# Patient Record
Sex: FEMALE | Race: WHITE | HISPANIC OR LATINO | Employment: FULL TIME | URBAN - METROPOLITAN AREA
[De-identification: names, ages, dates, MRNs, and addresses within clinical notes are randomized per-mention and may not be internally consistent; named-entity substitution may affect disease eponyms.]

---

## 2017-07-10 DIAGNOSIS — Z12.11 COLON CANCER SCREENING: ICD-10-CM

## 2018-07-10 LAB — PAP SMEAR: NORMAL

## 2018-11-19 ENCOUNTER — TELEPHONE (OUTPATIENT)
Dept: ENDOSCOPY | Facility: HOSPITAL | Age: 63
End: 2018-11-19

## 2018-11-19 NOTE — TELEPHONE ENCOUNTER
----- Message from Kim Ross sent at 11/19/2018  4:14 PM CST -----  Contact: self [phone]  Kade - returning your call - please call patient at [phone]

## 2018-11-21 ENCOUNTER — OFFICE VISIT (OUTPATIENT)
Dept: GASTROENTEROLOGY | Facility: CLINIC | Age: 63
End: 2018-11-21
Payer: COMMERCIAL

## 2018-11-21 ENCOUNTER — TELEPHONE (OUTPATIENT)
Dept: ENDOSCOPY | Facility: HOSPITAL | Age: 63
End: 2018-11-21

## 2018-11-21 ENCOUNTER — LAB VISIT (OUTPATIENT)
Dept: LAB | Facility: HOSPITAL | Age: 63
End: 2018-11-21
Payer: COMMERCIAL

## 2018-11-21 VITALS
HEIGHT: 61 IN | WEIGHT: 141 LBS | BODY MASS INDEX: 26.62 KG/M2 | DIASTOLIC BLOOD PRESSURE: 77 MMHG | HEART RATE: 67 BPM | SYSTOLIC BLOOD PRESSURE: 138 MMHG

## 2018-11-21 DIAGNOSIS — Z12.11 COLON CANCER SCREENING: Primary | ICD-10-CM

## 2018-11-21 DIAGNOSIS — R10.13 EPIGASTRIC ABDOMINAL PAIN: ICD-10-CM

## 2018-11-21 PROCEDURE — 3008F BODY MASS INDEX DOCD: CPT | Mod: CPTII,S$GLB,, | Performed by: PHYSICIAN ASSISTANT

## 2018-11-21 PROCEDURE — 86677 HELICOBACTER PYLORI ANTIBODY: CPT

## 2018-11-21 PROCEDURE — 99213 OFFICE O/P EST LOW 20 MIN: CPT | Mod: S$GLB,,, | Performed by: PHYSICIAN ASSISTANT

## 2018-11-21 PROCEDURE — 36415 COLL VENOUS BLD VENIPUNCTURE: CPT

## 2018-11-21 PROCEDURE — 99999 PR PBB SHADOW E&M-EST. PATIENT-LVL III: CPT | Mod: PBBFAC,,, | Performed by: PHYSICIAN ASSISTANT

## 2018-11-21 NOTE — PROGRESS NOTES
[facility] Consultation Note    Reason for Consult:  The primary encounter diagnosis was Colon cancer screening. A diagnosis of Epigastric abdominal pain was also pertinent to this visit.    PCP:   Prem Denny   No address on file    Referring MD:  Aaareferral Self  No address on file    HPI:  This is a 63 y.o. female here for colon cancer screening    Last colonoscopy 20years ago for abdominal pain  No polyps    Epigastric abdominal pain  Onset-few months  Palliative/Worse - increases with eating heavy food  Quality- burning  Radiation-no   Severity-8/10  Timing- after meals  No N/V  Denies melena  S/p tyrone - had trouble with anesthesia 20yrs ago    GERD after meals- couple times a month  Denies dysphagia    ASA 81 daily twice daily- before bed    ROS:  Constitutional: No fevers, chills, No weight loss  ENT: No allergies  CV: No chest pain  Pulm: No cough, No shortness of breath  Ophtho: No vision changes  GI: see HPI  Derm: No rash  Heme: No lymphadenopathy, No bruising  MSK: No arthritis  : No dysuria, No hematuria  Endo: No hot or cold intolerance  Neuro: No syncope, No seizure  Psych: No anxiety, No depression    Medical History:  has a past medical history of Hyperlipidemia.    Surgical History:  has a past surgical history that includes Cholecystectomy.    Family History: family history is not on file..     Social History:  reports that  has never smoked. She does not have any smokeless tobacco history on file. She reports that she does not drink alcohol or use drugs.    Review of patient's allergies indicates:  No Known Allergies    Current Outpatient Medications on File Prior to Visit   Medication Sig Dispense Refill    fish oil-omega-3 fatty acids 300-1,000 mg capsule Take 2 g by mouth once daily.      aspirin (ECOTRIN) 81 MG EC tablet Take 81 mg by mouth twice a week.      benzocaine-menthol (CEPACOL SORE THROAT, BOLA-MEN,) 15-2.6 mg Lozg 1 lozenge by Mucous Membrane  "route every 4 to 6 hours as needed. 18 lozenge 0    benzonatate (TESSALON) 100 MG capsule Take 1 capsule (100 mg total) by mouth 3 (three) times daily as needed for Cough. 21 capsule 0    cetirizine (ZYRTEC) 10 MG tablet Take 10 mg by mouth once daily.      ketoconazole (NIZORAL) 2 % cream       peg-electrolyte soln (TRILYTE WITH FLAVOR PACKETS) 420 gram SolR Take 4,000 mLs by mouth as directed. 1 Bottle 0     No current facility-administered medications on file prior to visit.          Objective Findings:    Vital Signs:  /77   Pulse 67   Ht 5' 1" (1.549 m)   Wt 64 kg (141 lb)   BMI 26.64 kg/m²   Body mass index is 26.64 kg/m².    Physical Exam:  General Appearance: Well appearing in no acute distress  Head:   Normocephalic, without obvious abnormality  Eyes:    No scleral icterus  ENT: Neck supple, Lips, mucosa, and tongue normal  Lungs: CTA bilaterally in anterior and posterior fields, no wheezes, no crackles.  Heart:  Regular rate and rhythm, S1, S2 normal, no murmurs heard  Abdomen: Soft, non tender, non distended with positive bowel sounds in all four quadrants. Extremities: no edema  Skin: No rash  Neurologic: AAO x 3      Labs:  Lab Results   Component Value Date    WBC 8.70 10/16/2015    HGB 13.4 10/16/2015    HCT 39.9 10/16/2015     10/16/2015    CHOL 216 (H) 10/16/2015    TRIG 131 10/16/2015    HDL 36 (L) 10/16/2015    ALT 10 10/16/2015    AST 15 10/16/2015     10/16/2015    K 4.1 10/16/2015     10/16/2015    CREATININE 0.8 10/16/2015    BUN 13 10/16/2015    CO2 27 10/16/2015    TSH 4.132 (H) 10/16/2015    HGBA1C 5.5 10/16/2015       Imaging:    Endoscopy:      Assessment:  1. Colon cancer screening    2. Epigastric abdominal pain       62yo F here to schedule a colonoscopy    Occasional abdominal pain after meals. Will rule ou H. pylori    Recommendations:  1. Schedule colonoscopy to rule out malignancies    2. Lab to rule out H. pylori    No Follow-up on file.      Order " summary:  Orders Placed This Encounter    H. PYLORI ANTIBODY, IGG    Case request GI: COLONOSCOPY         Thank you so much for allowing me to participate in the care of Raine Gunnar Braun PA-C

## 2018-11-24 LAB — H PYLORI IGG SERPL QL IA: NEGATIVE

## 2018-11-27 DIAGNOSIS — Z12.11 SPECIAL SCREENING FOR MALIGNANT NEOPLASMS, COLON: Primary | ICD-10-CM

## 2018-11-27 RX ORDER — POLYETHYLENE GLYCOL 3350, SODIUM SULFATE ANHYDROUS, SODIUM BICARBONATE, SODIUM CHLORIDE, POTASSIUM CHLORIDE 236; 22.74; 6.74; 5.86; 2.97 G/4L; G/4L; G/4L; G/4L; G/4L
4 POWDER, FOR SOLUTION ORAL ONCE
Qty: 4000 ML | Refills: 0 | Status: SHIPPED | OUTPATIENT
Start: 2018-11-27 | End: 2018-11-27

## 2019-01-03 ENCOUNTER — ANESTHESIA (OUTPATIENT)
Dept: ENDOSCOPY | Facility: HOSPITAL | Age: 64
End: 2019-01-03
Payer: COMMERCIAL

## 2019-01-03 ENCOUNTER — ANESTHESIA EVENT (OUTPATIENT)
Dept: ENDOSCOPY | Facility: HOSPITAL | Age: 64
End: 2019-01-03
Payer: COMMERCIAL

## 2019-01-03 ENCOUNTER — HOSPITAL ENCOUNTER (OUTPATIENT)
Facility: HOSPITAL | Age: 64
Discharge: HOME | End: 2019-01-03
Attending: INTERNAL MEDICINE | Admitting: INTERNAL MEDICINE
Payer: COMMERCIAL

## 2019-01-03 VITALS
DIASTOLIC BLOOD PRESSURE: 75 MMHG | TEMPERATURE: 97 F | OXYGEN SATURATION: 100 % | WEIGHT: 142 LBS | RESPIRATION RATE: 20 BRPM | HEART RATE: 66 BPM | HEIGHT: 62 IN | SYSTOLIC BLOOD PRESSURE: 144 MMHG | BODY MASS INDEX: 26.13 KG/M2

## 2019-01-03 DIAGNOSIS — Z12.11 ENCOUNTER FOR SCREENING COLONOSCOPY: Primary | ICD-10-CM

## 2019-01-03 PROCEDURE — G0121 COLON CA SCRN NOT HI RSK IND: HCPCS | Performed by: INTERNAL MEDICINE

## 2019-01-03 PROCEDURE — 37000009 HC ANESTHESIA EA ADD 15 MINS: Performed by: INTERNAL MEDICINE

## 2019-01-03 PROCEDURE — E9220 PRA ENDO ANESTHESIA: ICD-10-PCS | Mod: ,,, | Performed by: NURSE ANESTHETIST, CERTIFIED REGISTERED

## 2019-01-03 PROCEDURE — 25000003 PHARM REV CODE 250: Performed by: INTERNAL MEDICINE

## 2019-01-03 PROCEDURE — 37000008 HC ANESTHESIA 1ST 15 MINUTES: Performed by: INTERNAL MEDICINE

## 2019-01-03 PROCEDURE — G0121 COLON CA SCRN NOT HI RSK IND: HCPCS | Mod: ,,, | Performed by: INTERNAL MEDICINE

## 2019-01-03 PROCEDURE — E9220 PRA ENDO ANESTHESIA: HCPCS | Mod: ,,, | Performed by: NURSE ANESTHETIST, CERTIFIED REGISTERED

## 2019-01-03 PROCEDURE — 63600175 PHARM REV CODE 636 W HCPCS: Performed by: NURSE ANESTHETIST, CERTIFIED REGISTERED

## 2019-01-03 PROCEDURE — G0121 COLON CA SCRN NOT HI RSK IND: ICD-10-PCS | Mod: ,,, | Performed by: INTERNAL MEDICINE

## 2019-01-03 RX ORDER — SODIUM CHLORIDE 0.9 % (FLUSH) 0.9 %
3 SYRINGE (ML) INJECTION
Status: DISCONTINUED | OUTPATIENT
Start: 2019-01-03 | End: 2019-01-03 | Stop reason: HOSPADM

## 2019-01-03 RX ORDER — SODIUM CHLORIDE 9 MG/ML
INJECTION, SOLUTION INTRAVENOUS CONTINUOUS
Status: DISCONTINUED | OUTPATIENT
Start: 2019-01-03 | End: 2019-01-03 | Stop reason: HOSPADM

## 2019-01-03 RX ORDER — LIDOCAINE HCL/PF 100 MG/5ML
SYRINGE (ML) INTRAVENOUS
Status: DISCONTINUED | OUTPATIENT
Start: 2019-01-03 | End: 2019-01-03

## 2019-01-03 RX ORDER — PROPOFOL 10 MG/ML
VIAL (ML) INTRAVENOUS
Status: DISCONTINUED | OUTPATIENT
Start: 2019-01-03 | End: 2019-01-03

## 2019-01-03 RX ORDER — PROPOFOL 10 MG/ML
VIAL (ML) INTRAVENOUS CONTINUOUS PRN
Status: DISCONTINUED | OUTPATIENT
Start: 2019-01-03 | End: 2019-01-03

## 2019-01-03 RX ADMIN — SODIUM CHLORIDE: 0.9 INJECTION, SOLUTION INTRAVENOUS at 09:01

## 2019-01-03 RX ADMIN — LIDOCAINE HYDROCHLORIDE 50 MG: 20 INJECTION, SOLUTION INTRAVENOUS at 09:01

## 2019-01-03 RX ADMIN — PROPOFOL 175 MCG/KG/MIN: 10 INJECTION, EMULSION INTRAVENOUS at 09:01

## 2019-01-03 RX ADMIN — PROPOFOL 80 MG: 10 INJECTION, EMULSION INTRAVENOUS at 09:01

## 2019-01-03 NOTE — DISCHARGE INSTRUCTIONS
Colonoscopy     A camera attached to a flexible tube with a viewing lens is used to take video pictures.     Colonoscopy is a test to view the inside of your lower digestive tract (colon and rectum). Sometimes it can show the last part of the small intestine (ileum). During the test, small pieces of tissue may be removed for testing. This is called a biopsy. Small growths, such as polyps, may also be removed.   Why is colonoscopy done?  The test is done to help look for colon cancer. And it can help find the source of abdominal pain, bleeding, and changes in bowel habits. It may be needed once a year, depending on factors such as your:  · Age  · Health history  · Family health history  · Symptoms  · Results from any prior colonoscopy  Risks and possible complications  These include:  · Bleeding               · A puncture or tear in the colon   · Risks of anesthesia  · A cancer lesion not being seen  Getting ready   To prepare for the test:  · Talk with your healthcare provider about the risks of the test (see below). Also ask your healthcare provider about alternatives to the test.  · Tell your healthcare provider about any medicines you take. Also tell him or her about any health conditions you may have.  · Make sure your rectum and colon are empty for the test. Follow the diet and bowel prep instructions exactly. If you dont, the test may need to be rescheduled.  · Plan for a friend or family member to drive you home after the test.     Colonoscopy provides an inside view of the entire colon.     You may discuss the results with your doctor right away or at a future visit.  During the test   The test is usually done in the hospital on an outpatient basis. This means you go home the same day. The procedure takes about 30 minutes. During that time:  · You are given relaxing (sedating) medicine through an IV line. You may be drowsy, or fully asleep.  · The healthcare provider will first give you a physical exam to  check for anal and rectal problems.  · Then the anus is lubricated and the scope inserted.  · If you are awake, you may have a feeling similar to needing to have a bowel movement. You may also feel pressure as air is pumped into the colon. Its OK to pass gas during the procedure.  · Biopsy, polyp removal, or other treatments may be done during the test.  After the test   You may have gas right after the test. It can help to try to pass it to help prevent later bloating. Your healthcare provider may discuss the results with you right away. Or you may need to schedule a follow-up visit to talk about the results. After the test, you can go back to your normal eating and other activities. You may be tired from the sedation and need to rest for a few hours.  Date Last Reviewed: 11/1/2016 © 2000-2017 [employer]. [address] [place] [address], [place] [address]. All rights reserved. This information is not intended as a substitute for professional medical care. Always follow your healthcare professional's instructions.

## 2019-01-03 NOTE — TRANSFER OF CARE
"Anesthesia Transfer of Care Note    Patient: Raine Medley    Procedure(s) Performed: Procedure(s) (LRB):  COLONOSCOPY (N/A)    Patient location: PACU    Anesthesia Type: general    Transport from OR: Transported from OR on 6-10 L/min O2 by face mask with adequate spontaneous ventilation    Post pain: adequate analgesia    Post assessment: no apparent anesthetic complications    Post vital signs: stable    Level of consciousness: sedated    Nausea/Vomiting: no nausea/vomiting    Complications: none    Transfer of care protocol was followed      Last vitals:   Visit Vitals  BP (!) 156/70 (BP Location: Left arm, Patient Position: Lying)   Pulse 67   Temp 36.6 °C (97.9 °F) (Temporal)   Resp 18   Ht 5' 2" (1.575 m)   Wt 64.4 kg (142 lb)   SpO2 99%   Breastfeeding? No   BMI 25.97 kg/m²     "

## 2019-01-03 NOTE — ANESTHESIA PREPROCEDURE EVALUATION
01/03/2019  Raine Medley is a 63 y.o., female.    Anesthesia Evaluation    I have reviewed the Patient Summary Reports.     I have reviewed the Medications.     Review of Systems  Hematology/Oncology:  Hematology Normal   Oncology Normal     EENT/Dental:EENT/Dental Normal   Cardiovascular:  Cardiovascular Normal  hyperlipidemia   Pulmonary:  Pulmonary Normal    Renal/:  Renal/ Normal     Hepatic/GI:  Hepatic/GI Normal    Musculoskeletal:  Musculoskeletal Normal    Neurological:  Neurology Normal    Endocrine:  Endocrine Normal    Dermatological:  Skin Normal        Physical Exam  General:  Well nourished    Airway/Jaw/Neck:  Airway Findings: Mouth Opening: Normal Mallampati: II  Jaw/Neck Findings:     Eyes/Ears/Nose:  EYES/EARS/NOSE FINDINGS: Normal   Dental:  Dental Findings: In tact   Chest/Lungs:  Chest/Lungs Clear    Heart/Vascular:  Heart Findings: Normal Heart murmur: negative Vascular Findings: Normal    Abdomen:  Abdomen Findings: Normal    Musculoskeletal:  Musculoskeletal Findings: Normal   Skin:  Skin Findings: Normal    Mental Status:  Mental Status Findings: Normal        Anesthesia Plan  Type of Anesthesia, risks & benefits discussed:  Anesthesia Type:  general  Patient's Preference: general  Intra-op Monitoring Plan: standard ASA monitors  Intra-op Monitoring Plan Comments:   Post Op Pain Control Plan:   Post Op Pain Control Plan Comments:   Induction:   IV  Beta Blocker:  Patient is not currently on a Beta-Blocker (No further documentation required).       Informed Consent: Patient understands risks and agrees with Anesthesia plan.  Questions answered. Anesthesia consent signed with patient.  ASA Score: 2     Day of Surgery Review of History & Physical:  There are no significant changes.          Ready For Surgery From Anesthesia Perspective.

## 2019-01-03 NOTE — [employer] PATIENT INSTRUCTIONS
Discharge Summary/Instructions after an Endoscopic Procedure  Patient Name: Raine Medley  Patient MRN: [id number]  Patient YOB: 1955 Thursday, January 03, 2019  Gaston Beebe MD  RESTRICTIONS:  During your procedure today, you received medications for sedation.  These   medications may affect your judgment, balance and coordination.  Therefore,   for 24 hours, you have the following restrictions:   - DO NOT drive a car, operate machinery, make legal/financial decisions,   sign important papers or drink alcohol.    ACTIVITY:  Today: no heavy lifting, straining or running due to procedural   sedation/anesthesia.  The following day: return to full activity including work.  DIET:  Eat and drink normally unless instructed otherwise.     TREATMENT FOR COMMON SIDE EFFECTS:  - Mild abdominal pain, nausea, belching, bloating or excessive gas:  rest,   eat lightly and use a heating pad.  - Sore Throat: treat with throat lozenges and/or gargle with warm salt   water.  - Because air was used during the procedure, expelling large amounts of air   from your rectum or belching is normal.  - If a bowel prep was taken, you may not have a bowel movement for 1-3 days.    This is normal.  SYMPTOMS TO WATCH FOR AND REPORT TO YOUR PHYSICIAN:  1. Abdominal pain or bloating, other than gas cramps.  2. Chest pain.  3. Back pain.  4. Signs of infection such as: chills or fever occurring within 24 hours   after the procedure.  5. Rectal bleeding, which would show as bright red, maroon, or black stools.   (A tablespoon of blood from the rectum is not serious, especially if   hemorrhoids are present.)  6. Vomiting.  7. Weakness or dizziness.  GO DIRECTLY TO THE NEAREST EMERGENCY ROOM IF YOU HAVE ANY OF THE FOLLOWING:      Difficulty breathing              Chills and/or fever over 101 F   Persistent vomiting and/or vomiting blood   Severe abdominal pain   Severe chest pain   Black, tarry stools   Bleeding- more than one  tablespoon   Any other symptom or condition that you feel may need urgent attention  Your doctor recommends these additional instructions:  If any biopsies were taken, your doctors clinic will contact you in 1 to 2   weeks with any results.  - Discharge patient to home.   - Patient has a contact number available for emergencies.  The signs and   symptoms of potential delayed complications were discussed with the   patient.  Return to normal activities tomorrow.  Written discharge   instructions were provided to the patient.   - Resume previous diet.   - Continue present medications.   - Repeat colonoscopy in 10 years for screening purposes.   - Return to referring physician.   For questions, problems or results please call your physician - Gaston Beebe MD at Work:  [phone].  [facility], EMERGENCY ROOM PHONE NUMBER: [phone]  IF A COMPLICATION OR EMERGENCY SITUATION ARISES AND YOU ARE UNABLE TO REACH   YOUR PHYSICIAN - GO DIRECTLY TO THE EMERGENCY ROOM.  Gaston Beebe MD  1/3/2019 9:56:53 AM  This report has been verified and signed electronically.  [employer]

## 2019-01-03 NOTE — H&P
Short Stay Endoscopy History and Physical    PCP - Prem Denny MD  Referring Physician - Italo Braun PA-C  [address] [place] [address]    Procedure - Colonoscopy  ASA - per anesthesia  Mallampati - per anesthesia  History of Anesthesia problems - no  Family history Anesthesia problems -  no   Plan of anesthesia - General    HPI  63 y.o. female  Reason for procedure: screening colonoscopy      ROS:  Constitutional: No fevers, chills, No weight loss  CV: No chest pain  Pulm: No cough, No shortness of breath  GI: see HPI    Medical History:  has a past medical history of Hyperlipidemia.    Surgical History:  has a past surgical history that includes Cholecystectomy.    Family History: family history is not on file.. Otherwise no colon cancer, inflammatory bowel disease, or GI malignancies.    Social History:  reports that  has never smoked. She does not have any smokeless tobacco history on file. She reports that she does not drink alcohol or use drugs.    Review of patient's allergies indicates:  No Known Allergies    Medications:   Medications Prior to Admission   Medication Sig Dispense Refill Last Dose    aspirin (ECOTRIN) 81 MG EC tablet Take 81 mg by mouth twice a week.   Taking    benzocaine-menthol (CEPACOL SORE THROAT, BOLA-MEN,) 15-2.6 mg Lozg 1 lozenge by Mucous Membrane route every 4 to 6 hours as needed. 18 lozenge 0 Not Taking    benzonatate (TESSALON) 100 MG capsule Take 1 capsule (100 mg total) by mouth 3 (three) times daily as needed for Cough. 21 capsule 0 Not Taking    cetirizine (ZYRTEC) 10 MG tablet Take 10 mg by mouth once daily.   Not Taking    fish oil-omega-3 fatty acids 300-1,000 mg capsule Take 2 g by mouth once daily.   Taking    ketoconazole (NIZORAL) 2 % cream    Not Taking    peg-electrolyte soln (TRILYTE WITH FLAVOR PACKETS) 420 gram SolR Take 4,000 mLs by mouth as directed. 1 Bottle 0 Not Taking       Physical Exam:    Vital Signs: There were no  vitals filed for this visit.    General Appearance: Well appearing in no acute distress  Abdomen: Soft, non tender, non distended with normal bowel sounds, no masses    Labs:  Lab Results   Component Value Date    WBC 8.70 10/16/2015    HGB 13.4 10/16/2015    HCT 39.9 10/16/2015     10/16/2015    CHOL 216 (H) 10/16/2015    TRIG 131 10/16/2015    HDL 36 (L) 10/16/2015    ALT 10 10/16/2015    AST 15 10/16/2015     10/16/2015    K 4.1 10/16/2015     10/16/2015    CREATININE 0.8 10/16/2015    BUN 13 10/16/2015    CO2 27 10/16/2015    TSH 4.132 (H) 10/16/2015    HGBA1C 5.5 10/16/2015       I have explained the risks and benefits of this endoscopic procedure to the patient including but not limited to bleeding, inflammation, infection, perforation, and death.      Gaston Beebe MD

## 2019-01-10 ENCOUNTER — TELEPHONE (OUTPATIENT)
Dept: ENDOSCOPY | Facility: HOSPITAL | Age: 64
End: 2019-01-10

## 2019-01-29 DIAGNOSIS — M54.2 CERVICALGIA: Primary | ICD-10-CM

## 2019-01-29 DIAGNOSIS — R10.9 STOMACH PAIN: Primary | ICD-10-CM

## 2019-01-29 LAB
CHOLEST SERPL-MSCNC: 204 MG/DL (ref 100–200)
HDLC SERPL-MCNC: 34 MG/DL (ref 35–70)
LDLC SERPL CALC-MCNC: 138 MG/DL (ref 0–99)
TRIGL SERPL-MCNC: 158 MG/DL (ref 40–160)
VLDLC SERPL-MCNC: 32 MG/DL (ref 5–40)

## 2019-02-11 ENCOUNTER — TELEPHONE (OUTPATIENT)
Dept: GASTROENTEROLOGY | Facility: CLINIC | Age: 64
End: 2019-02-11

## 2019-02-11 NOTE — TELEPHONE ENCOUNTER
Attempted to return pt call pt didn't answer left vm instructing pt to call back.         ----- Message from Rohini Joaquin sent at 2/11/2019 11:53 AM CST -----  Contact: self  Pt is returning a call she missed from the office to schedule a procedure.    Pt would like a call back at [phone]. Per pt, please leave a message if she doesn't answer.     Thank you

## 2019-02-22 ENCOUNTER — OFFICE VISIT (OUTPATIENT)
Dept: GASTROENTEROLOGY | Facility: CLINIC | Age: 64
End: 2019-02-22
Payer: COMMERCIAL

## 2019-02-22 ENCOUNTER — TELEPHONE (OUTPATIENT)
Dept: ENDOSCOPY | Facility: HOSPITAL | Age: 64
End: 2019-02-22

## 2019-02-22 VITALS
HEIGHT: 61 IN | SYSTOLIC BLOOD PRESSURE: 147 MMHG | WEIGHT: 138 LBS | HEART RATE: 60 BPM | BODY MASS INDEX: 26.06 KG/M2 | DIASTOLIC BLOOD PRESSURE: 76 MMHG

## 2019-02-22 DIAGNOSIS — R10.13 EPIGASTRIC PAIN: Primary | ICD-10-CM

## 2019-02-22 DIAGNOSIS — R14.0 ABDOMINAL BLOATING: ICD-10-CM

## 2019-02-22 PROCEDURE — 99214 PR OFFICE/OUTPT VISIT, EST, LEVL IV, 30-39 MIN: ICD-10-PCS | Mod: S$GLB,,, | Performed by: PHYSICIAN ASSISTANT

## 2019-02-22 PROCEDURE — 3008F PR BODY MASS INDEX (BMI) DOCUMENTED: ICD-10-PCS | Mod: CPTII,S$GLB,, | Performed by: PHYSICIAN ASSISTANT

## 2019-02-22 PROCEDURE — 3008F BODY MASS INDEX DOCD: CPT | Mod: CPTII,S$GLB,, | Performed by: PHYSICIAN ASSISTANT

## 2019-02-22 PROCEDURE — 99999 PR PBB SHADOW E&M-EST. PATIENT-LVL IV: CPT | Mod: PBBFAC,,, | Performed by: PHYSICIAN ASSISTANT

## 2019-02-22 PROCEDURE — 99999 PR PBB SHADOW E&M-EST. PATIENT-LVL IV: ICD-10-PCS | Mod: PBBFAC,,, | Performed by: PHYSICIAN ASSISTANT

## 2019-02-22 PROCEDURE — 99214 OFFICE O/P EST MOD 30 MIN: CPT | Mod: S$GLB,,, | Performed by: PHYSICIAN ASSISTANT

## 2019-02-22 RX ORDER — PANTOPRAZOLE SODIUM 40 MG/1
40 TABLET, DELAYED RELEASE ORAL DAILY
Qty: 30 TABLET | Refills: 3 | Status: SHIPPED | OUTPATIENT
Start: 2019-02-22 | End: 2020-11-04

## 2019-02-22 NOTE — LETTER
February 22, 2019      Prem Denny MD  [address]  [address]  [facility] [address]           [facility] Gastroenter[facility]  [address]  [place] [address]  Phone: [phone]  Fax: [phone]          Patient: Raine Medley   MR Number: [id number]   YOB: 1955   Date of Visit: 2/22/2019       Dear Dr. Prem Denny:    Thank you for referring Raine Medley to me for evaluation. Attached you will find relevant portions of my assessment and plan of care.    If you have questions, please do not hesitate to call me. I look forward to following Raine Medley along with you.    Sincerely,    COLEMAN Seguraosure  CC:  No Recipients    If you would like to receive this communication electronically, please contact [email or web address] or [phone] to request more information on [employer] access.    For providers and/or their staff who would like to refer a patient to [facility], please contact us through our one-stop-shop provider referral line, [facility], at [phone].    If you feel you have received this communication in error or would no longer like to receive these types of communications, please e-mail [email or web address]

## 2019-02-22 NOTE — PROGRESS NOTES
[facility] Consultation Note    Reason for Consult:  The primary encounter diagnosis was Epigastric pain. A diagnosis of Abdominal bloating was also pertinent to this visit.    PCP:   Prem Denny   [address] / [facility] [address]    Referring MD:  Prem Denny Md  [address]  [address]  [place] [address]    HPI:  This is a 63 y.o. female here to follow up on her abdominal pain  HP IgG neg  Epigastric pain 1-2 times a week  Feels like a rock  Trigger Fried foods and acidic foods  Frequent bloating - even with drinking water  prilosec made her stomach hurt more  S/p tyrone    11/2018 visit notes  Epigastric abdominal pain  Onset-few months  Palliative/Worse - increases with eating heavy food  Quality- burning  Radiation-no   Severity-8/10  Timing- after meals  No N/V  Denies melena  S/p tyrone - had trouble with anesthesia 20yrs ago    GERD after meals- couple times a month  Denies dysphagia    ASA 81 daily- before bed    ROS:  Constitutional: No fevers, chills, No weight loss  ENT: No allergies  CV: No chest pain  Pulm: No cough, No shortness of breath  Ophtho: No vision changes  GI: see HPI  Derm: No rash  Heme: No lymphadenopathy, No bruising  MSK: No arthritis  : No dysuria, No hematuria  Endo: No hot or cold intolerance  Neuro: No syncope, No seizure  Psych: No anxiety, No depression    Medical History:  has a past medical history of Hyperlipidemia.    Surgical History:  has a past surgical history that includes Cholecystectomy and Colonoscopy (N/A, 1/3/2019).    Family History: family history is not on file..     Social History:  reports that  has never smoked. she has never used smokeless tobacco. She reports that she does not drink alcohol or use drugs.    Review of patient's allergies indicates:  No Known Allergies    Current Outpatient Medications on File Prior to Visit   Medication Sig Dispense Refill    aspirin (ECOTRIN) 81 MG EC tablet  "Take 81 mg by mouth twice a week.      benzocaine-menthol (CEPACOL SORE THROAT, BOLA-MEN,) 15-2.6 mg Lozg 1 lozenge by Mucous Membrane route every 4 to 6 hours as needed. 18 lozenge 0    benzonatate (TESSALON) 100 MG capsule Take 1 capsule (100 mg total) by mouth 3 (three) times daily as needed for Cough. 21 capsule 0    cetirizine (ZYRTEC) 10 MG tablet Take 10 mg by mouth once daily.      fish oil-omega-3 fatty acids 300-1,000 mg capsule Take 2 g by mouth once daily.      ketoconazole (NIZORAL) 2 % cream       peg-electrolyte soln (TRILYTE WITH FLAVOR PACKETS) 420 gram SolR Take 4,000 mLs by mouth as directed. 1 Bottle 0     No current facility-administered medications on file prior to visit.          Objective Findings:    Vital Signs:  BP (!) 147/76   Pulse 60   Ht 5' 1" (1.549 m)   Wt 62.6 kg (138 lb)   BMI 26.07 kg/m²   Body mass index is 26.07 kg/m².    Physical Exam:  General Appearance: Well appearing in no acute distress  Head:   Normocephalic, without obvious abnormality  Eyes:    No scleral icterus  ENT: Neck supple, Lips, mucosa, and tongue normal  Lungs: CTA bilaterally in anterior and posterior fields, no wheezes, no crackles.  Heart:  Regular rate and rhythm, S1, S2 normal, no murmurs heard  Abdomen: Soft, epigastric tenderness, no guarding or rebound tenderness non distended with positive bowel sounds in all four quadrants. Extremities: no edema  Skin: No rash  Neurologic: AAO x 3      Labs:  Lab Results   Component Value Date    WBC 8.70 10/16/2015    HGB 13.4 10/16/2015    HCT 39.9 10/16/2015     10/16/2015    CHOL 216 (H) 10/16/2015    TRIG 131 10/16/2015    HDL 36 (L) 10/16/2015    ALT 10 10/16/2015    AST 15 10/16/2015     10/16/2015    K 4.1 10/16/2015     10/16/2015    CREATININE 0.8 10/16/2015    BUN 13 10/16/2015    CO2 27 10/16/2015    TSH 4.132 (H) 10/16/2015    HGBA1C 5.5 10/16/2015       Imaging:    Endoscopy:      Assessment:  1. Epigastric pain    2. " Abdominal bloating       64yo F with epigastric pain 1-2 times a week with meals. ASA 81 on empty stomach. >HP neg. No anemia. Need to rule out ulcers    Recommendations:  1. Schedule EGD to rule out gastric ulcers  2. Trial of protonix 40mg x 12 weeks    No Follow-up on file.      Order summary:  Orders Placed This Encounter    pantoprazole (PROTONIX) 40 MG tablet    Case request GI: EGD (ESOPHAGOGASTRODUODENOSCOPY)         Thank you so much for allowing me to participate in the care of Raine Braun PA-C

## 2019-04-04 ENCOUNTER — TELEPHONE (OUTPATIENT)
Dept: GASTROENTEROLOGY | Facility: CLINIC | Age: 64
End: 2019-04-04

## 2019-04-04 NOTE — TELEPHONE ENCOUNTER
Ma contact pt no answer left message to return call       ----- Message from Alexia Mejía sent at 4/4/2019 11:47 AM CDT -----  Contact: Self  Pt is calling to speak with Staff regarding cancelling her 4/15/19 appt.    She can be reached at [phone].    Thank you.

## 2020-07-31 ENCOUNTER — LAB VISIT (OUTPATIENT)
Dept: PRIMARY CARE CLINIC | Facility: OTHER | Age: 65
End: 2020-07-31
Attending: INTERNAL MEDICINE
Payer: COMMERCIAL

## 2020-07-31 DIAGNOSIS — Z03.818 ENCOUNTER FOR OBSERVATION FOR SUSPECTED EXPOSURE TO OTHER BIOLOGICAL AGENTS RULED OUT: ICD-10-CM

## 2020-07-31 PROCEDURE — U0003 INFECTIOUS AGENT DETECTION BY NUCLEIC ACID (DNA OR RNA); SEVERE ACUTE RESPIRATORY SYNDROME CORONAVIRUS 2 (SARS-COV-2) (CORONAVIRUS DISEASE [COVID-19]), AMPLIFIED PROBE TECHNIQUE, MAKING USE OF HIGH THROUGHPUT TECHNOLOGIES AS DESCRIBED BY CMS-2020-01-R: HCPCS

## 2020-08-03 LAB — SARS-COV-2 RNA RESP QL NAA+PROBE: NORMAL

## 2020-10-21 ENCOUNTER — PATIENT OUTREACH (OUTPATIENT)
Dept: ADMINISTRATIVE | Facility: HOSPITAL | Age: 65
End: 2020-10-21

## 2020-10-21 NOTE — PROGRESS NOTES
Chart audit performed - Health maintenance updated - My chart portal message sent. Unable to update links d/t error

## 2020-11-04 ENCOUNTER — OFFICE VISIT (OUTPATIENT)
Dept: FAMILY MEDICINE | Facility: CLINIC | Age: 65
End: 2020-11-04
Payer: COMMERCIAL

## 2020-11-04 VITALS
SYSTOLIC BLOOD PRESSURE: 132 MMHG | WEIGHT: 136.88 LBS | OXYGEN SATURATION: 100 % | HEIGHT: 61 IN | DIASTOLIC BLOOD PRESSURE: 84 MMHG | BODY MASS INDEX: 25.84 KG/M2 | TEMPERATURE: 97 F | HEART RATE: 60 BPM

## 2020-11-04 DIAGNOSIS — Z11.4 ENCOUNTER FOR SCREENING FOR HIV: ICD-10-CM

## 2020-11-04 DIAGNOSIS — Z12.31 ENCOUNTER FOR SCREENING MAMMOGRAM FOR BREAST CANCER: ICD-10-CM

## 2020-11-04 DIAGNOSIS — Z00.00 ANNUAL PHYSICAL EXAM: Primary | ICD-10-CM

## 2020-11-04 PROCEDURE — 99999 PR PBB SHADOW E&M-EST. PATIENT-LVL IV: CPT | Mod: PBBFAC,,, | Performed by: FAMILY MEDICINE

## 2020-11-04 PROCEDURE — 99386 PREV VISIT NEW AGE 40-64: CPT | Mod: S$GLB,,, | Performed by: FAMILY MEDICINE

## 2020-11-04 PROCEDURE — 3008F BODY MASS INDEX DOCD: CPT | Mod: CPTII,S$GLB,, | Performed by: FAMILY MEDICINE

## 2020-11-04 PROCEDURE — 99999 PR PBB SHADOW E&M-EST. PATIENT-LVL IV: ICD-10-PCS | Mod: PBBFAC,,, | Performed by: FAMILY MEDICINE

## 2020-11-04 PROCEDURE — 99386 PR PREVENTIVE VISIT,NEW,40-64: ICD-10-PCS | Mod: S$GLB,,, | Performed by: FAMILY MEDICINE

## 2020-11-04 PROCEDURE — 3008F PR BODY MASS INDEX (BMI) DOCUMENTED: ICD-10-PCS | Mod: CPTII,S$GLB,, | Performed by: FAMILY MEDICINE

## 2020-11-04 NOTE — PROGRESS NOTES
Subjective:       Patient ID: Raine Medley is a 65 y.o. female.    Chief Complaint: Establish Care and Annual Exam    65 years old female came to the clinic for her physical examination.  Patient due for her mammogram.  Patient due for her screening for HIV.    Review of Systems   Constitutional: Negative.    HENT: Negative.    Eyes: Negative.    Respiratory: Negative.    Cardiovascular: Negative.    Gastrointestinal: Negative.    Genitourinary: Negative.    Musculoskeletal: Negative.    Neurological: Negative.    Psychiatric/Behavioral: Negative.          Objective:      Physical Exam  Constitutional:       General: She is not in acute distress.     Appearance: She is well-developed. She is not diaphoretic.   HENT:      Head: Normocephalic and atraumatic.      Right Ear: External ear normal.      Left Ear: External ear normal.      Nose: Nose normal.      Mouth/Throat:      Pharynx: No oropharyngeal exudate.   Eyes:      General: No scleral icterus.        Right eye: No discharge.         Left eye: No discharge.      Conjunctiva/sclera: Conjunctivae normal.      Pupils: Pupils are equal, round, and reactive to light.   Neck:      Musculoskeletal: Normal range of motion and neck supple.      Thyroid: No thyromegaly.      Vascular: No JVD.      Trachea: No tracheal deviation.   Cardiovascular:      Rate and Rhythm: Normal rate and regular rhythm.      Heart sounds: Normal heart sounds. No murmur. No friction rub. No gallop.    Pulmonary:      Effort: Pulmonary effort is normal. No respiratory distress.      Breath sounds: Normal breath sounds. No stridor. No wheezing or rales.   Chest:      Chest wall: No tenderness.   Abdominal:      General: Bowel sounds are normal. There is no distension.      Palpations: Abdomen is soft. There is no mass.      Tenderness: There is no abdominal tenderness. There is no guarding or rebound.   Musculoskeletal: Normal range of motion.         General: No tenderness.    Lymphadenopathy:      Cervical: No cervical adenopathy.   Skin:     General: Skin is warm and dry.      Coloration: Skin is not pale.      Findings: No erythema or rash.   Neurological:      Mental Status: She is alert and oriented to person, place, and time.      Cranial Nerves: No cranial nerve deficit.      Motor: No abnormal muscle tone.      Coordination: Coordination normal.      Deep Tendon Reflexes: Reflexes are normal and symmetric.   Psychiatric:         Behavior: Behavior normal.         Thought Content: Thought content normal.         Judgment: Judgment normal.         Assessment:       1. Annual physical exam    2. Encounter for screening for HIV    3. Encounter for screening mammogram for breast cancer        Plan:         Raine was seen today for establish care and annual exam.    Diagnoses and all orders for this visit:    Annual physical exam  -     Lipid Panel; Future  -     Urinalysis; Future  -     TSH; Future  -     CBC Auto Differential; Future  -     Comprehensive Metabolic Panel; Future  -     HIV 1/2 Ag/Ab (4th Gen); Future    Encounter for screening for HIV  -     HIV 1/2 Ag/Ab (4th Gen); Future    Encounter for screening mammogram for breast cancer  -     Mammo Digital Screening Bilat; Future

## 2020-11-21 ENCOUNTER — HOSPITAL ENCOUNTER (OUTPATIENT)
Dept: RADIOLOGY | Facility: HOSPITAL | Age: 65
Discharge: HOME | End: 2020-11-21
Attending: FAMILY MEDICINE
Payer: COMMERCIAL

## 2020-11-21 VITALS — HEIGHT: 61 IN | BODY MASS INDEX: 25.68 KG/M2 | WEIGHT: 136 LBS

## 2020-11-21 DIAGNOSIS — Z12.31 ENCOUNTER FOR SCREENING MAMMOGRAM FOR BREAST CANCER: ICD-10-CM

## 2020-11-21 PROCEDURE — 77063 MAMMO DIGITAL SCREENING BILAT WITH TOMO: ICD-10-PCS | Mod: 26,,, | Performed by: RADIOLOGY

## 2020-11-21 PROCEDURE — 77067 SCR MAMMO BI INCL CAD: CPT | Mod: 26,,, | Performed by: RADIOLOGY

## 2020-11-21 PROCEDURE — 77067 MAMMO DIGITAL SCREENING BILAT WITH TOMO: ICD-10-PCS | Mod: 26,,, | Performed by: RADIOLOGY

## 2020-11-21 PROCEDURE — 77067 SCR MAMMO BI INCL CAD: CPT | Mod: TC

## 2020-11-21 PROCEDURE — 77063 BREAST TOMOSYNTHESIS BI: CPT | Mod: 26,,, | Performed by: RADIOLOGY

## 2021-03-30 ENCOUNTER — OFFICE VISIT (OUTPATIENT)
Dept: FAMILY MEDICINE | Facility: CLINIC | Age: 66
End: 2021-03-30
Payer: COMMERCIAL

## 2021-03-30 VITALS
DIASTOLIC BLOOD PRESSURE: 66 MMHG | HEART RATE: 66 BPM | OXYGEN SATURATION: 98 % | WEIGHT: 133.38 LBS | BODY MASS INDEX: 25.18 KG/M2 | SYSTOLIC BLOOD PRESSURE: 112 MMHG | HEIGHT: 61 IN

## 2021-03-30 DIAGNOSIS — G89.29 CHRONIC PAIN OF BOTH KNEES: ICD-10-CM

## 2021-03-30 DIAGNOSIS — M25.512 CHRONIC LEFT SHOULDER PAIN: ICD-10-CM

## 2021-03-30 DIAGNOSIS — R07.89 ATYPICAL CHEST PAIN: Primary | ICD-10-CM

## 2021-03-30 DIAGNOSIS — E78.5 DYSLIPIDEMIA: ICD-10-CM

## 2021-03-30 DIAGNOSIS — M25.561 CHRONIC PAIN OF BOTH KNEES: ICD-10-CM

## 2021-03-30 DIAGNOSIS — G89.29 CHRONIC LEFT SHOULDER PAIN: ICD-10-CM

## 2021-03-30 DIAGNOSIS — M25.562 CHRONIC PAIN OF BOTH KNEES: ICD-10-CM

## 2021-03-30 PROCEDURE — 3008F BODY MASS INDEX DOCD: CPT | Mod: CPTII,S$GLB,, | Performed by: FAMILY MEDICINE

## 2021-03-30 PROCEDURE — 93005 EKG 12-LEAD: ICD-10-PCS | Mod: S$GLB,,, | Performed by: FAMILY MEDICINE

## 2021-03-30 PROCEDURE — 99214 OFFICE O/P EST MOD 30 MIN: CPT | Mod: S$GLB,,, | Performed by: FAMILY MEDICINE

## 2021-03-30 PROCEDURE — 93010 EKG 12-LEAD: ICD-10-PCS | Mod: S$GLB,,, | Performed by: INTERNAL MEDICINE

## 2021-03-30 PROCEDURE — 99999 PR PBB SHADOW E&M-EST. PATIENT-LVL IV: CPT | Mod: PBBFAC,,, | Performed by: FAMILY MEDICINE

## 2021-03-30 PROCEDURE — 99214 PR OFFICE/OUTPT VISIT, EST, LEVL IV, 30-39 MIN: ICD-10-PCS | Mod: S$GLB,,, | Performed by: FAMILY MEDICINE

## 2021-03-30 PROCEDURE — 93005 ELECTROCARDIOGRAM TRACING: CPT | Mod: S$GLB,,, | Performed by: FAMILY MEDICINE

## 2021-03-30 PROCEDURE — 3008F PR BODY MASS INDEX (BMI) DOCUMENTED: ICD-10-PCS | Mod: CPTII,S$GLB,, | Performed by: FAMILY MEDICINE

## 2021-03-30 PROCEDURE — 93010 ELECTROCARDIOGRAM REPORT: CPT | Mod: S$GLB,,, | Performed by: INTERNAL MEDICINE

## 2021-03-30 PROCEDURE — 1125F AMNT PAIN NOTED PAIN PRSNT: CPT | Mod: S$GLB,,, | Performed by: FAMILY MEDICINE

## 2021-03-30 PROCEDURE — 99999 PR PBB SHADOW E&M-EST. PATIENT-LVL IV: ICD-10-PCS | Mod: PBBFAC,,, | Performed by: FAMILY MEDICINE

## 2021-03-30 PROCEDURE — 1125F PR PAIN SEVERITY QUANTIFIED, PAIN PRESENT: ICD-10-PCS | Mod: S$GLB,,, | Performed by: FAMILY MEDICINE

## 2021-03-30 RX ORDER — CYCLOBENZAPRINE HCL 10 MG
TABLET ORAL
COMMUNITY
End: 2022-01-06 | Stop reason: SDUPTHER

## 2021-03-30 RX ORDER — OMEPRAZOLE 20 MG/1
CAPSULE, DELAYED RELEASE ORAL
COMMUNITY
End: 2022-05-19

## 2021-03-30 RX ORDER — TRIAMCINOLONE ACETONIDE 1 MG/G
CREAM TOPICAL
COMMUNITY
End: 2022-05-19

## 2021-03-30 RX ORDER — IBUPROFEN 800 MG/1
TABLET ORAL
COMMUNITY
End: 2022-01-06 | Stop reason: SDUPTHER

## 2021-03-31 ENCOUNTER — LAB VISIT (OUTPATIENT)
Dept: LAB | Facility: HOSPITAL | Age: 66
End: 2021-03-31
Attending: FAMILY MEDICINE
Payer: COMMERCIAL

## 2021-03-31 DIAGNOSIS — E78.5 DYSLIPIDEMIA: ICD-10-CM

## 2021-03-31 LAB
ALBUMIN SERPL BCP-MCNC: 3.9 G/DL (ref 3.5–5.2)
ALP SERPL-CCNC: 82 U/L (ref 55–135)
ALT SERPL W/O P-5'-P-CCNC: 11 U/L (ref 10–44)
ANION GAP SERPL CALC-SCNC: 10 MMOL/L (ref 8–16)
AST SERPL-CCNC: 16 U/L (ref 10–40)
BASOPHILS # BLD AUTO: 0.04 K/UL (ref 0–0.2)
BASOPHILS NFR BLD: 0.7 % (ref 0–1.9)
BILIRUB SERPL-MCNC: 0.4 MG/DL (ref 0.1–1)
BUN SERPL-MCNC: 14 MG/DL (ref 8–23)
CALCIUM SERPL-MCNC: 9.1 MG/DL (ref 8.7–10.5)
CHLORIDE SERPL-SCNC: 105 MMOL/L (ref 95–110)
CHOLEST SERPL-MCNC: 185 MG/DL (ref 120–199)
CHOLEST/HDLC SERPL: 5.4 {RATIO} (ref 2–5)
CO2 SERPL-SCNC: 27 MMOL/L (ref 23–29)
CREAT SERPL-MCNC: 0.7 MG/DL (ref 0.5–1.4)
DIFFERENTIAL METHOD: NORMAL
EOSINOPHIL # BLD AUTO: 0.2 K/UL (ref 0–0.5)
EOSINOPHIL NFR BLD: 3 % (ref 0–8)
ERYTHROCYTE [DISTWIDTH] IN BLOOD BY AUTOMATED COUNT: 12.6 % (ref 11.5–14.5)
EST. GFR  (AFRICAN AMERICAN): >60 ML/MIN/1.73 M^2
EST. GFR  (NON AFRICAN AMERICAN): >60 ML/MIN/1.73 M^2
GLUCOSE SERPL-MCNC: 94 MG/DL (ref 70–110)
HCT VFR BLD AUTO: 41.3 % (ref 37–48.5)
HDLC SERPL-MCNC: 34 MG/DL (ref 40–75)
HDLC SERPL: 18.4 % (ref 20–50)
HGB BLD-MCNC: 13.2 G/DL (ref 12–16)
IMM GRANULOCYTES # BLD AUTO: 0.02 K/UL (ref 0–0.04)
IMM GRANULOCYTES NFR BLD AUTO: 0.3 % (ref 0–0.5)
LDLC SERPL CALC-MCNC: 123.8 MG/DL (ref 63–159)
LYMPHOCYTES # BLD AUTO: 1.5 K/UL (ref 1–4.8)
LYMPHOCYTES NFR BLD: 24.2 % (ref 18–48)
MCH RBC QN AUTO: 28.8 PG (ref 27–31)
MCHC RBC AUTO-ENTMCNC: 32 G/DL (ref 32–36)
MCV RBC AUTO: 90 FL (ref 82–98)
MONOCYTES # BLD AUTO: 0.4 K/UL (ref 0.3–1)
MONOCYTES NFR BLD: 6.9 % (ref 4–15)
NEUTROPHILS # BLD AUTO: 3.9 K/UL (ref 1.8–7.7)
NEUTROPHILS NFR BLD: 64.9 % (ref 38–73)
NONHDLC SERPL-MCNC: 151 MG/DL
NRBC BLD-RTO: 0 /100 WBC
PLATELET # BLD AUTO: 228 K/UL (ref 150–450)
PMV BLD AUTO: 12.8 FL (ref 9.2–12.9)
POTASSIUM SERPL-SCNC: 4.1 MMOL/L (ref 3.5–5.1)
PROT SERPL-MCNC: 7.3 G/DL (ref 6–8.4)
RBC # BLD AUTO: 4.59 M/UL (ref 4–5.4)
SODIUM SERPL-SCNC: 142 MMOL/L (ref 136–145)
TRIGL SERPL-MCNC: 136 MG/DL (ref 30–150)
TSH SERPL DL<=0.005 MIU/L-ACNC: 2.26 UIU/ML (ref 0.4–4)
WBC # BLD AUTO: 5.98 K/UL (ref 3.9–12.7)

## 2021-03-31 PROCEDURE — 36415 COLL VENOUS BLD VENIPUNCTURE: CPT | Mod: PO | Performed by: FAMILY MEDICINE

## 2021-03-31 PROCEDURE — 80061 LIPID PANEL: CPT | Performed by: FAMILY MEDICINE

## 2021-03-31 PROCEDURE — 80053 COMPREHEN METABOLIC PANEL: CPT | Performed by: FAMILY MEDICINE

## 2021-03-31 PROCEDURE — 85025 COMPLETE CBC W/AUTO DIFF WBC: CPT | Performed by: FAMILY MEDICINE

## 2021-03-31 PROCEDURE — 84443 ASSAY THYROID STIM HORMONE: CPT | Performed by: FAMILY MEDICINE

## 2021-04-01 ENCOUNTER — CLINICAL SUPPORT (OUTPATIENT)
Dept: REHABILITATION | Facility: HOSPITAL | Age: 66
End: 2021-04-01
Attending: FAMILY MEDICINE
Payer: COMMERCIAL

## 2021-04-01 DIAGNOSIS — M25.561 CHRONIC PAIN OF BOTH KNEES: ICD-10-CM

## 2021-04-01 DIAGNOSIS — M25.562 CHRONIC PAIN OF BOTH KNEES: ICD-10-CM

## 2021-04-01 DIAGNOSIS — G89.29 CHRONIC PAIN OF BOTH KNEES: ICD-10-CM

## 2021-04-01 PROCEDURE — 97161 PT EVAL LOW COMPLEX 20 MIN: CPT | Mod: PO

## 2021-04-01 PROCEDURE — 97110 THERAPEUTIC EXERCISES: CPT | Mod: PO,97

## 2021-05-04 ENCOUNTER — CLINICAL SUPPORT (OUTPATIENT)
Dept: REHABILITATION | Facility: HOSPITAL | Age: 66
End: 2021-05-04
Attending: FAMILY MEDICINE
Payer: COMMERCIAL

## 2021-05-04 DIAGNOSIS — M25.669 IMPAIRED RANGE OF MOTION OF KNEE, UNSPECIFIED LATERALITY: ICD-10-CM

## 2021-05-04 DIAGNOSIS — R29.898 DECREASED STRENGTH OF LOWER EXTREMITY: ICD-10-CM

## 2021-05-04 PROCEDURE — 97110 THERAPEUTIC EXERCISES: CPT | Mod: PO

## 2021-05-11 ENCOUNTER — CLINICAL SUPPORT (OUTPATIENT)
Dept: REHABILITATION | Facility: HOSPITAL | Age: 66
End: 2021-05-11
Attending: FAMILY MEDICINE
Payer: COMMERCIAL

## 2021-05-11 DIAGNOSIS — R29.898 DECREASED STRENGTH OF LOWER EXTREMITY: ICD-10-CM

## 2021-05-11 DIAGNOSIS — M25.669 IMPAIRED RANGE OF MOTION OF KNEE, UNSPECIFIED LATERALITY: ICD-10-CM

## 2021-05-11 PROCEDURE — 97110 THERAPEUTIC EXERCISES: CPT | Mod: PO,CQ,97

## 2021-05-18 ENCOUNTER — CLINICAL SUPPORT (OUTPATIENT)
Dept: REHABILITATION | Facility: HOSPITAL | Age: 66
End: 2021-05-18
Attending: FAMILY MEDICINE
Payer: COMMERCIAL

## 2021-05-18 DIAGNOSIS — M25.669 IMPAIRED RANGE OF MOTION OF KNEE, UNSPECIFIED LATERALITY: ICD-10-CM

## 2021-05-18 DIAGNOSIS — R29.898 DECREASED STRENGTH OF LOWER EXTREMITY: ICD-10-CM

## 2021-05-18 PROCEDURE — 97110 THERAPEUTIC EXERCISES: CPT | Mod: PO,CQ,97

## 2021-05-25 ENCOUNTER — DOCUMENTATION ONLY (OUTPATIENT)
Dept: REHABILITATION | Facility: HOSPITAL | Age: 66
End: 2021-05-25

## 2021-06-10 ENCOUNTER — CLINICAL SUPPORT (OUTPATIENT)
Dept: REHABILITATION | Facility: HOSPITAL | Age: 66
End: 2021-06-10
Attending: FAMILY MEDICINE
Payer: COMMERCIAL

## 2021-06-10 DIAGNOSIS — M25.669 IMPAIRED RANGE OF MOTION OF KNEE, UNSPECIFIED LATERALITY: ICD-10-CM

## 2021-06-10 DIAGNOSIS — R29.898 DECREASED STRENGTH OF LOWER EXTREMITY: ICD-10-CM

## 2021-06-10 PROCEDURE — 97110 THERAPEUTIC EXERCISES: CPT | Mod: PO,97

## 2021-06-15 ENCOUNTER — CLINICAL SUPPORT (OUTPATIENT)
Dept: REHABILITATION | Facility: HOSPITAL | Age: 66
End: 2021-06-15
Attending: FAMILY MEDICINE
Payer: COMMERCIAL

## 2021-06-15 DIAGNOSIS — R29.898 DECREASED STRENGTH OF LOWER EXTREMITY: Primary | ICD-10-CM

## 2021-06-15 DIAGNOSIS — M25.669 IMPAIRED RANGE OF MOTION OF KNEE, UNSPECIFIED LATERALITY: ICD-10-CM

## 2021-06-15 PROCEDURE — 97110 THERAPEUTIC EXERCISES: CPT | Mod: PO,97

## 2021-06-17 ENCOUNTER — CLINICAL SUPPORT (OUTPATIENT)
Dept: REHABILITATION | Facility: HOSPITAL | Age: 66
End: 2021-06-17
Attending: FAMILY MEDICINE
Payer: COMMERCIAL

## 2021-06-17 DIAGNOSIS — M25.669 IMPAIRED RANGE OF MOTION OF KNEE, UNSPECIFIED LATERALITY: ICD-10-CM

## 2021-06-17 DIAGNOSIS — R29.898 DECREASED STRENGTH OF LOWER EXTREMITY: ICD-10-CM

## 2021-06-17 PROCEDURE — 97110 THERAPEUTIC EXERCISES: CPT | Mod: PO,CQ,97

## 2021-06-22 ENCOUNTER — CLINICAL SUPPORT (OUTPATIENT)
Dept: REHABILITATION | Facility: HOSPITAL | Age: 66
End: 2021-06-22
Attending: FAMILY MEDICINE
Payer: COMMERCIAL

## 2021-06-22 DIAGNOSIS — R29.898 DECREASED STRENGTH OF LOWER EXTREMITY: ICD-10-CM

## 2021-06-22 DIAGNOSIS — M25.669 IMPAIRED RANGE OF MOTION OF KNEE, UNSPECIFIED LATERALITY: ICD-10-CM

## 2021-06-22 PROCEDURE — 97110 THERAPEUTIC EXERCISES: CPT | Mod: PO,CQ

## 2021-06-24 ENCOUNTER — CLINICAL SUPPORT (OUTPATIENT)
Dept: REHABILITATION | Facility: HOSPITAL | Age: 66
End: 2021-06-24
Payer: COMMERCIAL

## 2021-06-24 DIAGNOSIS — R29.898 DECREASED STRENGTH OF LOWER EXTREMITY: Primary | ICD-10-CM

## 2021-06-24 PROCEDURE — 97110 THERAPEUTIC EXERCISES: CPT | Mod: PO

## 2021-06-29 ENCOUNTER — CLINICAL SUPPORT (OUTPATIENT)
Dept: REHABILITATION | Facility: HOSPITAL | Age: 66
End: 2021-06-29
Attending: FAMILY MEDICINE
Payer: COMMERCIAL

## 2021-06-29 DIAGNOSIS — R29.898 DECREASED STRENGTH OF LOWER EXTREMITY: Primary | ICD-10-CM

## 2021-06-29 PROCEDURE — 97110 THERAPEUTIC EXERCISES: CPT | Mod: PO,97

## 2022-01-06 ENCOUNTER — OFFICE VISIT (OUTPATIENT)
Dept: FAMILY MEDICINE | Facility: CLINIC | Age: 67
End: 2022-01-06
Payer: COMMERCIAL

## 2022-01-06 VITALS
TEMPERATURE: 99 F | WEIGHT: 132.25 LBS | HEART RATE: 82 BPM | BODY MASS INDEX: 24.97 KG/M2 | SYSTOLIC BLOOD PRESSURE: 112 MMHG | HEIGHT: 61 IN | DIASTOLIC BLOOD PRESSURE: 64 MMHG | OXYGEN SATURATION: 99 %

## 2022-01-06 DIAGNOSIS — H92.09 EAR ACHE: Primary | ICD-10-CM

## 2022-01-06 DIAGNOSIS — M25.521 RIGHT ELBOW PAIN: ICD-10-CM

## 2022-01-06 DIAGNOSIS — M54.2 NECK PAIN: ICD-10-CM

## 2022-01-06 DIAGNOSIS — L91.8 SKIN TAGS, MULTIPLE ACQUIRED: ICD-10-CM

## 2022-01-06 DIAGNOSIS — R42 DIZZINESSES: ICD-10-CM

## 2022-01-06 PROCEDURE — 3074F SYST BP LT 130 MM HG: CPT | Mod: CPTII,S$GLB,, | Performed by: INTERNAL MEDICINE

## 2022-01-06 PROCEDURE — 3074F PR MOST RECENT SYSTOLIC BLOOD PRESSURE < 130 MM HG: ICD-10-PCS | Mod: CPTII,S$GLB,, | Performed by: INTERNAL MEDICINE

## 2022-01-06 PROCEDURE — 99214 PR OFFICE/OUTPT VISIT, EST, LEVL IV, 30-39 MIN: ICD-10-PCS | Mod: S$GLB,,, | Performed by: INTERNAL MEDICINE

## 2022-01-06 PROCEDURE — 3288F PR FALLS RISK ASSESSMENT DOCUMENTED: ICD-10-PCS | Mod: CPTII,S$GLB,, | Performed by: INTERNAL MEDICINE

## 2022-01-06 PROCEDURE — 1101F PR PT FALLS ASSESS DOC 0-1 FALLS W/OUT INJ PAST YR: ICD-10-PCS | Mod: CPTII,S$GLB,, | Performed by: INTERNAL MEDICINE

## 2022-01-06 PROCEDURE — 1101F PT FALLS ASSESS-DOCD LE1/YR: CPT | Mod: CPTII,S$GLB,, | Performed by: INTERNAL MEDICINE

## 2022-01-06 PROCEDURE — 1126F AMNT PAIN NOTED NONE PRSNT: CPT | Mod: CPTII,S$GLB,, | Performed by: INTERNAL MEDICINE

## 2022-01-06 PROCEDURE — 3078F DIAST BP <80 MM HG: CPT | Mod: CPTII,S$GLB,, | Performed by: INTERNAL MEDICINE

## 2022-01-06 PROCEDURE — 3008F BODY MASS INDEX DOCD: CPT | Mod: CPTII,S$GLB,, | Performed by: INTERNAL MEDICINE

## 2022-01-06 PROCEDURE — 3288F FALL RISK ASSESSMENT DOCD: CPT | Mod: CPTII,S$GLB,, | Performed by: INTERNAL MEDICINE

## 2022-01-06 PROCEDURE — 1126F PR PAIN SEVERITY QUANTIFIED, NO PAIN PRESENT: ICD-10-PCS | Mod: CPTII,S$GLB,, | Performed by: INTERNAL MEDICINE

## 2022-01-06 PROCEDURE — 99214 OFFICE O/P EST MOD 30 MIN: CPT | Mod: S$GLB,,, | Performed by: INTERNAL MEDICINE

## 2022-01-06 PROCEDURE — 1159F PR MEDICATION LIST DOCUMENTED IN MEDICAL RECORD: ICD-10-PCS | Mod: CPTII,S$GLB,, | Performed by: INTERNAL MEDICINE

## 2022-01-06 PROCEDURE — 1160F PR REVIEW ALL MEDS BY PRESCRIBER/CLIN PHARMACIST DOCUMENTED: ICD-10-PCS | Mod: CPTII,S$GLB,, | Performed by: INTERNAL MEDICINE

## 2022-01-06 PROCEDURE — 1160F RVW MEDS BY RX/DR IN RCRD: CPT | Mod: CPTII,S$GLB,, | Performed by: INTERNAL MEDICINE

## 2022-01-06 PROCEDURE — 99999 PR PBB SHADOW E&M-EST. PATIENT-LVL III: CPT | Mod: PBBFAC,,, | Performed by: INTERNAL MEDICINE

## 2022-01-06 PROCEDURE — 3008F PR BODY MASS INDEX (BMI) DOCUMENTED: ICD-10-PCS | Mod: CPTII,S$GLB,, | Performed by: INTERNAL MEDICINE

## 2022-01-06 PROCEDURE — 99999 PR PBB SHADOW E&M-EST. PATIENT-LVL III: ICD-10-PCS | Mod: PBBFAC,,, | Performed by: INTERNAL MEDICINE

## 2022-01-06 PROCEDURE — 3078F PR MOST RECENT DIASTOLIC BLOOD PRESSURE < 80 MM HG: ICD-10-PCS | Mod: CPTII,S$GLB,, | Performed by: INTERNAL MEDICINE

## 2022-01-06 PROCEDURE — 1159F MED LIST DOCD IN RCRD: CPT | Mod: CPTII,S$GLB,, | Performed by: INTERNAL MEDICINE

## 2022-01-06 RX ORDER — CYCLOBENZAPRINE HCL 10 MG
10 TABLET ORAL 2 TIMES DAILY PRN
Qty: 30 TABLET | Refills: 0 | Status: SHIPPED | OUTPATIENT
Start: 2022-01-06 | End: 2022-01-16

## 2022-01-06 RX ORDER — MECLIZINE HCL 12.5 MG 12.5 MG/1
12.5 TABLET ORAL 3 TIMES DAILY PRN
Qty: 20 TABLET | Refills: 0 | Status: SHIPPED | OUTPATIENT
Start: 2022-01-06 | End: 2022-05-19

## 2022-01-06 RX ORDER — FLUTICASONE PROPIONATE 50 MCG
1 SPRAY, SUSPENSION (ML) NASAL DAILY
Qty: 16 G | Refills: 1 | Status: SHIPPED | OUTPATIENT
Start: 2022-01-06 | End: 2023-05-12

## 2022-01-06 RX ORDER — NAPROXEN 500 MG/1
500 TABLET ORAL 2 TIMES DAILY PRN
Qty: 30 TABLET | Refills: 1 | Status: SHIPPED | OUTPATIENT
Start: 2022-01-06 | End: 2022-04-12

## 2022-01-06 NOTE — PROGRESS NOTES
Subjective:       Patient ID: Raine Medley is a 66 y.o. female.    Chief Complaint: Otalgia (Left ) and Elbow Injury (Right )      HPI  Raine Medley is a 66 y.o. female with chronic conditions of HLD, neck pain, TMJ, who presents today for evaluation of left ear pain and right elbow pain.    Reports has been having episodes of dizziness, on and off,  when turning her head to the left. Her left ear feels clogged and neck has been sore.Deneis fever, chills, or close contact with anyone sick. Had a repid test a few days ago which was negative.    Also reports a week ago started with right elbow pain with no swelling or known injury. The pain does not radiate. Denies weakness or skin discoloration.    Health Maintenance:  Health Maintenance   Topic Date Due    DEXA SCAN  Never done    Mammogram  11/21/2021    TETANUS VACCINE  10/08/2025    Lipid Panel  03/31/2026    Hepatitis C Screening  Completed       Review of Systems   Constitutional: Negative for chills and fever.   HENT: Positive for nasal congestion and ear pain.    Respiratory: Negative for cough and shortness of breath.    Cardiovascular: Negative for chest pain and palpitations.   Gastrointestinal: Negative.    Genitourinary: Negative.    Musculoskeletal: Positive for neck pain.   Neurological: Positive for dizziness.      Past Medical History:   Diagnosis Date    Hyperlipidemia        Past Surgical History:   Procedure Laterality Date    CHOLECYSTECTOMY      COLONOSCOPY N/A 1/3/2019    Procedure: COLONOSCOPY;  Surgeon: Gaston Beebe MD;  Location: [address];  Service: Endoscopy;  Laterality: N/A;       Family History   Problem Relation Age of Onset    Melanoma Neg Hx     Lupus Neg Hx     Psoriasis Neg Hx     Colon cancer Neg Hx     Esophageal cancer Neg Hx     Stomach cancer Neg Hx        Social History     Socioeconomic History    Marital status:    Occupational History     Employer: [employer]   Tobacco Use    Smoking status: Never Smoker    Smokeless tobacco: Never Used   Substance and Sexual Activity    Alcohol use: No    Drug use: No   Social History Narrative    She lives in [place] with her 15-year-old son in [place]. 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 She is from [facility].        Current Outpatient Medications   Medication Sig Dispense Refill    aspirin (ECOTRIN) 81 MG EC tablet Take 81 mg by mouth twice a week.      carbamide peroxide (DEBROX) 6.5 % otic solution Place 5 drops into both ears 2 (two) times daily. for 7 days 18 mL 0    cetirizine (ZYRTEC) 10 MG tablet Take 10 mg by mouth once daily.      cyclobenzaprine (FLEXERIL) 10 MG tablet Take 1 tablet (10 mg total) by mouth 2 (two) times daily as needed for Muscle spasms. 30 tablet 0    fish oil-omega-3 fatty acids 300-1,000 mg capsule Take 2 g by mouth once daily.      fluticasone propionate (FLONASE) 50 mcg/actuation nasal spray 1 spray (50 mcg total) by Each Nostril route once daily. 16 g 1    ketoconazole (NIZORAL) 2 % cream       meclizine (ANTIVERT) 12.5 mg tablet Take 1 tablet (12.5 mg total) by mouth 3 (three) times daily as needed for Dizziness. 20 tablet 0    naproxen (NAPROSYN) 500 MG tablet Take 1 tablet (500 mg total) by mouth 2 (two) times daily as needed (pain). 30 tablet 1    omeprazole (PRILOSEC) 20 MG capsule omeprazole 20 mg capsule,delayed release   Take 1 capsule every day by oral route.      triamcinolone acetonide 0.1% (KENALOG) 0.1 % cream triamcinolone acetonide 0.1 % topical cream       No current facility-administered medications for this visit.       Review of patient's allergies indicates:  No Known Allergies      Objective:       Last 3 sets of Vitals    Vitals - 1 value per visit 11/21/2020 3/30/2021 1/6/2022   SYSTOLIC - 112 112   DIASTOLIC - 66 64   PULSE - 66 82   TEMPERATURE - - 98.5   RESPIRATIONS - - -   SPO2 - 98 99   Weight (lb) 136  "133.38 132.28   Weight (kg) 61.689 60.5 60   HEIGHT 5' 1" 5' 1" 5' 1"   BODY MASS INDEX 25.7 25.2 24.99   VISIT REPORT - - -   Pain Score  - 7 0   Physical Exam  Constitutional:       Appearance: Normal appearance.   HENT:      Head: Normocephalic.      Right Ear: Tympanic membrane, ear canal and external ear normal.      Left Ear: Tympanic membrane, ear canal and external ear normal.      Ears:      Comments: Wax present.     Nose: Nose normal.      Mouth/Throat:      Mouth: Mucous membranes are moist.   Eyes:      General: No scleral icterus.     Extraocular Movements: Extraocular movements intact.      Conjunctiva/sclera: Conjunctivae normal.      Pupils: Pupils are equal, round, and reactive to light.   Neck:      Vascular: No carotid bruit.   Cardiovascular:      Rate and Rhythm: Normal rate and regular rhythm.      Pulses: Normal pulses.      Heart sounds: Normal heart sounds.   Pulmonary:      Effort: Pulmonary effort is normal.      Breath sounds: Normal breath sounds.   Abdominal:      General: Bowel sounds are normal. There is no distension.      Palpations: Abdomen is soft. There is no mass.      Tenderness: There is no abdominal tenderness.   Musculoskeletal:         General: Tenderness (right elbow tenderness on lateral side of olecranon fossa and epicondile.) present. No swelling. Normal range of motion.      Cervical back: Neck supple.      Comments: Neck spasms.   Lymphadenopathy:      Cervical: No cervical adenopathy.   Skin:     General: Skin is warm.   Neurological:      General: No focal deficit present.      Mental Status: She is alert and oriented to person, place, and time.      Motor: No weakness.   Psychiatric:         Mood and Affect: Mood normal.         Behavior: Behavior normal.           CBC:  Recent Labs   Lab 11/21/20  0937 11/21/20  0937 03/31/21  0840   WBC 7.73   < > 5.98   RBC 4.96   < > 4.59   Hemoglobin 14.2   < > 13.2   Hematocrit 43.5   < > 41.3   Platelets 250   < > 228   MCV " 88   < > 90   MCH 28.6   < > 28.8   MCHC 32.6  --  32.0    < > = values in this interval not displayed.     CMP:  Recent Labs   Lab 11/21/20  0937 11/21/20  0937 03/31/21  0840   Glucose 101   < > 94   Calcium 9.5   < > 9.1   Albumin 4.2   < > 3.9   Total Protein 7.6   < > 7.3   Sodium 143   < > 142   Potassium 4.6   < > 4.1   CO2 28   < > 27   Chloride 105   < > 105   BUN 10   < > 14   Creatinine 0.7   < > 0.7   Alkaline Phosphatase 89   < > 82   ALT 12   < > 11   AST 16   < > 16   Total Bilirubin 0.4  --  0.4    < > = values in this interval not displayed.     URINALYSIS:  Recent Labs   Lab 11/21/20  0933   Color, UA Yellow   Specific Gravity, UA 1.020   pH, UA >=9.0   Protein, UA Negative   Nitrite, UA Negative   Leukocytes, UA Negative   Urobilinogen, UA Negative      LIPIDS:  Recent Labs   Lab 01/29/19  0000 11/21/20  0937 03/31/21  0840   TSH  --  1.781 2.262   HDL 34 A 40 34 L   Cholesterol 204 A 204 H 185   Triglycerides 158 125 136   LDL Cholesterol 138 A 139.0 123.8   HDL/Cholesterol Ratio  --  19.6 L 18.4 L   Non-HDL Cholesterol  --  164 151   Total Cholesterol/HDL Ratio  --  5.1 H 5.4 H     TSH:  Recent Labs   Lab 11/21/20  0937 03/31/21  0840   TSH 1.781 2.262       A1C:        Imaging:  Mammo Digital Screening Bilat w/ Otilio  Narrative: Result:  Mammo Digital Screening Bilat w/ Otilio    History:  Patient is 65 y.o. and is seen for a screening mammogram.    Films Compared:  Compared to: 07/18/2017 Mammo Digital Screening Bilat, 11/25/2015 Mammo   Digital Diagnostic Right with Tomosynthesis_CAD, and 10/29/2015 Mammo   Digital Screening Bilat with CAD    Findings:  This procedure was performed using tomosynthesis. Computer-aided detection   was utilized in the interpretation of this examination.     The breasts have scattered areas of fibroglandular density. There is no   evidence of suspicious masses, microcalcifications or architectural   distortion.  Impression: No mammographic evidence of  malignancy.    BI-RADS Category 1: Negative    Recommendation:  Routine screening mammogram in 1 year is recommended.    Your estimated lifetime risk of breast cancer (to age 85) based on   Tyrer-Cuzick risk assessment model is Tyrer-Cuzick: 5 %. According to the   American Cancer Society, patients with a lifetime breast cancer risk of   20% or higher might benefit from supplemental screening tests.      Assessment:       1. Ear ache    2. Right elbow pain    3. Neck pain          Plan:       Raine was seen today for otalgia and elbow injury.    Diagnoses and all orders for this visit:    Ear ache  -    No signs of infection. Possible allergic sinusitis  Or viral infection with increased pressure on left ear and causing vertigo.    - fluticasone propionate (FLONASE) 50 mcg/actuation nasal spray; 1 spray (50 mcg total) by Each Nostril route once daily.  -     carbamide peroxide (DEBROX) 6.5 % otic solution; Place 5 drops into both ears 2 (two) times daily. for 7 days  - Consider ENT evaluation if symptoms persist.    Right elbow pain  -     Bursitis vs epicondylitis.  -  naproxen (NAPROSYN) 500 MG tablet; Take 1 tablet (500 mg total) by mouth 2 (two) times daily as needed (pain).  - Ortho evaluation if no improvement.    Neck pain  -     naproxen (NAPROSYN) 500 MG tablet; Take 1 tablet (500 mg total) by mouth 2 (two) times daily as needed (pain).  -     cyclobenzaprine (FLEXERIL) 10 MG tablet; Take 1 tablet (10 mg total) by mouth 2 (two) times daily as needed for Muscle spasms.  - If no improvement consider imaging and therapy.    Dizzinesses  -     fluticasone propionate (FLONASE) 50 mcg/actuation nasal spray; 1 spray (50 mcg total) by Each Nostril route once daily.  -     carbamide peroxide (DEBROX) 6.5 % otic solution; Place 5 drops into both ears 2 (two) times daily. for 7 days  -     meclizine (ANTIVERT) 12.5 mg tablet; Take 1 tablet (12.5 mg total) by mouth 3 (three) times daily as needed for Dizziness.    Skin  tags, multiple acquired  -     Ambulatory referral/consult to Dermatology; Future      Health Maintenance Due   Topic Date Due    Pneumococcal Vaccines (Age 65+) (1 of 2 - PPSV23) Never done    DEXA SCAN  Never done    Shingles Vaccine (2 of 3) 11/24/2017    Influenza Vaccine (1) Never done    COVID-19 Vaccine (3 - Booster for Pfizer series) 09/27/2021    Mammogram  11/21/2021        Maribell Gotti MD  [facility]  Disclaimer:  This note has been generated using voice-recognition software. There may be grammatical or spelling errors that have been missed during proof-reading

## 2022-02-16 ENCOUNTER — OFFICE VISIT (OUTPATIENT)
Dept: URGENT CARE | Facility: CLINIC | Age: 67
End: 2022-02-16
Payer: COMMERCIAL

## 2022-02-16 VITALS
RESPIRATION RATE: 18 BRPM | OXYGEN SATURATION: 99 % | TEMPERATURE: 97 F | SYSTOLIC BLOOD PRESSURE: 170 MMHG | DIASTOLIC BLOOD PRESSURE: 72 MMHG | WEIGHT: 132 LBS | HEART RATE: 66 BPM | BODY MASS INDEX: 24.92 KG/M2 | HEIGHT: 61 IN

## 2022-02-16 DIAGNOSIS — S20.222A CONTUSION OF LEFT BACK WALL OF THORAX, INITIAL ENCOUNTER: Primary | ICD-10-CM

## 2022-02-16 PROCEDURE — 3077F PR MOST RECENT SYSTOLIC BLOOD PRESSURE >= 140 MM HG: ICD-10-PCS | Mod: CPTII,S$GLB,, | Performed by: NURSE PRACTITIONER

## 2022-02-16 PROCEDURE — 96372 THER/PROPH/DIAG INJ SC/IM: CPT | Mod: S$GLB,,, | Performed by: NURSE PRACTITIONER

## 2022-02-16 PROCEDURE — 3078F DIAST BP <80 MM HG: CPT | Mod: CPTII,S$GLB,, | Performed by: NURSE PRACTITIONER

## 2022-02-16 PROCEDURE — 71046 X-RAY EXAM CHEST 2 VIEWS: CPT | Mod: S$GLB,,, | Performed by: RADIOLOGY

## 2022-02-16 PROCEDURE — 3078F PR MOST RECENT DIASTOLIC BLOOD PRESSURE < 80 MM HG: ICD-10-PCS | Mod: CPTII,S$GLB,, | Performed by: NURSE PRACTITIONER

## 2022-02-16 PROCEDURE — 99203 PR OFFICE/OUTPT VISIT, NEW, LEVL III, 30-44 MIN: ICD-10-PCS | Mod: 25,S$GLB,, | Performed by: NURSE PRACTITIONER

## 2022-02-16 PROCEDURE — 99203 OFFICE O/P NEW LOW 30 MIN: CPT | Mod: 25,S$GLB,, | Performed by: NURSE PRACTITIONER

## 2022-02-16 PROCEDURE — 1159F MED LIST DOCD IN RCRD: CPT | Mod: CPTII,S$GLB,, | Performed by: NURSE PRACTITIONER

## 2022-02-16 PROCEDURE — 1160F RVW MEDS BY RX/DR IN RCRD: CPT | Mod: CPTII,S$GLB,, | Performed by: NURSE PRACTITIONER

## 2022-02-16 PROCEDURE — 96372 PR INJECTION,THERAP/PROPH/DIAG2ST, IM OR SUBCUT: ICD-10-PCS | Mod: S$GLB,,, | Performed by: NURSE PRACTITIONER

## 2022-02-16 PROCEDURE — 3008F PR BODY MASS INDEX (BMI) DOCUMENTED: ICD-10-PCS | Mod: CPTII,S$GLB,, | Performed by: NURSE PRACTITIONER

## 2022-02-16 PROCEDURE — 1160F PR REVIEW ALL MEDS BY PRESCRIBER/CLIN PHARMACIST DOCUMENTED: ICD-10-PCS | Mod: CPTII,S$GLB,, | Performed by: NURSE PRACTITIONER

## 2022-02-16 PROCEDURE — 3008F BODY MASS INDEX DOCD: CPT | Mod: CPTII,S$GLB,, | Performed by: NURSE PRACTITIONER

## 2022-02-16 PROCEDURE — 3077F SYST BP >= 140 MM HG: CPT | Mod: CPTII,S$GLB,, | Performed by: NURSE PRACTITIONER

## 2022-02-16 PROCEDURE — 1159F PR MEDICATION LIST DOCUMENTED IN MEDICAL RECORD: ICD-10-PCS | Mod: CPTII,S$GLB,, | Performed by: NURSE PRACTITIONER

## 2022-02-16 PROCEDURE — 71046 XR CHEST PA AND LATERAL: ICD-10-PCS | Mod: S$GLB,,, | Performed by: RADIOLOGY

## 2022-02-16 RX ORDER — KETOROLAC TROMETHAMINE 30 MG/ML
30 INJECTION, SOLUTION INTRAMUSCULAR; INTRAVENOUS
Status: COMPLETED | OUTPATIENT
Start: 2022-02-16 | End: 2022-02-16

## 2022-02-16 RX ADMIN — KETOROLAC TROMETHAMINE 30 MG: 30 INJECTION, SOLUTION INTRAMUSCULAR; INTRAVENOUS at 07:02

## 2022-02-17 NOTE — PROGRESS NOTES
"Subjective:       Patient ID: Raine Medley is a 66 y.o. female.    Vitals:  height is 5' 1" (1.549 m) and weight is 59.9 kg (132 lb). Her temperature is 97.3 °F (36.3 °C). Her blood pressure is 170/72 (abnormal) and her pulse is 66. Her respiration is 18 and oxygen saturation is 99%.     Chief Complaint: Fall and Back pain radiating to left side    Patient states was walking to the bathroom in the middle of the night and feel, hitting her L posterior-lateral mid back on the tub.  C/o pain to L mid back, + bruising, pain that radiates to the LUQ.   Denies fever, chills, N/V/D, hematemesis, chest pain, SOB  Denies pelvic pain, flank pain      Fall  The accident occurred 12 to 24 hours ago (11p). The fall occurred while walking. She landed on hard floor. There was no blood loss. The pain is present in the back (left side of stomach). The pain is at a severity of 8/10. The pain is moderate. The symptoms are aggravated by standing and movement. Associated symptoms include abdominal pain. Pertinent negatives include no fever, headaches, loss of consciousness, nausea, numbness or vomiting. She has tried ice and NSAID for the symptoms. The treatment provided no relief.       Constitution: Negative for chills, sweating, fatigue and fever.   Cardiovascular: Negative for chest pain, palpitations and sob on exertion.   Respiratory: Negative for chest tightness, cough and shortness of breath.    Gastrointestinal: Positive for abdominal pain. Negative for abdominal trauma, abdominal bloating, nausea, vomiting, constipation, diarrhea, bright red blood in stool, dark colored stools and rectal bleeding.        LUQ   Genitourinary: Negative for dysuria, frequency, urgency, flank pain and pelvic pain.   Musculoskeletal: Positive for trauma and back pain. Negative for joint pain, joint swelling, muscle cramps and muscle ache.   Skin: Positive for bruising. Negative for color change, pale and rash.   Neurological: Negative for " dizziness, light-headedness, headaches, loss of consciousness, numbness and tingling.       Objective:      Physical Exam   Constitutional:  Non-toxic appearance. She does not appear ill. No distress.   HENT:   Head: Normocephalic and atraumatic.   Cardiovascular: Normal rate and regular rhythm.   Pulmonary/Chest: Effort normal. No respiratory distress.   Abdominal: Normal appearance and bowel sounds are normal. She exhibits no distension and no mass. Soft. flat abdomenThere is abdominal tenderness. There is no rebound, no guarding, no left CVA tenderness and no right CVA tenderness.      Comments: Mild tenderness with deep palpation of LUQ  typany on percussion    Neurological: She is alert.   Skin: Skin is not diaphoretic.   Nursing note and vitals reviewed.        XR CHEST PA AND LATERAL    Result Date: 2/16/2022  EXAMINATION: XR CHEST PA AND LATERAL CLINICAL HISTORY: Contusion of thorax, unspecified, initial encounter TECHNIQUE: PA and lateral views of the chest were performed. COMPARISON: 10/08/2015. FINDINGS: The trachea is unremarkable.  The cardiomediastinal silhouette is within normal limits.  The hemidiaphragms are unremarkable.  There are no pleural effusions.  There is no evidence of a pneumothorax.  There is no evidence of pneumomediastinum.  No airspace opacity is present. There are degenerative changes in the osseous structures.  There is a hiatal hernia.  There are postop changes right upper abdominal quadrant.     No acute process. Electronically signed by: Marito Brooks MD Date:    02/16/2022 Time:    19:49    Assessment:       1. Contusion of left back wall of thorax, initial encounter          Plan:         Contusion of left back wall of thorax, initial encounter  -     XR CHEST PA AND LATERAL; Future; Expected date: 02/16/2022  -     ketorolac injection 30 mg                 Patient Instructions   Patient Education       Contusion Discharge Instructions   About this topic   A contusion is also  called a bruise. A bruise happens when blood vessels under the skin break. The blood leaks into the tissues and causes pain and swelling. It also causes skin discoloration that starts as red, blue, or purple and changes to green or yellow as the bruise heals.     What care is needed at home?   · Ask your doctor what you need to do when you go home. Make sure you ask questions if you do not understand what the doctor says.  · Rest your bruised area. You may want to place the bruised area on pillows when you rest. Slowly increase your activity level as you are able to.  · Use an elastic bandage or compression pants to help limit swelling.  · Place an ice pack or a bag of frozen vegetables wrapped in a towel over the painful part. Never put ice right on the skin. Use ice every 1 to 2 hours for 10 to 15 minutes at a time. Use for the first 24 to 48 hours after your injury.  · You may want to take medicine like ibuprofen, naproxen, or acetaminophen to help with pain.  What follow-up care is needed?   Your doctor may ask you to make visits to the office to check on your progress. Be sure to keep these visits.  What drugs may be needed?   The doctor may order drugs to:  · Help with pain and swelling  Will physical activity be limited?   Physical activity may be limited based on where the contusion is found. Talk to your doctor about the right amount of activity for you. Ask your doctor when you can go back to your normal activities and when you can return to work.  What can be done to prevent this health problem?   · Avoid activities that might make you fall.  · Wear or use equipment to protect yourself from being hurt.  When do I need to call the doctor?   · Your joint swells.  · You are not able to move or walk because of the pain.  · You have bruises for no reason.  · You develop bleeding in addition to skin bruises.  Teach Back: Helping You Understand   The Teach Back Method helps you understand the information we are  giving you. After you talk with the staff, tell them in your own words what you learned. This helps to make sure the staff has described each thing clearly. It also helps to explain things that may have been confusing. Before going home, make sure you can do these:  · I can tell you about my condition.  · I can tell you what may help ease my pain.  · I can tell you what I will do if the swelling and pain does not go away.  Where can I learn more?   [facility]  https://girnarsoftshChirpVisionth.org/en/teens/bruises.html?ref=search   [facility] Choices  https://www.nhs.uk/chq/Pages/1057.aspx   Last Reviewed Date   2021-06-07  Consumer Information Use and Disclaimer   This information is not specific medical advice and does not replace information you receive from your health care provider. This is only a brief summary of general information. It does NOT include all information about conditions, illnesses, injuries, tests, procedures, treatments, therapies, discharge instructions or life-style choices that may apply to you. You must talk with your health care provider for complete information about your health and treatment options. This information should not be used to decide whether or not to accept your health care providers advice, instructions or recommendations. Only your health care provider has the knowledge and training to provide advice that is right for you.  Copyright   Copyright © 2021 UpToDate, Inc. and its affiliates and/or licensors. All rights reserved.    After taking into careful account the patient's history, physical exam findings, as well as empirical and objective data obtained throughout urgent care workup, though internal bleeding r/t to trauma cannot be completely ruled out, I feel no emergent medical condition has been identified. No further evaluation or admission was felt to be required, and the patient is stable for discharge from the . The patient and any additional family present were updated with test  results, overall clinical impression, and recommended further plan of care, including discharge instructions as provided and outpatient follow-up for continued evaluation and management as needed. All questions were answered. The patient expressed understanding and agreed with current plan for discharge and follow-up plan of care. Strict ED precautions were provided, including return/worsening of current symptoms, new symptoms, or any other concerns.    Advised on return/follow-up precautions. Advised on ER precautions. Answered all patient questions. Patient verbalized understanding and voiced agreement with current treatment plan.  Please arrange follow up with your primary medical clinic as soon as possible. You must understand that you've received an Urgent Care treatment only and that you may be released before all of your medical problems are known or treated. You, the patient, will arrange for follow up as instructed. If your symptoms worsen or fail to improve you should go to the Emergency Room.

## 2022-02-17 NOTE — PATIENT INSTRUCTIONS
Patient Education       Contusion Discharge Instructions   About this topic   A contusion is also called a bruise. A bruise happens when blood vessels under the skin break. The blood leaks into the tissues and causes pain and swelling. It also causes skin discoloration that starts as red, blue, or purple and changes to green or yellow as the bruise heals.     What care is needed at home?   · Ask your doctor what you need to do when you go home. Make sure you ask questions if you do not understand what the doctor says.  · Rest your bruised area. You may want to place the bruised area on pillows when you rest. Slowly increase your activity level as you are able to.  · Use an elastic bandage or compression pants to help limit swelling.  · Place an ice pack or a bag of frozen vegetables wrapped in a towel over the painful part. Never put ice right on the skin. Use ice every 1 to 2 hours for 10 to 15 minutes at a time. Use for the first 24 to 48 hours after your injury.  · You may want to take medicine like ibuprofen, naproxen, or acetaminophen to help with pain.  What follow-up care is needed?   Your doctor may ask you to make visits to the office to check on your progress. Be sure to keep these visits.  What drugs may be needed?   The doctor may order drugs to:  · Help with pain and swelling  Will physical activity be limited?   Physical activity may be limited based on where the contusion is found. Talk to your doctor about the right amount of activity for you. Ask your doctor when you can go back to your normal activities and when you can return to work.  What can be done to prevent this health problem?   · Avoid activities that might make you fall.  · Wear or use equipment to protect yourself from being hurt.  When do I need to call the doctor?   · Your joint swells.  · You are not able to move or walk because of the pain.  · You have bruises for no reason.  · You develop bleeding in addition to skin bruises.  Teach  Back: Helping You Understand   The Teach Back Method helps you understand the information we are giving you. After you talk with the staff, tell them in your own words what you learned. This helps to make sure the staff has described each thing clearly. It also helps to explain things that may have been confusing. Before going home, make sure you can do these:  · I can tell you about my condition.  · I can tell you what may help ease my pain.  · I can tell you what I will do if the swelling and pain does not go away.  Where can I learn more?   [employer]  https://[employer].org/en/teens/bruises.html?ref=search   [facility] Choices  https://www.nhs.uk/chq/Pages/1057.aspx   Last Reviewed Date   2021-06-07  Consumer Information Use and Disclaimer   This information is not specific medical advice and does not replace information you receive from your health care provider. This is only a brief summary of general information. It does NOT include all information about conditions, illnesses, injuries, tests, procedures, treatments, therapies, discharge instructions or life-style choices that may apply to you. You must talk with your health care provider for complete information about your health and treatment options. This information should not be used to decide whether or not to accept your health care providers advice, instructions or recommendations. Only your health care provider has the knowledge and training to provide advice that is right for you.  Copyright   Copyright © 2021 [employer], Inc. and its affiliates and/or licensors. All rights reserved.    After taking into careful account the patient's history, physical exam findings, as well as empirical and objective data obtained throughout urgent care workup, I feel no emergent medical condition has been identified. No further evaluation or admission was felt to be required, and the patient is stable for discharge from the . The patient and any additional family present  were updated with test results, overall clinical impression, and recommended further plan of care, including discharge instructions as provided and outpatient follow-up for continued evaluation and management as needed. All questions were answered. The patient expressed understanding and agreed with current plan for discharge and follow-up plan of care. Strict ED precautions were provided, including return/worsening of current symptoms, new symptoms, or any other concerns.    Advised on return/follow-up precautions. Advised on ER precautions. Answered all patient questions. Patient verbalized understanding and voiced agreement with current treatment plan.  Please arrange follow up with your primary medical clinic as soon as possible. You must understand that you've received an Urgent Care treatment only and that you may be released before all of your medical problems are known or treated. You, the patient, will arrange for follow up as instructed. If your symptoms worsen or fail to improve you should go to the Emergency Room.

## 2022-04-12 ENCOUNTER — OFFICE VISIT (OUTPATIENT)
Dept: FAMILY MEDICINE | Facility: CLINIC | Age: 67
End: 2022-04-12
Payer: COMMERCIAL

## 2022-04-12 VITALS
SYSTOLIC BLOOD PRESSURE: 130 MMHG | OXYGEN SATURATION: 98 % | BODY MASS INDEX: 24.97 KG/M2 | WEIGHT: 132.25 LBS | HEIGHT: 61 IN | HEART RATE: 62 BPM | DIASTOLIC BLOOD PRESSURE: 74 MMHG

## 2022-04-12 DIAGNOSIS — M26.622 ARTHRALGIA OF LEFT TEMPOROMANDIBULAR JOINT: Primary | ICD-10-CM

## 2022-04-12 DIAGNOSIS — H60.90 OTITIS EXTERNA, UNSPECIFIED CHRONICITY, UNSPECIFIED LATERALITY, UNSPECIFIED TYPE: ICD-10-CM

## 2022-04-12 PROCEDURE — 1126F AMNT PAIN NOTED NONE PRSNT: CPT | Mod: CPTII,S$GLB,, | Performed by: INTERNAL MEDICINE

## 2022-04-12 PROCEDURE — 3288F PR FALLS RISK ASSESSMENT DOCUMENTED: ICD-10-PCS | Mod: CPTII,S$GLB,, | Performed by: INTERNAL MEDICINE

## 2022-04-12 PROCEDURE — 1159F PR MEDICATION LIST DOCUMENTED IN MEDICAL RECORD: ICD-10-PCS | Mod: CPTII,S$GLB,, | Performed by: INTERNAL MEDICINE

## 2022-04-12 PROCEDURE — 3008F PR BODY MASS INDEX (BMI) DOCUMENTED: ICD-10-PCS | Mod: CPTII,S$GLB,, | Performed by: INTERNAL MEDICINE

## 2022-04-12 PROCEDURE — 1101F PR PT FALLS ASSESS DOC 0-1 FALLS W/OUT INJ PAST YR: ICD-10-PCS | Mod: CPTII,S$GLB,, | Performed by: INTERNAL MEDICINE

## 2022-04-12 PROCEDURE — 99214 PR OFFICE/OUTPT VISIT, EST, LEVL IV, 30-39 MIN: ICD-10-PCS | Mod: S$GLB,,, | Performed by: INTERNAL MEDICINE

## 2022-04-12 PROCEDURE — 3008F BODY MASS INDEX DOCD: CPT | Mod: CPTII,S$GLB,, | Performed by: INTERNAL MEDICINE

## 2022-04-12 PROCEDURE — 1159F MED LIST DOCD IN RCRD: CPT | Mod: CPTII,S$GLB,, | Performed by: INTERNAL MEDICINE

## 2022-04-12 PROCEDURE — 3078F PR MOST RECENT DIASTOLIC BLOOD PRESSURE < 80 MM HG: ICD-10-PCS | Mod: CPTII,S$GLB,, | Performed by: INTERNAL MEDICINE

## 2022-04-12 PROCEDURE — 1160F RVW MEDS BY RX/DR IN RCRD: CPT | Mod: CPTII,S$GLB,, | Performed by: INTERNAL MEDICINE

## 2022-04-12 PROCEDURE — 3075F SYST BP GE 130 - 139MM HG: CPT | Mod: CPTII,S$GLB,, | Performed by: INTERNAL MEDICINE

## 2022-04-12 PROCEDURE — 99214 OFFICE O/P EST MOD 30 MIN: CPT | Mod: S$GLB,,, | Performed by: INTERNAL MEDICINE

## 2022-04-12 PROCEDURE — 1101F PT FALLS ASSESS-DOCD LE1/YR: CPT | Mod: CPTII,S$GLB,, | Performed by: INTERNAL MEDICINE

## 2022-04-12 PROCEDURE — 1126F PR PAIN SEVERITY QUANTIFIED, NO PAIN PRESENT: ICD-10-PCS | Mod: CPTII,S$GLB,, | Performed by: INTERNAL MEDICINE

## 2022-04-12 PROCEDURE — 99999 PR PBB SHADOW E&M-EST. PATIENT-LVL IV: ICD-10-PCS | Mod: PBBFAC,,, | Performed by: INTERNAL MEDICINE

## 2022-04-12 PROCEDURE — 3078F DIAST BP <80 MM HG: CPT | Mod: CPTII,S$GLB,, | Performed by: INTERNAL MEDICINE

## 2022-04-12 PROCEDURE — 3075F PR MOST RECENT SYSTOLIC BLOOD PRESS GE 130-139MM HG: ICD-10-PCS | Mod: CPTII,S$GLB,, | Performed by: INTERNAL MEDICINE

## 2022-04-12 PROCEDURE — 99999 PR PBB SHADOW E&M-EST. PATIENT-LVL IV: CPT | Mod: PBBFAC,,, | Performed by: INTERNAL MEDICINE

## 2022-04-12 PROCEDURE — 3288F FALL RISK ASSESSMENT DOCD: CPT | Mod: CPTII,S$GLB,, | Performed by: INTERNAL MEDICINE

## 2022-04-12 PROCEDURE — 1160F PR REVIEW ALL MEDS BY PRESCRIBER/CLIN PHARMACIST DOCUMENTED: ICD-10-PCS | Mod: CPTII,S$GLB,, | Performed by: INTERNAL MEDICINE

## 2022-04-12 RX ORDER — ACETIC ACID 20.65 MG/ML
4 SOLUTION AURICULAR (OTIC) 3 TIMES DAILY
Qty: 6 ML | Refills: 0 | Status: SHIPPED | OUTPATIENT
Start: 2022-04-12 | End: 2022-04-22

## 2022-04-12 RX ORDER — NAPROXEN 500 MG/1
500 TABLET ORAL 2 TIMES DAILY PRN
Qty: 60 TABLET | Refills: 0 | Status: SHIPPED | OUTPATIENT
Start: 2022-04-12 | End: 2022-05-12

## 2022-04-12 NOTE — PROGRESS NOTES
Subjective:       Patient ID: Raine Medley is a 66 y.o. female.    Chief Complaint: Otalgia      HPI  Raine Medley is a 66 y.o. female with chronic conditions of chronic conditions of HLD, neck pain, TMJ, who presents today for ear discomfort.    Reports last month had a cold with sore throat, sinus congestion and postnasal drip and treated for infection.  Will have on an sore throat and pain on the left TMJ area and ear discomfort.  Denies fever, chills, or hearing loss.  Her pain is mostly TMJ area than the ear.  Some ear itching at times.  She eats an apple for lunch every day and in the last few days has been having trouble opening her mouth to eat a apple.  Is she opens her mouth feels upper pole on the muscles of her neck.  Has not used any medications to treat her symptoms.    Also reports on the left side of her nasal bridge there is a tiny area that tends to perform a scab and who is on an off.  Thinks she irritated it with her eyeglasses.    Health Maintenance:  Health Maintenance   Topic Date Due    DEXA Scan  Never done    Mammogram  11/21/2021    TETANUS VACCINE  10/08/2025    Lipid Panel  03/31/2026    Hepatitis C Screening  Completed       Review of Systems   Constitutional: Negative for fever.   HENT: Positive for nasal congestion (Earlier plasma now improved), ear pain and sore throat (Improved.).    Respiratory: Negative for shortness of breath.    Cardiovascular: Negative for chest pain and palpitations.   Gastrointestinal: Positive for abdominal pain (Occasional with bloating). Negative for change in bowel habit and change in bowel habit.   Genitourinary: Negative.    Musculoskeletal: Positive for arthralgias (TMJ) and neck pain (Describes it as a muscle pull when she opens her mouth.). Negative for neck stiffness.   Neurological: Negative for dizziness.      Past Medical History:   Diagnosis Date    Hyperlipidemia        Past Surgical History:   Procedure Laterality Date     CHOLECYSTECTOMY      COLONOSCOPY N/A 1/3/2019    Procedure: COLONOSCOPY;  Surgeon: Gaston Beebe MD;  Location: [facility] ([address] [place] [address]);  Service: Endoscopy;  Laterality: N/A;       Family History   Problem Relation Age of Onset    Melanoma Neg Hx     Lupus Neg Hx     Psoriasis Neg Hx     Colon cancer Neg Hx     Esophageal cancer Neg Hx     Stomach cancer Neg Hx        Social History     Socioeconomic History    Marital status:    Occupational History     Employer: [employer]   Tobacco Use    Smoking status: Never Smoker    Smokeless tobacco: Never Used   Substance and Sexual Activity    Alcohol use: No    Drug use: No   Social History Narrative    She lives in [place] with her 15-year-old son in [place]. She is an assistant . Her son attends [employer]. She is from [facility].        Current Outpatient Medications   Medication Sig Dispense Refill    cetirizine (ZYRTEC) 10 MG tablet Take 10 mg by mouth once daily.      fluticasone propionate (FLONASE) 50 mcg/actuation nasal spray 1 spray (50 mcg total) by Each Nostril route once daily. 16 g 1    ketoconazole (NIZORAL) 2 % cream       meclizine (ANTIVERT) 12.5 mg tablet Take 1 tablet (12.5 mg total) by mouth 3 (three) times daily as needed for Dizziness. 20 tablet 0    triamcinolone acetonide 0.1% (KENALOG) 0.1 % cream triamcinolone acetonide 0.1 % topical cream      acetic acid (VOSOL) 2 % otic solution Place 4 drops into the left ear 3 (three) times daily. for 10 days 6 mL 0    aspirin (ECOTRIN) 81 MG EC tablet Take 81 mg by mouth twice a week.      fish oil-omega-3 fatty acids 300-1,000 mg capsule Take 2 g by mouth once daily.      naproxen (NAPROSYN) 500 MG tablet Take 1 tablet (500 mg total) by mouth 2 (two) times daily as needed (Pain). 60 tablet 0    omeprazole (PRILOSEC) 20 MG capsule omeprazole 20 mg capsule,delayed release   Take 1 capsule every day by oral route.       No  current facility-administered medications for this visit.       Review of patient's allergies indicates:  No Known Allergies      Objective:       Last 3 sets of Vitals    Vitals - 1 value per visit 2/16/2022 4/12/2022 4/12/2022   SYSTOLIC 170 - 130   DIASTOLIC 72 - 74   Pulse 66 - 62   Temp 97.3 - -   Resp 18 - -   SPO2 99 - 98   Weight (lb) 132 - 132.28   Weight (kg) 59.875 - 60   Height 61 - 61   BMI (Calculated) 25 - 25   VISIT REPORT - - -   Pain Score  - 0 -   Physical Exam  Constitutional:       General: She is not in acute distress.     Appearance: Normal appearance.   HENT:      Head: Normocephalic.      Right Ear: Tympanic membrane, ear canal and external ear normal.      Left Ear: Tympanic membrane normal.      Ears:      Comments: White patch on left canal without erythema     Nose: Nose normal.      Mouth/Throat:      Mouth: Mucous membranes are moist.      Pharynx: No oropharyngeal exudate or posterior oropharyngeal erythema.      Comments: Mild click sensation and able to open mouth for exam.  Mild without any new lesions or erythema  Eyes:      General: No scleral icterus.     Extraocular Movements: Extraocular movements intact.      Conjunctiva/sclera: Conjunctivae normal.      Pupils: Pupils are equal, round, and reactive to light.   Neck:      Vascular: No carotid bruit.      Comments: No goiter.  Cardiovascular:      Rate and Rhythm: Normal rate and regular rhythm.      Pulses: Normal pulses.      Heart sounds: Normal heart sounds.   Pulmonary:      Effort: Pulmonary effort is normal.      Breath sounds: Normal breath sounds.   Abdominal:      General: Bowel sounds are normal. There is no distension.      Palpations: Abdomen is soft. There is no mass.      Tenderness: There is no abdominal tenderness.   Musculoskeletal:         General: No swelling. Normal range of motion.      Cervical back: Neck supple. No rigidity or tenderness.   Lymphadenopathy:      Cervical: No cervical adenopathy.    Skin:     General: Skin is warm and dry.   Neurological:      General: No focal deficit present.      Mental Status: She is alert and oriented to person, place, and time.   Psychiatric:         Mood and Affect: Mood normal.         Behavior: Behavior normal.           CBC:  Recent Labs   Lab 11/21/20 0937 03/31/21  0840   WBC 7.73 5.98   RBC 4.96 4.59   Hemoglobin 14.2 13.2   Hematocrit 43.5 41.3   Platelets 250 228   MCV 88 90   MCH 28.6 28.8   MCHC 32.6 32.0     CMP:  Recent Labs   Lab 11/21/20 0937 03/31/21  0840   Glucose 101 94   Calcium 9.5 9.1   Albumin 4.2 3.9   Total Protein 7.6 7.3   Sodium 143 142   Potassium 4.6 4.1   CO2 28 27   Chloride 105 105   BUN 10 14   Creatinine 0.7 0.7   Alkaline Phosphatase 89 82   ALT 12 11   AST 16 16   Total Bilirubin 0.4 0.4     URINALYSIS:  Recent Labs   Lab 11/21/20  0933   Color, UA Yellow   Specific Gravity, UA 1.020   pH, UA >=9.0   Protein, UA Negative   Nitrite, UA Negative   Leukocytes, UA Negative   Urobilinogen, UA Negative      LIPIDS:  Recent Labs   Lab 11/21/20 0937 03/31/21  0840   TSH 1.781 2.262   HDL 40 34 L   Cholesterol 204 H 185   Triglycerides 125 136   LDL Cholesterol 139.0 123.8   HDL/Cholesterol Ratio 19.6 L 18.4 L   Non-HDL Cholesterol 164 151   Total Cholesterol/HDL Ratio 5.1 H 5.4 H     TSH:  Recent Labs   Lab 11/21/20 0937 03/31/21  0840   TSH 1.781 2.262       A1C:        Imaging:  XR CHEST PA AND LATERAL  Narrative: EXAMINATION:  XR CHEST PA AND LATERAL    CLINICAL HISTORY:  Contusion of thorax, unspecified, initial encounter    TECHNIQUE:  PA and lateral views of the chest were performed.    COMPARISON:  10/08/2015.    FINDINGS:  The trachea is unremarkable.  The cardiomediastinal silhouette is within normal limits.  The hemidiaphragms are unremarkable.  There are no pleural effusions.  There is no evidence of a pneumothorax.  There is no evidence of pneumomediastinum.  No airspace opacity is present.    There are degenerative changes  in the osseous structures.  There is a hiatal hernia.  There are postop changes right upper abdominal quadrant.  Impression: No acute process.    Electronically signed by: Marito Brooks MD  Date:    02/16/2022  Time:    19:49      Assessment:       1. Arthralgia of left temporomandibular joint    2. Otitis externa, unspecified chronicity, unspecified laterality, unspecified type          Chronic conditions status updated as per HPI. Other than changes above, cont current medications and maintain follow up with specialist. Return to clinic in 2-3 months.  Plan:       Raine was seen today for otalgia.    Diagnoses and all orders for this visit:    Arthralgia of left temporomandibular joint  -    presents good range of motion in all directions of her neck and able to open mouth but appears uncomfortable.  Likely pain due to TMJ.  Doubt is related to her ear.  However due to recurrent problem moved referred to ENT.  - naproxen (NAPROSYN) 500 MG tablet; Take 1 tablet (500 mg total) by mouth 2 (two) times daily as needed (Pain).  -     Ambulatory referral/consult to ENT; Future    Otitis externa, unspecified chronicity, unspecified laterality, unspecified type  -     acetic acid (VOSOL) 2 % otic solution; Place 4 drops into the left ear 3 (three) times daily. for 10 days      Health Maintenance Due   Topic Date Due    DEXA Scan  Never done    Shingles Vaccine (2 of 3) 11/24/2017    Pneumococcal Vaccines (Age 65+) (1 - PCV) Never done    COVID-19 Vaccine (3 - Booster for Pfizer series) 08/27/2021    Influenza Vaccine (1) Never done    Mammogram  11/21/2021        Maribell Gotti MD  [facility]  Disclaimer:  This note has been generated using voice-recognition software. There may be grammatical or spelling errors that have been missed during proof-reading

## 2022-04-14 ENCOUNTER — TELEPHONE (OUTPATIENT)
Dept: FAMILY MEDICINE | Facility: CLINIC | Age: 67
End: 2022-04-14
Payer: COMMERCIAL

## 2022-04-19 ENCOUNTER — PATIENT OUTREACH (OUTPATIENT)
Dept: ADMINISTRATIVE | Facility: OTHER | Age: 67
End: 2022-04-19
Payer: COMMERCIAL

## 2022-04-19 NOTE — PROGRESS NOTES
Requested updates within Care Everywhere.  Patient's chart was reviewed for overdue ROMERO topics.  Health maintenance:updated  Immunizations:reconciled   Legacy:   Media:reviewed for outside mammogram  Orders placed:  Tasked appts:  Labs Linked:  Upcoming appt:PCP 5/19/22

## 2022-04-20 DIAGNOSIS — Z12.31 OTHER SCREENING MAMMOGRAM: ICD-10-CM

## 2022-04-21 ENCOUNTER — OFFICE VISIT (OUTPATIENT)
Dept: DERMATOLOGY | Facility: CLINIC | Age: 67
End: 2022-04-21
Payer: COMMERCIAL

## 2022-04-21 DIAGNOSIS — L82.1 SEBORRHEIC KERATOSES: ICD-10-CM

## 2022-04-21 DIAGNOSIS — B07.9 VIRAL WARTS, UNSPECIFIED TYPE: Primary | ICD-10-CM

## 2022-04-21 PROCEDURE — 17110 PR DESTRUCTION BENIGN LESIONS UP TO 14: ICD-10-PCS | Mod: S$GLB,,, | Performed by: STUDENT IN AN ORGANIZED HEALTH CARE EDUCATION/TRAINING PROGRAM

## 2022-04-21 PROCEDURE — 99999 PR PBB SHADOW E&M-EST. PATIENT-LVL III: CPT | Mod: PBBFAC,,, | Performed by: STUDENT IN AN ORGANIZED HEALTH CARE EDUCATION/TRAINING PROGRAM

## 2022-04-21 PROCEDURE — 99203 PR OFFICE/OUTPT VISIT, NEW, LEVL III, 30-44 MIN: ICD-10-PCS | Mod: 25,S$GLB,, | Performed by: STUDENT IN AN ORGANIZED HEALTH CARE EDUCATION/TRAINING PROGRAM

## 2022-04-21 PROCEDURE — 17110 DESTRUCTION B9 LES UP TO 14: CPT | Mod: S$GLB,,, | Performed by: STUDENT IN AN ORGANIZED HEALTH CARE EDUCATION/TRAINING PROGRAM

## 2022-04-21 PROCEDURE — 1101F PT FALLS ASSESS-DOCD LE1/YR: CPT | Mod: CPTII,S$GLB,, | Performed by: STUDENT IN AN ORGANIZED HEALTH CARE EDUCATION/TRAINING PROGRAM

## 2022-04-21 PROCEDURE — 1159F MED LIST DOCD IN RCRD: CPT | Mod: CPTII,S$GLB,, | Performed by: STUDENT IN AN ORGANIZED HEALTH CARE EDUCATION/TRAINING PROGRAM

## 2022-04-21 PROCEDURE — 1126F AMNT PAIN NOTED NONE PRSNT: CPT | Mod: CPTII,S$GLB,, | Performed by: STUDENT IN AN ORGANIZED HEALTH CARE EDUCATION/TRAINING PROGRAM

## 2022-04-21 PROCEDURE — 1126F PR PAIN SEVERITY QUANTIFIED, NO PAIN PRESENT: ICD-10-PCS | Mod: CPTII,S$GLB,, | Performed by: STUDENT IN AN ORGANIZED HEALTH CARE EDUCATION/TRAINING PROGRAM

## 2022-04-21 PROCEDURE — 1101F PR PT FALLS ASSESS DOC 0-1 FALLS W/OUT INJ PAST YR: ICD-10-PCS | Mod: CPTII,S$GLB,, | Performed by: STUDENT IN AN ORGANIZED HEALTH CARE EDUCATION/TRAINING PROGRAM

## 2022-04-21 PROCEDURE — 1160F RVW MEDS BY RX/DR IN RCRD: CPT | Mod: CPTII,S$GLB,, | Performed by: STUDENT IN AN ORGANIZED HEALTH CARE EDUCATION/TRAINING PROGRAM

## 2022-04-21 PROCEDURE — 1160F PR REVIEW ALL MEDS BY PRESCRIBER/CLIN PHARMACIST DOCUMENTED: ICD-10-PCS | Mod: CPTII,S$GLB,, | Performed by: STUDENT IN AN ORGANIZED HEALTH CARE EDUCATION/TRAINING PROGRAM

## 2022-04-21 PROCEDURE — 3288F PR FALLS RISK ASSESSMENT DOCUMENTED: ICD-10-PCS | Mod: CPTII,S$GLB,, | Performed by: STUDENT IN AN ORGANIZED HEALTH CARE EDUCATION/TRAINING PROGRAM

## 2022-04-21 PROCEDURE — 3288F FALL RISK ASSESSMENT DOCD: CPT | Mod: CPTII,S$GLB,, | Performed by: STUDENT IN AN ORGANIZED HEALTH CARE EDUCATION/TRAINING PROGRAM

## 2022-04-21 PROCEDURE — 99203 OFFICE O/P NEW LOW 30 MIN: CPT | Mod: 25,S$GLB,, | Performed by: STUDENT IN AN ORGANIZED HEALTH CARE EDUCATION/TRAINING PROGRAM

## 2022-04-21 PROCEDURE — 1159F PR MEDICATION LIST DOCUMENTED IN MEDICAL RECORD: ICD-10-PCS | Mod: CPTII,S$GLB,, | Performed by: STUDENT IN AN ORGANIZED HEALTH CARE EDUCATION/TRAINING PROGRAM

## 2022-04-21 PROCEDURE — 99999 PR PBB SHADOW E&M-EST. PATIENT-LVL III: ICD-10-PCS | Mod: PBBFAC,,, | Performed by: STUDENT IN AN ORGANIZED HEALTH CARE EDUCATION/TRAINING PROGRAM

## 2022-04-21 NOTE — PATIENT INSTRUCTIONS

## 2022-04-21 NOTE — PROGRESS NOTES
Subjective:       Patient ID:  Raine Medley is a 66 y.o. female who presents for   Chief Complaint   Patient presents with    Spots and/or Floaters     L side of nose , behind L ear     Spot - Initial  Affected locations: nose and face  Signs / symptoms: tender  Severity: mild to moderate  Timing: constant  Aggravated by: nothing  Relieving factors/Treatments tried: nothing  Improvement on treatment: no relief        Review of Systems   Constitutional: Negative for malaise.   Skin: Negative for daily sunscreen use, activity-related sunscreen use and recent sunburn.        Objective:    Physical Exam   Constitutional: She appears well-developed and well-nourished.   Neurological: She is alert.   Psychiatric: She has a normal mood and affect.   Skin:   Areas Examined (abnormalities noted in diagram):   Scalp / Hair Palpated and Inspected  Chest / Axilla Inspection Performed                   Diagram Legend     Erythematous scaling macule/papule c/w actinic keratosis       Vascular papule c/w angioma      Pigmented verrucoid papule/plaque c/w seborrheic keratosis      Yellow umbilicated papule c/w sebaceous hyperplasia      Irregularly shaped tan macule c/w lentigo     1-2 mm smooth white papules consistent with Milia      Movable subcutaneous cyst with punctum c/w epidermal inclusion cyst      Subcutaneous movable cyst c/w pilar cyst      Firm pink to brown papule c/w dermatofibroma      Pedunculated fleshy papule(s) c/w skin tag(s)      Evenly pigmented macule c/w junctional nevus     Mildly variegated pigmented, slightly irregular-bordered macule c/w mildly atypical nevus      Flesh colored to evenly pigmented papule c/w intradermal nevus       Pink pearly papule/plaque c/w basal cell carcinoma      Erythematous hyperkeratotic cursted plaque c/w SCC      Surgical scar with no sign of skin cancer recurrence      Open and closed comedones      Inflammatory papules and pustules      Verrucoid papule  consistent consistent with wart     Erythematous eczematous patches and plaques     Dystrophic onycholytic nail with subungual debris c/w onychomycosis     Umbilicated papule    Erythematous-base heme-crusted tan verrucoid plaque consistent with inflamed seborrheic keratosis     Erythematous Silvery Scaling Plaque c/w Psoriasis     See annotation      Assessment / Plan:        Viral warts, unspecified type  L nasal sidewall  Cryosurgery procedure note:    Verbal consent from the patient is obtained including, but not limited to, risk of hypopigmentation/hyperpigmentation, scar, recurrence of lesion. Liquid nitrogen cryosurgery is applied to 1 verruca with prior paring, as detailed in the physical exam, to produce a freeze injury. 1 consecutive freeze thaw cycles are applied to each verruca. The patient is aware that blisters (possibly blood blisters) may form.    Seborrheic keratoses  These are benign inherited growths without a malignant potential. Reassurance given to patient. No treatment is necessary.     -     Ambulatory referral/consult to Dermatology             No follow-ups on file.

## 2022-04-25 ENCOUNTER — PATIENT MESSAGE (OUTPATIENT)
Dept: ADMINISTRATIVE | Facility: HOSPITAL | Age: 67
End: 2022-04-25
Payer: COMMERCIAL

## 2022-05-19 ENCOUNTER — OFFICE VISIT (OUTPATIENT)
Dept: FAMILY MEDICINE | Facility: CLINIC | Age: 67
End: 2022-05-19
Payer: COMMERCIAL

## 2022-05-19 VITALS
DIASTOLIC BLOOD PRESSURE: 68 MMHG | SYSTOLIC BLOOD PRESSURE: 116 MMHG | HEIGHT: 61 IN | WEIGHT: 132.5 LBS | HEART RATE: 72 BPM | OXYGEN SATURATION: 98 % | BODY MASS INDEX: 25.02 KG/M2

## 2022-05-19 DIAGNOSIS — Z79.899 MEDICATION MANAGEMENT: ICD-10-CM

## 2022-05-19 DIAGNOSIS — H92.02 EARACHE ON LEFT: ICD-10-CM

## 2022-05-19 DIAGNOSIS — M26.622 ARTHRALGIA OF LEFT TEMPOROMANDIBULAR JOINT: Primary | ICD-10-CM

## 2022-05-19 PROCEDURE — 3078F DIAST BP <80 MM HG: CPT | Mod: CPTII,S$GLB,, | Performed by: INTERNAL MEDICINE

## 2022-05-19 PROCEDURE — 1159F MED LIST DOCD IN RCRD: CPT | Mod: CPTII,S$GLB,, | Performed by: INTERNAL MEDICINE

## 2022-05-19 PROCEDURE — 1160F PR REVIEW ALL MEDS BY PRESCRIBER/CLIN PHARMACIST DOCUMENTED: ICD-10-PCS | Mod: CPTII,S$GLB,, | Performed by: INTERNAL MEDICINE

## 2022-05-19 PROCEDURE — 99999 PR PBB SHADOW E&M-EST. PATIENT-LVL IV: CPT | Mod: PBBFAC,,, | Performed by: INTERNAL MEDICINE

## 2022-05-19 PROCEDURE — 3008F BODY MASS INDEX DOCD: CPT | Mod: CPTII,S$GLB,, | Performed by: INTERNAL MEDICINE

## 2022-05-19 PROCEDURE — 3074F SYST BP LT 130 MM HG: CPT | Mod: CPTII,S$GLB,, | Performed by: INTERNAL MEDICINE

## 2022-05-19 PROCEDURE — 1126F PR PAIN SEVERITY QUANTIFIED, NO PAIN PRESENT: ICD-10-PCS | Mod: CPTII,S$GLB,, | Performed by: INTERNAL MEDICINE

## 2022-05-19 PROCEDURE — 99213 PR OFFICE/OUTPT VISIT, EST, LEVL III, 20-29 MIN: ICD-10-PCS | Mod: S$GLB,,, | Performed by: INTERNAL MEDICINE

## 2022-05-19 PROCEDURE — 1159F PR MEDICATION LIST DOCUMENTED IN MEDICAL RECORD: ICD-10-PCS | Mod: CPTII,S$GLB,, | Performed by: INTERNAL MEDICINE

## 2022-05-19 PROCEDURE — 99999 PR PBB SHADOW E&M-EST. PATIENT-LVL IV: ICD-10-PCS | Mod: PBBFAC,,, | Performed by: INTERNAL MEDICINE

## 2022-05-19 PROCEDURE — 1101F PT FALLS ASSESS-DOCD LE1/YR: CPT | Mod: CPTII,S$GLB,, | Performed by: INTERNAL MEDICINE

## 2022-05-19 PROCEDURE — 99213 OFFICE O/P EST LOW 20 MIN: CPT | Mod: S$GLB,,, | Performed by: INTERNAL MEDICINE

## 2022-05-19 PROCEDURE — 1160F RVW MEDS BY RX/DR IN RCRD: CPT | Mod: CPTII,S$GLB,, | Performed by: INTERNAL MEDICINE

## 2022-05-19 PROCEDURE — 3074F PR MOST RECENT SYSTOLIC BLOOD PRESSURE < 130 MM HG: ICD-10-PCS | Mod: CPTII,S$GLB,, | Performed by: INTERNAL MEDICINE

## 2022-05-19 PROCEDURE — 3008F PR BODY MASS INDEX (BMI) DOCUMENTED: ICD-10-PCS | Mod: CPTII,S$GLB,, | Performed by: INTERNAL MEDICINE

## 2022-05-19 PROCEDURE — 1101F PR PT FALLS ASSESS DOC 0-1 FALLS W/OUT INJ PAST YR: ICD-10-PCS | Mod: CPTII,S$GLB,, | Performed by: INTERNAL MEDICINE

## 2022-05-19 PROCEDURE — 3078F PR MOST RECENT DIASTOLIC BLOOD PRESSURE < 80 MM HG: ICD-10-PCS | Mod: CPTII,S$GLB,, | Performed by: INTERNAL MEDICINE

## 2022-05-19 PROCEDURE — 3288F PR FALLS RISK ASSESSMENT DOCUMENTED: ICD-10-PCS | Mod: CPTII,S$GLB,, | Performed by: INTERNAL MEDICINE

## 2022-05-19 PROCEDURE — 3288F FALL RISK ASSESSMENT DOCD: CPT | Mod: CPTII,S$GLB,, | Performed by: INTERNAL MEDICINE

## 2022-05-19 PROCEDURE — 1126F AMNT PAIN NOTED NONE PRSNT: CPT | Mod: CPTII,S$GLB,, | Performed by: INTERNAL MEDICINE

## 2022-05-19 RX ORDER — CHOLECALCIFEROL (VITAMIN D3) 25 MCG
1000 TABLET ORAL DAILY
COMMUNITY

## 2022-05-19 NOTE — PROGRESS NOTES
Subjective:       Patient ID: Raine Medley is a 67 y.o. female.    Chief Complaint: Pain (2 month )      HPI  Raine Medley is a 67 y.o. female with chronic conditions of HLD, neck pain, TMJ who presents today for follow-up.    Reports her throat and neck pain improved.  Left ear sometimes will have a popping noise but improved.  Remains with some discomfort by the TMJ area.  Anti-inflammatories helps but discomfort is recurring.  Denies fevers or chills, cough for shortness of breast.    Has COVID vaccines x2.  Has not received the booster and uses mask at work.    Has no toxic habits.    Health Maintenance:  Health Maintenance   Topic Date Due    DEXA Scan  Never done    Mammogram  11/21/2021    TETANUS VACCINE  10/08/2025    Lipid Panel  03/31/2026    Hepatitis C Screening  Completed       Review of Systems   Constitutional: Negative for chills and fever.   HENT: Positive for ear pain.    Respiratory: Negative.    Cardiovascular: Negative.    Gastrointestinal: Negative.    Genitourinary: Negative.    Musculoskeletal: Negative.    Neurological: Negative for headaches.   Psychiatric/Behavioral: Negative for sleep disturbance.      Past Medical History:   Diagnosis Date    Hyperlipidemia        Past Surgical History:   Procedure Laterality Date    CHOLECYSTECTOMY      COLONOSCOPY N/A 1/3/2019    Procedure: COLONOSCOPY;  Surgeon: Gaston eBebe MD;  Location: [address];  Service: Endoscopy;  Laterality: N/A;       Family History   Problem Relation Age of Onset    Melanoma Neg Hx     Lupus Neg Hx     Psoriasis Neg Hx     Colon cancer Neg Hx     Esophageal cancer Neg Hx     Stomach cancer Neg Hx        Social History     Socioeconomic History    Marital status:    Occupational History     Employer: [employer]   Tobacco Use    Smoking status: Never Smoker    Smokeless tobacco: Never Used   Substance and Sexual Activity    Alcohol use: No     Drug use: No   Social History Narrative    She lives in [place] with her 15-year-old son in [place]. 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 She is from [facility].        Current Outpatient Medications   Medication Sig Dispense Refill    fluticasone propionate (FLONASE) 50 mcg/actuation nasal spray 1 spray (50 mcg total) by Each Nostril route once daily. 16 g 1    vitamin D (VITAMIN D3) 1000 units Tab Take 1,000 Units by mouth once daily.       No current facility-administered medications for this visit.       Review of patient's allergies indicates:  No Known Allergies      Objective:       Last 3 sets of Vitals    Vitals - 1 value per visit 4/21/2022 5/19/2022 5/19/2022   SYSTOLIC - - 116   DIASTOLIC - - 68   Pulse - - 72   Temp - - -   Resp - - -   SPO2 - - 98   Weight (lb) - - 132.5   Weight (kg) - - 60.1   Height - - 61   BMI (Calculated) - - 25   VISIT REPORT - - -   Pain Score  0 0 -   Physical Exam  Constitutional:       General: She is not in acute distress.     Appearance: Normal appearance.   HENT:      Head: Normocephalic.      Right Ear: External ear normal.      Left Ear: External ear normal.      Mouth/Throat:      Pharynx: Oropharynx is clear.   Eyes:      General: No scleral icterus.     Extraocular Movements: Extraocular movements intact.      Conjunctiva/sclera: Conjunctivae normal.   Neck:      Vascular: No carotid bruit.      Comments: No goiter.  Cardiovascular:      Rate and Rhythm: Normal rate and regular rhythm.      Pulses: Normal pulses.      Heart sounds: Normal heart sounds.   Pulmonary:      Effort: Pulmonary effort is normal.      Breath sounds: Normal breath sounds.   Abdominal:      General: Bowel sounds are normal. There is no distension.      Palpations: Abdomen is soft.   Musculoskeletal:         General: No swelling. Normal range of motion.      Cervical back: Neck supple.   Lymphadenopathy:      Cervical: No cervical adenopathy.   Skin:      General: Skin is warm and dry.   Neurological:      General: No focal deficit present.      Mental Status: She is alert and oriented to person, place, and time.   Psychiatric:         Mood and Affect: Mood normal.         Behavior: Behavior normal.           CBC:  Recent Labs   Lab 11/21/20 0937 03/31/21  0840   WBC 7.73 5.98   RBC 4.96 4.59   Hemoglobin 14.2 13.2   Hematocrit 43.5 41.3   Platelets 250 228   MCV 88 90   MCH 28.6 28.8   MCHC 32.6 32.0     CMP:  Recent Labs   Lab 11/21/20 0937 03/31/21  0840   Glucose 101 94   Calcium 9.5 9.1   Albumin 4.2 3.9   Total Protein 7.6 7.3   Sodium 143 142   Potassium 4.6 4.1   CO2 28 27   Chloride 105 105   BUN 10 14   Creatinine 0.7 0.7   Alkaline Phosphatase 89 82   ALT 12 11   AST 16 16   Total Bilirubin 0.4 0.4     URINALYSIS:  Recent Labs   Lab 11/21/20  0933   Color, UA Yellow   Specific Gravity, UA 1.020   pH, UA >=9.0   Protein, UA Negative   Nitrite, UA Negative   Leukocytes, UA Negative   Urobilinogen, UA Negative      LIPIDS:  Recent Labs   Lab 11/21/20  0937 03/31/21  0840   TSH 1.781 2.262   HDL 40 34 L   Cholesterol 204 H 185   Triglycerides 125 136   LDL Cholesterol 139.0 123.8   HDL/Cholesterol Ratio 19.6 L 18.4 L   Non-HDL Cholesterol 164 151   Total Cholesterol/HDL Ratio 5.1 H 5.4 H     TSH:  Recent Labs   Lab 11/21/20  0937 03/31/21  0840   TSH 1.781 2.262       A1C:        Imaging:  XR CHEST PA AND LATERAL  Narrative: EXAMINATION:  XR CHEST PA AND LATERAL    CLINICAL HISTORY:  Contusion of thorax, unspecified, initial encounter    TECHNIQUE:  PA and lateral views of the chest were performed.    COMPARISON:  10/08/2015.    FINDINGS:  The trachea is unremarkable.  The cardiomediastinal silhouette is within normal limits.  The hemidiaphragms are unremarkable.  There are no pleural effusions.  There is no evidence of a pneumothorax.  There is no evidence of pneumomediastinum.  No airspace opacity is present.    There are degenerative changes in the  osseous structures.  There is a hiatal hernia.  There are postop changes right upper abdominal quadrant.  Impression: No acute process.    Electronically signed by: Marito Brooks MD  Date:    02/16/2022  Time:    19:49      Assessment:       1. Arthralgia of left temporomandibular joint    2. Earache on left    3. Medication management          Chronic conditions status updated as per HPI. Other than changes above, cont current medications and maintain follow up with specialist. Return to clinic in 12 months.  Plan:       Raine was seen today for pain.    Diagnoses and all orders for this visit:    Arthralgia of left temporomandibular joint/ Earache on left  -     Ambulatory referral/consult to ENT; Future  - continue anti-inflammatories as needed.  - dentist evaluation recommended.    Medication management  -     CBC Auto Differential; Future  -     Comprehensive Metabolic Panel; Future  -     Hemoglobin A1C; Future  -     Lipid Panel; Future  -     TSH; Future      Health Maintenance Due   Topic Date Due    DEXA Scan  Never done    Shingles Vaccine (2 of 3) 11/24/2017    Pneumococcal Vaccines (Age 65+) (1 - PCV) Never done    COVID-19 Vaccine (3 - Booster for Pfizer series) 08/27/2021    Mammogram  11/21/2021        Maribell Gotti MD  [facility]  Disclaimer:  This note has been generated using voice-recognition software. There may be grammatical or spelling errors that have been missed during proof-reading

## 2022-05-31 ENCOUNTER — PATIENT MESSAGE (OUTPATIENT)
Dept: ADMINISTRATIVE | Facility: HOSPITAL | Age: 67
End: 2022-05-31
Payer: COMMERCIAL

## 2022-06-06 ENCOUNTER — TELEPHONE (OUTPATIENT)
Dept: FAMILY MEDICINE | Facility: CLINIC | Age: 67
End: 2022-06-06
Payer: COMMERCIAL

## 2022-06-06 DIAGNOSIS — M25.569 KNEE PAIN, UNSPECIFIED CHRONICITY, UNSPECIFIED LATERALITY: ICD-10-CM

## 2022-06-06 DIAGNOSIS — M26.622 ARTHRALGIA OF LEFT TEMPOROMANDIBULAR JOINT: Primary | ICD-10-CM

## 2022-06-06 NOTE — TELEPHONE ENCOUNTER
Patient is requesting a referral for ortho, Dr Sanford Calvert, for both her knees.  Please advise.

## 2022-06-15 ENCOUNTER — PATIENT MESSAGE (OUTPATIENT)
Dept: ORTHOPEDICS | Facility: CLINIC | Age: 67
End: 2022-06-15
Payer: COMMERCIAL

## 2022-06-15 ENCOUNTER — TELEPHONE (OUTPATIENT)
Dept: ORTHOPEDICS | Facility: CLINIC | Age: 67
End: 2022-06-15
Payer: COMMERCIAL

## 2022-06-15 DIAGNOSIS — M25.562 ACUTE PAIN OF BOTH KNEES: Primary | ICD-10-CM

## 2022-06-15 DIAGNOSIS — M25.561 ACUTE PAIN OF BOTH KNEES: Primary | ICD-10-CM

## 2022-06-15 NOTE — TELEPHONE ENCOUNTER
I called the patient regarding her appointment on 6/28 with Dr. Calvert. I left a message stating that we need to move her appointment to another day due to a scheduling conflict. The patient verbalized understanding and has no further questions.

## 2022-06-28 ENCOUNTER — HOSPITAL ENCOUNTER (OUTPATIENT)
Dept: RADIOLOGY | Facility: HOSPITAL | Age: 67
Discharge: HOME | End: 2022-06-28
Attending: ORTHOPAEDIC SURGERY
Payer: COMMERCIAL

## 2022-06-28 DIAGNOSIS — M25.562 ACUTE PAIN OF BOTH KNEES: ICD-10-CM

## 2022-06-28 DIAGNOSIS — M25.561 ACUTE PAIN OF BOTH KNEES: ICD-10-CM

## 2022-06-28 PROCEDURE — 73564 X-RAY EXAM KNEE 4 OR MORE: CPT | Mod: 26,50,, | Performed by: RADIOLOGY

## 2022-06-28 PROCEDURE — 73564 X-RAY EXAM KNEE 4 OR MORE: CPT | Mod: TC,50

## 2022-06-28 PROCEDURE — 73564 XR KNEE ORTHO BILAT WITH FLEXION: ICD-10-PCS | Mod: 26,50,, | Performed by: RADIOLOGY

## 2022-07-05 ENCOUNTER — HOSPITAL ENCOUNTER (OUTPATIENT)
Dept: RADIOLOGY | Facility: HOSPITAL | Age: 67
Discharge: HOME | End: 2022-07-05
Attending: INTERNAL MEDICINE
Payer: COMMERCIAL

## 2022-07-05 ENCOUNTER — OFFICE VISIT (OUTPATIENT)
Dept: ORTHOPEDICS | Facility: CLINIC | Age: 67
End: 2022-07-05
Payer: COMMERCIAL

## 2022-07-05 VITALS — HEIGHT: 61 IN | WEIGHT: 132 LBS | BODY MASS INDEX: 24.92 KG/M2

## 2022-07-05 DIAGNOSIS — M25.569 KNEE PAIN, UNSPECIFIED CHRONICITY, UNSPECIFIED LATERALITY: ICD-10-CM

## 2022-07-05 DIAGNOSIS — Z12.31 OTHER SCREENING MAMMOGRAM: ICD-10-CM

## 2022-07-05 DIAGNOSIS — M17.0 ARTHRITIS OF BOTH KNEES: Primary | ICD-10-CM

## 2022-07-05 PROCEDURE — 99999 PR PBB SHADOW E&M-EST. PATIENT-LVL III: CPT | Mod: PBBFAC,,, | Performed by: ORTHOPAEDIC SURGERY

## 2022-07-05 PROCEDURE — 77067 SCR MAMMO BI INCL CAD: CPT | Mod: 26,,, | Performed by: RADIOLOGY

## 2022-07-05 PROCEDURE — 99999 PR PBB SHADOW E&M-EST. PATIENT-LVL III: ICD-10-PCS | Mod: PBBFAC,,, | Performed by: ORTHOPAEDIC SURGERY

## 2022-07-05 PROCEDURE — 1125F AMNT PAIN NOTED PAIN PRSNT: CPT | Mod: CPTII,S$GLB,, | Performed by: ORTHOPAEDIC SURGERY

## 2022-07-05 PROCEDURE — 1125F PR PAIN SEVERITY QUANTIFIED, PAIN PRESENT: ICD-10-PCS | Mod: CPTII,S$GLB,, | Performed by: ORTHOPAEDIC SURGERY

## 2022-07-05 PROCEDURE — 3288F PR FALLS RISK ASSESSMENT DOCUMENTED: ICD-10-PCS | Mod: CPTII,S$GLB,, | Performed by: ORTHOPAEDIC SURGERY

## 2022-07-05 PROCEDURE — 3008F BODY MASS INDEX DOCD: CPT | Mod: CPTII,S$GLB,, | Performed by: ORTHOPAEDIC SURGERY

## 2022-07-05 PROCEDURE — 99204 OFFICE O/P NEW MOD 45 MIN: CPT | Mod: S$GLB,,, | Performed by: ORTHOPAEDIC SURGERY

## 2022-07-05 PROCEDURE — 1101F PR PT FALLS ASSESS DOC 0-1 FALLS W/OUT INJ PAST YR: ICD-10-PCS | Mod: CPTII,S$GLB,, | Performed by: ORTHOPAEDIC SURGERY

## 2022-07-05 PROCEDURE — 3008F PR BODY MASS INDEX (BMI) DOCUMENTED: ICD-10-PCS | Mod: CPTII,S$GLB,, | Performed by: ORTHOPAEDIC SURGERY

## 2022-07-05 PROCEDURE — 1101F PT FALLS ASSESS-DOCD LE1/YR: CPT | Mod: CPTII,S$GLB,, | Performed by: ORTHOPAEDIC SURGERY

## 2022-07-05 PROCEDURE — 77063 BREAST TOMOSYNTHESIS BI: CPT | Mod: 26,,, | Performed by: RADIOLOGY

## 2022-07-05 PROCEDURE — 3288F FALL RISK ASSESSMENT DOCD: CPT | Mod: CPTII,S$GLB,, | Performed by: ORTHOPAEDIC SURGERY

## 2022-07-05 PROCEDURE — 77067 SCR MAMMO BI INCL CAD: CPT | Mod: TC

## 2022-07-05 PROCEDURE — 77063 MAMMO DIGITAL SCREENING BILAT WITH TOMO: ICD-10-PCS | Mod: 26,,, | Performed by: RADIOLOGY

## 2022-07-05 PROCEDURE — 77067 MAMMO DIGITAL SCREENING BILAT WITH TOMO: ICD-10-PCS | Mod: 26,,, | Performed by: RADIOLOGY

## 2022-07-05 PROCEDURE — 99204 PR OFFICE/OUTPT VISIT, NEW, LEVL IV, 45-59 MIN: ICD-10-PCS | Mod: S$GLB,,, | Performed by: ORTHOPAEDIC SURGERY

## 2022-07-05 PROCEDURE — 77063 BREAST TOMOSYNTHESIS BI: CPT | Mod: TC

## 2022-07-05 NOTE — PROGRESS NOTES
Subjective:     HPI:   Raine Medley is a 67 y.o. female who presents for eval B knee L>R    Seen in  by Angela Alvarado knee OA, offered HA v TKA, no f/u    Son helping translate    She has had years of bilateral knee pain.  She says the pain was tolerable until a she did physical therapy last year and things got worse.  She has moderate to severe global knee pain activity related and relieved with rest.  Intermittent symptoms worse with walking and stairs.  Right more than left    Medications: Advil PRN ~3x/week, GI upset with regular NSAID use    Injections: The patient had bilateral knee iaCSI  and it only helped for 3 weeks.     Physical Therapy: Yes, completed OP-PT last year, the patient reported that it did not help the pain and the PT made her knee pain worse.     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 The patient stated that she would need to find someone to help take care of her. She has sons that live in the area may not be able to help her.       ROS:  The updated medical history is in the chart and has been reviewed. A review of systems is updated and there is no reported vision changes, ear/nose/mouth/throat complaints,  chest pain, shortness of breath, abdominal pain, urological complaints, fevers or chills, psychiatric complaints. Musculoskeletal and neurologcial symptoms are as documented. All other systems are negative.      Objective:   Exam:  There were no vitals filed for this visit.  Body mass index is 24.94 kg/m².    Physical examination included assessment of the patient's  general appearance with particular attention to development, nutrition, body habitus, attention to grooming, and any evidence of distress.  Constitutional: The patient is a well-developed, well-nourished patient in no acute distress.   Cardiovascular: Vascular examination included warmth and capillary refill as well inspection for edema and assessment of pedal pulses. Pulses are palpable and regular.  Musculoskeletal: Gait was assessed as to whether it was steady, non-antalgic, and/or required the use of an assist device. The patient was also asked to walk independently and get onto the examination table.  Skin: The skin was examined for any obvious rashes or lesions in the extremity.  Neurologic: Sensation is intact to light touch in the extremity. The patient has good coordination without hyperreflexia and is alert and oriented to person, place and time and has normal mood and affect.     All of the above were examined and found to be within normal limits except for the following pertinent clinical findings:    Antalgic gait with a limp mild.  Negative Trendelenburg.  Both knees 0-120 degree knee range of motion 3° varus alignment which does correct she is tender palpation predominantly medial but also lateral patellofemoral joint lines mild-to-moderate effusion knee stable anterior-posterior varus valgus stresses no flexion contracture or extensor lag      Imaging:    KNEE R ARTHRITIS    Indication:  Right knee pain  Exam Ordered: Radiographs of the right knee include a standing anteroposterior view, a standing posterioanterior view, a lateral view in full flexion, and a sunrise view  Details of Examination: Exam shows evidence of joint space narrowing, osteophyte formation, and subchondral sclerosis, all consistent with degenerative arthritis of the knee.  No other significant findings are noted.  Impression:  Degenerative Arthritis, Right Knee and KNEE L ARTHRITIS     Indication:  Left knee pain  Exam Ordered:  Radiographs of the left knee include a standing anteroposterior view, a standing posterioanterior view, a lateral view in full flexion, and a sunrise view  Details of Examination: Exam shows evidence of joint space narrowing, osteophyte formation, and subchondral sclerosis, all consistent with degenerative arthritis of the knee.  No other significant findings are noted.  Impression:  Degenerative Arthritis, Left Knee    B knee G4 varus arthritis, bone on bone      Assessment:       ICD-10-CM ICD-9-CM   1. Arthritis of both knees  M17.0 716.96   2. Knee pain, unspecified chronicity, unspecified laterality  M25.569 719.46      No sig PMHx     Plan:       The above findings were discussed with patient length. We discussed the risks of conservative versus surgical management knee arthritis. Conservative management consisting of anti-inflammatory medications, glucosamine/chondroitin sulfate, weight loss, physical therapy, activity modification, as well as injections (lubricant versus corticosteroid) was discussed at length. At this point considering the patient's level of activity, pain, and radiographic findings I recommend TKA    The patient was given a handout with treatment strategies for hip and knee joint care prior to surgery from [employer], the [employer] of Hip [employer] K[employer].   This included information regarding medications, injections, weight loss, exercise, braces, physical therapy, and alternative therapies.     I explained the potentially adverse gastric, cardiac, and renal effects of NSAIDs and explained that if the patient wishes to take it for longer that the patient should discuss this with their primary care physician to determine if it is safe to do so.    x Tylenol    Aleve    Voltaren Gel   [facility] info    Bursitis info   x Total Joint Info    HEP: [employer] [employer] home exercise conditioning program     PT    CSI: intra-articular steroid injection    CSI: greater trochanter  bursitis    HA: hyaluronic acid injection    Brace:     Referral:      She says school starts in 3 weeks, August 1st.    Unfortunately I do not have any operative time open between now and then and even if we did she would not have time to recover and be able to be at work full-time by the start of school.  I wish she had come in much earlier 2 chordee.      That being said she says that she will try to put off surgery until next summer.      In the meantime recommend that she see Dr. Elise for non-op interventions like US guided HA and iovera    F/u PRN to discuss TKA when she is ready    Orders Placed This Encounter   Procedures    Ambulatory referral/consult to Sports Medicine     Standing Status:   Future     Number of Occurrences:   1     Standing Expiration Date:   8/5/2023     Referral Priority:   Routine     Referral Type:   Consultation     Referral Reason:   Specialty Services Required     Requested Specialty:   Sports Medicine     Number of Visits Requested:   1             Past Medical History:   Diagnosis Date    Hyperlipidemia        Past Surgical History:   Procedure Laterality Date    CHOLECYSTECTOMY      COLONOSCOPY N/A 1/3/2019    Procedure: COLONOSCOPY;  Surgeon: Gaston Beebe MD;  Location: [address];  Service: Endoscopy;  Laterality: N/A;       Family History   Problem Relation Age of Onset    Melanoma Neg Hx     Lupus Neg Hx     Psoriasis Neg Hx     Colon cancer Neg Hx     Esophageal cancer Neg Hx     Stomach cancer Neg Hx        Social History     Socioeconomic History    Marital status:    Occupational History     Employer: [employer] [employer] [place] [employer]   Tobacco Use    Smoking status: Never Smoker    Smokeless tobacco: Never Used   Substance and Sexual Activity    Alcohol use: No    Drug use: No   Social History Narrative    She lives in [place] with her 15-year-old son in [place]. 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 She is from [facility].

## 2022-07-13 NOTE — PROGRESS NOTES
CC: bilateral knee pain    67 y.o. Female presents today for evaluation of her bilateral knee pain. Pt was referred by Dr. Calvert. Offered pt  services, pt denied as daughter is present with her day. Pt reports her knee pain started more than 10 years ago. Pt states she started going to [employer] for walks, causing her knees to hurt. Pt localizes pain to medial aspect of left knee and anterior aspect of right knee. Pt reports her pain is 8/10 in left and 10/10 in right. Pt reports clicking in her right knee with exercise. Pt denies numbness/tingling, but reports cramping in both legs at night.     SYMPTOMS:   Pain Score: 8/10 left, 10/10 right  Pain location: medial on left, anterior on right  Time of onset: >10 yrs ago  Trauma, injury: gradual onset with walking activity    Audible pop: no  Clicking: yes  Catching: no  Locking: no  Giving out, instability: no  Swelling: yes, left  Theater sign: yes  Problems with stairs: yes, descending    INTERVENTIONS:   Medications tried: advil (some relief)  Braces/devices: open patella brace (some relief)  Physical therapy: fPT last year (states pain was worse after)  Injections: CSI over 5 yrs ago, 1 month of relief    RELEVANT HISTORY:   Imaging to date: 06/28/22  Previous significant knee injuries:  none  Previous knee surgeries: none    Occupation: Teacher     REVIEW OF SYSTEMS:   Constitution: Patient denies fever or chills.  Eyes: Patient denies eye pain or vision changes.  HEENT: Patient denies ear pain, sore throat, or nasal discharge.  CVS: Patient denies chest pain.  Lungs: Patient denies shortness of breath or cough.  Abdomen: Patient denies any stomach pain, nausea, vomiting, or diarrhea  Skin: Patient denies skin rash or itching.    Musculoskeletal: Patient denies recent injuries or trauma.  Neuro: Patient denies any numbness or tingling in upper or lower extremities.  Psych: Patient denies any current anxiety or nervousness.    PAST MEDICAL HISTORY:  "  Past Medical History:   Diagnosis Date    Hyperlipidemia        PAST SURGICAL HISTORY:  Past Surgical History:   Procedure Laterality Date    CHOLECYSTECTOMY      COLONOSCOPY N/A 1/3/2019    Procedure: COLONOSCOPY;  Surgeon: Gaston Beebe MD;  Location: [address]);  Service: Endoscopy;  Laterality: N/A;       FAMILY HISTORY:  Family History   Problem Relation Age of Onset    Melanoma Neg Hx     Lupus Neg Hx     Psoriasis Neg Hx     Colon cancer Neg Hx     Esophageal cancer Neg Hx     Stomach cancer Neg Hx        SOCIAL HISTORY:  Social History     Socioeconomic History    Marital status:    Occupational History     Employer: [employer]   Tobacco Use    Smoking status: Never Smoker    Smokeless tobacco: Never Used   Substance and Sexual Activity    Alcohol use: No    Drug use: No   Social History Narrative    She lives in [place] with her 15-year-old son in [place]. 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 She is from [facility].        MEDICATIONS:     Current Outpatient Medications:     fluticasone propionate (FLONASE) 50 mcg/actuation nasal spray, 1 spray (50 mcg total) by Each Nostril route once daily., Disp: 16 g, Rfl: 1    vitamin D (VITAMIN D3) 1000 units Tab, Take 1,000 Units by mouth once daily., Disp: , Rfl:     ALLERGIES:   Review of patient's allergies indicates:  No Known Allergies     IMAGIN. Knee X-ray ordered due to bilateral knee pain. 4 views taken 22.   2. X-ray images were reviewed personally by me and then directly with patient.  3. FINDINGS: Knee joints are narrowed medially with degenerative change and loss joint cartilage.  Changes are worse on the left.  Patellofemoral degenerative changes seen bilateral no joint effusion is noted.    4. IMPRESSION:  See above    PHYSICAL EXAMINATION:  /86   Pulse 69   Ht 5' 1" (1.549 m)   Wt 60 kg (132 lb 4.8 oz)   BMI 25.00 kg/m²   Vitals " signs and nursing note have been reviewed.    General: In no acute distress, well developed, well nourished, no diaphoresis  Eyes: EOM full and smooth, no eye redness or discharge  HEENT: normocephalic and atraumatic, neck supple, trachea midline, no nasal discharge  Cardiovascular: no LE edema  Lungs: respirations non-labored, no conversational dyspnea   Neuro: AAOx3, CN2-12 grossly intact  Skin: No rashes, warm and dry  Psychiatric: cooperative, pleasant, mood and affect appropriate for age    Bilateral Knee:   Gait: slightly antalgic    Inspection/Palpation:   -Rubor   -Calor  -Effusion   -Patella ballotable   -Patellar apprehension  -Retinacular tenderness   +Patellar crepitus   Patellar tilt grossly normal     TTP at:  -Joint line   -MCL   -LCL   -Popliteal region   -Quad tendon   -Patella  -Pat tendon  -Pat border  -Med condyle   -Lat condyle   -Pes   -Prox fibula   -Tib tub  -Gerdy's tubercle  -Distal Hamstring tendons  -Proximal Hamstrings/Ischial tuberosity  -ITB    ROM:   Ext: <5°   Flex:120°   +Discomfort w/ full flex   -Bounce-home discomfort     Ligamentous:   -Ant drawer   -Post drawer   -Lachman's   Good endpoints & no pain w/ valgus & varus stress    Meniscal:  +Caleb's for pain    Other:  -Patellar apprehension  +Patellar grind  -Pritchett's   -J sign  -Vivian's  Abductors 5/5    ASSESSMENT:      ICD-10-CM ICD-9-CM   1. Bilateral primary osteoarthritis of knee  M17.0 715.16   2. Arthritis of both knees  M17.0 716.96   3. Bilateral chronic knee pain  M25.561 719.46    M25.562 338.29    G89.29          PLAN:    Patient with Kellgren-Orestes Grade 2 or higher osteoarthritic changes to the knee(s) (please see previous note with description of xray images).  Prior to hyaluronic injections, patient had functional limitations with decreased range of motion and persistent pain.  Patient has tried aerobic and resistance training and weight reduction without improvement of symptoms.  Patient failed to improve  with the use of NSAIDs and Acetaminophen over the last 12 months.  Patient did not receive more than 8 weeks of relief from corticosteroid injection.        Future planning includes - B/L HA injections    All questions were answered to the best of my ability and all concerns were addressed at this time.    Follow up in 2 week(s) for above, or sooner if needed.      This note is dictated using the M*Modal Fluency Direct word recognition program. There are word recognition mistakes that are occasionally missed on review.

## 2022-07-14 ENCOUNTER — OFFICE VISIT (OUTPATIENT)
Dept: SPORTS MEDICINE | Facility: CLINIC | Age: 67
End: 2022-07-14
Payer: COMMERCIAL

## 2022-07-14 VITALS
HEART RATE: 69 BPM | SYSTOLIC BLOOD PRESSURE: 139 MMHG | WEIGHT: 132.31 LBS | HEIGHT: 61 IN | DIASTOLIC BLOOD PRESSURE: 86 MMHG | BODY MASS INDEX: 24.98 KG/M2

## 2022-07-14 DIAGNOSIS — M17.0 BILATERAL PRIMARY OSTEOARTHRITIS OF KNEE: Primary | ICD-10-CM

## 2022-07-14 DIAGNOSIS — G89.29 BILATERAL CHRONIC KNEE PAIN: ICD-10-CM

## 2022-07-14 DIAGNOSIS — M25.561 BILATERAL CHRONIC KNEE PAIN: ICD-10-CM

## 2022-07-14 DIAGNOSIS — M17.0 ARTHRITIS OF BOTH KNEES: ICD-10-CM

## 2022-07-14 DIAGNOSIS — M25.562 BILATERAL CHRONIC KNEE PAIN: ICD-10-CM

## 2022-07-14 PROCEDURE — 1101F PR PT FALLS ASSESS DOC 0-1 FALLS W/OUT INJ PAST YR: ICD-10-PCS | Mod: CPTII,S$GLB,, | Performed by: STUDENT IN AN ORGANIZED HEALTH CARE EDUCATION/TRAINING PROGRAM

## 2022-07-14 PROCEDURE — 99204 OFFICE O/P NEW MOD 45 MIN: CPT | Mod: S$GLB,,, | Performed by: STUDENT IN AN ORGANIZED HEALTH CARE EDUCATION/TRAINING PROGRAM

## 2022-07-14 PROCEDURE — 99999 PR PBB SHADOW E&M-EST. PATIENT-LVL III: CPT | Mod: PBBFAC,,, | Performed by: STUDENT IN AN ORGANIZED HEALTH CARE EDUCATION/TRAINING PROGRAM

## 2022-07-14 PROCEDURE — 3079F DIAST BP 80-89 MM HG: CPT | Mod: CPTII,S$GLB,, | Performed by: STUDENT IN AN ORGANIZED HEALTH CARE EDUCATION/TRAINING PROGRAM

## 2022-07-14 PROCEDURE — 1159F PR MEDICATION LIST DOCUMENTED IN MEDICAL RECORD: ICD-10-PCS | Mod: CPTII,S$GLB,, | Performed by: STUDENT IN AN ORGANIZED HEALTH CARE EDUCATION/TRAINING PROGRAM

## 2022-07-14 PROCEDURE — 3288F FALL RISK ASSESSMENT DOCD: CPT | Mod: CPTII,S$GLB,, | Performed by: STUDENT IN AN ORGANIZED HEALTH CARE EDUCATION/TRAINING PROGRAM

## 2022-07-14 PROCEDURE — 1125F PR PAIN SEVERITY QUANTIFIED, PAIN PRESENT: ICD-10-PCS | Mod: CPTII,S$GLB,, | Performed by: STUDENT IN AN ORGANIZED HEALTH CARE EDUCATION/TRAINING PROGRAM

## 2022-07-14 PROCEDURE — 3075F SYST BP GE 130 - 139MM HG: CPT | Mod: CPTII,S$GLB,, | Performed by: STUDENT IN AN ORGANIZED HEALTH CARE EDUCATION/TRAINING PROGRAM

## 2022-07-14 PROCEDURE — 99204 PR OFFICE/OUTPT VISIT, NEW, LEVL IV, 45-59 MIN: ICD-10-PCS | Mod: S$GLB,,, | Performed by: STUDENT IN AN ORGANIZED HEALTH CARE EDUCATION/TRAINING PROGRAM

## 2022-07-14 PROCEDURE — 3079F PR MOST RECENT DIASTOLIC BLOOD PRESSURE 80-89 MM HG: ICD-10-PCS | Mod: CPTII,S$GLB,, | Performed by: STUDENT IN AN ORGANIZED HEALTH CARE EDUCATION/TRAINING PROGRAM

## 2022-07-14 PROCEDURE — 99999 PR PBB SHADOW E&M-EST. PATIENT-LVL III: ICD-10-PCS | Mod: PBBFAC,,, | Performed by: STUDENT IN AN ORGANIZED HEALTH CARE EDUCATION/TRAINING PROGRAM

## 2022-07-14 PROCEDURE — 3008F PR BODY MASS INDEX (BMI) DOCUMENTED: ICD-10-PCS | Mod: CPTII,S$GLB,, | Performed by: STUDENT IN AN ORGANIZED HEALTH CARE EDUCATION/TRAINING PROGRAM

## 2022-07-14 PROCEDURE — 1125F AMNT PAIN NOTED PAIN PRSNT: CPT | Mod: CPTII,S$GLB,, | Performed by: STUDENT IN AN ORGANIZED HEALTH CARE EDUCATION/TRAINING PROGRAM

## 2022-07-14 PROCEDURE — 1160F PR REVIEW ALL MEDS BY PRESCRIBER/CLIN PHARMACIST DOCUMENTED: ICD-10-PCS | Mod: CPTII,S$GLB,, | Performed by: STUDENT IN AN ORGANIZED HEALTH CARE EDUCATION/TRAINING PROGRAM

## 2022-07-14 PROCEDURE — 3288F PR FALLS RISK ASSESSMENT DOCUMENTED: ICD-10-PCS | Mod: CPTII,S$GLB,, | Performed by: STUDENT IN AN ORGANIZED HEALTH CARE EDUCATION/TRAINING PROGRAM

## 2022-07-14 PROCEDURE — 1101F PT FALLS ASSESS-DOCD LE1/YR: CPT | Mod: CPTII,S$GLB,, | Performed by: STUDENT IN AN ORGANIZED HEALTH CARE EDUCATION/TRAINING PROGRAM

## 2022-07-14 PROCEDURE — 3075F PR MOST RECENT SYSTOLIC BLOOD PRESS GE 130-139MM HG: ICD-10-PCS | Mod: CPTII,S$GLB,, | Performed by: STUDENT IN AN ORGANIZED HEALTH CARE EDUCATION/TRAINING PROGRAM

## 2022-07-14 PROCEDURE — 1159F MED LIST DOCD IN RCRD: CPT | Mod: CPTII,S$GLB,, | Performed by: STUDENT IN AN ORGANIZED HEALTH CARE EDUCATION/TRAINING PROGRAM

## 2022-07-14 PROCEDURE — 1160F RVW MEDS BY RX/DR IN RCRD: CPT | Mod: CPTII,S$GLB,, | Performed by: STUDENT IN AN ORGANIZED HEALTH CARE EDUCATION/TRAINING PROGRAM

## 2022-07-14 PROCEDURE — 3008F BODY MASS INDEX DOCD: CPT | Mod: CPTII,S$GLB,, | Performed by: STUDENT IN AN ORGANIZED HEALTH CARE EDUCATION/TRAINING PROGRAM

## 2022-07-18 ENCOUNTER — OFFICE VISIT (OUTPATIENT)
Dept: OTOLARYNGOLOGY | Facility: CLINIC | Age: 67
End: 2022-07-18
Payer: COMMERCIAL

## 2022-07-18 DIAGNOSIS — J35.1 TONSILLAR HYPERTROPHY: ICD-10-CM

## 2022-07-18 DIAGNOSIS — H92.02 EAR DISCOMFORT, LEFT: ICD-10-CM

## 2022-07-18 DIAGNOSIS — Z84.89: ICD-10-CM

## 2022-07-18 DIAGNOSIS — M54.2 NECK PAIN ON LEFT SIDE: Primary | ICD-10-CM

## 2022-07-18 DIAGNOSIS — Z87.898 HISTORY OF VERTIGO: ICD-10-CM

## 2022-07-18 DIAGNOSIS — M26.622 ARTHRALGIA OF LEFT TEMPOROMANDIBULAR JOINT: ICD-10-CM

## 2022-07-18 PROCEDURE — 99999 PR PBB SHADOW E&M-EST. PATIENT-LVL III: ICD-10-PCS | Mod: PBBFAC,,, | Performed by: OTOLARYNGOLOGY

## 2022-07-18 PROCEDURE — 3288F PR FALLS RISK ASSESSMENT DOCUMENTED: ICD-10-PCS | Mod: CPTII,S$GLB,, | Performed by: OTOLARYNGOLOGY

## 2022-07-18 PROCEDURE — 1126F AMNT PAIN NOTED NONE PRSNT: CPT | Mod: CPTII,S$GLB,, | Performed by: OTOLARYNGOLOGY

## 2022-07-18 PROCEDURE — 3288F FALL RISK ASSESSMENT DOCD: CPT | Mod: CPTII,S$GLB,, | Performed by: OTOLARYNGOLOGY

## 2022-07-18 PROCEDURE — 99203 PR OFFICE/OUTPT VISIT, NEW, LEVL III, 30-44 MIN: ICD-10-PCS | Mod: S$GLB,,, | Performed by: OTOLARYNGOLOGY

## 2022-07-18 PROCEDURE — 99203 OFFICE O/P NEW LOW 30 MIN: CPT | Mod: S$GLB,,, | Performed by: OTOLARYNGOLOGY

## 2022-07-18 PROCEDURE — 1159F PR MEDICATION LIST DOCUMENTED IN MEDICAL RECORD: ICD-10-PCS | Mod: CPTII,S$GLB,, | Performed by: OTOLARYNGOLOGY

## 2022-07-18 PROCEDURE — 1101F PT FALLS ASSESS-DOCD LE1/YR: CPT | Mod: CPTII,S$GLB,, | Performed by: OTOLARYNGOLOGY

## 2022-07-18 PROCEDURE — 1126F PR PAIN SEVERITY QUANTIFIED, NO PAIN PRESENT: ICD-10-PCS | Mod: CPTII,S$GLB,, | Performed by: OTOLARYNGOLOGY

## 2022-07-18 PROCEDURE — 99999 PR PBB SHADOW E&M-EST. PATIENT-LVL III: CPT | Mod: PBBFAC,,, | Performed by: OTOLARYNGOLOGY

## 2022-07-18 PROCEDURE — 1101F PR PT FALLS ASSESS DOC 0-1 FALLS W/OUT INJ PAST YR: ICD-10-PCS | Mod: CPTII,S$GLB,, | Performed by: OTOLARYNGOLOGY

## 2022-07-18 PROCEDURE — 1159F MED LIST DOCD IN RCRD: CPT | Mod: CPTII,S$GLB,, | Performed by: OTOLARYNGOLOGY

## 2022-07-18 NOTE — PROGRESS NOTES
"Subjective:       Patient ID: Raine Medley is a 67 y.o. female.    Chief Complaint: Otalgia (Left ear)    HPI: Ms. Medley is a 67 year old CF who has lived in this country for 35 years. She is originally from [facility]. Her mother and sons live here ( as well as 6 grandchildren).  Her chief complaint is left neck pain which can radiate from her left TMJ area down and inferiorly. She also c/o a "blowing sensation" in her left ear while lying down at times.  She experienced dizziness and possible vertigo sx several months ago. Her mother suffers with vertigo sx.   Ms. Medley was evaluated years ago by another ENT for these same /very similar sx . No specific diagnosis was provided to her.  She was evaluated by family medicine practitioner Dr. Gotti 1/6/2022 for off and on dizziness episodes when turning her head to the left and a clogged senation in her left ear and neck soreness.  A rapid COVID test is negative then  She was prescribed Debrox  and Flonase and she was advised to consider an ENT evaluation should symptoms persist.  There was no sign of ear infection per exam, although a viral infection or allergic sinusitis could be related to her left ear pressure and vertigo symptoms according to the EMR notes.  She was prescribed Naprosyn for right elbow pain and neck pain as well as Flexeril.  She was prescribed meclizine 12.5 mg for dizziness symptoms  She has recently noticed a physical difference in her left neck ( appears more prominent to her ) when looking at it in the mirror.   She specifically denies any left ear pain sx or AS hearing loss sx today.     She was prescribed Zyrtec 05/09/2022.    Past Medical History:   Diagnosis Date    Hyperlipidemia      Past Surgical History:   Procedure Laterality Date    CHOLECYSTECTOMY      COLONOSCOPY N/A 1/3/2019    Procedure: COLONOSCOPY;  Surgeon: Gaston Beebe MD;  Location: [address];  Service: Endoscopy;  Laterality: N/A;     Current " Outpatient Medications on File Prior to Visit   Medication Sig Dispense Refill    fluticasone propionate (FLONASE) 50 mcg/actuation nasal spray 1 spray (50 mcg total) by Each Nostril route once daily. 16 g 1    vitamin D (VITAMIN D3) 1000 units Tab Take 1,000 Units by mouth once daily.       No current facility-administered medications on file prior to visit.       Review of Systems        Objective:    Gen.: alert and oriented [place]- accented CF in no acute distress  Both ears are examined under the microscope.  The left neck and ear are auscultated. There is no evidence of bruit.  There is no evidence of left neck mass.    Physical Exam  Constitutional:       Appearance: She is well-developed.   HENT:      Head: Normocephalic.      Jaw: No trismus.        Right Ear: Tympanic membrane and external ear normal. No decreased hearing noted. No drainage. No foreign body. No mastoid tenderness. Tympanic membrane is not perforated.      Left Ear: Tympanic membrane and external ear normal. No decreased hearing noted. No drainage. No foreign body. No mastoid tenderness. Tympanic membrane is not perforated.      Ears:        Nose: Nose normal. No nasal deformity or septal deviation.      Right Sinus: No maxillary sinus tenderness or frontal sinus tenderness.      Left Sinus: No maxillary sinus tenderness or frontal sinus tenderness.      Mouth/Throat:      Mouth: No oral lesions.      Pharynx: Uvula midline. No oropharyngeal exudate or uvula swelling.      Tonsils: No tonsillar abscesses.     Neck:      Thyroid: No thyromegaly.      Trachea: No tracheal deviation.   Pulmonary:      Effort: Pulmonary effort is normal.      Breath sounds: No stridor.   Musculoskeletal:      Cervical back: Neck supple.   Lymphadenopathy:      Cervical: No cervical adenopathy.   Skin:     Findings: No rash.   Neurological:      Mental Status: She is alert and oriented to person, place, and time.         Assessment:       1. Neck pain on left  side    2. Arthralgia of left temporomandibular joint ; TMJ syndrome   3. History of vertigo    4. Family history of vertigo    5. Ear discomfort, left    6. Tonsillar hypertrophy        Plan:     TMJ otalgia literature provided ( pt. has dental vist scheduled for Sept. 2022)  Pt. reassured re: left neck/ear status  Consider audiometry +/- [facility] on return, the pending course  Call for any significant change in status

## 2022-07-18 NOTE — PATIENT INSTRUCTIONS
TMJ otalgia literature provided ( pt. has dental vist scheduled for Sept. 2022)  Pt. reassured re: left neck/ear status  Consider audiometry +/- [facility] on return, the pending course  Call for any significant change in status

## 2022-07-28 ENCOUNTER — OFFICE VISIT (OUTPATIENT)
Dept: SPORTS MEDICINE | Facility: CLINIC | Age: 67
End: 2022-07-28
Payer: COMMERCIAL

## 2022-07-28 VITALS
WEIGHT: 131.63 LBS | BODY MASS INDEX: 24.85 KG/M2 | DIASTOLIC BLOOD PRESSURE: 85 MMHG | SYSTOLIC BLOOD PRESSURE: 164 MMHG | HEIGHT: 61 IN | HEART RATE: 65 BPM

## 2022-07-28 DIAGNOSIS — M17.0 BILATERAL PRIMARY OSTEOARTHRITIS OF KNEE: Primary | ICD-10-CM

## 2022-07-28 DIAGNOSIS — R03.0 ELEVATED BLOOD PRESSURE READING: ICD-10-CM

## 2022-07-28 PROCEDURE — 1125F AMNT PAIN NOTED PAIN PRSNT: CPT | Mod: CPTII,S$GLB,, | Performed by: STUDENT IN AN ORGANIZED HEALTH CARE EDUCATION/TRAINING PROGRAM

## 2022-07-28 PROCEDURE — 3008F PR BODY MASS INDEX (BMI) DOCUMENTED: ICD-10-PCS | Mod: CPTII,S$GLB,, | Performed by: STUDENT IN AN ORGANIZED HEALTH CARE EDUCATION/TRAINING PROGRAM

## 2022-07-28 PROCEDURE — 99999 PR PBB SHADOW E&M-EST. PATIENT-LVL III: CPT | Mod: PBBFAC,,, | Performed by: STUDENT IN AN ORGANIZED HEALTH CARE EDUCATION/TRAINING PROGRAM

## 2022-07-28 PROCEDURE — 1101F PT FALLS ASSESS-DOCD LE1/YR: CPT | Mod: CPTII,S$GLB,, | Performed by: STUDENT IN AN ORGANIZED HEALTH CARE EDUCATION/TRAINING PROGRAM

## 2022-07-28 PROCEDURE — 99999 PR PBB SHADOW E&M-EST. PATIENT-LVL III: ICD-10-PCS | Mod: PBBFAC,,, | Performed by: STUDENT IN AN ORGANIZED HEALTH CARE EDUCATION/TRAINING PROGRAM

## 2022-07-28 PROCEDURE — 3008F BODY MASS INDEX DOCD: CPT | Mod: CPTII,S$GLB,, | Performed by: STUDENT IN AN ORGANIZED HEALTH CARE EDUCATION/TRAINING PROGRAM

## 2022-07-28 PROCEDURE — 3077F PR MOST RECENT SYSTOLIC BLOOD PRESSURE >= 140 MM HG: ICD-10-PCS | Mod: CPTII,S$GLB,, | Performed by: STUDENT IN AN ORGANIZED HEALTH CARE EDUCATION/TRAINING PROGRAM

## 2022-07-28 PROCEDURE — 3079F DIAST BP 80-89 MM HG: CPT | Mod: CPTII,S$GLB,, | Performed by: STUDENT IN AN ORGANIZED HEALTH CARE EDUCATION/TRAINING PROGRAM

## 2022-07-28 PROCEDURE — 99499 UNLISTED E&M SERVICE: CPT | Mod: S$GLB,,, | Performed by: STUDENT IN AN ORGANIZED HEALTH CARE EDUCATION/TRAINING PROGRAM

## 2022-07-28 PROCEDURE — 3288F PR FALLS RISK ASSESSMENT DOCUMENTED: ICD-10-PCS | Mod: CPTII,S$GLB,, | Performed by: STUDENT IN AN ORGANIZED HEALTH CARE EDUCATION/TRAINING PROGRAM

## 2022-07-28 PROCEDURE — 20611 DRAIN/INJ JOINT/BURSA W/US: CPT | Mod: 50,S$GLB,, | Performed by: STUDENT IN AN ORGANIZED HEALTH CARE EDUCATION/TRAINING PROGRAM

## 2022-07-28 PROCEDURE — 3288F FALL RISK ASSESSMENT DOCD: CPT | Mod: CPTII,S$GLB,, | Performed by: STUDENT IN AN ORGANIZED HEALTH CARE EDUCATION/TRAINING PROGRAM

## 2022-07-28 PROCEDURE — 1101F PR PT FALLS ASSESS DOC 0-1 FALLS W/OUT INJ PAST YR: ICD-10-PCS | Mod: CPTII,S$GLB,, | Performed by: STUDENT IN AN ORGANIZED HEALTH CARE EDUCATION/TRAINING PROGRAM

## 2022-07-28 PROCEDURE — 1125F PR PAIN SEVERITY QUANTIFIED, PAIN PRESENT: ICD-10-PCS | Mod: CPTII,S$GLB,, | Performed by: STUDENT IN AN ORGANIZED HEALTH CARE EDUCATION/TRAINING PROGRAM

## 2022-07-28 PROCEDURE — 3079F PR MOST RECENT DIASTOLIC BLOOD PRESSURE 80-89 MM HG: ICD-10-PCS | Mod: CPTII,S$GLB,, | Performed by: STUDENT IN AN ORGANIZED HEALTH CARE EDUCATION/TRAINING PROGRAM

## 2022-07-28 PROCEDURE — 1159F MED LIST DOCD IN RCRD: CPT | Mod: CPTII,S$GLB,, | Performed by: STUDENT IN AN ORGANIZED HEALTH CARE EDUCATION/TRAINING PROGRAM

## 2022-07-28 PROCEDURE — 1159F PR MEDICATION LIST DOCUMENTED IN MEDICAL RECORD: ICD-10-PCS | Mod: CPTII,S$GLB,, | Performed by: STUDENT IN AN ORGANIZED HEALTH CARE EDUCATION/TRAINING PROGRAM

## 2022-07-28 PROCEDURE — 1160F RVW MEDS BY RX/DR IN RCRD: CPT | Mod: CPTII,S$GLB,, | Performed by: STUDENT IN AN ORGANIZED HEALTH CARE EDUCATION/TRAINING PROGRAM

## 2022-07-28 PROCEDURE — 99499 NO LOS: ICD-10-PCS | Mod: S$GLB,,, | Performed by: STUDENT IN AN ORGANIZED HEALTH CARE EDUCATION/TRAINING PROGRAM

## 2022-07-28 PROCEDURE — 1160F PR REVIEW ALL MEDS BY PRESCRIBER/CLIN PHARMACIST DOCUMENTED: ICD-10-PCS | Mod: CPTII,S$GLB,, | Performed by: STUDENT IN AN ORGANIZED HEALTH CARE EDUCATION/TRAINING PROGRAM

## 2022-07-28 PROCEDURE — 20611 LARGE JOINT ASPIRATION/INJECTION: BILATERAL KNEE: ICD-10-PCS | Mod: 50,S$GLB,, | Performed by: STUDENT IN AN ORGANIZED HEALTH CARE EDUCATION/TRAINING PROGRAM

## 2022-07-28 PROCEDURE — 3077F SYST BP >= 140 MM HG: CPT | Mod: CPTII,S$GLB,, | Performed by: STUDENT IN AN ORGANIZED HEALTH CARE EDUCATION/TRAINING PROGRAM

## 2022-07-28 NOTE — PROGRESS NOTES
CC: bilateral knee pain    67 y.o. Female presents today for her Monovisc injection of her bilateral knee.     Attempted treatments: brace  Pain score: 0/10 today, 10/10 at worst  History of trauma/injury: none since last visit   Affecting ADLs: yes      REVIEW OF SYSTEMS:   Constitution: Patient denies fever or chills.  Eyes: Patient denies eye pain or vision changes.  HEENT: Patient denies ear pain, sore throat, or nasal discharge.  CVS: Patient denies chest pain.  Lungs: Patient denies shortness of breath or cough.  Skin: Patient denies skin rash or itching.    Musculoskeletal: Patient denies recent falls. See HPI.  Psych: Patient reports nervousness.    PAST MEDICAL HISTORY:   Past Medical History:   Diagnosis Date    Hyperlipidemia        MEDICATIONS:     Current Outpatient Medications:     fluticasone propionate (FLONASE) 50 mcg/actuation nasal spray, 1 spray (50 mcg total) by Each Nostril route once daily., Disp: 16 g, Rfl: 1    vitamin D (VITAMIN D3) 1000 units Tab, Take 1,000 Units by mouth once daily., Disp: , Rfl:     ALLERGIES:   Review of patient's allergies indicates:  No Known Allergies     PHYSICAL EXAMINATION:  There were no vitals taken for this visit.  There are no signs of infection at the injection site, including no rubor, calor, or skin lesions.  Gen: NAD.  Psych: Affect & judgment nl.  Neuro: Grossly CNI. CHÁVEZ.  HEENT: -Trach dev. -Eye d/c. -Rhinorrhea.  CV: Color nl. -E/C/C. WWPx4.  Pulm: -Dyspnea. -Cough.  Lymph: -Edema.  Int: -Rash/lesion noted. Skin is warm and dry    ASSESSMENT:    No diagnosis found.      PLAN:  Ultrasound-guided injection of the bilateral knee with Monovisc performed at visit today.    Future planning includes - Continue exercise program    Risks and benefits were discussed with patient prior to receiving injection.  Depending on injection type, risks include the possibility of infection, pain, disruptions in blood pressure and blood sugar, and cosmetic deformity at  site of injection.    All questions were answered to the best of my ability and all concerns were addressed at this time.    Follow up in 6 week(s) for above, or sooner if needed.      This note is dictated using the M*Modal Fluency Direct word recognition program. There are word recognition mistakes that are occasionally missed on review.         No

## 2022-07-28 NOTE — PROCEDURES
Large Joint Aspiration/Injection: bilateral knee    Date/Time: 7/28/2022 10:15 AM  Performed by: Verenice Elise MD  Authorized by: Verenice Elise MD     Consent Done?:  Yes (Verbal)  Indications:  Arthritis and pain  Site marked: the procedure site was marked    Timeout: prior to procedure the correct patient, procedure, and site was verified    Prep: patient was prepped and draped in usual sterile fashion      Local anesthesia used?: Yes    Anesthesia:  Local infiltration  Local anesthetic:  Co-phenylcaine spray    Details:  Needle Size:  22 G  Ultrasonic Guidance for needle placement?: Yes (Ultrasound guidance used to avoid neurovascular injury and/or to improve accuracy given body habitus.)    Images are saved and documented.  Approach: Superolateral.  Location:  Knee  Laterality:  Bilateral  Site:  Bilateral knee  Medications (Right):  88 mg hyaluronate sodium, stabilized 88 mg/4 mL  Medications (Right) comment:  2mL Ropivacaine 0.2%    Medications (Left):  88 mg hyaluronate sodium, stabilized 88 mg/4 mL  Medications (Left) comment:  2mL Ropivacaine 0.2%    Patient tolerance:  Patient tolerated the procedure well with no immediate complications     TECHNIQUE: Real time ultrasound examination of the bilateral knees was performed with SonPrim Laundry Edge 2, 9-L MHz linear probe(s). Ultrasound guidance was used for needle localization. Images were saved and stored for documentation. Dynamic visualization of the needle was continuous throughout the procedures and maintained in good position.

## 2022-08-17 ENCOUNTER — PATIENT MESSAGE (OUTPATIENT)
Dept: SPORTS MEDICINE | Facility: CLINIC | Age: 67
End: 2022-08-17
Payer: COMMERCIAL

## 2022-08-19 ENCOUNTER — HOSPITAL ENCOUNTER (OUTPATIENT)
Dept: RADIOLOGY | Facility: HOSPITAL | Age: 67
Discharge: HOME | End: 2022-08-19
Attending: STUDENT IN AN ORGANIZED HEALTH CARE EDUCATION/TRAINING PROGRAM
Payer: COMMERCIAL

## 2022-08-19 ENCOUNTER — OFFICE VISIT (OUTPATIENT)
Dept: SPORTS MEDICINE | Facility: CLINIC | Age: 67
End: 2022-08-19
Payer: COMMERCIAL

## 2022-08-19 VITALS
DIASTOLIC BLOOD PRESSURE: 78 MMHG | WEIGHT: 132.63 LBS | HEIGHT: 61 IN | SYSTOLIC BLOOD PRESSURE: 146 MMHG | BODY MASS INDEX: 25.04 KG/M2 | HEART RATE: 63 BPM

## 2022-08-19 DIAGNOSIS — M25.562 BILATERAL CHRONIC KNEE PAIN: ICD-10-CM

## 2022-08-19 DIAGNOSIS — M17.0 BILATERAL PRIMARY OSTEOARTHRITIS OF KNEE: ICD-10-CM

## 2022-08-19 DIAGNOSIS — M25.561 BILATERAL CHRONIC KNEE PAIN: ICD-10-CM

## 2022-08-19 DIAGNOSIS — M54.16 LUMBAR RADICULOPATHY: ICD-10-CM

## 2022-08-19 DIAGNOSIS — R20.2 PARESTHESIA OF BILATERAL LEGS: ICD-10-CM

## 2022-08-19 DIAGNOSIS — R20.2 PARESTHESIA OF BILATERAL LEGS: Primary | ICD-10-CM

## 2022-08-19 DIAGNOSIS — G89.29 BILATERAL CHRONIC KNEE PAIN: ICD-10-CM

## 2022-08-19 PROCEDURE — 1125F PR PAIN SEVERITY QUANTIFIED, PAIN PRESENT: ICD-10-PCS | Mod: CPTII,S$GLB,, | Performed by: STUDENT IN AN ORGANIZED HEALTH CARE EDUCATION/TRAINING PROGRAM

## 2022-08-19 PROCEDURE — 3288F FALL RISK ASSESSMENT DOCD: CPT | Mod: CPTII,S$GLB,, | Performed by: STUDENT IN AN ORGANIZED HEALTH CARE EDUCATION/TRAINING PROGRAM

## 2022-08-19 PROCEDURE — 72114 XR LUMBAR SPINE 5 VIEW WITH FLEX AND EXT: ICD-10-PCS | Mod: 26,,, | Performed by: RADIOLOGY

## 2022-08-19 PROCEDURE — 1125F AMNT PAIN NOTED PAIN PRSNT: CPT | Mod: CPTII,S$GLB,, | Performed by: STUDENT IN AN ORGANIZED HEALTH CARE EDUCATION/TRAINING PROGRAM

## 2022-08-19 PROCEDURE — 72114 X-RAY EXAM L-S SPINE BENDING: CPT | Mod: 26,,, | Performed by: RADIOLOGY

## 2022-08-19 PROCEDURE — 1160F RVW MEDS BY RX/DR IN RCRD: CPT | Mod: CPTII,S$GLB,, | Performed by: STUDENT IN AN ORGANIZED HEALTH CARE EDUCATION/TRAINING PROGRAM

## 2022-08-19 PROCEDURE — 99214 OFFICE O/P EST MOD 30 MIN: CPT | Mod: S$GLB,,, | Performed by: STUDENT IN AN ORGANIZED HEALTH CARE EDUCATION/TRAINING PROGRAM

## 2022-08-19 PROCEDURE — 3078F DIAST BP <80 MM HG: CPT | Mod: CPTII,S$GLB,, | Performed by: STUDENT IN AN ORGANIZED HEALTH CARE EDUCATION/TRAINING PROGRAM

## 2022-08-19 PROCEDURE — 3008F PR BODY MASS INDEX (BMI) DOCUMENTED: ICD-10-PCS | Mod: CPTII,S$GLB,, | Performed by: STUDENT IN AN ORGANIZED HEALTH CARE EDUCATION/TRAINING PROGRAM

## 2022-08-19 PROCEDURE — 1159F PR MEDICATION LIST DOCUMENTED IN MEDICAL RECORD: ICD-10-PCS | Mod: CPTII,S$GLB,, | Performed by: STUDENT IN AN ORGANIZED HEALTH CARE EDUCATION/TRAINING PROGRAM

## 2022-08-19 PROCEDURE — 3008F BODY MASS INDEX DOCD: CPT | Mod: CPTII,S$GLB,, | Performed by: STUDENT IN AN ORGANIZED HEALTH CARE EDUCATION/TRAINING PROGRAM

## 2022-08-19 PROCEDURE — 99999 PR PBB SHADOW E&M-EST. PATIENT-LVL III: CPT | Mod: PBBFAC,,, | Performed by: STUDENT IN AN ORGANIZED HEALTH CARE EDUCATION/TRAINING PROGRAM

## 2022-08-19 PROCEDURE — 3077F SYST BP >= 140 MM HG: CPT | Mod: CPTII,S$GLB,, | Performed by: STUDENT IN AN ORGANIZED HEALTH CARE EDUCATION/TRAINING PROGRAM

## 2022-08-19 PROCEDURE — 1101F PR PT FALLS ASSESS DOC 0-1 FALLS W/OUT INJ PAST YR: ICD-10-PCS | Mod: CPTII,S$GLB,, | Performed by: STUDENT IN AN ORGANIZED HEALTH CARE EDUCATION/TRAINING PROGRAM

## 2022-08-19 PROCEDURE — 3078F PR MOST RECENT DIASTOLIC BLOOD PRESSURE < 80 MM HG: ICD-10-PCS | Mod: CPTII,S$GLB,, | Performed by: STUDENT IN AN ORGANIZED HEALTH CARE EDUCATION/TRAINING PROGRAM

## 2022-08-19 PROCEDURE — 1101F PT FALLS ASSESS-DOCD LE1/YR: CPT | Mod: CPTII,S$GLB,, | Performed by: STUDENT IN AN ORGANIZED HEALTH CARE EDUCATION/TRAINING PROGRAM

## 2022-08-19 PROCEDURE — 99999 PR PBB SHADOW E&M-EST. PATIENT-LVL III: ICD-10-PCS | Mod: PBBFAC,,, | Performed by: STUDENT IN AN ORGANIZED HEALTH CARE EDUCATION/TRAINING PROGRAM

## 2022-08-19 PROCEDURE — 99214 PR OFFICE/OUTPT VISIT, EST, LEVL IV, 30-39 MIN: ICD-10-PCS | Mod: S$GLB,,, | Performed by: STUDENT IN AN ORGANIZED HEALTH CARE EDUCATION/TRAINING PROGRAM

## 2022-08-19 PROCEDURE — 3288F PR FALLS RISK ASSESSMENT DOCUMENTED: ICD-10-PCS | Mod: CPTII,S$GLB,, | Performed by: STUDENT IN AN ORGANIZED HEALTH CARE EDUCATION/TRAINING PROGRAM

## 2022-08-19 PROCEDURE — 1160F PR REVIEW ALL MEDS BY PRESCRIBER/CLIN PHARMACIST DOCUMENTED: ICD-10-PCS | Mod: CPTII,S$GLB,, | Performed by: STUDENT IN AN ORGANIZED HEALTH CARE EDUCATION/TRAINING PROGRAM

## 2022-08-19 PROCEDURE — 1159F MED LIST DOCD IN RCRD: CPT | Mod: CPTII,S$GLB,, | Performed by: STUDENT IN AN ORGANIZED HEALTH CARE EDUCATION/TRAINING PROGRAM

## 2022-08-19 PROCEDURE — 3077F PR MOST RECENT SYSTOLIC BLOOD PRESSURE >= 140 MM HG: ICD-10-PCS | Mod: CPTII,S$GLB,, | Performed by: STUDENT IN AN ORGANIZED HEALTH CARE EDUCATION/TRAINING PROGRAM

## 2022-08-19 PROCEDURE — 72114 X-RAY EXAM L-S SPINE BENDING: CPT | Mod: TC

## 2022-08-19 RX ORDER — NAPROXEN 500 MG/1
500 TABLET ORAL 2 TIMES DAILY WITH MEALS
Qty: 60 TABLET | Refills: 1 | Status: SHIPPED | OUTPATIENT
Start: 2022-08-19 | End: 2023-05-12

## 2022-08-19 NOTE — PROGRESS NOTES
"CC: bilateral knee pain    67 y.o. Female presents today for follow up evaluation of her bilateral knee pain following Monovisc injections. Pt reports no improvement since injections and is reporting pain down bilateral legs. Pt also notes swelling in both knees. Pt reports stabbing pain is 8/10 in right and 10/10 in left. Pt reports clicking and locking in her right knee. Pt reports intermittent numbness down entire leg bilaterally.     Pt is accompanied by her daughter today.     Attempted treatments: monovisc, advil   Pain score: 8/10 right, 10/10 left  History of trauma/injury: none since last visit   Affecting ADLs: yes      REVIEW OF SYSTEMS:   Constitution: Patient denies fever or chills.  Eyes: Patient denies eye pain or vision changes. Pt reports feeling like something is in her eye since last week, pt unsure if this is nerves or anxiety.   HEENT: Patient denies ear pain, sore throat, or nasal discharge.  CVS: Patient denies chest pain.  Lungs: Patient denies shortness of breath or cough.  Skin: Patient denies skin rash or itching.    Musculoskeletal: Patient denies recent falls. See HPI.  Psych: Patient denies any current anxiety or nervousness.    PAST MEDICAL HISTORY:   Past Medical History:   Diagnosis Date    Hyperlipidemia        MEDICATIONS:     Current Outpatient Medications:     fluticasone propionate (FLONASE) 50 mcg/actuation nasal spray, 1 spray (50 mcg total) by Each Nostril route once daily., Disp: 16 g, Rfl: 1    naproxen (NAPROSYN) 500 MG tablet, Take 1 tablet (500 mg total) by mouth 2 (two) times daily with meals., Disp: 60 tablet, Rfl: 1    vitamin D (VITAMIN D3) 1000 units Tab, Take 1,000 Units by mouth once daily., Disp: , Rfl:     ALLERGIES:   Review of patient's allergies indicates:  No Known Allergies     PHYSICAL EXAMINATION:  BP (!) 146/78   Pulse 63   Ht 5' 1" (1.549 m)   Wt 60.1 kg (132 lb 9.6 oz)   BMI 25.05 kg/m²   Vitals signs and nursing note have been " reviewed.    General: In no acute distress, well developed, well nourished, no diaphoresis  Eyes: EOM full and smooth, no eye redness or discharge  HENT: normocephalic and atraumatic, neck supple, trachea midline, no nasal discharge  Cardiovascular: no LE edema  Lungs: respirations non-labored, no conversational dyspnea   Neuro: AAOx3, CN2-12 grossly intact  Skin: No rashes, warm and dry  Psychiatric: cooperative, pleasant, mood and affect appropriate for age    Bilateral Knee:   Gait:  Slightly antalgic    Inspection/Palpation:   -Rubor   -Calor  -Effusion   -Patella ballotable   -Patellar apprehension  -Retinacular tenderness   +Patellar crepitus   Patellar tilt grossly normal     TTP at:  -Joint line   -MCL   -LCL   -Popliteal region   -Quad tendon   -Patella  -Pat tendon  -Pat border  -Med condyle   -Lat condyle   -Pes   -Prox fibula   -Tib tub  -Gerdy's tubercle  -Distal Hamstring tendons  -Proximal Hamstrings/Ischial tuberosity  -ITB    ROM:   Ext: 0°   Flex:120°   +Discomfort w/ full flex   -Bounce-home discomfort         ASSESSMENT:      ICD-10-CM ICD-9-CM   1. Paresthesia of bilateral legs  R20.2 782.0   2. Bilateral primary osteoarthritis of knee  M17.0 715.16   3. Bilateral chronic knee pain  M25.561 719.46    M25.562 338.29    G89.29    4. Lumbar radiculopathy  M54.16 724.4         PLAN:  Considering it is only been about 3 weeks since the Monovisc injection, we are 3 weeks early into wound we typically see results.  But given the change in patient's symptoms, I am concerned that there is more going on other than the primary osteoarthritis of her knees.  I believe patient might be having lumbar radiculopathy.  Will obtain lumbar spine x-rays as well as MRI lumbar spine.  Patient will also be prescribed naproxen 500 mg b.i.d..  Follow-up after MRI.    Future planning includes - lumbar spine x-ray, lumbar spine MRI    All questions were answered to the best of my ability and all concerns were addressed at  this time.    Follow up for above, or sooner if needed.      This note is dictated using the M*Modal Fluency Direct word recognition program. There are word recognition mistakes that are occasionally missed on review.

## 2022-08-26 ENCOUNTER — TELEPHONE (OUTPATIENT)
Dept: SPORTS MEDICINE | Facility: CLINIC | Age: 67
End: 2022-08-26
Payer: COMMERCIAL

## 2022-08-26 NOTE — TELEPHONE ENCOUNTER
----- Message from Francis Whitaker sent at 8/26/2022  1:22 PM CDT -----  Regarding: MRI AUTHORIZATION APPROVAL  Contact: Maine Eid stated the pt's MRI Authorization has been approved Authorization Approval Number: [id number]      Contact info    [phone] Phone

## 2022-08-31 DIAGNOSIS — Z78.0 MENOPAUSE: ICD-10-CM

## 2022-09-07 ENCOUNTER — PATIENT MESSAGE (OUTPATIENT)
Dept: ADMINISTRATIVE | Facility: HOSPITAL | Age: 67
End: 2022-09-07
Payer: COMMERCIAL

## 2022-10-21 ENCOUNTER — TELEPHONE (OUTPATIENT)
Dept: ORTHOPEDICS | Facility: CLINIC | Age: 67
End: 2022-10-21
Payer: COMMERCIAL

## 2022-10-21 NOTE — TELEPHONE ENCOUNTER
I called the patient to rescheduled the patient because Dr. Calvert will not be in clinic on 11/8. The patient did not answer. I left a voicemail with a call back number.

## 2022-11-27 ENCOUNTER — OFFICE VISIT (OUTPATIENT)
Dept: URGENT CARE | Facility: CLINIC | Age: 67
End: 2022-11-27
Payer: COMMERCIAL

## 2022-11-27 VITALS
OXYGEN SATURATION: 100 % | TEMPERATURE: 99 F | BODY MASS INDEX: 25.02 KG/M2 | RESPIRATION RATE: 18 BRPM | HEIGHT: 61 IN | HEART RATE: 61 BPM | DIASTOLIC BLOOD PRESSURE: 88 MMHG | SYSTOLIC BLOOD PRESSURE: 188 MMHG | WEIGHT: 132.5 LBS

## 2022-11-27 DIAGNOSIS — M25.519 SHOULDER PAIN, UNSPECIFIED CHRONICITY, UNSPECIFIED LATERALITY: ICD-10-CM

## 2022-11-27 DIAGNOSIS — M77.8 TENDINITIS OF LEFT SHOULDER: Primary | ICD-10-CM

## 2022-11-27 PROCEDURE — 1125F AMNT PAIN NOTED PAIN PRSNT: CPT | Mod: CPTII,S$GLB,, | Performed by: NURSE PRACTITIONER

## 2022-11-27 PROCEDURE — 1159F MED LIST DOCD IN RCRD: CPT | Mod: CPTII,S$GLB,, | Performed by: NURSE PRACTITIONER

## 2022-11-27 PROCEDURE — 3008F PR BODY MASS INDEX (BMI) DOCUMENTED: ICD-10-PCS | Mod: CPTII,S$GLB,, | Performed by: NURSE PRACTITIONER

## 2022-11-27 PROCEDURE — 3077F PR MOST RECENT SYSTOLIC BLOOD PRESSURE >= 140 MM HG: ICD-10-PCS | Mod: CPTII,S$GLB,, | Performed by: NURSE PRACTITIONER

## 2022-11-27 PROCEDURE — 1159F PR MEDICATION LIST DOCUMENTED IN MEDICAL RECORD: ICD-10-PCS | Mod: CPTII,S$GLB,, | Performed by: NURSE PRACTITIONER

## 2022-11-27 PROCEDURE — 99213 OFFICE O/P EST LOW 20 MIN: CPT | Mod: S$GLB,,, | Performed by: NURSE PRACTITIONER

## 2022-11-27 PROCEDURE — 99213 PR OFFICE/OUTPT VISIT, EST, LEVL III, 20-29 MIN: ICD-10-PCS | Mod: S$GLB,,, | Performed by: NURSE PRACTITIONER

## 2022-11-27 PROCEDURE — 1160F RVW MEDS BY RX/DR IN RCRD: CPT | Mod: CPTII,S$GLB,, | Performed by: NURSE PRACTITIONER

## 2022-11-27 PROCEDURE — 3077F SYST BP >= 140 MM HG: CPT | Mod: CPTII,S$GLB,, | Performed by: NURSE PRACTITIONER

## 2022-11-27 PROCEDURE — 3008F BODY MASS INDEX DOCD: CPT | Mod: CPTII,S$GLB,, | Performed by: NURSE PRACTITIONER

## 2022-11-27 PROCEDURE — 73030 XR SHOULDER TRAUMA 3 VIEW LEFT: ICD-10-PCS | Mod: LT,S$GLB,, | Performed by: RADIOLOGY

## 2022-11-27 PROCEDURE — 1125F PR PAIN SEVERITY QUANTIFIED, PAIN PRESENT: ICD-10-PCS | Mod: CPTII,S$GLB,, | Performed by: NURSE PRACTITIONER

## 2022-11-27 PROCEDURE — 73030 X-RAY EXAM OF SHOULDER: CPT | Mod: LT,S$GLB,, | Performed by: RADIOLOGY

## 2022-11-27 PROCEDURE — 1160F PR REVIEW ALL MEDS BY PRESCRIBER/CLIN PHARMACIST DOCUMENTED: ICD-10-PCS | Mod: CPTII,S$GLB,, | Performed by: NURSE PRACTITIONER

## 2022-11-27 PROCEDURE — 3079F PR MOST RECENT DIASTOLIC BLOOD PRESSURE 80-89 MM HG: ICD-10-PCS | Mod: CPTII,S$GLB,, | Performed by: NURSE PRACTITIONER

## 2022-11-27 PROCEDURE — 3079F DIAST BP 80-89 MM HG: CPT | Mod: CPTII,S$GLB,, | Performed by: NURSE PRACTITIONER

## 2022-11-27 RX ORDER — MELOXICAM 15 MG/1
15 TABLET ORAL DAILY
Qty: 14 TABLET | Refills: 0 | Status: SHIPPED | OUTPATIENT
Start: 2022-11-27 | End: 2022-12-11

## 2022-11-27 NOTE — PROGRESS NOTES
"Subjective:       Patient ID: Raine Medley is a 67 y.o. female.    Vitals:  height is 5' 1" (1.549 m) and weight is 60.1 kg (132 lb 7.9 oz). Her temperature is 98.6 °F (37 °C). Her blood pressure is 188/88 (abnormal) and her pulse is 61. Her respiration is 18 and oxygen saturation is 100%.     Chief Complaint: Shoulder Pain    Pt reports having left shoulder pain that radiates to her left hand for a week now. Pt reports that she has trouble raising her arm above her head. Pt reports that she has been taking aleve and had no relief.    Provider note begins below:    Patient has full range of motion without crepitus or bony impingement to the left shoulder.  No lesions to the external surface of the skin of the shoulder or upper arm.  States pain is worse at night and after sleep.  She is been taking Advil and Aleve with little or no relief.    No numbness or paresthesias to the affected limb.  Patient is right-handed.  Denies any previous history of left shoulder pain.    Shoulder Pain   The pain is present in the left arm. This is a new problem. The current episode started 1 to 4 weeks ago (1 week). There has been no history of extremity trauma. The problem occurs constantly. The quality of the pain is described as burning and dull. The pain is at a severity of 8/10. The pain is moderate. Associated symptoms include a limited range of motion and stiffness. The symptoms are aggravated by lying down and activity. She has tried NSAIDS for the symptoms. The treatment provided mild relief.     Musculoskeletal:  Positive for abnormal ROM of joint.     Objective:      Physical Exam   Constitutional: She is oriented to person, place, and time. She appears well-developed. She is cooperative. No distress.   HENT:   Head: Normocephalic and atraumatic.   Nose: Nose normal.   Mouth/Throat: Oropharynx is clear and moist and mucous membranes are normal.   Eyes: Conjunctivae and lids are normal.   Neck: Trachea normal and " phonation normal. Neck supple.   Cardiovascular: Normal rate, regular rhythm, normal heart sounds and normal pulses.   Pulses:       Radial pulses are 2+ on the left side.   Pulmonary/Chest: Effort normal and breath sounds normal.   Abdominal: Normal appearance and bowel sounds are normal. She exhibits no mass. Soft.   Musculoskeletal:         General: No deformity.      Left shoulder: She exhibits normal range of motion, no tenderness, no bony tenderness, no swelling, no effusion, no crepitus, no laceration, normal pulse and normal strength.        Arms:    Neurological: She is alert and oriented to person, place, and time. She has normal strength and normal reflexes. No sensory deficit.   Skin: Skin is warm, dry, intact and not diaphoretic.   Psychiatric: Her speech is normal and behavior is normal. Judgment and thought content normal.   Nursing note and vitals reviewed.      Assessment:       1. Shoulder pain, unspecified chronicity, unspecified laterality          Plan:       D/C Advil and Aleve at home and only take meloxicam for the next 3 days.  If pain is well-controlled after 3 days you may discontinue meloxicam and resume taking Advil relief.  If pain is not controlled after 3 days continue taking the meloxicam for as long as necessary.    Shoulder pain, unspecified chronicity, unspecified laterality  -     XR SHOULDER TRAUMA 3 VIEW LEFT; Future; Expected date: 11/27/2022

## 2022-12-02 ENCOUNTER — HOSPITAL ENCOUNTER (OUTPATIENT)
Dept: RADIOLOGY | Facility: CLINIC | Age: 67
Discharge: HOME | End: 2022-12-02
Attending: INTERNAL MEDICINE
Payer: COMMERCIAL

## 2022-12-02 DIAGNOSIS — Z78.0 MENOPAUSE: ICD-10-CM

## 2022-12-02 PROCEDURE — 77080 DXA BONE DENSITY AXIAL: CPT | Mod: TC

## 2022-12-02 PROCEDURE — 77080 DXA BONE DENSITY AXIAL: CPT | Mod: 26,,, | Performed by: INTERNAL MEDICINE

## 2022-12-02 PROCEDURE — 77080 DEXA BONE DENSITY SPINE HIP: ICD-10-PCS | Mod: 26,,, | Performed by: INTERNAL MEDICINE

## 2022-12-05 ENCOUNTER — PATIENT MESSAGE (OUTPATIENT)
Dept: ORTHOPEDICS | Facility: CLINIC | Age: 67
End: 2022-12-05
Payer: COMMERCIAL

## 2022-12-05 DIAGNOSIS — M25.569 KNEE PAIN, UNSPECIFIED CHRONICITY, UNSPECIFIED LATERALITY: Primary | ICD-10-CM

## 2022-12-05 DIAGNOSIS — M17.0 ARTHRITIS OF BOTH KNEES: ICD-10-CM

## 2022-12-06 ENCOUNTER — OFFICE VISIT (OUTPATIENT)
Dept: SPORTS MEDICINE | Facility: CLINIC | Age: 67
End: 2022-12-06
Payer: COMMERCIAL

## 2022-12-06 VITALS
WEIGHT: 132 LBS | HEART RATE: 72 BPM | SYSTOLIC BLOOD PRESSURE: 135 MMHG | HEIGHT: 61 IN | BODY MASS INDEX: 24.92 KG/M2 | DIASTOLIC BLOOD PRESSURE: 71 MMHG

## 2022-12-06 DIAGNOSIS — M75.32 CALCIFIC TENDINITIS OF LEFT SHOULDER: ICD-10-CM

## 2022-12-06 DIAGNOSIS — M25.512 ACUTE PAIN OF LEFT SHOULDER: Primary | ICD-10-CM

## 2022-12-06 PROCEDURE — 1159F MED LIST DOCD IN RCRD: CPT | Mod: CPTII,S$GLB,, | Performed by: PHYSICIAN ASSISTANT

## 2022-12-06 PROCEDURE — 1160F RVW MEDS BY RX/DR IN RCRD: CPT | Mod: CPTII,S$GLB,, | Performed by: PHYSICIAN ASSISTANT

## 2022-12-06 PROCEDURE — 3078F DIAST BP <80 MM HG: CPT | Mod: CPTII,S$GLB,, | Performed by: PHYSICIAN ASSISTANT

## 2022-12-06 PROCEDURE — 99999 PR PBB SHADOW E&M-EST. PATIENT-LVL III: ICD-10-PCS | Mod: PBBFAC,,, | Performed by: PHYSICIAN ASSISTANT

## 2022-12-06 PROCEDURE — 3075F PR MOST RECENT SYSTOLIC BLOOD PRESS GE 130-139MM HG: ICD-10-PCS | Mod: CPTII,S$GLB,, | Performed by: PHYSICIAN ASSISTANT

## 2022-12-06 PROCEDURE — 3008F BODY MASS INDEX DOCD: CPT | Mod: CPTII,S$GLB,, | Performed by: PHYSICIAN ASSISTANT

## 2022-12-06 PROCEDURE — 99213 PR OFFICE/OUTPT VISIT, EST, LEVL III, 20-29 MIN: ICD-10-PCS | Mod: S$GLB,,, | Performed by: PHYSICIAN ASSISTANT

## 2022-12-06 PROCEDURE — 3075F SYST BP GE 130 - 139MM HG: CPT | Mod: CPTII,S$GLB,, | Performed by: PHYSICIAN ASSISTANT

## 2022-12-06 PROCEDURE — 1160F PR REVIEW ALL MEDS BY PRESCRIBER/CLIN PHARMACIST DOCUMENTED: ICD-10-PCS | Mod: CPTII,S$GLB,, | Performed by: PHYSICIAN ASSISTANT

## 2022-12-06 PROCEDURE — 3008F PR BODY MASS INDEX (BMI) DOCUMENTED: ICD-10-PCS | Mod: CPTII,S$GLB,, | Performed by: PHYSICIAN ASSISTANT

## 2022-12-06 PROCEDURE — 1159F PR MEDICATION LIST DOCUMENTED IN MEDICAL RECORD: ICD-10-PCS | Mod: CPTII,S$GLB,, | Performed by: PHYSICIAN ASSISTANT

## 2022-12-06 PROCEDURE — 1125F AMNT PAIN NOTED PAIN PRSNT: CPT | Mod: CPTII,S$GLB,, | Performed by: PHYSICIAN ASSISTANT

## 2022-12-06 PROCEDURE — 99999 PR PBB SHADOW E&M-EST. PATIENT-LVL III: CPT | Mod: PBBFAC,,, | Performed by: PHYSICIAN ASSISTANT

## 2022-12-06 PROCEDURE — 3078F PR MOST RECENT DIASTOLIC BLOOD PRESSURE < 80 MM HG: ICD-10-PCS | Mod: CPTII,S$GLB,, | Performed by: PHYSICIAN ASSISTANT

## 2022-12-06 PROCEDURE — 3288F PR FALLS RISK ASSESSMENT DOCUMENTED: ICD-10-PCS | Mod: CPTII,S$GLB,, | Performed by: PHYSICIAN ASSISTANT

## 2022-12-06 PROCEDURE — 99213 OFFICE O/P EST LOW 20 MIN: CPT | Mod: S$GLB,,, | Performed by: PHYSICIAN ASSISTANT

## 2022-12-06 PROCEDURE — 1101F PT FALLS ASSESS-DOCD LE1/YR: CPT | Mod: CPTII,S$GLB,, | Performed by: PHYSICIAN ASSISTANT

## 2022-12-06 PROCEDURE — 1101F PR PT FALLS ASSESS DOC 0-1 FALLS W/OUT INJ PAST YR: ICD-10-PCS | Mod: CPTII,S$GLB,, | Performed by: PHYSICIAN ASSISTANT

## 2022-12-06 PROCEDURE — 3288F FALL RISK ASSESSMENT DOCD: CPT | Mod: CPTII,S$GLB,, | Performed by: PHYSICIAN ASSISTANT

## 2022-12-06 PROCEDURE — 1125F PR PAIN SEVERITY QUANTIFIED, PAIN PRESENT: ICD-10-PCS | Mod: CPTII,S$GLB,, | Performed by: PHYSICIAN ASSISTANT

## 2022-12-06 RX ORDER — TRIPROLIDINE/PSEUDOEPHEDRINE 2.5MG-60MG
TABLET ORAL EVERY 6 HOURS PRN
COMMUNITY
End: 2022-12-12 | Stop reason: ALTCHOICE

## 2022-12-06 NOTE — PROGRESS NOTES
NEW PATIENT    HISTORY OF PRESENT ILLNESS   67 y.o. Female with a history of left shoulder pain who is a  at [employer].  She complains today of having acute left shoulder pain and stiffness over the last 2 weeks.  She denies having any precipitating injury or trauma.  She states that her pain is worse with activity and range of motion.  She is also having discomfort and difficulty sleeping at night due to the pain.  She has tried over-the-counter anti-inflammatories and oral meloxicam which have given her no relief.  She has noticed some improvement in her range of motion and pain with the use of a topical diclofenac cream 2 times per day.       -  mechanical symptoms, - instability    Is affecting ADLs.  Pain is 7/10 at it's worst.        PAST MEDICAL HISTORY    Past Medical History:   Diagnosis Date    Hyperlipidemia        PAST SURGICAL HISTORY     Past Surgical History:   Procedure Laterality Date    CHOLECYSTECTOMY      COLONOSCOPY N/A 1/3/2019    Procedure: COLONOSCOPY;  Surgeon: Gaston Beebe MD;  Location: [address];  Service: Endoscopy;  Laterality: N/A;       FAMILY HISTORY    Family History   Problem Relation Age of Onset    Melanoma Neg Hx     Lupus Neg Hx     Psoriasis Neg Hx     Colon cancer Neg Hx     Esophageal cancer Neg Hx     Stomach cancer Neg Hx        SOCIAL HISTORY    Social History     Socioeconomic History    Marital status:    Occupational History     Employer: [employer]   Tobacco Use    Smoking status: Never    Smokeless tobacco: Never   Substance and Sexual Activity    Alcohol use: No    Drug use: No   Social History Narrative    She lives in [place] with her 15-year-old son in [place]. 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 She is from [facility].        MEDICATIONS      Current Outpatient Medications:     ibuprofen (ADVIL,MOTRIN) 100 mg/5 mL suspension, Take by mouth every 6  "(six) hours as needed for Temperature greater than., Disp: , Rfl:     meloxicam (MOBIC) 15 MG tablet, Take 1 tablet (15 mg total) by mouth once daily. for 14 days, Disp: 14 tablet, Rfl: 0    vitamin D (VITAMIN D3) 1000 units Tab, Take 1,000 Units by mouth once daily., Disp: , Rfl:     fluticasone propionate (FLONASE) 50 mcg/actuation nasal spray, 1 spray (50 mcg total) by Each Nostril route once daily. (Patient not taking: Reported on 11/27/2022), Disp: 16 g, Rfl: 1    naproxen (NAPROSYN) 500 MG tablet, Take 1 tablet (500 mg total) by mouth 2 (two) times daily with meals. (Patient not taking: Reported on 11/27/2022), Disp: 60 tablet, Rfl: 1    ALLERGIES     Review of patient's allergies indicates:  No Known Allergies      REVIEW OF SYSTEMS   Constitution: Negative. Negative for chills, fever and night sweats.   HENT: Negative for congestion and headaches.    Eyes: Negative for blurred vision, left vision loss and right vision loss.   Cardiovascular: Negative for chest pain and syncope.   Respiratory: Negative for cough and shortness of breath.    Endocrine: Negative for polydipsia, polyphagia and polyuria.   Hematologic/Lymphatic: Negative for bleeding problem. Does not bruise/bleed easily.   Skin: Negative for dry skin, itching and rash.   Musculoskeletal: Negative for falls. Positive for left shoulder pain and stiffness.  Gastrointestinal: Negative for abdominal pain and bowel incontinence.   Genitourinary: Negative for bladder incontinence and nocturia.   Neurological: Negative for disturbances in coordination, loss of balance and seizures.   Psychiatric/Behavioral: Negative for depression. The patient does not have insomnia.    Allergic/Immunologic: Negative for hives and persistent infections.     PHYSICAL EXAMINATION    Vitals: /71   Pulse 72   Ht 5' 1" (1.549 m)   Wt 59.9 kg (132 lb)   BMI 24.94 kg/m²     General: The patient appears active and healthy with no apparent physical problems.  No " disturbance of mood or affect is demonstrated. Alert and Oriented.    HEENT: Eyes normal, pupils equally round, nose normal.    Resp: Equal and symmetrical chest rises. No wheezing    CV: Regular rate    Neck: Supple; nonpainful range of motion.    Extremities: no cyanosis, clubbing, edema, or diffuse swelling.  Palpable pulses, good capillary refill of the digits.  No coolness, discoloration, edema or obvious varicosities.    Skin: no lesions noted.    Lymphatic: no detected adenopathy in the upper or lower extremities.    Neurologic: normal mental status, normal reflexes, normal gait and balance.  Patient is alert and oriented to person, place and time.  No flaccidity or spasticity is noted.  No motor or sensory deficits are noted.  Light touch is intact    Orthopaedic: Shoulder Musculoskeletal Exam    Inspection    Left      Left shoulder inspection is normal.      Ecchymosis: none      Peripheral edema: none      Atrophy: none      Symmetry: symmetric      Masses: none      Skin tenting: none      Prior incision: none    Palpation    Left      Crepitus: no crepitus      Increased warmth: none      Tenderness: present        Anterior shoulder: none        Posterior shoulder: none        Clavicle: none        AC joint: none        Rotator cuff: mild        Greater tuberosity: mild        Trapezius: mild        Bicipital groove: none    Range of Motion    Right      Internal rotation: T7.     Left      Active ROM: abnormal, pain and no pain.       Passive ROM: normal and pain.       Active forward elevation: 90.       Passive forward elevation: 160.       Shoulder active abduction: 70.       Passive abduction: 100.       Active external rotation at side: 20.       Passive external rotation at side: 40.       Active external rotation in abduction: 20.       Passive external rotation in abduction: 30.       Internal rotation: T10.     Strength    Left      Left shoulder strength is normal.       External rotation:  5/5. External rotation is affected by pain.       Internal rotation: 5/5. Internal rotation is not affected by pain.       Abduction: 5/5. Abduction is affected by pain.       Biceps: 5/5. Biceps are not affected by pain.       Triceps: 5/5. Triceps are not affected by pain.     Neurovascular    Left      Left shoulder nerve sensation is normal.    Scapula    Left       Position: normal      Dyskinesia: positive    Special Tests    Left     Rotator Cuff Signs      Neer's test: positive       Jackson test: positive       Supraspinatus: positive       Belly press test: negative       Painful arc test: positive     Biceps/buck Signs      Blackford's test: negative       Clicking/popping: negative     AC Joint Signs      Active horizontal adduction pain: negative     Instability Signs      Joint laxity: negative       IMAGING    XR SHOULDER TRAUMA 3 VIEW LEFT  Narrative: EXAMINATION:  XR SHOULDER TRAUMA 3 VIEW LEFT    CLINICAL HISTORY:  Pain in unspecified shoulder    TECHNIQUE:  Three views of the left shoulder were performed.    COMPARISON  Chest radiograph 02/16/2022 and 03/31/2015    FINDINGS:  Bones are well mineralized for age.  Overall alignment is within normal limits.  No displaced fracture, dislocation or destructive osseous process.  Mild degenerative change noted about the shoulder.  Multiple amorphous calcific densities noted about the humeral head suggesting sequela of remote/chronic rotator cuff calcific tendinitis.  No subcutaneous emphysema or radiodense retained foreign body.  Left lung apex is clear.  Impression: No acute displaced fracture-dislocation identified.    Suspected sequela of left shoulder rotator cuff remote/chronic calcific tendinitis.    Electronically signed by: Jorge Majano MD  Date:    11/27/2022  Time:    17:24    I have personally reviewed the previous x-ray images and report today.  There are no acute findings.  No fracture or dislocation is identified.  The glenohumeral joint  space appears to be well preserved.  There are some degenerative changes seen of the acromioclavicular joint.  There are also multiple calcific densities adjacent to the humeral head consistent with rotator cuff calcific tendinitis.    IMPRESSION       ICD-10-CM ICD-9-CM   1. Acute pain of left shoulder  M25.512 719.41   2. Calcific tendinitis of left shoulder  M75.32 726.11       MEDICATIONS PRESCRIBED      None    RECOMMENDATIONS     Physical therapy focused on left shoulder range of motion and strengthening  Topical diclofenac 3-4 times per day and icing as needed for pain  Return to clinic in 1 month for repeat evaluation; we will consider subacromial corticosteroid injection under ultrasound guidance if pain persists      All questions were answered, pt will contact us for questions or concerns in the interim.

## 2022-12-11 ENCOUNTER — PATIENT MESSAGE (OUTPATIENT)
Dept: FAMILY MEDICINE | Facility: CLINIC | Age: 67
End: 2022-12-11
Payer: COMMERCIAL

## 2022-12-12 ENCOUNTER — TELEPHONE (OUTPATIENT)
Dept: SPORTS MEDICINE | Facility: CLINIC | Age: 67
End: 2022-12-12
Payer: COMMERCIAL

## 2022-12-12 DIAGNOSIS — M75.32 CALCIFIC TENDINITIS OF LEFT SHOULDER: Primary | ICD-10-CM

## 2022-12-12 RX ORDER — DICLOFENAC SODIUM 10 MG/G
2 GEL TOPICAL 3 TIMES DAILY PRN
Qty: 100 G | Refills: 2 | Status: ON HOLD | OUTPATIENT
Start: 2022-12-12 | End: 2023-07-20 | Stop reason: HOSPADM

## 2022-12-12 NOTE — TELEPHONE ENCOUNTER
Called and spoke with patient to let her know that Riley got her message and that he apologize due to he thought that you already had the medication and and to let you know it should be at your pharmacy shortly.    ----- Message from Riley Roblero PA-C sent at 12/12/2022  2:11 PM CST -----  Regarding: RE: PT'S CALLING REGARDING CREAM FOR LEFT SHOULDER  Contact: PT  I will send a Rx now.  I must have misunderstood her because I thought she already had a prescription.  Please apologize to her for me and let her know it should be at her pharmacy shortly.  Riley    ----- Message -----  From: Angela Salamanca MA  Sent: 12/12/2022  12:31 PM CST  To: Riley Roblero PA-C  Subject: FW: PT'S CALLING REGARDING CREAM FOR LEFT SH#      ----- Message -----  From: Oanh Kurtz  Sent: 12/12/2022  11:43 AM CST  To: [facility] Staff  Subject: PT'S CALLING REGARDING CREAM FOR LEFT SHOULD#    Diclofenac cream    Confirmed contact info below:  Contact Name: Raine Medley  Phone Number: [phone]

## 2022-12-30 ENCOUNTER — HOSPITAL ENCOUNTER (OUTPATIENT)
Dept: RADIOLOGY | Facility: HOSPITAL | Age: 67
Discharge: HOME | End: 2022-12-30
Attending: ORTHOPAEDIC SURGERY
Payer: COMMERCIAL

## 2022-12-30 ENCOUNTER — TELEPHONE (OUTPATIENT)
Dept: FAMILY MEDICINE | Facility: CLINIC | Age: 67
End: 2022-12-30
Payer: COMMERCIAL

## 2022-12-30 ENCOUNTER — OFFICE VISIT (OUTPATIENT)
Dept: ORTHOPEDICS | Facility: CLINIC | Age: 67
End: 2022-12-30
Payer: COMMERCIAL

## 2022-12-30 VITALS — HEIGHT: 61 IN | BODY MASS INDEX: 24.15 KG/M2 | WEIGHT: 127.88 LBS

## 2022-12-30 DIAGNOSIS — M17.11 PRIMARY OSTEOARTHRITIS OF RIGHT KNEE: Primary | ICD-10-CM

## 2022-12-30 DIAGNOSIS — M17.0 ARTHRITIS OF BOTH KNEES: Primary | ICD-10-CM

## 2022-12-30 DIAGNOSIS — M25.569 KNEE PAIN, UNSPECIFIED CHRONICITY, UNSPECIFIED LATERALITY: ICD-10-CM

## 2022-12-30 DIAGNOSIS — M17.0 ARTHRITIS OF BOTH KNEES: ICD-10-CM

## 2022-12-30 PROCEDURE — 3288F PR FALLS RISK ASSESSMENT DOCUMENTED: ICD-10-PCS | Mod: CPTII,S$GLB,, | Performed by: ORTHOPAEDIC SURGERY

## 2022-12-30 PROCEDURE — 73564 X-RAY EXAM KNEE 4 OR MORE: CPT | Mod: 26,RT,, | Performed by: RADIOLOGY

## 2022-12-30 PROCEDURE — 99999 PR PBB SHADOW E&M-EST. PATIENT-LVL III: CPT | Mod: PBBFAC,,, | Performed by: ORTHOPAEDIC SURGERY

## 2022-12-30 PROCEDURE — 73564 XR KNEE ORTHO BILAT WITH FLEXION: ICD-10-PCS | Mod: 26,RT,, | Performed by: RADIOLOGY

## 2022-12-30 PROCEDURE — 1125F PR PAIN SEVERITY QUANTIFIED, PAIN PRESENT: ICD-10-PCS | Mod: CPTII,S$GLB,, | Performed by: ORTHOPAEDIC SURGERY

## 2022-12-30 PROCEDURE — 99215 OFFICE O/P EST HI 40 MIN: CPT | Mod: S$GLB,,, | Performed by: ORTHOPAEDIC SURGERY

## 2022-12-30 PROCEDURE — 1159F MED LIST DOCD IN RCRD: CPT | Mod: CPTII,S$GLB,, | Performed by: ORTHOPAEDIC SURGERY

## 2022-12-30 PROCEDURE — 73564 X-RAY EXAM KNEE 4 OR MORE: CPT | Mod: TC,50

## 2022-12-30 PROCEDURE — 1101F PT FALLS ASSESS-DOCD LE1/YR: CPT | Mod: CPTII,S$GLB,, | Performed by: ORTHOPAEDIC SURGERY

## 2022-12-30 PROCEDURE — 99215 PR OFFICE/OUTPT VISIT, EST, LEVL V, 40-54 MIN: ICD-10-PCS | Mod: S$GLB,,, | Performed by: ORTHOPAEDIC SURGERY

## 2022-12-30 PROCEDURE — 1125F AMNT PAIN NOTED PAIN PRSNT: CPT | Mod: CPTII,S$GLB,, | Performed by: ORTHOPAEDIC SURGERY

## 2022-12-30 PROCEDURE — 3008F PR BODY MASS INDEX (BMI) DOCUMENTED: ICD-10-PCS | Mod: CPTII,S$GLB,, | Performed by: ORTHOPAEDIC SURGERY

## 2022-12-30 PROCEDURE — 73564 X-RAY EXAM KNEE 4 OR MORE: CPT | Mod: 26,LT,, | Performed by: RADIOLOGY

## 2022-12-30 PROCEDURE — 3008F BODY MASS INDEX DOCD: CPT | Mod: CPTII,S$GLB,, | Performed by: ORTHOPAEDIC SURGERY

## 2022-12-30 PROCEDURE — 1159F PR MEDICATION LIST DOCUMENTED IN MEDICAL RECORD: ICD-10-PCS | Mod: CPTII,S$GLB,, | Performed by: ORTHOPAEDIC SURGERY

## 2022-12-30 PROCEDURE — 1101F PR PT FALLS ASSESS DOC 0-1 FALLS W/OUT INJ PAST YR: ICD-10-PCS | Mod: CPTII,S$GLB,, | Performed by: ORTHOPAEDIC SURGERY

## 2022-12-30 PROCEDURE — 99999 PR PBB SHADOW E&M-EST. PATIENT-LVL III: ICD-10-PCS | Mod: PBBFAC,,, | Performed by: ORTHOPAEDIC SURGERY

## 2022-12-30 PROCEDURE — 3288F FALL RISK ASSESSMENT DOCD: CPT | Mod: CPTII,S$GLB,, | Performed by: ORTHOPAEDIC SURGERY

## 2022-12-30 NOTE — TELEPHONE ENCOUNTER
----- Message from Emma Oconnor MA sent at 12/30/2022  3:26 PM CST -----  Spoke with patient who informed me she is being billed 134.90  from visit on April 19 . Patient does not remember any additional treatments . Patient is requesting to have charges taken off.

## 2022-12-30 NOTE — TELEPHONE ENCOUNTER
----- Message from Vicky Lino sent at 12/30/2022  1:21 PM CST -----  Regarding: call back  Contact: [phone]  Who Called: PT     Patient is calling to talk to nurse in regards to a bill she had received in regards to an office visit in April 12 it was coded wrong. Please advice

## 2022-12-30 NOTE — TELEPHONE ENCOUNTER
Spoke with patient who informed me she is being billed 134.90  from visit on April 19 . Patient does not remember any additional treatments . Patient is requesting to have charges taken off.

## 2022-12-30 NOTE — TELEPHONE ENCOUNTER
Covering for Dr. Gotti, only thing I see is a derm visit where they froze off some warts on 04/21, not sure if that is what she is talking about.  There was also a patient out reach care coordination message listed on 04/19/2022 but not sure if that would be a charge.  My thought is if it was a procedure charge it may be from the dermatology visit (saw Dr. Eamon Stearns).  Should have the service billed for and the provider associated with the charge listed on her invoice.  Note the very original message said something about April 12th.  She did see Dr. Gotti on April 12th for an office visit.  Not sure if the charges related to office visit charge or something related to her co-pay if it is related to the April 12th visit with Dr. Gotti, she may need to check with billing department.

## 2022-12-30 NOTE — PROGRESS NOTES
Subjective:     HPI:   Raine Medley is a 67 y.o. female who presents for repeat eval R>L hip arthritis    We saw her 2022 she has bilateral grade 4 varus knee arthritis.  She did not want to schedule knee replacement to interfere with the school year in the fall.  She went to [facility] and had bilateral Monovisc injections 2022 she had apparently some significant pain the 1st week or 2 after the injections but overall felt like they helped long-term for quite some time.    To review:   She has had years of bilateral knee pain.  She says the pain was tolerable until a she did physical therapy last year and things got worse.  She has moderate to severe global knee pain activity related and relieved with rest.  Intermittent symptoms worse with walking and stairs.  Right more than left     Medications: Advil PRN ~3x/week, GI upset with regular NSAID use     Injections: The patient had bilateral knee iaCSI  and it only helped for 3 weeks. B synvisc one injections  helped.      Physical Therapy: Yes, completed OP-PT last year, the patient reported that it did not help the pain and the PT made her knee pain worse.      Bracing: None      Assistive Devices: None      Walkin mile      Limitations: General walking, difficulty going up/down steps, difficulty sleeping at night , difficulty rising from sitting  and difficulty standing for long periods of time                   Occupation: The patient currently works as a  @ the [employer], teaches math and science     Social support: The patient stated that they live at home with their son. The patient stated that she would need to find someone to help take care of her. She has sons that live in the area may not be able to help her.         Objective:   Body mass index is 24.16 kg/m².  Exam:  No change:   Antalgic gait with a limp mild.  Negative Trendelenburg.  Both knees 0-120 degree knee range of motion 3° varus  alignment which does correct she is tender palpation predominantly medial but also lateral patellofemoral joint lines mild-to-moderate effusion knee stable anterior-posterior varus valgus stresses no flexion contracture or extensor lag      Imaging:  KNEE R ARTHRITIS    Indication:  Right knee pain  Exam Ordered: Radiographs of the right knee include a standing anteroposterior view, a standing posterioanterior view, a lateral view in full flexion, and a sunrise view  Details of Examination: Exam shows evidence of joint space narrowing, osteophyte formation, and subchondral sclerosis, all consistent with degenerative arthritis of the knee.  No other significant findings are noted.  Impression:  Degenerative Arthritis, Right Knee and KNEE L ARTHRITIS     Indication:  Left knee pain  Exam Ordered: Radiographs of the left knee include a standing anteroposterior view, a standing posterioanterior view, a lateral view in full flexion, and a sunrise view  Details of Examination: Exam shows evidence of joint space narrowing, osteophyte formation, and subchondral sclerosis, all consistent with degenerative arthritis of the knee.  No other significant findings are noted.  Impression:  Degenerative Arthritis, Left Knee     B knee G4 varus arthritis, severe, bone on bone      Assessment:       ICD-10-CM ICD-9-CM   1. Arthritis of both knees  M17.0 716.96      Denies sig PMHx     Plan:       The above findings were discussed with patient length. We discussed the risks of conservative versus surgical management knee arthritis. Conservative management consisting of anti-inflammatory medications, glucosamine/chondroitin sulfate, weight loss, physical therapy, activity modification, as well as injections (lubricant versus corticosteroid) was discussed at length. At this point considering the patient's level of activity, pain, and radiographic findings I recommend TKA    This patient has significant symptoms in their knee that are  affecting their quality of life and daily activities.  They have tried non-operative treatment including analgesics, an exercise program, and activity modification, but the symptoms have persisted. I believe they make a good candidate for knee arthroplasty.       Implants:   Company: [employer]  System: Attune  primary tray    DVT prophylaxis: ASA 81mg BID x1 month    Dispo: outpatient PT    Admission status: Outpatient/23hr OBS    Location: [place]    Overnight stay, language barrier    Plan for R TKA 5/31/23 at end of school year  (May change to left)  TKA info    F/u for pre-op visit with       No orders of the defined types were placed in this encounter.            Past Medical History:   Diagnosis Date    Hyperlipidemia        Past Surgical History:   Procedure Laterality Date    CHOLECYSTECTOMY      COLONOSCOPY N/A 1/3/2019    Procedure: COLONOSCOPY;  Surgeon: Gaston Beebe MD;  Location: [facility] ([address] [place] [address]);  Service: Endoscopy;  Laterality: N/A;       Family History   Problem Relation Age of Onset    Melanoma Neg Hx     Lupus Neg Hx     Psoriasis Neg Hx     Colon cancer Neg Hx     Esophageal cancer Neg Hx     Stomach cancer Neg Hx        Social History     Socioeconomic History    Marital status:    Occupational History     Employer: [employer]   Tobacco Use    Smoking status: Never    Smokeless tobacco: Never   Substance and Sexual Activity    Alcohol use: No    Drug use: No   Social History Narrative    She lives in [place] with her 15-year-old son in [place]. 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 She is from [facility].

## 2023-01-10 ENCOUNTER — OFFICE VISIT (OUTPATIENT)
Dept: SPORTS MEDICINE | Facility: CLINIC | Age: 68
End: 2023-01-10
Payer: COMMERCIAL

## 2023-01-10 VITALS
HEIGHT: 61 IN | WEIGHT: 138.75 LBS | SYSTOLIC BLOOD PRESSURE: 138 MMHG | HEART RATE: 65 BPM | DIASTOLIC BLOOD PRESSURE: 67 MMHG | BODY MASS INDEX: 26.2 KG/M2

## 2023-01-10 DIAGNOSIS — M75.32 CALCIFIC TENDINITIS OF LEFT SHOULDER: ICD-10-CM

## 2023-01-10 DIAGNOSIS — M25.512 ACUTE PAIN OF LEFT SHOULDER: Primary | ICD-10-CM

## 2023-01-10 PROCEDURE — 1126F PR PAIN SEVERITY QUANTIFIED, NO PAIN PRESENT: ICD-10-PCS | Mod: CPTII,S$GLB,, | Performed by: PHYSICIAN ASSISTANT

## 2023-01-10 PROCEDURE — 1160F RVW MEDS BY RX/DR IN RCRD: CPT | Mod: CPTII,S$GLB,, | Performed by: PHYSICIAN ASSISTANT

## 2023-01-10 PROCEDURE — 3078F PR MOST RECENT DIASTOLIC BLOOD PRESSURE < 80 MM HG: ICD-10-PCS | Mod: CPTII,S$GLB,, | Performed by: PHYSICIAN ASSISTANT

## 2023-01-10 PROCEDURE — 99213 PR OFFICE/OUTPT VISIT, EST, LEVL III, 20-29 MIN: ICD-10-PCS | Mod: S$GLB,,, | Performed by: PHYSICIAN ASSISTANT

## 2023-01-10 PROCEDURE — 1160F PR REVIEW ALL MEDS BY PRESCRIBER/CLIN PHARMACIST DOCUMENTED: ICD-10-PCS | Mod: CPTII,S$GLB,, | Performed by: PHYSICIAN ASSISTANT

## 2023-01-10 PROCEDURE — 3078F DIAST BP <80 MM HG: CPT | Mod: CPTII,S$GLB,, | Performed by: PHYSICIAN ASSISTANT

## 2023-01-10 PROCEDURE — 3288F PR FALLS RISK ASSESSMENT DOCUMENTED: ICD-10-PCS | Mod: CPTII,S$GLB,, | Performed by: PHYSICIAN ASSISTANT

## 2023-01-10 PROCEDURE — 3008F PR BODY MASS INDEX (BMI) DOCUMENTED: ICD-10-PCS | Mod: CPTII,S$GLB,, | Performed by: PHYSICIAN ASSISTANT

## 2023-01-10 PROCEDURE — 99999 PR PBB SHADOW E&M-EST. PATIENT-LVL III: ICD-10-PCS | Mod: PBBFAC,,, | Performed by: PHYSICIAN ASSISTANT

## 2023-01-10 PROCEDURE — 1126F AMNT PAIN NOTED NONE PRSNT: CPT | Mod: CPTII,S$GLB,, | Performed by: PHYSICIAN ASSISTANT

## 2023-01-10 PROCEDURE — 3008F BODY MASS INDEX DOCD: CPT | Mod: CPTII,S$GLB,, | Performed by: PHYSICIAN ASSISTANT

## 2023-01-10 PROCEDURE — 99213 OFFICE O/P EST LOW 20 MIN: CPT | Mod: S$GLB,,, | Performed by: PHYSICIAN ASSISTANT

## 2023-01-10 PROCEDURE — 1159F PR MEDICATION LIST DOCUMENTED IN MEDICAL RECORD: ICD-10-PCS | Mod: CPTII,S$GLB,, | Performed by: PHYSICIAN ASSISTANT

## 2023-01-10 PROCEDURE — 1101F PT FALLS ASSESS-DOCD LE1/YR: CPT | Mod: CPTII,S$GLB,, | Performed by: PHYSICIAN ASSISTANT

## 2023-01-10 PROCEDURE — 3288F FALL RISK ASSESSMENT DOCD: CPT | Mod: CPTII,S$GLB,, | Performed by: PHYSICIAN ASSISTANT

## 2023-01-10 PROCEDURE — 1159F MED LIST DOCD IN RCRD: CPT | Mod: CPTII,S$GLB,, | Performed by: PHYSICIAN ASSISTANT

## 2023-01-10 PROCEDURE — 99999 PR PBB SHADOW E&M-EST. PATIENT-LVL III: CPT | Mod: PBBFAC,,, | Performed by: PHYSICIAN ASSISTANT

## 2023-01-10 PROCEDURE — 1101F PR PT FALLS ASSESS DOC 0-1 FALLS W/OUT INJ PAST YR: ICD-10-PCS | Mod: CPTII,S$GLB,, | Performed by: PHYSICIAN ASSISTANT

## 2023-01-10 PROCEDURE — 3075F SYST BP GE 130 - 139MM HG: CPT | Mod: CPTII,S$GLB,, | Performed by: PHYSICIAN ASSISTANT

## 2023-01-10 PROCEDURE — 3075F PR MOST RECENT SYSTOLIC BLOOD PRESS GE 130-139MM HG: ICD-10-PCS | Mod: CPTII,S$GLB,, | Performed by: PHYSICIAN ASSISTANT

## 2023-01-10 NOTE — PROGRESS NOTES
ESTABLISHED PATIENT    History 01/10/2023:  Maryam returns here today for follow-up evaluation of her left shoulder.  She did not undergo physical therapy as previously recommended however, her symptoms have improved significantly since her last visit with conservative care.  She is still having some discomfort at night while sleeping but is overall very pleased with her progress and reports her current symptoms do not inhibit her in any way.    History 12/06/2022:  67 y.o. Female with a history of left shoulder pain who is a  at the [employer].  She complains today of having acute left shoulder pain and stiffness over the last 2 weeks.  She denies having any precipitating injury or trauma.  She states that her pain is worse with activity and range of motion.  She is also having discomfort and difficulty sleeping at night due to the pain.  She has tried over-the-counter anti-inflammatories and oral meloxicam which have given her no relief.  She has noticed some improvement in her range of motion and pain with the use of a topical diclofenac cream 2 times per day.       -  mechanical symptoms, - instability    Is NOT affecting ADLs.  Pain is 2/10 at it's worst.        PAST MEDICAL HISTORY    Past Medical History:   Diagnosis Date    Hyperlipidemia        PAST SURGICAL HISTORY     Past Surgical History:   Procedure Laterality Date    CHOLECYSTECTOMY      COLONOSCOPY N/A 1/3/2019    Procedure: COLONOSCOPY;  Surgeon: Gaston Beebe MD;  Location: [address]);  Service: Endoscopy;  Laterality: N/A;       FAMILY HISTORY    Family History   Problem Relation Age of Onset    Melanoma Neg Hx     Lupus Neg Hx     Psoriasis Neg Hx     Colon cancer Neg Hx     Esophageal cancer Neg Hx     Stomach cancer Neg Hx        SOCIAL HISTORY    Social History     Socioeconomic History    Marital status:    Occupational History     Employer: [employer]   Tobacco Use     Smoking status: Never    Smokeless tobacco: Never   Substance and Sexual Activity    Alcohol use: No    Drug use: No   Social History Narrative    She lives in [place] with her 15-year-old son in [place]. 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 She is from [facility].        MEDICATIONS      Current Outpatient Medications:     diclofenac sodium (VOLTAREN) 1 % Gel, Apply 2 g topically 3 (three) times daily as needed (pain). (Patient not taking: Reported on 1/10/2023), Disp: 100 g, Rfl: 2    fluticasone propionate (FLONASE) 50 mcg/actuation nasal spray, 1 spray (50 mcg total) by Each Nostril route once daily. (Patient not taking: Reported on 11/27/2022), Disp: 16 g, Rfl: 1    naproxen (NAPROSYN) 500 MG tablet, Take 1 tablet (500 mg total) by mouth 2 (two) times daily with meals. (Patient not taking: Reported on 11/27/2022), Disp: 60 tablet, Rfl: 1    vitamin D (VITAMIN D3) 1000 units Tab, Take 1,000 Units by mouth once daily., Disp: , Rfl:     ALLERGIES     Review of patient's allergies indicates:  No Known Allergies      REVIEW OF SYSTEMS   Constitution: Negative. Negative for chills, fever and night sweats.   HENT: Negative for congestion and headaches.    Eyes: Negative for blurred vision, left vision loss and right vision loss.   Cardiovascular: Negative for chest pain and syncope.   Respiratory: Negative for cough and shortness of breath.    Endocrine: Negative for polydipsia, polyphagia and polyuria.   Hematologic/Lymphatic: Negative for bleeding problem. Does not bruise/bleed easily.   Skin: Negative for dry skin, itching and rash.   Musculoskeletal: Negative for falls. Positive for left shoulder pain and stiffness.  Gastrointestinal: Negative for abdominal pain and bowel incontinence.   Genitourinary: Negative for bladder incontinence and nocturia.   Neurological: Negative for disturbances in coordination, loss of balance and seizures.   Psychiatric/Behavioral: Negative for  "depression. The patient does not have insomnia.    Allergic/Immunologic: Negative for hives and persistent infections.     PHYSICAL EXAMINATION    Vitals: /67   Pulse 65   Ht 5' 1" (1.549 m)   Wt 62.9 kg (138 lb 12.5 oz)   BMI 26.22 kg/m²     General: The patient appears active and healthy with no apparent physical problems.  No disturbance of mood or affect is demonstrated. Alert and Oriented.    HEENT: Eyes normal, pupils equally round, nose normal.    Resp: Equal and symmetrical chest rises. No wheezing    CV: Regular rate    Neck: Supple; nonpainful range of motion.    Extremities: no cyanosis, clubbing, edema, or diffuse swelling.  Palpable pulses, good capillary refill of the digits.  No coolness, discoloration, edema or obvious varicosities.    Skin: no lesions noted.    Lymphatic: no detected adenopathy in the upper or lower extremities.    Neurologic: normal mental status, normal reflexes, normal gait and balance.  Patient is alert and oriented to person, place and time.  No flaccidity or spasticity is noted.  No motor or sensory deficits are noted.  Light touch is intact    Orthopaedic: Shoulder Musculoskeletal Exam    Inspection    Left      Left shoulder inspection is normal.      Ecchymosis: none      Peripheral edema: none      Atrophy: none      Symmetry: symmetric      Masses: none      Skin tenting: none      Prior incision: none    Palpation    Left      Crepitus: no crepitus      Increased warmth: none      Tenderness: none    Range of Motion    Right      Internal rotation: T7.     Left      Active ROM: normal and no pain.       Passive ROM: normal and no pain.       Active forward elevation: 160.       Passive forward elevation: 170.       Shoulder active abduction: 90.       Passive abduction: 100.       Active external rotation at side: 30.       Passive external rotation at side: 40.       Active external rotation in abduction: 30.       Passive external rotation in abduction: 30. "       Internal rotation: T7.     Strength    Left      Left shoulder strength is normal.       External rotation: 5/5. External rotation is not affected by pain.       Internal rotation: 5/5. Internal rotation is not affected by pain.       Abduction: 5/5. Abduction is not affected by pain.       Biceps: 5/5. Biceps are not affected by pain.       Triceps: 5/5. Triceps are not affected by pain.     Neurovascular    Left      Left shoulder nerve sensation is normal.    Scapula    Left       Position: normal      Dyskinesia: positive    Special Tests    Left     Rotator Cuff Signs      Neer's test: negative       Jackson test: negative       Supraspinatus: negative       Belly press test: negative       Painful arc test: negative       IMAGING    XR SHOULDER TRAUMA 3 VIEW LEFT  Order: [id number]  Status: Final result     Visible to patient: Yes (seen)     Next appt: None     Dx: Shoulder pain, unspecified chronicity...     0 Result Notes  Details    Reading Physician Reading Date Result Priority   Jorge Majano MD  [phone]  [phone] 11/27/2022 STAT     Narrative & Impression  EXAMINATION:  XR SHOULDER TRAUMA 3 VIEW LEFT     CLINICAL HISTORY:  Pain in unspecified shoulder     TECHNIQUE:  Three views of the left shoulder were performed.     COMPARISON  Chest radiograph 02/16/2022 and 03/31/2015     FINDINGS:  Bones are well mineralized for age.  Overall alignment is within normal limits.  No displaced fracture, dislocation or destructive osseous process.  Mild degenerative change noted about the shoulder.  Multiple amorphous calcific densities noted about the humeral head suggesting sequela of remote/chronic rotator cuff calcific tendinitis.  No subcutaneous emphysema or radiodense retained foreign body.  Left lung apex is clear.     Impression:     No acute displaced fracture-dislocation identified.     Suspected sequela of left shoulder rotator cuff remote/chronic calcific tendinitis.        Electronically  signed by: Jorge Majano MD  Date:                                            11/27/2022  Time:                                           17:24       I have personally reviewed the previous x-ray images and report today.  There are no acute findings.  No fracture or dislocation is identified.  The glenohumeral joint space appears to be well preserved.  There are some degenerative changes seen of the acromioclavicular joint.  There are also multiple calcific densities adjacent to the humeral head consistent with rotator cuff calcific tendinitis.    IMPRESSION       ICD-10-CM ICD-9-CM   1. Acute pain of left shoulder  M25.512 719.41   2. Calcific tendinitis of left shoulder  M75.32 726.11         MEDICATIONS PRESCRIBED      None    RECOMMENDATIONS     Continue conservative treatment until symptoms completely resolve  RTC as needed      All questions were answered, pt will contact us for questions or concerns in the interim.

## 2023-01-26 ENCOUNTER — PATIENT MESSAGE (OUTPATIENT)
Dept: ORTHOPEDICS | Facility: CLINIC | Age: 68
End: 2023-01-26

## 2023-03-17 ENCOUNTER — PATIENT MESSAGE (OUTPATIENT)
Dept: RESEARCH | Facility: HOSPITAL | Age: 68
End: 2023-03-17
Payer: COMMERCIAL

## 2023-04-07 ENCOUNTER — PATIENT MESSAGE (OUTPATIENT)
Dept: SURGERY | Facility: HOSPITAL | Age: 68
End: 2023-04-07
Payer: COMMERCIAL

## 2023-05-03 ENCOUNTER — PATIENT MESSAGE (OUTPATIENT)
Dept: ADMINISTRATIVE | Facility: OTHER | Age: 68
End: 2023-05-03
Payer: COMMERCIAL

## 2023-05-09 DIAGNOSIS — M17.11 PRIMARY OSTEOARTHRITIS OF RIGHT KNEE: Primary | ICD-10-CM

## 2023-05-10 ENCOUNTER — TELEPHONE (OUTPATIENT)
Dept: ORTHOPEDICS | Facility: CLINIC | Age: 68
End: 2023-05-10
Payer: COMMERCIAL

## 2023-05-10 NOTE — TELEPHONE ENCOUNTER
I called the patient to let her know that she is scheduled at  for her xray appointment. The patient did not answer. I left a voicemail explaining with a call back number.

## 2023-05-11 DIAGNOSIS — M79.609 PAIN IN EXTREMITY, UNSPECIFIED EXTREMITY: ICD-10-CM

## 2023-05-11 DIAGNOSIS — Z01.818 PRE-OP TESTING: Primary | ICD-10-CM

## 2023-05-11 PROBLEM — M17.11 PRIMARY OSTEOARTHRITIS OF RIGHT KNEE: Status: ACTIVE | Noted: 2023-05-11

## 2023-05-12 ENCOUNTER — OFFICE VISIT (OUTPATIENT)
Dept: ORTHOPEDICS | Facility: CLINIC | Age: 68
End: 2023-05-12
Payer: COMMERCIAL

## 2023-05-12 ENCOUNTER — OFFICE VISIT (OUTPATIENT)
Dept: INTERNAL MEDICINE | Facility: CLINIC | Age: 68
End: 2023-05-12
Payer: COMMERCIAL

## 2023-05-12 ENCOUNTER — TELEPHONE (OUTPATIENT)
Dept: PREADMISSION TESTING | Facility: HOSPITAL | Age: 68
End: 2023-05-12

## 2023-05-12 ENCOUNTER — HOSPITAL ENCOUNTER (OUTPATIENT)
Dept: RADIOLOGY | Facility: HOSPITAL | Age: 68
Discharge: HOME | End: 2023-05-12
Attending: ORTHOPAEDIC SURGERY
Payer: COMMERCIAL

## 2023-05-12 VITALS
DIASTOLIC BLOOD PRESSURE: 71 MMHG | SYSTOLIC BLOOD PRESSURE: 146 MMHG | HEIGHT: 61 IN | WEIGHT: 134 LBS | HEART RATE: 68 BPM | TEMPERATURE: 98 F | BODY MASS INDEX: 25.3 KG/M2 | OXYGEN SATURATION: 100 %

## 2023-05-12 VITALS
HEIGHT: 60 IN | SYSTOLIC BLOOD PRESSURE: 146 MMHG | WEIGHT: 135.13 LBS | DIASTOLIC BLOOD PRESSURE: 71 MMHG | BODY MASS INDEX: 26.53 KG/M2 | HEIGHT: 60 IN | HEART RATE: 68 BPM | BODY MASS INDEX: 26.51 KG/M2 | WEIGHT: 135.06 LBS

## 2023-05-12 DIAGNOSIS — M17.11 PRIMARY OSTEOARTHRITIS OF RIGHT KNEE: Primary | ICD-10-CM

## 2023-05-12 DIAGNOSIS — R06.83 SNORING: ICD-10-CM

## 2023-05-12 DIAGNOSIS — M17.11 PRIMARY OSTEOARTHRITIS OF RIGHT KNEE: ICD-10-CM

## 2023-05-12 DIAGNOSIS — Z01.818 PREOPERATIVE EXAMINATION: Primary | ICD-10-CM

## 2023-05-12 DIAGNOSIS — R10.816 EPIGASTRIC ABDOMINAL TENDERNESS, REBOUND TENDERNESS PRESENCE NOT SPECIFIED: ICD-10-CM

## 2023-05-12 DIAGNOSIS — M26.629 TMJ SYNDROME: ICD-10-CM

## 2023-05-12 DIAGNOSIS — E78.1 HYPERTRIGLYCERIDEMIA: ICD-10-CM

## 2023-05-12 DIAGNOSIS — D50.9 MICROCYTIC ANEMIA: ICD-10-CM

## 2023-05-12 PROCEDURE — 1159F MED LIST DOCD IN RCRD: CPT | Mod: CPTII,S$GLB,, | Performed by: PHYSICIAN ASSISTANT

## 2023-05-12 PROCEDURE — 99999 PR PBB SHADOW E&M-EST. PATIENT-LVL III: ICD-10-PCS | Mod: PBBFAC,,, | Performed by: ORTHOPAEDIC SURGERY

## 2023-05-12 PROCEDURE — 3008F PR BODY MASS INDEX (BMI) DOCUMENTED: ICD-10-PCS | Mod: CPTII,S$GLB,, | Performed by: PHYSICIAN ASSISTANT

## 2023-05-12 PROCEDURE — 99499 UNLISTED E&M SERVICE: CPT | Mod: S$GLB,,, | Performed by: ORTHOPAEDIC SURGERY

## 2023-05-12 PROCEDURE — 3077F PR MOST RECENT SYSTOLIC BLOOD PRESSURE >= 140 MM HG: ICD-10-PCS | Mod: CPTII,S$GLB,, | Performed by: PHYSICIAN ASSISTANT

## 2023-05-12 PROCEDURE — 3008F PR BODY MASS INDEX (BMI) DOCUMENTED: ICD-10-PCS | Mod: CPTII,S$GLB,, | Performed by: NURSE PRACTITIONER

## 2023-05-12 PROCEDURE — 73560 X-RAY EXAM OF KNEE 1 OR 2: CPT | Mod: TC,RT

## 2023-05-12 PROCEDURE — 73560 XR KNEE 1 OR 2 VIEW RIGHT: ICD-10-PCS | Mod: 26,RT,, | Performed by: RADIOLOGY

## 2023-05-12 PROCEDURE — 3078F PR MOST RECENT DIASTOLIC BLOOD PRESSURE < 80 MM HG: ICD-10-PCS | Mod: CPTII,S$GLB,, | Performed by: NURSE PRACTITIONER

## 2023-05-12 PROCEDURE — 1159F PR MEDICATION LIST DOCUMENTED IN MEDICAL RECORD: ICD-10-PCS | Mod: CPTII,S$GLB,, | Performed by: PHYSICIAN ASSISTANT

## 2023-05-12 PROCEDURE — 3078F DIAST BP <80 MM HG: CPT | Mod: CPTII,S$GLB,, | Performed by: NURSE PRACTITIONER

## 2023-05-12 PROCEDURE — 99215 PR OFFICE/OUTPT VISIT, EST, LEVL V, 40-54 MIN: ICD-10-PCS | Mod: S$GLB,,, | Performed by: NURSE PRACTITIONER

## 2023-05-12 PROCEDURE — 99499 NO LOS: ICD-10-PCS | Mod: S$GLB,,, | Performed by: ORTHOPAEDIC SURGERY

## 2023-05-12 PROCEDURE — 1125F PR PAIN SEVERITY QUANTIFIED, PAIN PRESENT: ICD-10-PCS | Mod: CPTII,S$GLB,, | Performed by: PHYSICIAN ASSISTANT

## 2023-05-12 PROCEDURE — 3077F SYST BP >= 140 MM HG: CPT | Mod: CPTII,S$GLB,, | Performed by: PHYSICIAN ASSISTANT

## 2023-05-12 PROCEDURE — 1160F RVW MEDS BY RX/DR IN RCRD: CPT | Mod: CPTII,S$GLB,, | Performed by: PHYSICIAN ASSISTANT

## 2023-05-12 PROCEDURE — 99999 PR PBB SHADOW E&M-EST. PATIENT-LVL IV: ICD-10-PCS | Mod: PBBFAC,,, | Performed by: NURSE PRACTITIONER

## 2023-05-12 PROCEDURE — 99214 PR OFFICE/OUTPT VISIT, EST, LEVL IV, 30-39 MIN: ICD-10-PCS | Mod: S$GLB,,, | Performed by: PHYSICIAN ASSISTANT

## 2023-05-12 PROCEDURE — 3288F PR FALLS RISK ASSESSMENT DOCUMENTED: ICD-10-PCS | Mod: CPTII,S$GLB,, | Performed by: PHYSICIAN ASSISTANT

## 2023-05-12 PROCEDURE — 1125F AMNT PAIN NOTED PAIN PRSNT: CPT | Mod: CPTII,S$GLB,, | Performed by: PHYSICIAN ASSISTANT

## 2023-05-12 PROCEDURE — 3078F DIAST BP <80 MM HG: CPT | Mod: CPTII,S$GLB,, | Performed by: PHYSICIAN ASSISTANT

## 2023-05-12 PROCEDURE — 1160F PR REVIEW ALL MEDS BY PRESCRIBER/CLIN PHARMACIST DOCUMENTED: ICD-10-PCS | Mod: CPTII,S$GLB,, | Performed by: PHYSICIAN ASSISTANT

## 2023-05-12 PROCEDURE — 3008F BODY MASS INDEX DOCD: CPT | Mod: CPTII,S$GLB,, | Performed by: PHYSICIAN ASSISTANT

## 2023-05-12 PROCEDURE — 99999 PR PBB SHADOW E&M-EST. PATIENT-LVL IV: CPT | Mod: PBBFAC,,, | Performed by: NURSE PRACTITIONER

## 2023-05-12 PROCEDURE — 3077F PR MOST RECENT SYSTOLIC BLOOD PRESSURE >= 140 MM HG: ICD-10-PCS | Mod: CPTII,S$GLB,, | Performed by: NURSE PRACTITIONER

## 2023-05-12 PROCEDURE — 99999 PR PBB SHADOW E&M-EST. PATIENT-LVL III: CPT | Mod: PBBFAC,,, | Performed by: PHYSICIAN ASSISTANT

## 2023-05-12 PROCEDURE — 1101F PT FALLS ASSESS-DOCD LE1/YR: CPT | Mod: CPTII,S$GLB,, | Performed by: PHYSICIAN ASSISTANT

## 2023-05-12 PROCEDURE — 99999 PR PBB SHADOW E&M-EST. PATIENT-LVL III: ICD-10-PCS | Mod: PBBFAC,,, | Performed by: PHYSICIAN ASSISTANT

## 2023-05-12 PROCEDURE — 3078F PR MOST RECENT DIASTOLIC BLOOD PRESSURE < 80 MM HG: ICD-10-PCS | Mod: CPTII,S$GLB,, | Performed by: PHYSICIAN ASSISTANT

## 2023-05-12 PROCEDURE — 99215 OFFICE O/P EST HI 40 MIN: CPT | Mod: S$GLB,,, | Performed by: NURSE PRACTITIONER

## 2023-05-12 PROCEDURE — 73560 X-RAY EXAM OF KNEE 1 OR 2: CPT | Mod: 26,RT,, | Performed by: RADIOLOGY

## 2023-05-12 PROCEDURE — 3008F BODY MASS INDEX DOCD: CPT | Mod: CPTII,S$GLB,, | Performed by: NURSE PRACTITIONER

## 2023-05-12 PROCEDURE — 3077F SYST BP >= 140 MM HG: CPT | Mod: CPTII,S$GLB,, | Performed by: NURSE PRACTITIONER

## 2023-05-12 PROCEDURE — 1126F PR PAIN SEVERITY QUANTIFIED, NO PAIN PRESENT: ICD-10-PCS | Mod: CPTII,S$GLB,, | Performed by: NURSE PRACTITIONER

## 2023-05-12 PROCEDURE — 99999 PR PBB SHADOW E&M-EST. PATIENT-LVL III: CPT | Mod: PBBFAC,,, | Performed by: ORTHOPAEDIC SURGERY

## 2023-05-12 PROCEDURE — 1126F AMNT PAIN NOTED NONE PRSNT: CPT | Mod: CPTII,S$GLB,, | Performed by: NURSE PRACTITIONER

## 2023-05-12 PROCEDURE — 3288F FALL RISK ASSESSMENT DOCD: CPT | Mod: CPTII,S$GLB,, | Performed by: PHYSICIAN ASSISTANT

## 2023-05-12 PROCEDURE — 1101F PR PT FALLS ASSESS DOC 0-1 FALLS W/OUT INJ PAST YR: ICD-10-PCS | Mod: CPTII,S$GLB,, | Performed by: PHYSICIAN ASSISTANT

## 2023-05-12 PROCEDURE — 99214 OFFICE O/P EST MOD 30 MIN: CPT | Mod: S$GLB,,, | Performed by: PHYSICIAN ASSISTANT

## 2023-05-12 RX ORDER — PROCHLORPERAZINE EDISYLATE 5 MG/ML
5 INJECTION INTRAMUSCULAR; INTRAVENOUS EVERY 6 HOURS PRN
Status: CANCELLED | OUTPATIENT
Start: 2023-05-12

## 2023-05-12 RX ORDER — ASPIRIN 81 MG/1
81 TABLET ORAL 2 TIMES DAILY
Status: CANCELLED | OUTPATIENT
Start: 2023-05-12

## 2023-05-12 RX ORDER — LIDOCAINE HYDROCHLORIDE 10 MG/ML
1 INJECTION, SOLUTION EPIDURAL; INFILTRATION; INTRACAUDAL; PERINEURAL
Status: CANCELLED | OUTPATIENT
Start: 2023-05-12

## 2023-05-12 RX ORDER — ACETAMINOPHEN 500 MG
1000 TABLET ORAL
Status: CANCELLED | OUTPATIENT
Start: 2023-05-12

## 2023-05-12 RX ORDER — MUPIROCIN 20 MG/G
1 OINTMENT TOPICAL
Status: CANCELLED | OUTPATIENT
Start: 2023-05-12

## 2023-05-12 RX ORDER — NALOXONE HCL 0.4 MG/ML
0.02 VIAL (ML) INJECTION
Status: CANCELLED | OUTPATIENT
Start: 2023-05-12

## 2023-05-12 RX ORDER — PREGABALIN 75 MG/1
75 CAPSULE ORAL NIGHTLY
Status: CANCELLED | OUTPATIENT
Start: 2023-05-12

## 2023-05-12 RX ORDER — AMOXICILLIN 500 MG
CAPSULE ORAL DAILY
COMMUNITY
End: 2023-07-19

## 2023-05-12 RX ORDER — FENTANYL CITRATE 50 UG/ML
100 INJECTION, SOLUTION INTRAMUSCULAR; INTRAVENOUS
Status: CANCELLED | OUTPATIENT
Start: 2023-05-12 | End: 2023-05-13

## 2023-05-12 RX ORDER — ACETAMINOPHEN 500 MG
1000 TABLET ORAL EVERY 6 HOURS
Status: CANCELLED | OUTPATIENT
Start: 2023-05-12

## 2023-05-12 RX ORDER — METHOCARBAMOL 750 MG/1
750 TABLET, FILM COATED ORAL 3 TIMES DAILY
Status: CANCELLED | OUTPATIENT
Start: 2023-05-12

## 2023-05-12 RX ORDER — PREGABALIN 75 MG/1
75 CAPSULE ORAL
Status: CANCELLED | OUTPATIENT
Start: 2023-05-12

## 2023-05-12 RX ORDER — AMOXICILLIN 250 MG
1 CAPSULE ORAL 2 TIMES DAILY
Status: CANCELLED | OUTPATIENT
Start: 2023-05-12

## 2023-05-12 RX ORDER — SODIUM CHLORIDE 9 MG/ML
INJECTION, SOLUTION INTRAVENOUS CONTINUOUS
Status: CANCELLED | OUTPATIENT
Start: 2023-05-12 | End: 2023-05-13

## 2023-05-12 RX ORDER — OXYCODONE HYDROCHLORIDE 5 MG/1
5 TABLET ORAL
Status: CANCELLED | OUTPATIENT
Start: 2023-05-12

## 2023-05-12 RX ORDER — CELECOXIB 200 MG/1
200 CAPSULE ORAL DAILY
Status: CANCELLED | OUTPATIENT
Start: 2023-05-13

## 2023-05-12 RX ORDER — FENTANYL CITRATE 50 UG/ML
25 INJECTION, SOLUTION INTRAMUSCULAR; INTRAVENOUS EVERY 5 MIN PRN
Status: CANCELLED | OUTPATIENT
Start: 2023-05-12

## 2023-05-12 RX ORDER — FAMOTIDINE 20 MG/1
20 TABLET, FILM COATED ORAL 2 TIMES DAILY
Status: CANCELLED | OUTPATIENT
Start: 2023-05-12

## 2023-05-12 RX ORDER — BISACODYL 10 MG
10 SUPPOSITORY, RECTAL RECTAL EVERY 12 HOURS PRN
Status: CANCELLED | OUTPATIENT
Start: 2023-05-12

## 2023-05-12 RX ORDER — MORPHINE SULFATE 2 MG/ML
2 INJECTION, SOLUTION INTRAMUSCULAR; INTRAVENOUS
Status: CANCELLED | OUTPATIENT
Start: 2023-05-12

## 2023-05-12 RX ORDER — CELECOXIB 200 MG/1
400 CAPSULE ORAL ONCE
Status: CANCELLED | OUTPATIENT
Start: 2023-05-12 | End: 2023-05-12

## 2023-05-12 RX ORDER — TALC
6 POWDER (GRAM) TOPICAL NIGHTLY PRN
Status: CANCELLED | OUTPATIENT
Start: 2023-05-12

## 2023-05-12 RX ORDER — SODIUM CHLORIDE 9 MG/ML
INJECTION, SOLUTION INTRAVENOUS
Status: CANCELLED | OUTPATIENT
Start: 2023-05-12

## 2023-05-12 RX ORDER — CEFAZOLIN SODIUM 2 G/50ML
2 SOLUTION INTRAVENOUS
Status: CANCELLED | OUTPATIENT
Start: 2023-05-12

## 2023-05-12 RX ORDER — MIDAZOLAM HYDROCHLORIDE 1 MG/ML
4 INJECTION INTRAMUSCULAR; INTRAVENOUS
Status: CANCELLED | OUTPATIENT
Start: 2023-05-12 | End: 2023-05-13

## 2023-05-12 RX ORDER — ONDANSETRON 2 MG/ML
4 INJECTION INTRAMUSCULAR; INTRAVENOUS EVERY 8 HOURS PRN
Status: CANCELLED | OUTPATIENT
Start: 2023-05-12

## 2023-05-12 RX ORDER — POLYETHYLENE GLYCOL 3350 17 G/17G
17 POWDER, FOR SOLUTION ORAL DAILY
Status: CANCELLED | OUTPATIENT
Start: 2023-05-13

## 2023-05-12 RX ORDER — SODIUM CHLORIDE 0.9 % (FLUSH) 0.9 %
10 SYRINGE (ML) INJECTION
Status: CANCELLED | OUTPATIENT
Start: 2023-05-12

## 2023-05-12 RX ORDER — OXYCODONE HYDROCHLORIDE 5 MG/1
10 TABLET ORAL
Status: CANCELLED | OUTPATIENT
Start: 2023-05-12

## 2023-05-12 RX ORDER — MUPIROCIN 20 MG/G
1 OINTMENT TOPICAL 2 TIMES DAILY
Status: CANCELLED | OUTPATIENT
Start: 2023-05-12 | End: 2023-05-17

## 2023-05-12 RX ORDER — CEFAZOLIN SODIUM 2 G/50ML
2 SOLUTION INTRAVENOUS
Status: CANCELLED | OUTPATIENT
Start: 2023-05-12 | End: 2023-05-13

## 2023-05-12 NOTE — OUTPATIENT SUBJECTIVE & OBJECTIVE
Outpatient Subjective & Objective      Chief Complaint: Preoperative evaulation, perioperative medical management, and complication reduction plan.     Functional Capacity: no regular exercise regimen; can walk up one flight of stairs without CP/SOB.       Anesthesia issues: slow to awake with gallbladder surgery. Many people around bed and concerned.     Difficulty mouth opening: No    Steroid use in the last 12 months: No     Dental Issues: No    Family anesthesia difficulty: None       Family Hx of Thrombosis: None    Past Medical History:   Diagnosis Date    Asthma     as a child    Hyperlipidemia          Past Medical History Pertinent Negatives:   Diagnosis Date Noted    Anemia 05/12/2023    Anxiety 05/12/2023    CHF (congestive heart failure) 05/12/2023    Coronary artery disease 05/12/2023    Deep vein thrombosis 05/12/2023    Depression 05/12/2023    Diabetes mellitus 08/23/2015    Diabetes mellitus, type 2 05/12/2023    Disorder of kidney and ureter 05/12/2023    GERD (gastroesophageal reflux disease) 05/12/2023    Hypertension 08/23/2015    Myocardial infarction 05/12/2023    Pulmonary embolism 05/12/2023    Seizures 05/12/2023    Stroke 05/12/2023    Thyroid disease 05/12/2023         Past Surgical History:   Procedure Laterality Date    CHOLECYSTECTOMY      COLONOSCOPY N/A 1/3/2019    Procedure: COLONOSCOPY;  Surgeon: Gaston Beebe MD;  Location: [address]);  Service: Endoscopy;  Laterality: N/A;       Review of Systems   Constitutional:  Negative for chills, fatigue, fever and unexpected weight change.   HENT:  Negative for dental problem, hearing loss, postnasal drip, rhinorrhea, sore throat, tinnitus and trouble swallowing.    Eyes:  Negative for photophobia, pain, discharge and visual disturbance.   Respiratory:  Negative for apnea, cough, chest tightness, shortness of breath and wheezing.         STOP BANG risk factors:  Snoring   Cardiovascular:  Negative for chest pain, palpitations  "and leg swelling.   Gastrointestinal:  Negative for abdominal pain, blood in stool, constipation, nausea and vomiting.        Denies Fatty liver, Hepatitis   Endocrine: Negative for cold intolerance and heat intolerance.   Genitourinary:  Negative for decreased urine volume, difficulty urinating, dysuria, frequency, hematuria and urgency.        Nocturia 2-3x   Musculoskeletal:  Positive for arthralgias (bilateral knee). Negative for back pain, neck pain and neck stiffness.   Skin:  Negative for rash and wound.   Neurological:  Positive for numbness (bilateral foot). Negative for dizziness, tremors, seizures, syncope, weakness and headaches.   Hematological:  Negative for adenopathy. Does not bruise/bleed easily.   Psychiatric/Behavioral:  Negative for confusion, sleep disturbance and suicidal ideas.             VITALS  Visit Vitals  BP (!) 146/71 (BP Location: Right arm, Patient Position: Sitting)   Pulse 68   Temp 97.7 °F (36.5 °C) (Oral)   Ht 5' 1" (1.549 m)   Wt 60.8 kg (134 lb)   SpO2 100%   BMI 25.32 kg/m²          Physical Exam  Vitals reviewed.   Constitutional:       General: She is not in acute distress.     Appearance: She is well-developed.   HENT:      Head: Normocephalic.      Nose: Nose normal.      Mouth/Throat:      Pharynx: No oropharyngeal exudate.      Comments: small torus present  Eyes:      General:         Right eye: No discharge.         Left eye: No discharge.      Conjunctiva/sclera: Conjunctivae normal.      Pupils: Pupils are equal, round, and reactive to light.   Neck:      Thyroid: No thyromegaly.      Vascular: No carotid bruit or JVD.      Trachea: No tracheal deviation.   Cardiovascular:      Rate and Rhythm: Normal rate and regular rhythm.      Pulses:           Carotid pulses are 2+ on the right side and 2+ on the left side.       Dorsalis pedis pulses are 2+ on the right side and 2+ on the left side.        Posterior tibial pulses are 2+ on the right side and 2+ on the left side. "      Heart sounds: Normal heart sounds. No murmur heard.  Pulmonary:      Effort: Pulmonary effort is normal. No respiratory distress.      Breath sounds: Normal breath sounds. No stridor. No wheezing, rhonchi or rales.   Abdominal:      General: Bowel sounds are normal. There is no distension.      Palpations: Abdomen is soft.      Tenderness: There is abdominal tenderness in the epigastric area. There is no guarding.   Musculoskeletal:      Cervical back: Decreased range of motion (mild with extension).      Right lower leg: No edema.      Left lower leg: No edema.   Lymphadenopathy:      Cervical: No cervical adenopathy.   Skin:     General: Skin is warm and dry.      Capillary Refill: Capillary refill takes less than 2 seconds.      Findings: No erythema or rash.   Neurological:      Mental Status: She is alert and oriented to person, place, and time.      Coordination: Coordination normal.        Significant Labs:  Lab Results   Component Value Date    WBC 8.44 05/12/2023    HGB 11.6 (L) 05/12/2023    HCT 38.8 05/12/2023     05/12/2023    CHOL 185 03/31/2021    TRIG 136 03/31/2021    HDL 34 (L) 03/31/2021    ALT 20 05/12/2023    AST 23 05/12/2023     05/12/2023    K 4.2 05/12/2023     05/12/2023    CREATININE 0.7 05/12/2023    BUN 10 05/12/2023    CO2 30 (H) 05/12/2023    TSH 2.262 03/31/2021    INR 0.9 05/12/2023    HGBA1C 5.5 10/16/2015       Diagnostic Studies: No relevant studies.    EKG:   Results for orders placed or performed in visit on 03/30/21   IN OFFICE EKG 12-LEAD (to [facility])    Collection Time: 03/30/21  8:53 AM    Narrative    Test Reason : R07.89,M25.512,G89.29,    Vent. Rate : 055 BPM     Atrial Rate : 055 BPM     P-R Int : 148 ms          QRS Dur : 080 ms      QT Int : 440 ms       P-R-T Axes : 053 050 061 degrees     QTc Int : 420 ms    Sinus bradycardia  Otherwise normal ECG  When compared with ECG of 24-NOV-2014 16:49,  No significant change was found  Confirmed by Ryan  Abraham ELLISON MD ([id number]) on 3/31/2021 10:03:08 AM    Referred By: SONIA RIVERA           Confirmed By:Abraham Davis III, MD       2D ECHO: old 2013  TTE:  CONCLUSIONS     1 - Concentric remodeling.     2 - Normal left ventricular systolic function (EF 60-65%).     3 - Normal right ventricular systolic function .     4 - Normal left ventricular diastolic function.     5 - Mild tricuspid regurgitation.         This document has been electronically    SIGNED BY: Charlie Flores MD On: 10/17/2013 16:45         Imaging   Xray L-Spine 8/19/22  FINDINGS:  Bones appear mildly demineralized.  Alignment appears satisfactory in the frontal projection.  Neutral lateral view demonstrates grade 1 anterolisthesis of L4 with respect L5.  This does not appear to change significantly with flexion and extension.  Vertebral body heights are satisfactorily maintained.  Mild hypertrophic spurring at the lower thoracic and lower lumbar spine.  Mild disc space narrowing at the L4-5 level.  No definite evidence of spondylolysis.  Degenerative changes at some of the lower facet joints bilaterally.  No fracture or osseous destruction.  Pedicles appear intact.  Surgical clips at the right upper quadrant of the abdomen.  Aortoiliac atherosclerosis without definite evidence of aneurysm.     Impression:     No acute abnormality.  DJD and alignment abnormalities as detailed above.        Electronically signed by: Martin Holt MD  Date:                                            08/19/2022  Time:                                           16:56        Active Cardiac Conditions: None      Revised Cardiac Risk Index   High -Risk Surgery  Intraperitoneal; Intrathoracic; suprainguinal vascular Yes- + 1 No- 0   History of Ischemic Heart Disease   (Hx of MI/positive exercise test/current chest pain due to ischemia/use of nitrate therapy/EKG with pathological Q waves) Yes- + 1 No- 0   History of CHF  (Pulmonary edema/bilateral rales or S3 gallop/PND/CXR  showing pulmonary vascular redistribution) Yes- + 1 No- 0   History of CVA   (Prior stroke or TIA) Yes- + 1 No- 0   Pre-operative treatment with insulin Yes- + 1 No- 0   Pre-operative creatinine > 2mg/dl Yes- + 1 No- 0   Total: 0      Risk Status:  Estimated risk of cardiac complications after non-cardiac surgery using the Revised Cardiac Risk Index for Preoperative risk is 3.9 %      ARISCAT (Canet) risk index: Intermediate: 13.3% risk of post-op pulmonary complications.    American Society of Anesthesiologists Physical Status classification (ASA): 2           No further cardiac workup needed prior to surgery.    Outpatient Subjective & Objective

## 2023-05-12 NOTE — ASSESSMENT & PLAN NOTE
With exam today, states intermittent discomfort and not new.  Symptoms more prevalent with certain foods; increased bloating.  Seen by [facility] Clinic in 2019 for this and EGD was to be done to rule out ulcers. Trial of Protonix initiated- no longer taking medication.  Reports history of hiatal hernia  Suggest continued follow-up with PCP

## 2023-05-12 NOTE — ANESTHESIA PAT ROS NOTE
05/12/2023  Raine Medley is a 67 y.o., female.      Pre-op Assessment          Review of Systems         Anesthesia Assessment: Preoperative EQUATION    Planned Procedure: Procedure(s) (LRB):  ARTHROPLASTY, KNEE, TOTAL: RIGHT: DEPUY - ATTUNE (Right)  Requested Anesthesia Type:Regional  Surgeon: Sanford Calvert III, MD  Service: Orthopedics  Known or anticipated Date of Surgery:6/5/2023    Surgeon notes: reviewed    Electronic QUestionnaire Assessment completed via nurse interview with patient.        Triage considerations:     The patient has no apparent active cardiac condition (No unstable coronary Syndrome such as severe unstable angina or recent [<1 month] myocardial infarction, decompensated CHF, severe valvular   disease or significant arrhythmia)    Previous anesthesia records:MAC    1/3/19  COLONOSCOPY     Airway/Jaw/Neck:  Airway Findings: Mouth Opening: Normal Mallampati: II  Jaw/Neck Findings:       Last PCP note: 6-12 months ago     Dr. Gotti  Subspecialty notes: Ortho  ENT     (Dr. Mc)  Gastro (CIRILO AYALA)    Other important co-morbidities: HLD , Microcytic anemia (mild)     Tests already available:  Available tests,  within [facility] .   3/31/21     CBC, CMP, TSH, Lipid  2/16/22     XR Chest  3/30/21     EKG  11/26/14   24 hr Holter  10/17/13   2D Echo  2/14/13     X Ray Cervical Spine                Instructions given. (See in Nurse's note)    Optimization:  Anesthesia Preop Clinic Assessment  Indicated Will schedule POC    Medical Opinion Indicated       Sub-specialist consult indicated:   [facility] NP      Plan:    Testing:  CBC, CMP, and PT/INR   Pre-anesthesia  visit       Visit focus: possible regional anesthesia and/or nerve block      Consultation: [facility] NP for medical and anesthesia optimization     Patient  has previously scheduled Medical Appointment:   5/12    Navigation: Tests Scheduled.              Consults scheduled.             Results will be tracked by Preop Clinic.      5/15/23    Patient is optimized for surgery.     Elevated bicarbonate likely due to snoring.         Constantin Flores NP  Perioperative Medicine  [facility]

## 2023-05-12 NOTE — PROGRESS NOTES
Raine Medley is a 67 y.o. year old here today for pre surgery optimization visit  in preparation for a Right total knee arthroplasty to be performed by Dr. Calvert  on 06/05/2023.  she was last seen and treated in the clinic on 5/12/2023. she will be medically optimized by the pre op center. There has been no significant change in medical status since last visit. No fever, chills, malaise, or unexplained weight change.      Allergies, Medications, past medical and surgical history reviewed.    Focused examination performed.    Patient saw surgeon in clinic today. All questions answered. Patient encouraged to call with questions. Contact information given.     Pre, connor, and post operative procedures and expectations discussed. Goals of successful surgery reviewed and include manageable pain levels, surgical site free of infection, medication management, and ambulation with PT/OT assistance. Healthy weight management discussed with patient and caregiver who were receptive to eduction of healthy diet and activity. No other necessary lifestyle changes identified. Educated patient about signs and symptoms of infection, medication management, anticoagulation therapy, risk of tobacco and alcohol use, and self-care to promote healing. Surgical guide given for future reference. Hibiclens given to patient with instructions. All questions were answered.     Raine Medley verbalized an understanding to the education and goals. Patient has displayed readiness to engage in care and is ready to proceed with surgery.  Patient reports daughter is able and ready to provide assistance at home after discharge.    Surgical and blood consents signed.    Raine Medley will contact us if there are any questions, concerns, or changes in medical status prior to surgery.       Joint class: 05/24/2023    Patient has discussed discharge planning with surgeon. Patient will be discharged to home following surgery.    patient will be scheduled with [facility].     30 minutes of time was spent on patient education, review of records, templating, H&P, , appointment scheduling and optimizing patient for surgery.

## 2023-05-12 NOTE — PROGRESS NOTES
[facility]  Progress Note    Patient Name: Raine Medley  MRN: [id number]  Date of Evaluation- 05/15/2023  PCP- Maribell Gotti MD    Future cases for Raine Medley [[id number]]       Case ID Status Date Time Rod Procedure Provider Location    [id number] [facility] 6/5/2023  1:18  ARTHROPLASTY, KNEE, TOTAL: RIGHT: DEPUY - ATTUNE Sanford Calvert III, MD [[id number]] [facility]            HPI:  This is a 67 y.o. female  who presents today with daughter for a preoperative evaluation in preparation for an Orthopedic  procedure.  Scheduled for  right knee arthroplasty.  Surgery is indicated for osteoarthritis of right knee.   Patient is new to me.  Details of current problem: The duration of problem is 7 years. Denies trauma.  Pain is becoming progressively worse. She has tried gel injections with initial relief.  Reports symptoms of  intermittent dull aching pain to right knee.  Aggravating factors include: walking, going up/down stairs and decreased ADLs.    Relieving factors are   Advil/Voltaren gel prn.  Denies pain today; no use of  assistive devices.    The history has been obtained by speaking with the patient and daughter as well as by  reviewing the electronic medical record and/or outside health information. Significant health conditions for the perioperative period are discussed below in assessment and plan.     Patient reports current health status to be Good.  Denies any new symptoms before surgery.        Subjective/ Objective:     Chief Complaint: Preoperative evaulation, perioperative medical management, and complication reduction plan.     Functional Capacity: no regular exercise regimen; can walk up one flight of stairs without CP/SOB.       Anesthesia issues: slow to awake with gallbladder surgery. Many people around bed and concerned.     Difficulty mouth opening: No    Steroid use in the last 12 months: No     Dental Issues: No    Family anesthesia difficulty: None       Family Hx of  Thrombosis: None    Past Medical History:   Diagnosis Date    Asthma     as a child    Hyperlipidemia          Past Medical History Pertinent Negatives:   Diagnosis Date Noted    Anemia 05/12/2023    Anxiety 05/12/2023    CHF (congestive heart failure) 05/12/2023    Coronary artery disease 05/12/2023    Deep vein thrombosis 05/12/2023    Depression 05/12/2023    Diabetes mellitus 08/23/2015    Diabetes mellitus, type 2 05/12/2023    Disorder of kidney and ureter 05/12/2023    GERD (gastroesophageal reflux disease) 05/12/2023    Hypertension 08/23/2015    Myocardial infarction 05/12/2023    Pulmonary embolism 05/12/2023    Seizures 05/12/2023    Stroke 05/12/2023    Thyroid disease 05/12/2023         Past Surgical History:   Procedure Laterality Date    CHOLECYSTECTOMY      COLONOSCOPY N/A 1/3/2019    Procedure: COLONOSCOPY;  Surgeon: Gaston Beebe MD;  Location: [address];  Service: Endoscopy;  Laterality: N/A;       Review of Systems   Constitutional:  Negative for chills, fatigue, fever and unexpected weight change.   HENT:  Negative for dental problem, hearing loss, postnasal drip, rhinorrhea, sore throat, tinnitus and trouble swallowing.    Eyes:  Negative for photophobia, pain, discharge and visual disturbance.   Respiratory:  Negative for apnea, cough, chest tightness, shortness of breath and wheezing.         STOP BANG risk factors:  Snoring   Cardiovascular:  Negative for chest pain, palpitations and leg swelling.   Gastrointestinal:  Negative for abdominal pain, blood in stool, constipation, nausea and vomiting.        Denies Fatty liver, Hepatitis   Endocrine: Negative for cold intolerance and heat intolerance.   Genitourinary:  Negative for decreased urine volume, difficulty urinating, dysuria, frequency, hematuria and urgency.        Nocturia 2-3x   Musculoskeletal:  Positive for arthralgias (bilateral knee). Negative for back pain, neck pain and neck stiffness.   Skin:  Negative for rash  "and wound.   Neurological:  Positive for numbness (bilateral foot). Negative for dizziness, tremors, seizures, syncope, weakness and headaches.   Hematological:  Negative for adenopathy. Does not bruise/bleed easily.   Psychiatric/Behavioral:  Negative for confusion, sleep disturbance and suicidal ideas.             VITALS  Visit Vitals  BP (!) 146/71 (BP Location: Right arm, Patient Position: Sitting)   Pulse 68   Temp 97.7 °F (36.5 °C) (Oral)   Ht 5' 1" (1.549 m)   Wt 60.8 kg (134 lb)   SpO2 100%   BMI 25.32 kg/m²          Physical Exam  Vitals reviewed.   Constitutional:       General: She is not in acute distress.     Appearance: She is well-developed.   HENT:      Head: Normocephalic.      Nose: Nose normal.      Mouth/Throat:      Pharynx: No oropharyngeal exudate.      Comments: small torus present  Eyes:      General:         Right eye: No discharge.         Left eye: No discharge.      Conjunctiva/sclera: Conjunctivae normal.      Pupils: Pupils are equal, round, and reactive to light.   Neck:      Thyroid: No thyromegaly.      Vascular: No carotid bruit or JVD.      Trachea: No tracheal deviation.   Cardiovascular:      Rate and Rhythm: Normal rate and regular rhythm.      Pulses:           Carotid pulses are 2+ on the right side and 2+ on the left side.       Dorsalis pedis pulses are 2+ on the right side and 2+ on the left side.        Posterior tibial pulses are 2+ on the right side and 2+ on the left side.      Heart sounds: Normal heart sounds. No murmur heard.  Pulmonary:      Effort: Pulmonary effort is normal. No respiratory distress.      Breath sounds: Normal breath sounds. No stridor. No wheezing, rhonchi or rales.   Abdominal:      General: Bowel sounds are normal. There is no distension.      Palpations: Abdomen is soft.      Tenderness: There is abdominal tenderness in the epigastric area. There is no guarding.   Musculoskeletal:      Cervical back: Decreased range of motion (mild with " extension).      Right lower leg: No edema.      Left lower leg: No edema.   Lymphadenopathy:      Cervical: No cervical adenopathy.   Skin:     General: Skin is warm and dry.      Capillary Refill: Capillary refill takes less than 2 seconds.      Findings: No erythema or rash.   Neurological:      Mental Status: She is alert and oriented to person, place, and time.      Coordination: Coordination normal.        Significant Labs:  Lab Results   Component Value Date    WBC 8.44 05/12/2023    HGB 11.6 (L) 05/12/2023    HCT 38.8 05/12/2023     05/12/2023    CHOL 185 03/31/2021    TRIG 136 03/31/2021    HDL 34 (L) 03/31/2021    ALT 20 05/12/2023    AST 23 05/12/2023     05/12/2023    K 4.2 05/12/2023     05/12/2023    CREATININE 0.7 05/12/2023    BUN 10 05/12/2023    CO2 30 (H) 05/12/2023    TSH 2.262 03/31/2021    INR 0.9 05/12/2023    HGBA1C 5.5 10/16/2015       Diagnostic Studies: No relevant studies.    EKG:   Results for orders placed or performed in visit on 03/30/21   IN OFFICE EKG 12-LEAD (to [place])    Collection Time: 03/30/21  8:53 AM    Narrative    Test Reason : R07.89,M25.512,G89.29,    Vent. Rate : 055 BPM     Atrial Rate : 055 BPM     P-R Int : 148 ms          QRS Dur : 080 ms      QT Int : 440 ms       P-R-T Axes : 053 050 061 degrees     QTc Int : 420 ms    Sinus bradycardia  Otherwise normal ECG  When compared with ECG of 24-NOV-2014 16:49,  No significant change was found  Confirmed by Abraham Davis III, MD ([id number]) on 3/31/2021 10:03:08 AM    Referred By: SONIA RIVERA           Confirmed By:Abraham Davis III, MD       2D ECHO: old 2013  TTE:  CONCLUSIONS     1 - Concentric remodeling.     2 - Normal left ventricular systolic function (EF 60-65%).     3 - Normal right ventricular systolic function .     4 - Normal left ventricular diastolic function.     5 - Mild tricuspid regurgitation.         This document has been electronically    SIGNED BY: Charlie Flores MD On: 10/17/2013  16:45         Imaging   Xray L-Spine 8/19/22  FINDINGS:  Bones appear mildly demineralized.  Alignment appears satisfactory in the frontal projection.  Neutral lateral view demonstrates grade 1 anterolisthesis of L4 with respect L5.  This does not appear to change significantly with flexion and extension.  Vertebral body heights are satisfactorily maintained.  Mild hypertrophic spurring at the lower thoracic and lower lumbar spine.  Mild disc space narrowing at the L4-5 level.  No definite evidence of spondylolysis.  Degenerative changes at some of the lower facet joints bilaterally.  No fracture or osseous destruction.  Pedicles appear intact.  Surgical clips at the right upper quadrant of the abdomen.  Aortoiliac atherosclerosis without definite evidence of aneurysm.     Impression:     No acute abnormality.  DJD and alignment abnormalities as detailed above.        Electronically signed by: Martin Holt MD  Date:                                            08/19/2022  Time:                                           16:56        Active Cardiac Conditions: None      Revised Cardiac Risk Index   High -Risk Surgery  Intraperitoneal; Intrathoracic; suprainguinal vascular Yes- + 1 No- 0   History of Ischemic Heart Disease   (Hx of MI/positive exercise test/current chest pain due to ischemia/use of nitrate therapy/EKG with pathological Q waves) Yes- + 1 No- 0   History of CHF  (Pulmonary edema/bilateral rales or S3 gallop/PND/CXR showing pulmonary vascular redistribution) Yes- + 1 No- 0   History of CVA   (Prior stroke or TIA) Yes- + 1 No- 0   Pre-operative treatment with insulin Yes- + 1 No- 0   Pre-operative creatinine > 2mg/dl Yes- + 1 No- 0   Total: 0      Risk Status:  Estimated risk of cardiac complications after non-cardiac surgery using the Revised Cardiac Risk Index for Preoperative risk is 3.9 %      ARISCAT (Canet) risk index: Intermediate: 13.3% risk of post-op pulmonary complications.    American Society of  Anesthesiologists Physical Status classification (ASA): 2           No further cardiac workup needed prior to surgery.                   Assessment/Plan:     TMJ syndrome  Denies history; reported left ear pain years ago.  Resolved      Hypertriglyceridemia  Patient denies and not treated with medication      Primary osteoarthritis of right knee  Scheduled with Dr. Calvert on 6/5/23 for right knee arthroplasty.     Snoring  Denies ERMELINDA. Possible sleep apnea: recommend caution with sedating medication in the perioperative period.   Sleep Study referral declined     Epigastric abdominal tenderness  With exam today, states intermittent discomfort and not new.  Symptoms more prevalent with certain foods; increased bloating.  Seen by [facility] in 2019 for this and EGD was to be done to rule out ulcers. Trial of Protonix initiated- no longer taking medication.  Reports history of hiatal hernia  Suggest continued follow-up with PCP    Microcytic anemia  Mild  Current labs:  Hgb- 11.6   Hct-   38.8; possibly related to naproxen use.  Recommend monitoring during perioperative period.       Discussion/Management of Perioperative Care    Thromboembolic prophylaxis (VTE) Care: Risk factors for thrombosis include: age and surgical procedure.  I recommend prophylaxis of thromboembolism with the use of compression stockings/pneumatic devices, and/or pharmacologic agents. The benefits should outweigh the risks for pharmacologic prophylaxis in the perioperative period. I also encourage early ambulation if not contraindicated during the post-operative period.    Risk factors for post-operative pulmonary complications include:age > 65 years and surgery > 3 hours. To reduce the risk of pulmonary complications, prophylactic recommendations include: incentive spirometry use/deep breathing, early ambulation, and pain control.    Risk factors for renal complications include: age. To reduce the risk of postoperative renal complications, I  recommend the patient maintain adequate fluid volume status by drinking 2 liters of water daily.  Avoid/reduce NSAIDS and RASMUSSEN-2 inhibitors use as well as IV contrast for renal protection.    I recommend the use of appropriate prophylactic antibiotics to reduce the risk of surgical site infections.    Delirium risk factors include advanced age. I recommend to avoid/reduce use of benzodiazepine use (not for patients who take on a regular basis), anticholinergics, Benadryl,  and agents that may cause postoperative serotonin syndrome.  Controlled pain can decrease the risk for postop delirium and since opioids are used for postoperative pain control, I suggest using the lowest dose for the shortest amount of time necessary for pain management.     The patient is at an increased risk for urinary retention due to : possible regional anesthesia and advanced age. I recommend to avoid/decrease the use of benzodiazepines, anticholinergics, and Benadryl in the perioperative period. I also recommend using opioids for the shortest period of time if possible.          This visit was focused on Preoperative evaluation, Perioperative Medical management, complication reduction plans. I suggest that the patient follows up with primary care or relevant sub specialists for ongoing health care.    I appreciate the opportunity to be involved in this patients care. Please feel free to contact me if there were any questions about this consultation.        I spent a total of 61 minutes on the day of the visit.This includes face to face time and non-face to face time preparing to see the patient (e.g., review of tests), obtaining and/or reviewing separately obtained history, documenting clinical information in the electronic or other health record, independently interpreting results and communicating results to the patient/family/caregiver, or care coordinator.       Patient is optimized for surgery.    Elevated bicarbonate likely due to  snoring.       Constantin Flores NP  Perioperative Medicine  [facility]

## 2023-05-12 NOTE — HPI
This is a 67 y.o. female  who presents today with daughter for a preoperative evaluation in preparation for an Orthopedic  procedure.  Scheduled for  right knee arthroplasty.  Surgery is indicated for osteoarthritis of right knee.   Patient is new to me.  Details of current problem: The duration of problem is 7 years. Denies trauma.  Pain is becoming progressively worse. She has tried gel injections with initial relief.  Reports symptoms of  intermittent dull aching pain to right knee.  Aggravating factors include: walking, going up/down stairs and decreased ADLs.    Relieving factors are   Advil/Voltaren gel prn.  Denies pain today; no use of  assistive devices.    The history has been obtained by speaking with the patient and daughter as well as by  reviewing the electronic medical record and/or outside health information. Significant health conditions for the perioperative period are discussed below in assessment and plan.     Patient reports current health status to be Good.  Denies any new symptoms before surgery.

## 2023-05-12 NOTE — DISCHARGE INSTRUCTIONS
Your surgery has been scheduled for:_______6/5/23___________________________________    You should report to:  ____[facility], located on the [place] side of the first floor of the           [facility] ([phone])  ____[facility], located on the [facility] side of the            Second floor of the [facility] ([phone])  ____3rd Floor [facility] located on the [facility] side of the [facility] [phone]  __X__[facility]: located at [address] [place] [address]. [address]     Please Note   Tell your doctor if you take Aspirin, products containing Aspirin, herbal medications  or blood thinners, such as Coumadin, Ticlid, or Plavix.  (Consult your provider regarding holding or stopping before surgery).  Arrange for someone to drive you home following surgery.  You will not be allowed to leave the surgical facility alone or drive yourself home following sedation and anesthesia.    Before Surgery  Stop taking all herbal medications, vitamins, and supplements 7 days prior to surgery  No Motrin/Advil (Ibuprofen) 7 days before surgery  No Aleve (Naproxen) 7 days before surgery  Stop Taking Asprin, products containing Aspirin __7___days before surgery   No Goody's/BC Powder 7 days before surgery  Refrain from drinking alcoholic beverages for 24 hours before and after surgery  Stop or limit smoking at least 24 hours prior to surgery  You may take Tylenol for pain    Night before Surgery  Do not eat or drink after midnight  Take a shower or bath (shower is recommended).  Bathe with Hibiclens soap or an antibacterial soap from the neck down.  If not supplied by your surgeon, hibiclens soap will need to be purchased over the counter in pharmacy.  Rinse soap off thoroughly.  Shampoo your hair with your regular shampoo    The Day of Surgery  Take another bath or shower with hibiclens or  any antibacterial soap, to reduce the chance of infection.  Take heart and blood pressure medications with a small sip of water, as advised by the perioperative team.  Do not take fluid pills  You may brush your teeth and rinse your mouth, but do not swallow any additional water.   Do not apply perfumes, powder, body lotions or deodorant on the day of surgery.  Nail polish should be removed.  Do not wear makeup or moisturizer  Wear comfortable clothes, such as a button front shirt and loose fitting pants.  Leave all jewelry, including body piercings, and valuables at home.    Bring any devices you will neeed after surgery such as crutches or canes.  If you have sleep apnea, please bring your CPAP machine  In the event that your physical condition changes including the onset of a cold or respiratory illness, or if you have to delay or cancel your surgery, please notify your surgeon.

## 2023-05-12 NOTE — PROGRESS NOTES
Pre-op for R TKA 6/5/23  Says she does not need , one was offered  No interval change, pomc today, labs pending  Exam unchanged    This patient has significant symptoms in their knee that are affecting their quality of life and daily activities.  They have tried non-operative treatment including analgesics, an exercise program, and activity modification, but the symptoms have persisted. I believe they make a good candidate for knee arthroplasty.     The risks and benefits of knee arthroplasty have been discussed with the patient which include, but are not limited to infections (including severe sequelae), potential component failure, fracture, DVT, pulmonary embolus, nerve palsy, poor range of motion, the possibility of bone grafting, persistent pain, wound healing complications, transfusions, medical complications and death.     We discussed surgical options including implant type and why I believe the implant selected is a good match for the patient's needs. The patient expressed understanding and agrees to proceed with the planned surgery.     Pre-operative planning will include the following:  A pre-surgical evaluation by will be arranged.  Pre-op orders will be placed.  We will make arrangements with the operating room for proper time and staffing.  We will make Social Service arrangements for the patient.    Implants:   Company: [employer]  System: Attune  primary tray     DVT prophylaxis: ASA 81mg BID x1 month    Dispo: outpatient PT     Admission status: Outpatient/23hr OBS    Location: [place]     Overnight stay, language barrier     Plan for R TKA 5/31/23 at end of school year

## 2023-05-12 NOTE — H&P (VIEW-ONLY)
CC:  Right knee pain    Raine Medley is a 67 y.o. female with history of Right knee pain. Pain is worse with activity and weight bearing.  Patient has experienced interference of activities of daily living due to decreased range of motion and an increase in joint pain and swelling.  Patient has failed non-operative treatment including NSAIDs, corticosteroid injections, viscosupplement injections, and activity modification.  Raine Medley currently ambulates independently.        Past Medical History:   Diagnosis Date    Asthma     as a child    Hyperlipidemia        Past Surgical History:   Procedure Laterality Date    CHOLECYSTECTOMY      COLONOSCOPY N/A 1/3/2019    Procedure: COLONOSCOPY;  Surgeon: Gaston Beebe MD;  Location: [address]);  Service: Endoscopy;  Laterality: N/A;       Family History   Problem Relation Age of Onset    No Known Problems Mother     No Known Problems Father     No Known Problems Sister     No Known Problems Daughter     No Known Problems Son     No Known Problems Son     No Known Problems Son     Melanoma Neg Hx     Lupus Neg Hx     Psoriasis Neg Hx     Colon cancer Neg Hx     Esophageal cancer Neg Hx     Stomach cancer Neg Hx        Review of patient's allergies indicates:  No Known Allergies      Current Outpatient Medications:     diclofenac sodium (VOLTAREN) 1 % Gel, Apply 2 g topically 3 (three) times daily as needed (pain)., Disp: 100 g, Rfl: 2    omega-3 fatty acids/fish oil (FISH OIL-OMEGA-3 FATTY ACIDS) 300-1,000 mg capsule, Take by mouth once daily., Disp: , Rfl:     vitamin D (VITAMIN D3) 1000 units Tab, Take 1,000 Units by mouth once daily., Disp: , Rfl:     Review of Systems:  Constitutional: no fever or chills  Eyes: no visual changes  ENT: no nasal congestion or sore throat  Respiratory: no cough or shortness of breath  Cardiovascular: no chest pain or palpitations  Gastrointestinal: no nausea or vomiting, tolerating diet  Genitourinary: no  hematuria or dysuria  Integument/Breast: no rash or pruritis  Hematologic/Lymphatic: no easy bruising or lymphadenopathy  Musculoskeletal: positive for knee pain  Neurological: no seizures or tremors  Behavioral/Psych: no auditory or visual hallucinations  Endocrine: no heat or cold intolerance    PE:  BP (!) 146/71 (BP Location: Left arm, Patient Position: Sitting, BP Method: Medium (Automatic))   Pulse 68   Ht 5' (1.524 m)   Wt 61.3 kg (135 lb 2.3 oz)   BMI 26.39 kg/m²   General: Pleasant, cooperative, NAD   Gait: antalgic  HEENT: NCAT, sclera nonicteric   Lungs: Respirations clear bilaterally; equal and unlabored.   CV: S1S2; 2+ bilateral upper and lower extremity pulses.   Skin: Intact throughout with no rashes, erythema, or lesions  Extremities: No LE edema,  no erythema or warmth of the skin in either lower extremity.    Right knee exam:  Knee Range of Motion:0-125 degrees flexion, pain with passive range of motion  Effusion:none  Condition of skin:intact  Location of tenderness:Medial joint line and Lateral joint line   Strength:limited by pain and 5 of 5  Stability: stable to testing, Lachman: stable, LCL: stable, MCL: stable, PCL: stable, and posteromedial (dial): stable    Hip Examination: painless PROM of hip     Radiographs: Radiographs reveal advanced degenerative changes including subchondral cyst formation, subchondral sclerosis, osteophyte formation, joint space narrowing.     Knee Alignment:  Moderate varus    Diagnosis: Primary osteoarthritis Right knee    Plan: Right total knee arthroplasty    Due to the serious nature of total joint infection and the high prevalence of community acquired MRSA, vancomycin will be used perioperatively.

## 2023-05-12 NOTE — H&P
CC:  Right knee pain    Raine Medley is a 67 y.o. female with history of Right knee pain. Pain is worse with activity and weight bearing.  Patient has experienced interference of activities of daily living due to decreased range of motion and an increase in joint pain and swelling.  Patient has failed non-operative treatment including NSAIDs, corticosteroid injections, viscosupplement injections, and activity modification.  Raine Medley currently ambulates independently.        Past Medical History:   Diagnosis Date    Asthma     as a child    Hyperlipidemia        Past Surgical History:   Procedure Laterality Date    CHOLECYSTECTOMY      COLONOSCOPY N/A 1/3/2019    Procedure: COLONOSCOPY;  Surgeon: Gaston Beebe MD;  Location: [address]);  Service: Endoscopy;  Laterality: N/A;       Family History   Problem Relation Age of Onset    No Known Problems Mother     No Known Problems Father     No Known Problems Sister     No Known Problems Daughter     No Known Problems Son     No Known Problems Son     No Known Problems Son     Melanoma Neg Hx     Lupus Neg Hx     Psoriasis Neg Hx     Colon cancer Neg Hx     Esophageal cancer Neg Hx     Stomach cancer Neg Hx        Review of patient's allergies indicates:  No Known Allergies      Current Outpatient Medications:     diclofenac sodium (VOLTAREN) 1 % Gel, Apply 2 g topically 3 (three) times daily as needed (pain)., Disp: 100 g, Rfl: 2    omega-3 fatty acids/fish oil (FISH OIL-OMEGA-3 FATTY ACIDS) 300-1,000 mg capsule, Take by mouth once daily., Disp: , Rfl:     vitamin D (VITAMIN D3) 1000 units Tab, Take 1,000 Units by mouth once daily., Disp: , Rfl:     Review of Systems:  Constitutional: no fever or chills  Eyes: no visual changes  ENT: no nasal congestion or sore throat  Respiratory: no cough or shortness of breath  Cardiovascular: no chest pain or palpitations  Gastrointestinal: no nausea or vomiting, tolerating diet  Genitourinary: no  hematuria or dysuria  Integument/Breast: no rash or pruritis  Hematologic/Lymphatic: no easy bruising or lymphadenopathy  Musculoskeletal: positive for knee pain  Neurological: no seizures or tremors  Behavioral/Psych: no auditory or visual hallucinations  Endocrine: no heat or cold intolerance    PE:  BP (!) 146/71 (BP Location: Left arm, Patient Position: Sitting, BP Method: Medium (Automatic))   Pulse 68   Ht 5' (1.524 m)   Wt 61.3 kg (135 lb 2.3 oz)   BMI 26.39 kg/m²   General: Pleasant, cooperative, NAD   Gait: antalgic  HEENT: NCAT, sclera nonicteric   Lungs: Respirations clear bilaterally; equal and unlabored.   CV: S1S2; 2+ bilateral upper and lower extremity pulses.   Skin: Intact throughout with no rashes, erythema, or lesions  Extremities: No LE edema,  no erythema or warmth of the skin in either lower extremity.    Right knee exam:  Knee Range of Motion:0-125 degrees flexion, pain with passive range of motion  Effusion:none  Condition of skin:intact  Location of tenderness:Medial joint line and Lateral joint line   Strength:limited by pain and 5 of 5  Stability: stable to testing, Lachman: stable, LCL: stable, MCL: stable, PCL: stable, and posteromedial (dial): stable    Hip Examination: painless PROM of hip     Radiographs: Radiographs reveal advanced degenerative changes including subchondral cyst formation, subchondral sclerosis, osteophyte formation, joint space narrowing.     Knee Alignment:  Moderate varus    Diagnosis: Primary osteoarthritis Right knee    Plan: Right total knee arthroplasty    Due to the serious nature of total joint infection and the high prevalence of community acquired MRSA, vancomycin will be used perioperatively.

## 2023-05-12 NOTE — ASSESSMENT & PLAN NOTE
Denies ERMELINDA. Possible sleep apnea: recommend caution with sedating medication in the perioperative period.   Sleep Study referral declined

## 2023-05-15 PROBLEM — D50.9 MICROCYTIC ANEMIA: Status: ACTIVE | Noted: 2023-05-15

## 2023-05-15 NOTE — ASSESSMENT & PLAN NOTE
Mild  Current labs:  Hgb- 11.6   Hct-   38.8; possibly related to naproxen use.  Recommend monitoring during perioperative period.

## 2023-05-19 DIAGNOSIS — M17.11 PRIMARY OSTEOARTHRITIS OF RIGHT KNEE: Primary | ICD-10-CM

## 2023-05-22 ENCOUNTER — PATIENT MESSAGE (OUTPATIENT)
Dept: ADMINISTRATIVE | Facility: OTHER | Age: 68
End: 2023-05-22
Payer: COMMERCIAL

## 2023-05-23 ENCOUNTER — PATIENT MESSAGE (OUTPATIENT)
Dept: RESEARCH | Facility: HOSPITAL | Age: 68
End: 2023-05-23
Payer: COMMERCIAL

## 2023-05-31 ENCOUNTER — CLINICAL SUPPORT (OUTPATIENT)
Dept: REHABILITATION | Facility: HOSPITAL | Age: 68
End: 2023-05-31
Attending: ORTHOPAEDIC SURGERY
Payer: COMMERCIAL

## 2023-05-31 DIAGNOSIS — M17.11 PRIMARY OSTEOARTHRITIS OF RIGHT KNEE: ICD-10-CM

## 2023-05-31 PROCEDURE — 97112 NEUROMUSCULAR REEDUCATION: CPT

## 2023-05-31 PROCEDURE — 97161 PT EVAL LOW COMPLEX 20 MIN: CPT

## 2023-05-31 NOTE — PLAN OF CARE
[facility]   Physical Therapy Initial Evaluation      Name: Raine Medley  Clinic Number: [id number]    Therapy Diagnosis:   Encounter Diagnosis   Name Primary?    Primary osteoarthritis of right knee         Physician: Sanford Calvert III, *    Physician Orders: PT Eval and Treat   Medical Diagnosis from Referral: M17.11 (ICD-10-CM) - Primary osteoarthritis of right knee  Evaluation Date: 5/31/2023  Authorization Period Expiration: 6/30/23  Plan of Care Expiration: 6/1/23  Progress Note Due: n/a  Visit # / Visits authorized: 1/ 1   FOTO: 1/3    Precautions: Standard     Time In: 6:00 pm  Time Out: 6:30 pm  Total Appointment Time (timed & untimed codes): 30 minutes    Subjective     Date of onset: 1 year    History of current condition - Raine reports: she is here for pre op R knee for TKA.     Falls: none    Imaging: see chart    Prior Therapy: yes  Social History:  lives with their daughter  Occupation: teacher  Prior Level of Function: limited by knee pain  Current Level of Function: same    Pain:  Current 8/10, worst 10/10, best 8/10   Location: right knee   Description: Aching, Dull, and Sharp  Aggravating Factors: anything weight bearing  Easing Factors: rest    Patients goals: learn prehab exercises      Medical History:   Past Medical History:   Diagnosis Date    Asthma     as a child    Hyperlipidemia     Microcytic anemia 5/15/2023       Surgical History:   Raine Medley  has a past surgical history that includes Cholecystectomy and Colonoscopy (N/A, 1/3/2019).    Medications:   Raine has a current medication list which includes the following prescription(s): diclofenac sodium, fish oil-omega-3 fatty acids, and vitamin d.    Allergies:   Review of patient's allergies indicates:  No Known Allergies     Objective      Range of Motion (Active):   Knee Right  Left    Flexion 115 WFL   Extension -3 WFL       Lower Extremity Strength  Right LE  Left LE    Quadriceps:  4+/5 Quadriceps: 4+/5   Hamstrings: 4+/5 Hamstrings: 4+/5       Joint Mobility: hypomobile patella all directions     Palpation: TTP joint line      Treatment     Total Treatment time (time-based codes) separate from Evaluation: 15 minutes     Raine received the treatments listed below:      neuromuscular re-education activities to improve: Kinesthetic and Proprioception for 10 minutes. The following activities were included:  Quad sets x20  SLR x20   Heel slides with strap x20   Patient education on what to expect after sx, HEP, important exercises      Patient Education and Home Exercises     Education provided:   - HEP, POC, answered patient questions      Written Home Exercises Provided: yes. Exercises were reviewed and Raine was able to demonstrate them prior to the end of the session.  Raine demonstrated good  understanding of the education provided. See EMR under Patient Instructions for exercises provided during therapy sessions.    Assessment     Raine is a 68 y.o. female referred to outpatient Physical Therapy with a medical diagnosis of M17.11 (ICD-10-CM) - Primary osteoarthritis of right knee. Patient presents with reduced knee ROM and reduced quad strength as expected for pre-op TKA. Education given and HEP issued for before and after exercises.     Patient prognosis is Excellent.   Patient will benefit from skilled outpatient Physical Therapy to address the deficits stated above and in the chart below, provide patient /family education, and to maximize patientt's level of independence.     Plan of care discussed with patient: Yes  Patient's spiritual, cultural and educational needs considered and patient is agreeable to the plan of care and goals as stated below:     Anticipated Barriers for therapy: none    Medical Necessity is demonstrated by the following  History  Co-morbidities and personal factors that may impact the plan of care [x] LOW: no personal factors / co-morbidities  [] MODERATE: 1-2  personal factors / co-morbidities  [] HIGH: 3+ personal factors / co-morbidities       Examination  Body Structures and Functions, activity limitations and participation restrictions that may impact the plan of care [x] LOW: addressing 1-2 elements  [] MODERATE: 3+ elements  [] HIGH: 4+ elements (please support below)       Clinical Presentation [x] LOW: stable  [] MODERATE: Evolving  [] HIGH: Unstable     Decision Making/ Complexity Score: low         Plan     Plan of care Certification: 5/31/2023 to 6/1/23.    Outpatient Physical Therapy 1 times weekly for 1 weeks to include the following interventions: Neuromuscular Re-ed.     Miles Singh, PT

## 2023-06-02 ENCOUNTER — TELEPHONE (OUTPATIENT)
Dept: ORTHOPEDICS | Facility: CLINIC | Age: 68
End: 2023-06-02
Payer: COMMERCIAL

## 2023-06-02 RX ORDER — PANTOPRAZOLE SODIUM 40 MG/1
40 TABLET, DELAYED RELEASE ORAL DAILY
Qty: 30 TABLET | Refills: 0 | Status: ON HOLD | OUTPATIENT
Start: 2023-06-02 | End: 2023-07-20 | Stop reason: SDUPTHER

## 2023-06-02 RX ORDER — ASPIRIN 81 MG/1
81 TABLET ORAL 2 TIMES DAILY
Qty: 60 TABLET | Refills: 0 | Status: ON HOLD | OUTPATIENT
Start: 2023-06-02 | End: 2023-07-20 | Stop reason: HOSPADM

## 2023-06-02 RX ORDER — DEXTROMETHORPHAN HYDROBROMIDE, GUAIFENESIN 5; 100 MG/5ML; MG/5ML
650 LIQUID ORAL EVERY 8 HOURS
Qty: 120 TABLET | Refills: 0 | Status: SHIPPED | OUTPATIENT
Start: 2023-06-02 | End: 2023-08-20 | Stop reason: SDUPTHER

## 2023-06-02 RX ORDER — METHOCARBAMOL 750 MG/1
750 TABLET, FILM COATED ORAL 3 TIMES DAILY PRN
Qty: 21 TABLET | Refills: 0 | Status: SHIPPED | OUTPATIENT
Start: 2023-06-02 | End: 2023-06-20 | Stop reason: SDUPTHER

## 2023-06-02 RX ORDER — CELECOXIB 200 MG/1
200 CAPSULE ORAL DAILY
Qty: 30 CAPSULE | Refills: 0 | Status: SHIPPED | OUTPATIENT
Start: 2023-06-02 | End: 2023-07-19

## 2023-06-02 RX ORDER — OXYCODONE HYDROCHLORIDE 10 MG/1
5 TABLET ORAL EVERY 6 HOURS PRN
Qty: 25 TABLET | Refills: 0 | Status: SHIPPED | OUTPATIENT
Start: 2023-06-02 | End: 2023-07-19

## 2023-06-02 RX ORDER — DOCUSATE SODIUM 100 MG/1
100 CAPSULE, LIQUID FILLED ORAL 2 TIMES DAILY
Qty: 60 CAPSULE | Refills: 0 | Status: SHIPPED | OUTPATIENT
Start: 2023-06-02 | End: 2023-07-06

## 2023-06-02 NOTE — TELEPHONE ENCOUNTER
I called the patient today regarding surgery on 6/5/2023 with Dr. Sanford Calvert. I informed the patient that her surgery will be at  [facility] ([facility] [place] [address]). I informed the patient they must arrive at 9:00am and their surgery will start at 11:00am.     Per the [facility] COVID-19 Pre-Procedural Testing Policy (administered 10/26/2022), patients do not need tested for COVID-19 regardless of vaccination status.    I reminded the patient that they cannot eat or drink after midnight, the night before surgery.     I reminded the patient to be careful of their skin over the next few days to make sure they do not get any cuts, scratches or scrapes.    The patient has not yet received their walker, bedside commode or shower chair from [facility]. I told the patient that she needs to call [facility] today at [phone].    The patient is also going to call [facility] to get scheduled for physical therapy.     The patient verbalized understanding and has no further questions.

## 2023-06-05 ENCOUNTER — ANESTHESIA (OUTPATIENT)
Dept: SURGERY | Facility: HOSPITAL | Age: 68
End: 2023-06-05
Payer: COMMERCIAL

## 2023-06-05 ENCOUNTER — TELEPHONE (OUTPATIENT)
Dept: PHARMACY | Facility: CLINIC | Age: 68
End: 2023-06-05
Payer: COMMERCIAL

## 2023-06-05 ENCOUNTER — ANESTHESIA EVENT (OUTPATIENT)
Dept: SURGERY | Facility: HOSPITAL | Age: 68
End: 2023-06-05
Payer: COMMERCIAL

## 2023-06-05 ENCOUNTER — HOSPITAL ENCOUNTER (OUTPATIENT)
Facility: HOSPITAL | Age: 68
Discharge: HOME | End: 2023-06-06
Attending: ORTHOPAEDIC SURGERY | Admitting: ORTHOPAEDIC SURGERY
Payer: COMMERCIAL

## 2023-06-05 DIAGNOSIS — M17.11 PRIMARY OSTEOARTHRITIS OF RIGHT KNEE: ICD-10-CM

## 2023-06-05 PROCEDURE — 63600175 PHARM REV CODE 636 W HCPCS: Performed by: PHYSICIAN ASSISTANT

## 2023-06-05 PROCEDURE — 25000003 PHARM REV CODE 250: Performed by: PHYSICIAN ASSISTANT

## 2023-06-05 PROCEDURE — 63600175 PHARM REV CODE 636 W HCPCS: Performed by: ANESTHESIOLOGY

## 2023-06-05 PROCEDURE — 36000711: Performed by: ORTHOPAEDIC SURGERY

## 2023-06-05 PROCEDURE — 25000003 PHARM REV CODE 250: Performed by: NURSE ANESTHETIST, CERTIFIED REGISTERED

## 2023-06-05 PROCEDURE — D9220A PRA ANESTHESIA: Mod: CRNA,,, | Performed by: NURSE ANESTHETIST, CERTIFIED REGISTERED

## 2023-06-05 PROCEDURE — D9220A PRA ANESTHESIA: Mod: ANES,,, | Performed by: ANESTHESIOLOGY

## 2023-06-05 PROCEDURE — 37000008 HC ANESTHESIA 1ST 15 MINUTES: Performed by: ORTHOPAEDIC SURGERY

## 2023-06-05 PROCEDURE — C1776 JOINT DEVICE (IMPLANTABLE): HCPCS | Performed by: ORTHOPAEDIC SURGERY

## 2023-06-05 PROCEDURE — 99900035 HC TECH TIME PER 15 MIN (STAT)

## 2023-06-05 PROCEDURE — 25000003 PHARM REV CODE 250: Performed by: ORTHOPAEDIC SURGERY

## 2023-06-05 PROCEDURE — 27447 TOTAL KNEE ARTHROPLASTY: CPT | Mod: RT,,, | Performed by: ORTHOPAEDIC SURGERY

## 2023-06-05 PROCEDURE — 71000039 HC RECOVERY, EACH ADD'L HOUR: Performed by: ORTHOPAEDIC SURGERY

## 2023-06-05 PROCEDURE — 27200688 HC TRAY, SPINAL-HYPER/ ISOBARIC: Performed by: ANESTHESIOLOGY

## 2023-06-05 PROCEDURE — 97165 OT EVAL LOW COMPLEX 30 MIN: CPT

## 2023-06-05 PROCEDURE — D9220A PRA ANESTHESIA: ICD-10-PCS | Mod: ANES,,, | Performed by: ANESTHESIOLOGY

## 2023-06-05 PROCEDURE — 97530 THERAPEUTIC ACTIVITIES: CPT

## 2023-06-05 PROCEDURE — 63600175 PHARM REV CODE 636 W HCPCS: Performed by: NURSE ANESTHETIST, CERTIFIED REGISTERED

## 2023-06-05 PROCEDURE — 71000033 HC RECOVERY, INTIAL HOUR: Performed by: ORTHOPAEDIC SURGERY

## 2023-06-05 PROCEDURE — 27447 PR TOTAL KNEE ARTHROPLASTY: ICD-10-PCS | Mod: RT,,, | Performed by: ORTHOPAEDIC SURGERY

## 2023-06-05 PROCEDURE — 63600175 PHARM REV CODE 636 W HCPCS: Performed by: ORTHOPAEDIC SURGERY

## 2023-06-05 PROCEDURE — 94761 N-INVAS EAR/PLS OXIMETRY MLT: CPT

## 2023-06-05 PROCEDURE — 37000009 HC ANESTHESIA EA ADD 15 MINS: Performed by: ORTHOPAEDIC SURGERY

## 2023-06-05 PROCEDURE — 27201423 OPTIME MED/SURG SUP & DEVICES STERILE SUPPLY: Performed by: ORTHOPAEDIC SURGERY

## 2023-06-05 PROCEDURE — D9220A PRA ANESTHESIA: ICD-10-PCS | Mod: CRNA,,, | Performed by: NURSE ANESTHETIST, CERTIFIED REGISTERED

## 2023-06-05 PROCEDURE — C1713 ANCHOR/SCREW BN/BN,TIS/BN: HCPCS | Performed by: ORTHOPAEDIC SURGERY

## 2023-06-05 PROCEDURE — 25000003 PHARM REV CODE 250: Performed by: ANESTHESIOLOGY

## 2023-06-05 PROCEDURE — 97535 SELF CARE MNGMENT TRAINING: CPT | Mod: 97

## 2023-06-05 PROCEDURE — 97161 PT EVAL LOW COMPLEX 20 MIN: CPT

## 2023-06-05 PROCEDURE — 25000003 PHARM REV CODE 250

## 2023-06-05 PROCEDURE — 36000710: Performed by: ORTHOPAEDIC SURGERY

## 2023-06-05 DEVICE — CEMENT BONE SURG SMPLX P RADPQ: Type: IMPLANTABLE DEVICE | Site: KNEE | Status: FUNCTIONAL

## 2023-06-05 DEVICE — IMPLANTABLE DEVICE: Type: IMPLANTABLE DEVICE | Site: KNEE | Status: FUNCTIONAL

## 2023-06-05 DEVICE — PATELLA ATTUNE MED 32MM: Type: IMPLANTABLE DEVICE | Site: KNEE | Status: FUNCTIONAL

## 2023-06-05 RX ORDER — POLYETHYLENE GLYCOL 3350 17 G/17G
17 POWDER, FOR SOLUTION ORAL DAILY
Status: DISCONTINUED | OUTPATIENT
Start: 2023-06-06 | End: 2023-06-06 | Stop reason: HOSPADM

## 2023-06-05 RX ORDER — PROCHLORPERAZINE EDISYLATE 5 MG/ML
5 INJECTION INTRAMUSCULAR; INTRAVENOUS EVERY 6 HOURS PRN
Status: DISCONTINUED | OUTPATIENT
Start: 2023-06-05 | End: 2023-06-06 | Stop reason: HOSPADM

## 2023-06-05 RX ORDER — ROPIVACAINE/EPI/CLONIDINE/KET 2.46-0.005
SYRINGE (ML) INJECTION
Status: DISCONTINUED | OUTPATIENT
Start: 2023-06-05 | End: 2023-06-05 | Stop reason: HOSPADM

## 2023-06-05 RX ORDER — ACETAMINOPHEN 500 MG
1000 TABLET ORAL EVERY 6 HOURS
Status: DISCONTINUED | OUTPATIENT
Start: 2023-06-05 | End: 2023-06-06 | Stop reason: HOSPADM

## 2023-06-05 RX ORDER — MORPHINE SULFATE 2 MG/ML
2 INJECTION, SOLUTION INTRAMUSCULAR; INTRAVENOUS
Status: DISCONTINUED | OUTPATIENT
Start: 2023-06-05 | End: 2023-06-06 | Stop reason: HOSPADM

## 2023-06-05 RX ORDER — OXYCODONE HYDROCHLORIDE 5 MG/1
5 TABLET ORAL
Status: DISCONTINUED | OUTPATIENT
Start: 2023-06-05 | End: 2023-06-06 | Stop reason: HOSPADM

## 2023-06-05 RX ORDER — METHOCARBAMOL 500 MG/1
1000 TABLET, FILM COATED ORAL ONCE AS NEEDED
Status: COMPLETED | OUTPATIENT
Start: 2023-06-05 | End: 2023-06-05

## 2023-06-05 RX ORDER — VANCOMYCIN HYDROCHLORIDE 1 G/20ML
INJECTION, POWDER, LYOPHILIZED, FOR SOLUTION INTRAVENOUS
Status: DISCONTINUED | OUTPATIENT
Start: 2023-06-05 | End: 2023-06-05 | Stop reason: HOSPADM

## 2023-06-05 RX ORDER — FAMOTIDINE 20 MG/1
20 TABLET, FILM COATED ORAL 2 TIMES DAILY
Status: DISCONTINUED | OUTPATIENT
Start: 2023-06-05 | End: 2023-06-06 | Stop reason: HOSPADM

## 2023-06-05 RX ORDER — SODIUM CHLORIDE 9 MG/ML
INJECTION, SOLUTION INTRAVENOUS
Status: COMPLETED | OUTPATIENT
Start: 2023-06-05 | End: 2023-06-05

## 2023-06-05 RX ORDER — MUPIROCIN 20 MG/G
1 OINTMENT TOPICAL
Status: COMPLETED | OUTPATIENT
Start: 2023-06-05 | End: 2023-06-05

## 2023-06-05 RX ORDER — DEXAMETHASONE SODIUM PHOSPHATE 4 MG/ML
INJECTION, SOLUTION INTRA-ARTICULAR; INTRALESIONAL; INTRAMUSCULAR; INTRAVENOUS; SOFT TISSUE
Status: DISCONTINUED | OUTPATIENT
Start: 2023-06-05 | End: 2023-06-05

## 2023-06-05 RX ORDER — AMOXICILLIN 250 MG
1 CAPSULE ORAL 2 TIMES DAILY
Status: DISCONTINUED | OUTPATIENT
Start: 2023-06-05 | End: 2023-06-06 | Stop reason: HOSPADM

## 2023-06-05 RX ORDER — SODIUM CHLORIDE 9 MG/ML
INJECTION, SOLUTION INTRAVENOUS CONTINUOUS
Status: DISCONTINUED | OUTPATIENT
Start: 2023-06-05 | End: 2023-06-06 | Stop reason: HOSPADM

## 2023-06-05 RX ORDER — MIDAZOLAM HYDROCHLORIDE 1 MG/ML
4 INJECTION INTRAMUSCULAR; INTRAVENOUS
Status: DISCONTINUED | OUTPATIENT
Start: 2023-06-05 | End: 2023-06-05 | Stop reason: HOSPADM

## 2023-06-05 RX ORDER — SODIUM CHLORIDE 0.9 % (FLUSH) 0.9 %
10 SYRINGE (ML) INJECTION
Status: DISCONTINUED | OUTPATIENT
Start: 2023-06-05 | End: 2023-06-05 | Stop reason: HOSPADM

## 2023-06-05 RX ORDER — NALOXONE HCL 0.4 MG/ML
0.02 VIAL (ML) INJECTION
Status: DISCONTINUED | OUTPATIENT
Start: 2023-06-05 | End: 2023-06-06 | Stop reason: HOSPADM

## 2023-06-05 RX ORDER — OXYCODONE HYDROCHLORIDE 10 MG/1
10 TABLET ORAL
Status: DISCONTINUED | OUTPATIENT
Start: 2023-06-05 | End: 2023-06-06 | Stop reason: HOSPADM

## 2023-06-05 RX ORDER — TALC
6 POWDER (GRAM) TOPICAL NIGHTLY PRN
Status: DISCONTINUED | OUTPATIENT
Start: 2023-06-05 | End: 2023-06-05 | Stop reason: HOSPADM

## 2023-06-05 RX ORDER — FENTANYL CITRATE 50 UG/ML
25 INJECTION, SOLUTION INTRAMUSCULAR; INTRAVENOUS EVERY 5 MIN PRN
Status: DISCONTINUED | OUTPATIENT
Start: 2023-06-05 | End: 2023-06-05 | Stop reason: HOSPADM

## 2023-06-05 RX ORDER — PHENYLEPHRINE HYDROCHLORIDE 10 MG/ML
INJECTION INTRAVENOUS
Status: DISCONTINUED | OUTPATIENT
Start: 2023-06-05 | End: 2023-06-05

## 2023-06-05 RX ORDER — MUPIROCIN 20 MG/G
1 OINTMENT TOPICAL 2 TIMES DAILY
Status: DISCONTINUED | OUTPATIENT
Start: 2023-06-05 | End: 2023-06-06 | Stop reason: HOSPADM

## 2023-06-05 RX ORDER — KETAMINE HCL IN 0.9 % NACL 50 MG/5 ML
SYRINGE (ML) INTRAVENOUS
Status: DISCONTINUED | OUTPATIENT
Start: 2023-06-05 | End: 2023-06-05

## 2023-06-05 RX ORDER — NICARDIPINE HYDROCHLORIDE 2.5 MG/ML
INJECTION INTRAVENOUS
Status: DISCONTINUED | OUTPATIENT
Start: 2023-06-05 | End: 2023-06-05

## 2023-06-05 RX ORDER — ACETAMINOPHEN 500 MG
1000 TABLET ORAL
Status: COMPLETED | OUTPATIENT
Start: 2023-06-05 | End: 2023-06-05

## 2023-06-05 RX ORDER — FENTANYL CITRATE 50 UG/ML
100 INJECTION, SOLUTION INTRAMUSCULAR; INTRAVENOUS
Status: DISCONTINUED | OUTPATIENT
Start: 2023-06-05 | End: 2023-06-05 | Stop reason: HOSPADM

## 2023-06-05 RX ORDER — LIDOCAINE HYDROCHLORIDE 20 MG/ML
INJECTION INTRAVENOUS
Status: DISCONTINUED | OUTPATIENT
Start: 2023-06-05 | End: 2023-06-05

## 2023-06-05 RX ORDER — PREGABALIN 75 MG/1
75 CAPSULE ORAL NIGHTLY
Status: DISCONTINUED | OUTPATIENT
Start: 2023-06-05 | End: 2023-06-06 | Stop reason: HOSPADM

## 2023-06-05 RX ORDER — FAMOTIDINE 10 MG/ML
INJECTION INTRAVENOUS
Status: DISCONTINUED | OUTPATIENT
Start: 2023-06-05 | End: 2023-06-05

## 2023-06-05 RX ORDER — PREGABALIN 75 MG/1
75 CAPSULE ORAL
Status: COMPLETED | OUTPATIENT
Start: 2023-06-05 | End: 2023-06-05

## 2023-06-05 RX ORDER — ONDANSETRON 2 MG/ML
4 INJECTION INTRAMUSCULAR; INTRAVENOUS EVERY 8 HOURS PRN
Status: DISCONTINUED | OUTPATIENT
Start: 2023-06-05 | End: 2023-06-06 | Stop reason: HOSPADM

## 2023-06-05 RX ORDER — ONDANSETRON 2 MG/ML
INJECTION INTRAMUSCULAR; INTRAVENOUS
Status: DISCONTINUED | OUTPATIENT
Start: 2023-06-05 | End: 2023-06-05

## 2023-06-05 RX ORDER — PROPOFOL 10 MG/ML
VIAL (ML) INTRAVENOUS CONTINUOUS PRN
Status: DISCONTINUED | OUTPATIENT
Start: 2023-06-05 | End: 2023-06-05

## 2023-06-05 RX ORDER — ASPIRIN 81 MG/1
81 TABLET ORAL 2 TIMES DAILY
Status: DISCONTINUED | OUTPATIENT
Start: 2023-06-05 | End: 2023-06-06 | Stop reason: HOSPADM

## 2023-06-05 RX ORDER — CELECOXIB 200 MG/1
400 CAPSULE ORAL ONCE
Status: COMPLETED | OUTPATIENT
Start: 2023-06-05 | End: 2023-06-05

## 2023-06-05 RX ORDER — METHOCARBAMOL 750 MG/1
750 TABLET, FILM COATED ORAL 3 TIMES DAILY
Status: DISCONTINUED | OUTPATIENT
Start: 2023-06-05 | End: 2023-06-06 | Stop reason: HOSPADM

## 2023-06-05 RX ORDER — TRANEXAMIC ACID 100 MG/ML
INJECTION, SOLUTION INTRAVENOUS
Status: DISCONTINUED | OUTPATIENT
Start: 2023-06-05 | End: 2023-06-05 | Stop reason: HOSPADM

## 2023-06-05 RX ORDER — FENTANYL CITRATE 50 UG/ML
INJECTION, SOLUTION INTRAMUSCULAR; INTRAVENOUS
Status: DISCONTINUED | OUTPATIENT
Start: 2023-06-05 | End: 2023-06-05

## 2023-06-05 RX ORDER — BISACODYL 10 MG
10 SUPPOSITORY, RECTAL RECTAL EVERY 12 HOURS PRN
Status: DISCONTINUED | OUTPATIENT
Start: 2023-06-05 | End: 2023-06-06 | Stop reason: HOSPADM

## 2023-06-05 RX ORDER — MIDAZOLAM HYDROCHLORIDE 1 MG/ML
INJECTION, SOLUTION INTRAMUSCULAR; INTRAVENOUS
Status: DISCONTINUED | OUTPATIENT
Start: 2023-06-05 | End: 2023-06-05

## 2023-06-05 RX ORDER — LIDOCAINE HYDROCHLORIDE 10 MG/ML
1 INJECTION, SOLUTION EPIDURAL; INFILTRATION; INTRACAUDAL; PERINEURAL
Status: DISCONTINUED | OUTPATIENT
Start: 2023-06-05 | End: 2023-06-05 | Stop reason: HOSPADM

## 2023-06-05 RX ORDER — PROPOFOL 10 MG/ML
VIAL (ML) INTRAVENOUS
Status: DISCONTINUED | OUTPATIENT
Start: 2023-06-05 | End: 2023-06-05

## 2023-06-05 RX ORDER — ASPIRIN 81 MG/1
81 TABLET ORAL 2 TIMES DAILY
Status: DISCONTINUED | OUTPATIENT
Start: 2023-06-05 | End: 2023-06-05

## 2023-06-05 RX ADMIN — SODIUM CHLORIDE: 9 INJECTION, SOLUTION INTRAVENOUS at 09:06

## 2023-06-05 RX ADMIN — FAMOTIDINE 20 MG: 20 TABLET ORAL at 09:06

## 2023-06-05 RX ADMIN — SODIUM CHLORIDE, SODIUM GLUCONATE, SODIUM ACETATE, POTASSIUM CHLORIDE, MAGNESIUM CHLORIDE, SODIUM PHOSPHATE, DIBASIC, AND POTASSIUM PHOSPHATE: .53; .5; .37; .037; .03; .012; .00082 INJECTION, SOLUTION INTRAVENOUS at 12:06

## 2023-06-05 RX ADMIN — OXYCODONE HYDROCHLORIDE 5 MG: 5 TABLET ORAL at 09:06

## 2023-06-05 RX ADMIN — PROPOFOL 20 MG: 10 INJECTION, EMULSION INTRAVENOUS at 01:06

## 2023-06-05 RX ADMIN — FAMOTIDINE 20 MG: 10 INJECTION, SOLUTION INTRAVENOUS at 12:06

## 2023-06-05 RX ADMIN — MIDAZOLAM HYDROCHLORIDE 1 MG: 1 INJECTION, SOLUTION INTRAMUSCULAR; INTRAVENOUS at 11:06

## 2023-06-05 RX ADMIN — FENTANYL CITRATE 50 MCG: 50 INJECTION, SOLUTION INTRAMUSCULAR; INTRAVENOUS at 12:06

## 2023-06-05 RX ADMIN — SODIUM CHLORIDE: 9 INJECTION, SOLUTION INTRAVENOUS at 02:06

## 2023-06-05 RX ADMIN — CELECOXIB 400 MG: 200 CAPSULE ORAL at 09:06

## 2023-06-05 RX ADMIN — METHOCARBAMOL 1000 MG: 500 TABLET ORAL at 02:06

## 2023-06-05 RX ADMIN — CEFAZOLIN 2 G: 2 INJECTION, POWDER, FOR SOLUTION INTRAMUSCULAR; INTRAVENOUS at 12:06

## 2023-06-05 RX ADMIN — SODIUM CHLORIDE: 0.9 INJECTION, SOLUTION INTRAVENOUS at 09:06

## 2023-06-05 RX ADMIN — Medication 10 MG: at 01:06

## 2023-06-05 RX ADMIN — TRANEXAMIC ACID 1000 MG: 100 INJECTION, SOLUTION INTRAVENOUS at 12:06

## 2023-06-05 RX ADMIN — MUPIROCIN 1 G: 20 OINTMENT TOPICAL at 09:06

## 2023-06-05 RX ADMIN — LIDOCAINE HYDROCHLORIDE 75 MG: 20 INJECTION INTRAVENOUS at 12:06

## 2023-06-05 RX ADMIN — ONDANSETRON 4 MG: 2 INJECTION, SOLUTION INTRAMUSCULAR; INTRAVENOUS at 01:06

## 2023-06-05 RX ADMIN — NICARDIPINE HYDROCHLORIDE 0.2 MG: 25 INJECTION INTRAVENOUS at 12:06

## 2023-06-05 RX ADMIN — MIDAZOLAM HYDROCHLORIDE 1 MG: 1 INJECTION, SOLUTION INTRAMUSCULAR; INTRAVENOUS at 12:06

## 2023-06-05 RX ADMIN — DEXAMETHASONE SODIUM PHOSPHATE 8 MG: 4 INJECTION, SOLUTION INTRAMUSCULAR; INTRAVENOUS at 12:06

## 2023-06-05 RX ADMIN — METHOCARBAMOL 750 MG: 750 TABLET ORAL at 09:06

## 2023-06-05 RX ADMIN — SODIUM CHLORIDE: 0.9 INJECTION, SOLUTION INTRAVENOUS at 10:06

## 2023-06-05 RX ADMIN — OXYCODONE HYDROCHLORIDE 5 MG: 5 TABLET ORAL at 02:06

## 2023-06-05 RX ADMIN — ACETAMINOPHEN 1000 MG: 500 TABLET ORAL at 09:06

## 2023-06-05 RX ADMIN — SODIUM CHLORIDE, SODIUM GLUCONATE, SODIUM ACETATE, POTASSIUM CHLORIDE, MAGNESIUM CHLORIDE, SODIUM PHOSPHATE, DIBASIC, AND POTASSIUM PHOSPHATE: .53; .5; .37; .037; .03; .012; .00082 INJECTION, SOLUTION INTRAVENOUS at 01:06

## 2023-06-05 RX ADMIN — SENNOSIDES AND DOCUSATE SODIUM 1 TABLET: 50; 8.6 TABLET ORAL at 09:06

## 2023-06-05 RX ADMIN — PROPOFOL 50 MCG/KG/MIN: 10 INJECTION, EMULSION INTRAVENOUS at 12:06

## 2023-06-05 RX ADMIN — ACETAMINOPHEN 1000 MG: 500 TABLET ORAL at 05:06

## 2023-06-05 RX ADMIN — TRANEXAMIC ACID 1000 MG: 100 INJECTION, SOLUTION INTRAVENOUS at 01:06

## 2023-06-05 RX ADMIN — VANCOMYCIN HYDROCHLORIDE 1000 MG: 1 INJECTION, POWDER, LYOPHILIZED, FOR SOLUTION INTRAVENOUS at 10:06

## 2023-06-05 RX ADMIN — OXYCODONE HYDROCHLORIDE 10 MG: 10 TABLET ORAL at 05:06

## 2023-06-05 RX ADMIN — PREGABALIN 75 MG: 75 CAPSULE ORAL at 09:06

## 2023-06-05 RX ADMIN — Medication 10 MG: at 12:06

## 2023-06-05 RX ADMIN — MEPIVACAINE HYDROCHLORIDE 2.8 ML: 20 INJECTION, SOLUTION EPIDURAL; INFILTRATION at 12:06

## 2023-06-05 RX ADMIN — PHENYLEPHRINE HYDROCHLORIDE 50 MCG: 10 INJECTION INTRAVENOUS at 01:06

## 2023-06-05 RX ADMIN — ASPIRIN 81 MG: 81 TABLET, COATED ORAL at 09:06

## 2023-06-05 RX ADMIN — PROPOFOL 20 MG: 10 INJECTION, EMULSION INTRAVENOUS at 02:06

## 2023-06-05 RX ADMIN — CEFAZOLIN 2 G: 2 INJECTION, POWDER, FOR SOLUTION INTRAMUSCULAR; INTRAVENOUS at 09:06

## 2023-06-05 NOTE — TRANSFER OF CARE
"Anesthesia Transfer of Care Note    Patient: Raien Medley    Procedure(s) Performed: Procedure(s) (LRB):  ARTHROPLASTY, KNEE, TOTAL: RIGHT: DEPUY - ATTUNE (Right)    Patient location: PACU    Anesthesia Type: general and spinal    Transport from OR: Transported from OR on room air with adequate spontaneous ventilation    Post pain: adequate analgesia    Post assessment: no apparent anesthetic complications and tolerated procedure well    Post vital signs: stable    Level of consciousness: awake and sedated    Nausea/Vomiting: no nausea/vomiting    Complications: none    Transfer of care protocol was followed      Last vitals:   Visit Vitals  BP (!) 113/54   Pulse 71   Temp 36.7 °C (98 °F) (Oral)   Resp 16   Ht 5' 1" (1.549 m)   Wt 61.2 kg (135 lb)   SpO2 97%   Breastfeeding No   BMI 25.51 kg/m²     "

## 2023-06-05 NOTE — TELEPHONE ENCOUNTER
DOCUMENTATION ONLY  oxyCODONE HCl 10mg tablets  Approval date: 6/2/2023 to 12/31/2023   Case ID# [id number]

## 2023-06-05 NOTE — NURSING TRANSFER
Nursing Transfer Note      6/5/2023     Reason patient is being transferred: pt neurovascularly intact    Transfer To: room 301    Transfer via bed    Transfer with IVF    Transported by RN x 2    Telemetry:    Medicines sent: mupirocin ointment    Any special needs or follow-up needed: post op pain control    Chart send with patient: Yes    Notified: daughter at bedside    Patient reassessed at:  6/5/23 at 1530    Upon arrival to floor: patient oriented to room, call bell in reach, and bed in lowest position

## 2023-06-05 NOTE — NURSING
Pt arrived to unit via hospital bed from PACU.  Pt aaox4, vss, no s/s of distress noted.  Dressing to right knee remains cdi.  Accutherm in place.  IVF infusing.  Pt instructed to call for assistance when getting up and she verbalized understanding.  Son and daughter at bedside.  Call light within reach.

## 2023-06-05 NOTE — PLAN OF CARE
Pre op complete with no distress noted.  Daughter at bedside.  Daughter will take belongings.  Patient stated she paid for walker.  Daughter will visit pharmacy to  medications in [facility].

## 2023-06-05 NOTE — ANESTHESIA PREPROCEDURE EVALUATION
06/05/2023  Pre-operative evaluation for Procedure(s) (LRB):  ARTHROPLASTY, KNEE, TOTAL: RIGHT: DEPUY - ATTUNE (Right)    Raine Medley is a 68 y.o. female     Patient Active Problem List   Diagnosis    UTI (lower urinary tract infection)    Flank pain    Cervical pain    Palpitations    Atypical pneumonia    Encounter for screening colonoscopy    Decreased strength of lower extremity    Impaired range of motion of knee    Primary osteoarthritis of right knee    Snoring    Epigastric abdominal tenderness    Microcytic anemia       Review of patient's allergies indicates:  No Known Allergies    No current facility-administered medications on file prior to encounter.     Current Outpatient Medications on File Prior to Encounter   Medication Sig Dispense Refill    diclofenac sodium (VOLTAREN) 1 % Gel Apply 2 g topically 3 (three) times daily as needed (pain). 100 g 2    vitamin D (VITAMIN D3) 1000 units Tab Take 1,000 Units by mouth once daily.         Past Surgical History:   Procedure Laterality Date    CHOLECYSTECTOMY      COLONOSCOPY N/A 1/3/2019    Procedure: COLONOSCOPY;  Surgeon: Gaston Beebe MD;  Location: [address];  Service: Endoscopy;  Laterality: N/A;       Social History     Socioeconomic History    Marital status:    Occupational History     Employer: [employer]   Tobacco Use    Smoking status: Never    Smokeless tobacco: Never   Substance and Sexual Activity    Alcohol use: No    Drug use: No   Social History Narrative    She lives in [place] with her 15-year-old son in [place]. 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 She is from [facility].        EKG:  None on file    2D Echo:  Results for orders placed or performed during the hospital encounter of 10/17/13   2D Echo w/ Color Flow  Doppler   Result Value Ref Range    EF + QEF 65     Diastolic Dysfunction No            Pre-op Assessment    I have reviewed the Patient Summary Reports.     I have reviewed the Nursing Notes. I have reviewed the NPO Status.   I have reviewed the Medications.     Review of Systems  Anesthesia Hx:  Denies Family Hx of Anesthesia complications.   Denies Personal Hx of Anesthesia complications.   Cardiovascular:   Exercise tolerance: good  Denies Angina.  Denies CHF.  Denies GONZALEZ.    Pulmonary:   Asthma    Renal/:   Denies Chronic Renal Disease.     Hepatic/GI:   Denies GERD.    Musculoskeletal:   Arthritis     Neurological:   Denies CVA. Denies Seizures.    Endocrine:   Denies Diabetes.        Physical Exam  General: Well nourished, Cooperative, Alert and Oriented    Airway:  Mallampati: II / I  Mouth Opening: Normal  TM Distance: Normal  Tongue: Normal  Neck ROM: Normal ROM    Dental:  Intact    Chest/Lungs:  Clear to auscultation, Normal Respiratory Rate    Heart:  Rate: Normal  Rhythm: Regular Rhythm        Anesthesia Plan  Type of Anesthesia, risks & benefits discussed:    Anesthesia Type: Gen Natural Airway, Spinal, CSE  Intra-op Monitoring Plan: Standard ASA Monitors  Post Op Pain Control Plan: multimodal analgesia  Induction:  IV  Informed Consent: Informed consent signed with the Patient and all parties understand the risks and agree with anesthesia plan.  All questions answered. Patient consented to blood products? Yes  ASA Score: 2  Day of Surgery Review of History & Physical: H&P Update referred to the surgeon/provider.    Ready For Surgery From Anesthesia Perspective.     .

## 2023-06-05 NOTE — PT/OT/SLP EVAL
Physical Therapy Evaluation and Treatment    Patient Name: Raine Medley   MRN: [id number]  Recent Surgery: Procedure(s) (LRB):  ARTHROPLASTY, KNEE, TOTAL: RIGHT: DEPUY - ATTUNE (Right) Day of Surgery    Recommendations:     Discharge Recommendations: outpatient PT   Discharge Equipment Recommendations: walker, rolling   Barriers to discharge: None    Assessment:     Raine Medley is a 68 y.o. female admitted with a medical diagnosis of Primary osteoarthritis of right knee. She presents with the following impairments/functional limitations: weakness, impaired functional mobility, gait instability, impaired balance, decreased lower extremity function, pain, decreased ROM, orthopedic precautions. Patient tolerated PT session fairly well, but was limited due to dizziness with /57. Patient ambulated 6 steps x3 trials with RW and contact guard assistance. No LOB or SOB noted. Patient has an OPPT appointment on 6/7/2023.      Rehab Prognosis: Good; patient would benefit from acute PT services to address these deficits and reach maximum level of function.    Plan:     During this hospitalization, patient to be seen daily to address the above listed problems via gait training, therapeutic activities, therapeutic exercises    Plan of Care Expires: 06/09/23    Subjective     Chief Complaint: Right knee posterior pain. Dizziness.   Patient Comments/Goals: To walk without pain.   Pain/Comfort:  Pain Rating 1: 7/10  Location - Side 1: Right  Location - Orientation 1: posterior  Location 1: knee  Pain Addressed 1: Pre-medicate for activity, Reposition, Distraction    Social History:  Living Environment: At discharge, patient is going to her daughter's house which is a [facility] with no steps to enter.   Prior Level of Function: Prior to admission, patient was independent  Equipment at Home: grab bar by toilet, bath bench  Assistance Upon Discharge:  daughter    Objective:     Communicated with RN prior to session.  Patient found supine with cryotherapy, FCD, peripheral IV upon PT entry to room. Son and daughter present in room.     General Precautions: Standard, fall   Orthopedic Precautions: RLE weight bearing as tolerated   Braces: N/A    Respiratory Status: Room air    Exams:  RLE ROM: WFL except limited at knee due to pain  RLE Strength:  appears WFL but did not formally assess due to pain and recent surgery  LLE ROM: WFL  LLE Strength: WFL  Cognitive: Patient is oriented to Person, Place, Time, Situation      Functional Mobility:  Gait belt applied - Yes  Bed Mobility  Supine to Sit: contact guard assistance x2 trials  Sit to Supine: contact guard assistance   Patient dizzy once seated back on edge of bed after bedside commode transfer. Patient assisted back into supine position and /57.  Transfers  Sit to Stand: contact guard assistance with rolling walker x2 from bed and x1 from bedside commode   Toilet Transfer (beside commode next to bed due to urgency): contact guard assistance with rolling walker.   Gait  Patient ambulated 6 steps x3 trials with rolling walker and contact guard assistance. Patient required cues for sequencing and weight bearing status to increase independence and safety.     Therapeutic Activities and Exercises:  Patient educated in:  -PT role and POC  -safety with transfers including hand placement  -gait sequencing and RW management  -OOB activity to maximize recovery including ambulating with nursing staff assistance and RW while in the hospital       AM-PAC 6 CLICK MOBILITY  Total Score:17    Patient left up in chair with all lines intact, call button in reach, RN notified, and son and daughter present.    GOALS:   Multidisciplinary Problems       Physical Therapy Goals          Problem: Physical Therapy    Goal Priority Disciplines Outcome Goal Variances Interventions   Physical Therapy Goal     PT, PT/OT Ongoing, Progressing     Description: Goals to be met by: 6/9/2023    Patient will  increase functional independence with mobility by performin. Supine to sit with supervision  2. Sit to stand transfer with Supervision  3. Gait x200 feet with Supervision using Rolling Walker  4. Ascend/Descend 1 curb step with Stand by assistance using Rolling Walker  5. Lower extremity exercise program x30 reps per handout, with supervision                            History:     Past Medical History:   Diagnosis Date    Asthma     as a child    Hyperlipidemia     Microcytic anemia 5/15/2023       Past Surgical History:   Procedure Laterality Date    CHOLECYSTECTOMY      COLONOSCOPY N/A 1/3/2019    Procedure: COLONOSCOPY;  Surgeon: Gaston Beebe MD;  Location: [address]);  Service: Endoscopy;  Laterality: N/A;       Time Tracking:     PT Received On: 23  PT Start Time: 1601  PT Stop Time: 1630  PT Total Time (min): 29 min     Billable Minutes: Evaluation 10 and Therapeutic Activity 19    2023

## 2023-06-05 NOTE — ANESTHESIA PROCEDURE NOTES
Spinal    Diagnosis: osteoarthritis  Patient location during procedure: OR  Start time: 6/5/2023 12:10 PM  Timeout: 6/5/2023 12:10 PM  End time: 6/5/2023 12:15 PM    Staffing  Authorizing Provider: Nabil Fernández MD  Performing Provider: Nabil Fernández MD    Preanesthetic Checklist  Completed: patient identified, IV checked, site marked, risks and benefits discussed, surgical consent, monitors and equipment checked, pre-op evaluation and timeout performed  Spinal Block  Patient position: sitting  Prep: ChloraPrep  Patient monitoring: heart rate, continuous pulse ox and frequent blood pressure checks  Approach: midline  Location: L4-5  Injection technique: single shot  CSF Fluid: clear free-flowing CSF  Needle  Needle type: pencil-tip   Needle gauge: 25 G  Needle length: 3.5 in  Additional Documentation: incremental injection, negative aspiration for heme and no paresthesia on injection  Needle localization: anatomical landmarks  Assessment  Ease of block: easy  Patient's tolerance of the procedure: comfortable throughout block and no complaints  Medications:    Medications: mepivacaine (CARBOCAINE) injection 20 mg/mL (2%) - Intrathecal   2.8 mL - 6/5/2023 12:15:00 PM

## 2023-06-05 NOTE — PLAN OF CARE
Problem: Pain Acute  Goal: Acceptable Pain Control and Functional Ability  Intervention: Develop Pain Management Plan  Flowsheets (Taken 6/5/2023 1848)  Pain Management Interventions:   care clustered   cold applied   pain management plan reviewed with patient/caregiver     Problem: Pain Acute  Goal: Acceptable Pain Control and Functional Ability  Intervention: Prevent or Manage Pain  Flowsheets (Taken 6/5/2023 1848)  Sensory Stimulation Regulation:   care clustered   lighting decreased   quiet environment promoted  Medication Review/Management: medications reviewed     Problem: Pain Acute  Goal: Acceptable Pain Control and Functional Ability  Intervention: Optimize Psychosocial Wellbeing  Flowsheets (Taken 6/5/2023 1848)  Supportive Measures:   active listening utilized   positive reinforcement provided     Problem: Adjustment to Surgery (Knee Arthroplasty)  Goal: Optimal Coping  Intervention: Support Psychosocial Response to Surgery and Mobility Changes  Flowsheets (Taken 6/5/2023 1848)  Supportive Measures:   active listening utilized   positive reinforcement provided     Problem: Bleeding (Knee Arthroplasty)  Goal: Absence of Bleeding  Intervention: Monitor and Manage Bleeding  Flowsheets (Taken 6/5/2023 1848)  Bleeding Management: dressing monitored     Problem: Infection (Knee Arthroplasty)  Goal: Absence of Infection Signs and Symptoms  Intervention: Prevent or Manage Infection  Flowsheets (Taken 6/5/2023 1848)  Infection Management: aseptic technique maintained     Problem: Pain (Knee Arthroplasty)  Goal: Acceptable Pain Control  Intervention: Prevent or Manage Pain  Flowsheets (Taken 6/5/2023 1848)  Pain Management Interventions:   care clustered   cold applied   pain management plan reviewed with patient/caregiver     Problem: Fall Injury Risk  Goal: Absence of Fall and Fall-Related Injury  Intervention: Identify and Manage Contributors  Flowsheets (Taken 6/5/2023 1848)  Medication Review/Management:  medications reviewed     Problem: Fall Injury Risk  Goal: Absence of Fall and Fall-Related Injury  Intervention: Promote Injury-Free Environment  Flowsheets (Taken 6/5/2023 0815)  Safety Promotion/Fall Prevention:   assistive device/personal item within reach   Fall Risk reviewed with patient/family   nonskid shoes/socks when out of bed   in recliner, wheels locked   instructed to call staff for mobility

## 2023-06-05 NOTE — PLAN OF CARE
Patient tolerated PT session fairly well, but was limited due to dizziness with /57. Patient ambulated 6 steps x3 trials with RW and contact guard assistance. No LOB or SOB noted. Patient has an OPPT appointment on 2023.        Problem: Physical Therapy  Goal: Physical Therapy Goal  Description: Goals to be met by: 2023    Patient will increase functional independence with mobility by performin. Supine to sit with supervision  2. Sit to stand transfer with Supervision  3. Gait x200 feet with Supervision using Rolling Walker  4. Ascend/Descend 1 curb step with Stand by assistance using Rolling Walker  5. Lower extremity exercise program x30 reps per handout, with supervision       Outcome: Ongoing, Progressing

## 2023-06-05 NOTE — ASSESSMENT & PLAN NOTE
Raine Medley is a 68 y.o. female s/p R TKA 6/5 by Dr. Calvert. Doing well this morning.    Plan:  Pain control: multimodal  PT/OT: WBAT RLE  DVT PPx: ASA 81 mg BID, FCDs at all times when not ambulating  Abx: postop Ancef    Dispo: home today with outpatient PT

## 2023-06-05 NOTE — OP NOTE
Enrike Right TKA  OPERATIVE NOTE    Date of Operation:   6/5/2023    Providers Performing:   Surgeon(s):  Sanford Calvert III, MD  Assistant: Pedrito Yoo MD    Operative Procedure:   Right Total Knee Arthroplasty, CPT 23274    Preoperative Diagnosis:   Right Knee Osteoarthritis, ICD-10 M17.11    Postoperative Diagnosis:   SAME    Components Used:   Depuy  Femur: Attune PS Size 3N  Tibia: Attune fixed bearing Size 2  Patella: Attune Medialized Dome 32 mm  Tibial Insert: Attune fixed bearing PS inset size 5 mm  2 packs cement: Simplex P    Implant Name Type Inv. Item Serial No.  Lot No. LRB No. Used Action   CEMENT BONE SURG SMPLX P RADPQ - [id number]  CEMENT BONE SURG SMPLX P RADPQ  [employer]. [id number] Right 2 Implanted   PATELLA ATTUNE MED 32MM - [id number]  PATELLA ATTUNE MED 32MM  [employer] [id number] Right 1 Implanted   BASE ATTUNE TIB FIX BEAR RHODA 2 - [id number]  BASE ATTUNE TIB FIX BEAR RHODA 2  SYN[employer]ES [id number] Right 1 Implanted   ATTUNE FEMORAL POSTERIOR STABILIZED NARROW SIZE 3N RIGHT CEMENTED     [employer] [id number] Right 1 Implanted   ATTUNE TIBIAL INSERT FIXED BEARING POSTERIOR STABILIZED SIZE 3  5MM  AOX    [employer] [id number] Right 1 Implanted       Anesthesia:   Spinal    MALLIKA cocktail: ANDREW    Estimated Blood Loss:   350 cc    Specimens:   None    Complications:   None    Indications:   The patient has failed non-operative measures including medications, injections and activity modifications for their knee.  After an explanation of risks and benefits of knee arthroplasty surgery, the patient wishes to proceed with surgery.    Operative Notes:   The patient was greeted in the pre-op holding area.  The patients knee was marked with a surgical marker by the surgeon.  Spinal-Epidural anesthesia was administered by the anesthesia team.  The patient was placed in the supine position on the OR table with all bony prominences padded.  A tourniquet was not used.  IV antibiotics were  administered.  The leg was prepped and draped in the usual sterile fashion.  A timeout was performed and the correct patient, site and procedure were identified.    A midline incision was made with a standard medical parapatellar arthrotomy.  The standard medial capsular release was performed along with the lateral gutter.  The patella was mobilized from the lateral parapatellar ligament and bony osteophytes were removed.  The intercondylar notch was cleared of the ACL/PCL.    Femur cut first, then tibia:   The extramedullary tibial alignment guide was placed in the appropriate slope and varus/valgus orientation and the proximal cutting block secured by pins.  Measured resection with a 6mm cut was accounted for from the high side of the plateau, perpendicular to the ankle.  The cut was made with an oscillating saw.  We then obtained access to the femoral canal with the stepped drill.  The intramedullary braulio was placed in 4 degrees of valgus with a 10mm planned resection.  Our distal femoral cuts were made.  The knee was placed in extension and the medical and lateral meniscal remnants removed.  A spacer block was placed with the knee in extension checking for alignment and balance which we were happy with.    The knee was then brought into flexion and the distal femoral gap assessed for appropriate rotation.  Our distal femoral sizing guide was placed and sized appropriately.  Guide pins were placed in the appropriate external rotation.  This was assessed with the 4 in 1 cutting block and the flexion gap sizing block which was appropriate.  The distal femoral preparation was completed after the anterior, posterior and chamfer cuts were made.  The box cutting guide was placed and assessed.  The box cut was made.    At this time the trial implants were placed and knee assessed for stability, and flexion arc. The patella was prepared using free-hand technique and the three-holed lug drill guide was sized and  appropriately assessed. Patella tracking was found to be acceptable. The tibial component rotation was marked. The trials were removed. The tibial component was positioned and the keel was drilled and punched.    The wound was copiously irrigated with pulsitile lavage and the bony surfaces dried.  Cement was mixed on the back table and applied to all components.  Cementation of the femoral, tibial and patellar components was performed sequentially with removal of all excess cement. A trial insert was placed to provide compression while the cement dried and a 0.35% betadine solution was left to soak in the surgical field.     After the cement was dry, the trial poly insert was removed. Hemostasis was obtained with bovie electrocautery.  The knee was again copiously irrigated with pulsatile lavage. The final polyethylene insert was placed and the knee reduced.      1 gram of vancomycin powder was placed in the knee. The arthrotomy was closed with interrupted #1 vicryl suture and #2 quill, the subcuticular layer was closed with 2-0 vicryl suture and a running 4-0 monocryl was used to closed the skin surface.  Surgicel sealant was placed over the top of the incision and sterile dressing placed over the wound. Appropriately sized TEDs hose were placed for edema control.     Sponge and needle counts were correct.    The first-assistant was critical to all steps of the operation, including retraction and leg stabilization during exposure and bone preparation, as well as the deep and superficial wound closure.  Dr. Calvert performed and/or supervised the key portions of this surgical procedure, including evaluation of the bone cuts, reshaping of the bony elements, and insertion of the provisional and final components.    Postoperative Plan:  The patient will be anticoagulated with 81mg aspirin twice daily for one month.   They will receive 24 hours of post-operative antibiotics.    Activity will be weight bearing as  tolerated.    Signed by: Sanford Calvert III, MD

## 2023-06-06 ENCOUNTER — PATIENT MESSAGE (OUTPATIENT)
Dept: ADMINISTRATIVE | Facility: OTHER | Age: 68
End: 2023-06-06
Payer: COMMERCIAL

## 2023-06-06 VITALS
BODY MASS INDEX: 25.49 KG/M2 | OXYGEN SATURATION: 96 % | TEMPERATURE: 98 F | DIASTOLIC BLOOD PRESSURE: 61 MMHG | RESPIRATION RATE: 18 BRPM | HEART RATE: 69 BPM | SYSTOLIC BLOOD PRESSURE: 140 MMHG | WEIGHT: 135 LBS | HEIGHT: 61 IN

## 2023-06-06 PROCEDURE — 94761 N-INVAS EAR/PLS OXIMETRY MLT: CPT

## 2023-06-06 PROCEDURE — 25000003 PHARM REV CODE 250: Performed by: PHYSICIAN ASSISTANT

## 2023-06-06 PROCEDURE — 97530 THERAPEUTIC ACTIVITIES: CPT | Mod: CQ,97

## 2023-06-06 PROCEDURE — 51798 US URINE CAPACITY MEASURE: CPT

## 2023-06-06 PROCEDURE — 99900035 HC TECH TIME PER 15 MIN (STAT)

## 2023-06-06 PROCEDURE — 97535 SELF CARE MNGMENT TRAINING: CPT | Mod: 97

## 2023-06-06 PROCEDURE — 97116 GAIT TRAINING THERAPY: CPT | Mod: CQ,97

## 2023-06-06 PROCEDURE — 25000003 PHARM REV CODE 250

## 2023-06-06 PROCEDURE — 97530 THERAPEUTIC ACTIVITIES: CPT

## 2023-06-06 PROCEDURE — 63600175 PHARM REV CODE 636 W HCPCS: Performed by: PHYSICIAN ASSISTANT

## 2023-06-06 PROCEDURE — 97110 THERAPEUTIC EXERCISES: CPT | Mod: CQ,97

## 2023-06-06 RX ORDER — CELECOXIB 200 MG/1
200 CAPSULE ORAL DAILY
Status: DISCONTINUED | OUTPATIENT
Start: 2023-06-06 | End: 2023-06-06 | Stop reason: HOSPADM

## 2023-06-06 RX ADMIN — ACETAMINOPHEN 1000 MG: 500 TABLET ORAL at 12:06

## 2023-06-06 RX ADMIN — POLYETHYLENE GLYCOL 3350 17 G: 17 POWDER, FOR SOLUTION ORAL at 08:06

## 2023-06-06 RX ADMIN — FAMOTIDINE 20 MG: 20 TABLET ORAL at 09:06

## 2023-06-06 RX ADMIN — SENNOSIDES AND DOCUSATE SODIUM 1 TABLET: 50; 8.6 TABLET ORAL at 08:06

## 2023-06-06 RX ADMIN — METHOCARBAMOL 750 MG: 750 TABLET ORAL at 08:06

## 2023-06-06 RX ADMIN — ASPIRIN 81 MG: 81 TABLET, COATED ORAL at 08:06

## 2023-06-06 RX ADMIN — ACETAMINOPHEN 1000 MG: 500 TABLET ORAL at 06:06

## 2023-06-06 RX ADMIN — CEFAZOLIN 2 G: 2 INJECTION, POWDER, FOR SOLUTION INTRAMUSCULAR; INTRAVENOUS at 04:06

## 2023-06-06 RX ADMIN — CELECOXIB 200 MG: 200 CAPSULE ORAL at 08:06

## 2023-06-06 RX ADMIN — MUPIROCIN 1 G: 20 OINTMENT TOPICAL at 08:06

## 2023-06-06 NOTE — DISCHARGE SUMMARY
[facility])  Orthopedics  Discharge Summary      Patient Name: Raine Medley  MRN: [id number]  Admission Date: 6/5/2023  Hospital Length of Stay: 0 days  Discharge Date and Time:  06/06/2023 7:54 AM  Attending Physician: Sanford Calvert III, MD   Discharging Provider: Charlie Hewitt MD  Primary Care Provider: Maribell Gotti MD    HPI: Raine Medley is a 67 y.o. female with history of Right knee pain. Pain is worse with activity and weight bearing.  Patient has experienced interference of activities of daily living due to decreased range of motion and an increase in joint pain and swelling.  Patient has failed non-operative treatment including NSAIDs, corticosteroid injections, viscosupplement injections, and activity modification.  Raine Medley currently ambulates independently    Procedure(s) (LRB):  ARTHROPLAST[facility]: [facility]      H[facility] Course: Patient presented for above procedure.  Tolerated it well and was discharged home POD 1 after voiding, tolerating diet, ambulating, pain controlled.  Discharge instructions, follow-up appointment, and med rec are below.     Consults (From admission, onward)          Status Ordering Provider     Inpatient consult to Social Work  Once        Provider:  (Not yet assigned)    Acknowledged MARI MORTON     Inpatient consult to Pain Management  Once        Provider:  (Not yet assigned)    Acknowledged MRAI MORTON     Inpatient consult to Respiratory Care  Once        Provider:  (Not yet assigned)    Acknowledged MARI MORTON            Significant Diagnostic Studies: No pertinent studies.    Pending Diagnostic Studies:       None          Final Active Diagnoses:    Diagnosis Date Noted POA    PRINCIPAL PROBLEM:  Primary osteoarthritis of right knee [M17.11] 05/11/2023 Yes      Problems Resolved During this Admission:      Discharged Condition: stable    Disposition: Home or Self  "Care    Follow Up:    Patient Instructions:      WALKER FOR HOME USE     Order Specific Question Answer Comments   Type of Walker: Adult (5'4"-6'6")    With wheels? Yes    Height: 5' 1" (1.549 m)    Weight: 61.2 kg (135 lb)    Length of need (1-99 months): 99    Does patient have medical equipment at home? grab bar    Does patient have medical equipment at home? bath bench    Please check all that apply: Patient's condition impairs ambulation.      COMMODE FOR HOME USE     Order Specific Question Answer Comments   Type: Standard    Height: 5' 1" (1.549 m)    Weight: 61.2 kg (135 lb)    Does patient have medical equipment at home? grab bar    Does patient have medical equipment at home? bath bench    Length of need (1-99 months): 99      Activity as tolerated     Sponge bath only until clinic visit     Keep surgical extremity elevated     Lifting restrictions   Order Comments: No strenuous exercise or lifting of > 10 lbs     Weight bearing as tolerated     No driving, operating heavy equipment or signing legal documents while taking pain medication     Leave dressing on - Keep it clean, dry, and intact until clinic visit   Order Comments: Do not remove surgical dressing for 2 weeks post-op. This will be done only by MD at initial post-op visit. If dressing is completely saturated, replace with identical dressing - silver-impregnated hydrocolloid dressing.     Do not get dressings wet. Do not shower.     If dressing continues to be saturated or there are signs of infection, please call [facility] [phone] for further instructions and to make appt to be seen.     Call MD for:  temperature >100.4     Call MD for:  persistent nausea and vomiting     Call MD for:  severe uncontrolled pain     Call MD for:  difficulty breathing, headache or visual disturbances     Call MD for:  redness, tenderness, or signs of infection (pain, swelling, redness, odor or green/yellow discharge around incision site)     Call MD for:  " hives     Call MD for:  persistent dizziness or light-headedness     Call MD for:  extreme fatigue     Medications:  Reconciled Home Medications:      Medication List        CONTINUE taking these medications      acetaminophen 650 MG Tbsr  Commonly known as: TYLENOL  Take 1 tablet (650 mg total) by mouth every 8 (eight) hours.     aspirin 81 MG EC tablet  Commonly known as: ECOTRIN  Pollard 1 tableta (81 mg en total) por vía oral 2 (dos) veces al día.  (Take 1 tablet (81 mg total) by mouth 2 (two) times daily.)     celecoxib 200 MG capsule  Commonly known as: CeleBREX  Pollard chuy cápsula (200 mg en total) por vía oral diariamente.  (Take 1 capsule (200 mg total) by mouth once daily.)     diclofenac sodium 1 % Gel  Commonly known as: VOLTAREN  Apply 2 g topically 3 (three) times daily as needed (pain).     docusate sodium 100 MG capsule  Commonly known as: COLACE  Pollard chuy cápsula (100 mg en total) por vía oral 2 veces al día.  (Take 1 capsule (100 mg total) by mouth 2 (two) times daily.)     fish oil-omega-3 fatty acids 300-1,000 mg capsule  Take by mouth once daily.     methocarbamoL 750 MG Tab  Commonly known as: ROBAXIN  Take 1 tablet (750 mg total) by mouth 3 (three) times daily as needed.     oxyCODONE 10 mg Tab immediate release tablet  Commonly known as: ROXICODONE  Take 0.5 - 1 tablet by mouth every 4-6 hours as needed for pain     pantoprazole 40 MG tablet  Commonly known as: PROTONIX  Pollard chuy tableta (40 mg en total) por vía oral diariamente.  (Take 1 tablet (40 mg total) by mouth once daily.)     vitamin D 1000 units Tab  Commonly known as: VITAMIN D3  Take 1,000 Units by mouth once daily.              Charlie Hewitt MD  Orthopedics  [facility]

## 2023-06-06 NOTE — PROGRESS NOTES
"[facility])  Orthopedics  Progress Note    Patient Name: Raine Medley  MRN: [id number]  Admission Date: 6/5/2023  Hospital Length of Stay: 0 days  Attending Provider: Sanford Calvert III, MD  Primary Care Provider: Maribell Gotti MD  Follow-up For: Procedure(s) (LRB):  ARTHROPLASTY, KNEE, TOTAL: RIGHT: DEPUY - ATTUNE (Right)    Post-Operative Day: 1 Day Post-Op  Subjective:     Principal Problem:Primary osteoarthritis of right knee    Principal Orthopedic Problem: Same    Interval History: Pt seen and examined at bedside. NAEON. Pain controlled. Ambulated 20ft with RW, per PT.  VSS, AF. No other concerns stated. Urinating.     Review of patient's allergies indicates:  No Known Allergies    Current Facility-Administered Medications   Medication    0.9%  NaCl infusion    acetaminophen tablet 1,000 mg    aspirin EC tablet 81 mg    bisacodyL suppository 10 mg    celecoxib capsule 200 mg    famotidine tablet 20 mg    methocarbamoL tablet 750 mg    morphine injection 2 mg    mupirocin 2 % ointment 1 g    naloxone 0.4 mg/mL injection 0.02 mg    ondansetron injection 4 mg    oxyCODONE immediate release tablet 5 mg    oxyCODONE immediate release tablet Tab 10 mg    polyethylene glycol packet 17 g    pregabalin capsule 75 mg    prochlorperazine injection Soln 5 mg    senna-docusate 8.6-50 mg per tablet 1 tablet     Objective:     Vital Signs (Most Recent):  Temp: 97.7 °F (36.5 °C) (06/06/23 0359)  Pulse: 61 (06/06/23 0359)  Resp: 20 (06/06/23 0359)  BP: 134/62 (06/06/23 0359)  SpO2: 98 % (06/06/23 0359) Vital Signs (24h Range):  Temp:  [97.1 °F (36.2 °C)-98 °F (36.7 °C)] 97.7 °F (36.5 °C)  Pulse:  [59-87] 61  Resp:  [15-24] 20  SpO2:  [95 %-100 %] 98 %  BP: (101-153)/(54-73) 134/62     Weight: 61.2 kg (135 lb)  Height: 5' 1" (154.9 cm)  Body mass index is 25.51 kg/m².      Intake/Output Summary (Last 24 hours) at 6/6/2023 0650  Last data filed at 6/6/2023 0354  Gross per 24 hour   Intake " 3497.5 ml   Output 1650 ml   Net 1847.5 ml        Ortho/SPM Exam    Vitals: Afebrile.  Vital signs stable.  General: No acute distress.  Cardio: Regular rate.  Chest: No increased work of breathing.    Right Lower Extremity Exam    - Dressing c/d/i  - Quad and hip flexor intact  - Compartments soft and compressible  - ROM full (eversion,inversion,plantar/dorsiflexion)  - TA/EHL/Gastroc/FHL assessed in isolation without deficit  - SILT throughout  - DP and PT palpated  2+  - Capillary Refill <3s      Significant Labs: All pertinent labs within the past 24 hours have been reviewed.    Significant Imaging: I have reviewed all pertinent imaging results/findings.    Assessment/Plan:     * Primary osteoarthritis of right knee  Raine Medley is a 68 y.o. female s/p R TKA 6/5 by Dr. Calvert. Doing well this morning.    Plan:  Pain control: multimodal  PT/OT: WBAT RLE  DVT PPx: ASA 81 mg BID, FCDs at all times when not ambulating  Abx: postop Ancef    Dispo: home today with outpatient PT            Charlie Hewitt MD  Orthopedics  [place] [facility] ([facility])

## 2023-06-06 NOTE — PLAN OF CARE
[facility])  Discharge Final Note    Primary Care Provider: Maribell Gotti MD    Expected Discharge Date: 6/6/2023    Final Discharge Note (most recent)       Final Note - 06/06/23 1415          Final Note    Assessment Type Final Discharge Note     Anticipated Discharge Disposition Home or Self Care     What phone number can be called within the next 1-3 days to see how you are doing after discharge? --   [phone]    Hospital Resources/Appts/Education Provided Appointments scheduled and added to AVS                     Important Message from Medicare           Future Appointments   Date Time Provider Department Center   6/7/2023  9:00 AM Verenice Oates, PT [facility] OP [facility] [place] 2 fl   6/7/2023  1:30 PM TELEMED NURSE, [facility] [address]   6/20/2023  1:40 PM Verenice [place]ill, NP [facility] [address]   7/18/2023  3:40 PM MD DEANDRA Alvarez III   8/29/2023  3:40 PM MD LOREN Alvarez III [facility] [place]     Patient discharged home to care of family on 6/6/23. Outpatient PT to begin on 7/7/23 @ 9AM.    Brenda Coronel RNCM  Case Management  [facility]  [phone]

## 2023-06-06 NOTE — PLAN OF CARE
Pt discharged from unit.  Pt aaox4, vss, no s/s of distress noted.  Dressing to right knee remains cdi.  IV removed from left hand w/ catheter intact.  Discharge instructions given to pt and she verbalized understanding.  Home meds and f/u appts reviewed as well.  [place] given to pt.  Meds and eqmt delivered to bedside prior to discharge.  Pt left unit via w/c and was accompanied to front of hospital to meet ride.  All personal belongings sent with pt.

## 2023-06-06 NOTE — PLAN OF CARE
Problem: Occupational Therapy  Goal: Occupational Therapy Goal  Description: Goals to be met by: 6/6/23     Patient will increase functional independence with ADLs by performing:    UE Dressing with Modified [place].  LE Dressing with Modified [place] and Assistive Devices as needed.  Grooming while standing at sink with Modified [place].  Toileting from bedside commode with Modified [place] for hygiene and clothing management.   Bathing from  shower chair/bench with Modified [place].  Toilet transfer to bedside commode with Modified [place].    Outcome: Met

## 2023-06-06 NOTE — PT/OT/SLP PROGRESS
Physical Therapy Treatment    Patient Name:  Raine Medley   MRN:  [id number]    Recommendations:     Discharge Recommendations: outpatient PT  Discharge Equipment Recommendations: bedside commode, walker, rolling  Barriers to discharge: None    Assessment:     Raine Medley is a 68 y.o. female admitted with a medical diagnosis of Primary osteoarthritis of right knee.  She presents with the following impairments/functional limitations: weakness, impaired functional mobility, gait instability, impaired balance, decreased lower extremity function, pain, decreased ROM, orthopedic precautions .pt  presents with  improved overall functional mobility requiring decreased assistance and increased activity tolerance to perform functional mobility.Pt would continue to benefit from skilled PT to address overall functional mobility, to return to functional baseline and goals. Goals remain appropriate.      Rehab Prognosis: Good; patient would benefit from acute skilled PT services to address these deficits and reach maximum level of function.    Recent Surgery: Procedure(s) (LRB):  ARTHROPLASTY, KNEE, TOTAL: RIGHT: DEPUY - ATTUNE (Right) 1 Day Post-Op    Plan:     During this hospitalization, patient to be seen daily to address the identified rehab impairments via gait training, therapeutic activities, therapeutic exercises and progress toward the following goals:    Plan of Care Expires:  06/09/23    Subjective     Chief Complaint: It hurts behind the knee    Pain/Comfort:  Pain Rating 1: 8/10  Location - Side 1: Right  Location - Orientation 1: posterior  Location 1: knee      Objective:     Communicated with RN prior to session.  Patient found HOB elevated with cryotherapy upon PT entry to room.     General Precautions: Standard, fall  Orthopedic Precautions: RLE weight bearing as tolerated  Braces: N/A  Respiratory Status: Room air     Functional Mobility:  Bed Mobility:     Scooting: supervision  Supine to  "Sit: stand by assistance  Sit to Supine: stand by assistance  Transfers:   vcs for hand placement  Sit to Stand:  stand by assistance with rolling walker  Gait: Pt ambulated with RW x 150 ft x 2 with SBA for safety with chair follow Patient required cues for sequencing and weight bearing status   Stairs:  Pt ascended/descended 6" curb step with Rolling Walker with no handrails with Contact Guard Assistance. Vcs for sequencing      AM-PAC 6 CLICK MOBILITY  Turning over in bed (including adjusting bedclothes, sheets and blankets)?: 3  Sitting down on and standing up from a chair with arms (e.g., wheelchair, bedside commode, etc.): 3  Moving from lying on back to sitting on the side of the bed?: 3  Moving to and from a bed to a chair (including a wheelchair)?: 3  Need to walk in hospital room?: 3  Climbing 3-5 steps with a railing?: 2  Basic Mobility Total Score: 17       Treatment & Education:  Therapist provided instruction and educated of  patient on progress, safety,d/c,PT POC,   proper body mechanics, energy conservation, and fall prevention strategies during tasks listed above, on the effects of prolonged immobility and the importance of performing OOB activity and exercises to promote healing and reduce recovery time        Patient  facilitated therex per TKR protocol X 10 reps . Patient required skilled PTA for instruction of exercises and appropriate cues to perform exercises safely, sequencing and appropriately.   Exercises performed to develop and maintain pt's strength, endurance and flexibility.  Updated white board with appropriate PT mobility information for medical team notification      Donned an extra gown      Pt encouraged to ambulate in hallways 3x/day with nursing or family assistance to improve endurance.      Pt safe to ambulate in hallway with RN or PCT assistance     Call nursing/pct to transfer to chair/use bathroom. Pt stated understanding     Bedside table in front of patient and area set " up for function, convenience, and safety. RN aware of patient's mobility needs and status. Questions/concerns addressed within PTA scope of practice; patient  with no further questions. Time was provided for active listening, discussion of health disposition, and discussion of safe discharge. Pt?verbalized?agreement .     Patient left up in chair with all lines intact, call button in reach, nsg notified, and family present..  GOALS:   Multidisciplinary Problems       Physical Therapy Goals          Problem: Physical Therapy    Goal Priority Disciplines Outcome Goal Variances Interventions   Physical Therapy Goal     PT, PT/OT Ongoing, Progressing     Description: Goals to be met by: 2023    Patient will increase functional independence with mobility by performin. Supine to sit with supervision  2. Sit to stand transfer with Supervision  3. Gait x200 feet with Supervision using Rolling Walker  4. Ascend/Descend 1 curb step with Stand by assistance using Rolling Walker  5. Lower extremity exercise program x30 reps per handout, with supervision                            Time Tracking:     PT Received On: 23  PT Start Time: 730     PT Stop Time: 823  PT Total Time (min): 53 min     Billable Minutes: Gait Training 30, Therapeutic Activity 8, and Therapeutic Exercise 15    Treatment Type: Treatment  PT/PTA: PTA     Number of PTA visits since last PT visit: 2023

## 2023-06-06 NOTE — PLAN OF CARE
Problem: Adult Inpatient Plan of Care  Goal: Absence of Hospital-Acquired Illness or Injury  Intervention: Identify and Manage Fall Risk  Flowsheets (Taken 6/6/2023 0305)  Safety Promotion/Fall Prevention:   assistive device/personal item within reach   Fall Risk reviewed with patient/family   side rails raised x 2   instructed to call staff for mobility   medications reviewed   nonskid shoes/socks when out of bed     Problem: Adult Inpatient Plan of Care  Goal: Absence of Hospital-Acquired Illness or Injury  Intervention: Prevent and Manage VTE (Venous Thromboembolism) Risk  Flowsheets (Taken 6/6/2023 0305)  Activity Management: Ambulated to bathroom - L4  VTE Prevention/Management:   intravenous hydration   dorsiflexion/plantar flexion performed   remove, assess skin, and reapply foot pump  Range of Motion: active ROM (range of motion) encouraged     Problem: Adult Inpatient Plan of Care  Goal: Absence of Hospital-Acquired Illness or Injury  Intervention: Prevent Infection  Flowsheets (Taken 6/6/2023 0305)  Infection Prevention:   hand hygiene promoted   single patient room provided   rest/sleep promoted

## 2023-06-06 NOTE — PLAN OF CARE
Problem: Occupational Therapy  Goal: Occupational Therapy Goal  Description: Goals to be met by: 6/6/23     Patient will increase functional independence with ADLs by performing:    UE Dressing with Modified [place].  LE Dressing with Modified [place] and Assistive Devices as needed.  Grooming while standing at sink with Modified [place].  Toileting from bedside commode with Modified [place] for hygiene and clothing management.   Bathing from  shower chair/bench with Modified [place].  Toilet transfer to bedside commode with Modified [place].    Outcome: Ongoing, Progressing

## 2023-06-06 NOTE — SUBJECTIVE & OBJECTIVE
"Principal Problem:Primary osteoarthritis of right knee    Principal Orthopedic Problem: Same    Interval History: Pt seen and examined at bedside. NAEON. Pain controlled. Ambulated 20ft with RW, per PT.  VSS, AF. No other concerns stated. Urinating.     Review of patient's allergies indicates:  No Known Allergies    Current Facility-Administered Medications   Medication    0.9%  NaCl infusion    acetaminophen tablet 1,000 mg    aspirin EC tablet 81 mg    bisacodyL suppository 10 mg    celecoxib capsule 200 mg    famotidine tablet 20 mg    methocarbamoL tablet 750 mg    morphine injection 2 mg    mupirocin 2 % ointment 1 g    naloxone 0.4 mg/mL injection 0.02 mg    ondansetron injection 4 mg    oxyCODONE immediate release tablet 5 mg    oxyCODONE immediate release tablet Tab 10 mg    polyethylene glycol packet 17 g    pregabalin capsule 75 mg    prochlorperazine injection Soln 5 mg    senna-docusate 8.6-50 mg per tablet 1 tablet     Objective:     Vital Signs (Most Recent):  Temp: 97.7 °F (36.5 °C) (06/06/23 0359)  Pulse: 61 (06/06/23 0359)  Resp: 20 (06/06/23 0359)  BP: 134/62 (06/06/23 0359)  SpO2: 98 % (06/06/23 0359) Vital Signs (24h Range):  Temp:  [97.1 °F (36.2 °C)-98 °F (36.7 °C)] 97.7 °F (36.5 °C)  Pulse:  [59-87] 61  Resp:  [15-24] 20  SpO2:  [95 %-100 %] 98 %  BP: (101-153)/(54-73) 134/62     Weight: 61.2 kg (135 lb)  Height: 5' 1" (154.9 cm)  Body mass index is 25.51 kg/m².      Intake/Output Summary (Last 24 hours) at 6/6/2023 0626  Last data filed at 6/6/2023 0354  Gross per 24 hour   Intake 3497.5 ml   Output 1650 ml   Net 1847.5 ml        Ortho/SPM Exam    Vitals: Afebrile.  Vital signs stable.  General: No acute distress.  Cardio: Regular rate.  Chest: No increased work of breathing.    Right Lower Extremity Exam    - Dressing c/d/i  - Quad and hip flexor intact  - Compartments soft and compressible  - ROM full (eversion,inversion,plantar/dorsiflexion)  - TA/EHL/Gastroc/FHL assessed in isolation " without deficit  - SILT throughout  - DP and PT palpated  2+  - Capillary Refill <3s      Significant Labs: All pertinent labs within the past 24 hours have been reviewed.    Significant Imaging: I have reviewed all pertinent imaging results/findings.

## 2023-06-06 NOTE — PROGRESS NOTES
Acute Pain Service and Perioperative Surgical Home Progress Note    Interval history  Raine Medley is a 68 y.o., female, status post right knee arthroplasty with no acute events overnight.  Pain is well controlled.  Tolerating p.o.     Surgery:  Procedure(s) (LRB):  ARTHROPLASTY, KNEE, TOTAL: RIGHT: DEPUY - ATTUNE (Right)    Post Op Day #: 1    Problem List:    Active Hospital Problems    Diagnosis  POA    *Primary osteoarthritis of right knee [M17.11]  Yes      Resolved Hospital Problems   No resolved problems to display.       Subjective:       General appearance of alert, oriented, no complaints   Pain with rest: 2    Numbers   Pain with movement: 3    Numbers   Side Effects    1. Pruritis No    2. Nausea No    3. Motor Blockade No, 0=Ability to raise lower extremities off bed    4. Sedation No, 1=awake and alert    Schedule Medications:    acetaminophen  1,000 mg Oral Q6H    aspirin  81 mg Oral BID    celecoxib  200 mg Oral Daily    famotidine  20 mg Oral BID    methocarbamoL  750 mg Oral TID    mupirocin  1 g Nasal BID    polyethylene glycol  17 g Oral Daily    pregabalin  75 mg Oral QHS    senna-docusate 8.6-50 mg  1 tablet Oral BID        Continuous Infusions:   sodium chloride 0.9% 150 mL/hr at 06/05/23 2113        PRN Medications:  bisacodyL, morphine, naloxone, ondansetron, oxyCODONE, oxyCODONE, prochlorperazine       Antibiotics:  Antibiotics (From admission, onward)      Start     Stop Route Frequency Ordered    06/05/23 2100  mupirocin 2 % ointment 1 g         06/10 2059 Nasl 2 times daily 06/05/23 1606               Objective:         Vital Signs (Most Recent):  Temp: 97.7 °F (36.5 °C) (06/06/23 0359)  Pulse: 61 (06/06/23 0359)  Resp: 20 (06/06/23 0359)  BP: 134/62 (06/06/23 0359)  SpO2: 98 % (06/06/23 0359) Vital Signs Range (Last 24H):  Temp:  [97.1 °F (36.2 °C)-98 °F (36.7 °C)]   Pulse:  [59-87]   Resp:  [15-24]   BP: (101-153)/(54-73)   SpO2:  [95 %-100 %]          I & O (Last  24H):  Intake/Output Summary (Last 24 hours) at 6/6/2023 0529  Last data filed at 6/6/2023 0354  Gross per 24 hour   Intake 3497.5 ml   Output 1650 ml   Net 1847.5 ml       Physical Exam:    GA: Alert, comfortable, no acute distress.   Pulmonary: Clear to auscultation . Normal respiratory ratei.  Cardiac: regular rate and rhythm.          Laboratory: reviewed in chart  CBC: No results for input(s): WBC, RBC, HGB, HCT, PLT, MCV, MCH, MCHC in the last 72 hours.    BMP: No results for input(s): NA, K, CO2, CL, BUN, CREATININE, GLU, MG, PHOS, CALCIUM in the last 72 hours.    No results for input(s): PT, INR, PROTIME, APTT in the last 72 hours.          Assessment:         Pain control adequate    Plan:  1) Pain: Multimodal pain regimen ordered which includes acetaminophen, celecoxib, pregabalin, and prn oxycodone.  Well controlled on current regimen.  Will continue to monitor.    2) bilateral foot numbness, chronic   3) anemia, chronic    4) FEN/GI: Tolerating regular diet.     5) Dispo: Pt working well with PT/OT. Case management and SW following along for setting up home health and physical therapy. Plan to discharge home this am.              Evaluator Riley Eugene PA-C

## 2023-06-06 NOTE — PT/OT/SLP EVAL
Occupational Therapy Evaluation and Treatment    Name: Raine Medley  MRN: [id number]  Admitting Diagnosis: Primary osteoarthritis of right knee Day of Surgery  Recent Surgery: Procedure(s) (LRB):  ARTHROPLASTY, KNEE, TOTAL: RIGHT: DEPUY - ATTUNE (Right) Day of Surgery    Recommendations:     Discharge Recommendations: home  Level of Assistance Recommended: 24 hours supervision  Discharge Equipment Recommendations: walker, rolling  Barriers to discharge: None    Assessment:     Raine Medley is a 68 y.o. female with a medical diagnosis of Primary osteoarthritis of right knee. She presents with performance deficits affecting function including impaired self care skills, impaired functional mobility, orthopedic precautions. Pt was able to perform sit/stand T/F c S and RW.  Able to walk to bathroom c S and RW.  Pt was able to perform grooming task and toilet hygiene c set-up.  Pt is progressing well.    Rehab Prognosis: Good; patient would benefit from acute OT services to address these deficits and reach maximum level of function.    Plan:     Patient to be seen daily to address the above listed problems via self-care/home management, therapeutic activities, therapeutic exercises  Plan of Care Expires: 06/06/23  Plan of Care Reviewed with: patient, daughter    Subjective     Chief Complaint:  R TKA  Patient Comments/Goals: I need to go to the bathroom  Pain/Comfort:  Pain Rating 1: 8/10    Patients cultural, spiritual, Yarsanism conflicts given the current situation: no    Social History:  Living Environment: Patient lives with their brother in a single story home with number of outside stair(s): 8 and walk-in shower  Prior Level of Function: Prior to admission, patient was independent.  Roles and Routines: Patient was driving and retired prior to admission.  Equipment Used at Home: grab bar, bath bench  DME owned (not currently used): shower chair  Assistance Upon Discharge:  Pt will be D/C home to her  daughter who lives in a one story house c 1 [address] and has a walk-in shower.    Objective:     Communicated with RN prior to session. Patient found up in chair with cryotherapy, FCD, peripheral IV upon OT entry to room.    General Precautions: Standard, fall   Orthopedic Precautions: RLE weight bearing as tolerated   Braces: N/A    Respiratory Status: Room air    Occupational Performance    Gait belt applied - Yes    Functional Mobility/Transfers:  Sit <> Stand Transfer with supervision with rolling walker  Toilet Transfer Stand Pivot technique with supervision with rolling walker  Functional Mobility: Pt was able to walk to bathroom c CGA and RW.    Activities of Daily Living:  Grooming: contact guard assistance to wash hands while standing at sink c RW.  Toileting: supervision for toilet hygiene.      Physical Exam:  Upper Extremity Range of Motion:     -       Right Upper Extremity: WFL  -       Left Upper Extremity: WFL  Upper Extremity Strength:    -       Right Upper Extremity: WFL  -       Left Upper Extremity: WFL    AMPAC 6 Click ADL:  AMPAC Total Score: 16    Treatment & Education:  Educated on the importance of mobility to maximize recovery  Educated on the importance of OOB mobility within safe range in order to decrease adverse effects of prolonged bedrest  Educated on safety with functional mobility; hand placement to ensure safe transfers to various surfaces in prep for ADLs  Educated on performing functional mobility and ADLs in adherence to orthopedic precautions  Educated on weight bearing status  Will continue to educate as needed      Patient clear to ambulate to/from bathroom with RN/PCT, assist x1 .    Patient left up in chair with all lines intact, call button in reach, and RN notified.    GOALS:   Multidisciplinary Problems       Occupational Therapy Goals          Problem: Occupational Therapy    Goal Priority Disciplines Outcome Interventions   Occupational Therapy Goal     OT, PT/OT Ongoing,  Progressing    Description: Goals to be met by: 6/6/23     Patient will increase functional independence with ADLs by performing:    UE Dressing with Modified [place].  LE Dressing with Modified [place] and Assistive Devices as needed.  Grooming while standing at sink with Modified [place].  Toileting from bedside commode with Modified [place] for hygiene and clothing management.   Bathing from  shower chair/bench with Modified [place].  Toilet transfer to bedside commode with Modified [place].                         History:     Past Medical History:   Diagnosis Date    Asthma     as a child    Hyperlipidemia     Microcytic anemia 5/15/2023         Past Surgical History:   Procedure Laterality Date    CHOLECYSTECTOMY      COLONOSCOPY N/A 1/3/2019    Procedure: COLONOSCOPY;  Surgeon: Gaston Beebe MD;  Location: [facility] ([address] [place] [address]);  Service: Endoscopy;  Laterality: N/A;       Time Tracking:     OT Date of Treatment: 06/05/23  OT Start Time: 1700  OT Stop Time: 1728  OT Total Time (min): 28 min    Billable Minutes: Evaluation 14 and Self Care/Home Management 14    6/5/2023

## 2023-06-06 NOTE — PLAN OF CARE
Problem: Infection (Pneumonia)  Goal: Resolution of Infection Signs and Symptoms  Intervention: Prevent Infection Progression  Flowsheets (Taken 6/6/2023 0715)  Infection Management: aseptic technique maintained     Problem: Pain Acute  Goal: Acceptable Pain Control and Functional Ability  Intervention: Develop Pain Management Plan  6/6/2023 0715 by STEPHANIE Day (Taken 6/6/2023 0715)  Pain Management Interventions:   care clustered   cold applied   pain management plan reviewed with patient/caregiver   quiet environment facilitated  6/5/2023 1848 by STEPHANIE Day (Taken 6/5/2023 1848)  Pain Management Interventions:   care clustered   cold applied   pain management plan reviewed with patient/caregiver     Problem: Pain Acute  Goal: Acceptable Pain Control and Functional Ability  Intervention: Prevent or Manage Pain  6/6/2023 0715 by STEPHANIE Day (Taken 6/6/2023 0715)  Sensory Stimulation Regulation:   care clustered   lighting decreased   quiet environment promoted  Medication Review/Management: medications reviewed  6/5/2023 1848 by STEPHANIE Day (Taken 6/5/2023 1848)  Sensory Stimulation Regulation:   care clustered   lighting decreased   quiet environment promoted  Medication Review/Management: medications reviewed     Problem: Pain Acute  Goal: Acceptable Pain Control and Functional Ability  Intervention: Optimize Psychosocial Wellbeing  6/6/2023 0715 by STEPHANIE Day (Taken 6/6/2023 0715)  Supportive Measures:   active listening utilized   positive reinforcement provided  6/5/2023 1848 by STEPHANIE Day (Taken 6/5/2023 1848)  Supportive Measures:   active listening utilized   positive reinforcement provided     Problem: Adjustment to Surgery (Knee Arthroplasty)  Goal: Optimal Coping  Intervention: Support Psychosocial Response to Surgery and Mobility Changes  6/6/2023 0715 by Nicolasa  Faucheux, RN  Flowsheets (Taken 6/6/2023 0715)  Supportive Measures:   active listening utilized   positive reinforcement provided  6/5/2023 1848 by Nicolasa Pompa RN  Flowsheets (Taken 6/5/2023 1848)  Supportive Measures:   active listening utilized   positive reinforcement provided     Problem: Bleeding (Knee Arthroplasty)  Goal: Absence of Bleeding  Intervention: Monitor and Manage Bleeding  6/6/2023 0715 by Nicolasa Pompa RN  Flowsheets (Taken 6/6/2023 0715)  Bleeding Management: dressing monitored  6/5/2023 1848 by Nicolasa Pompa RN  Flowsheets (Taken 6/5/2023 1848)  Bleeding Management: dressing monitored     Problem: Infection (Knee Arthroplasty)  Goal: Absence of Infection Signs and Symptoms  Intervention: Prevent or Manage Infection  6/6/2023 0715 by Nicolasa Pompa RN  Flowsheets (Taken 6/6/2023 0715)  Infection Management: aseptic technique maintained  6/5/2023 1848 by Nicolasa Pompa RN  Flowsheets (Taken 6/5/2023 1848)  Infection Management: aseptic technique maintained     Problem: Fall Injury Risk  Goal: Absence of Fall and Fall-Related Injury  Intervention: Identify and Manage Contributors  Flowsheets (Taken 6/5/2023 1848)  Medication Review/Management: medications reviewed     Problem: Fall Injury Risk  Goal: Absence of Fall and Fall-Related Injury  Intervention: Promote Injury-Free Environment  Flowsheets (Taken 6/5/2023 1848)  Safety Promotion/Fall Prevention:   assistive device/personal item within reach   Fall Risk reviewed with patient/family   nonskid shoes/socks when out of bed   in recliner, wheels locked   instructed to call staff for mobility

## 2023-06-06 NOTE — PT/OT/SLP PROGRESS
Occupational Therapy   Treatment and Discharge Note    Name: Raine Medley  MRN: [id number]  Admitting Diagnosis:  Primary osteoarthritis of right knee  1 Day Post-Op    Recommendations:     Discharge Recommendations: home  Discharge Equipment Recommendations:  bedside commode, walker, rolling  Barriers to discharge:  None    Assessment:     Raine Medley is a 68 y.o. female with a medical diagnosis of Primary osteoarthritis of right knee.  She presents with Performance deficits affecting function are impaired self care skills, impaired functional mobility, orthopedic precautions. She transferred from sitting to standing on chair and BSC over toilet using a RW with CGA. She transferred from standing to sitting on chair and BSC over toilet using a RW with CGA. She urinated with BSC over toilet with CGA. She washed hands and brushed teeth while standing at sink using a RW with CGA. She was able to don and doff socks with supervision. She donned underwear, shoes, bra, and dress with supervision. Pt. Received education on bathing and car transfers.     Rehab Prognosis:  Good; patient would benefit from acute skilled OT services to address these deficits and reach maximum level of function.       Plan:     Patient to be seen daily to address the above listed problems via self-care/home management, therapeutic activities, therapeutic exercises  Plan of Care Expires: 06/06/23  Plan of Care Reviewed with: patient, daughter    Subjective     Chief Complaint: RTKA  Patient/Family Comments/goals: I feel better today    Objective:     Communicated with: Nurse prior to session.  Patient found up in chair with cryotherapy upon OT entry to room.    General Precautions: Standard, fall    Orthopedic Precautions:RLE weight bearing as tolerated  Braces: N/A  Respiratory Status: Room air     Occupational Performance: Pt. Walked to bathroom and back using RW. Pt. Completing dressing while sitting in the chair.      Functional Mobility/Transfers:  Patient completed Sit <> Stand Transfer with contact guard assistance  with  rolling walker   Patient completed Toilet Transfer Stand Pivot technique with contact guard assistance with  rolling walker  Functional Mobility: Pt. Walked to bathroom and back using RW.     Activities of Daily Living:  Grooming: contact guard assistance - Pt. Washed hands and brushed teeth while standing at the sink using a RW.  Upper Body Dressing: supervision - Pt. Donned bra and dress while sitting in the chair.  Lower Body Dressing: supervision - Pt. Donned and doffed socks and donned underwear and shoes while sitting in the chair.  Toileting: contact guard assistance Pt. Urinated with BSC over toilet using a RW.       [facility] 6 Click ADL: 22      Patient left up in chair with call button in reach    GOALS:   Multidisciplinary Problems       Occupational Therapy Goals       Not on file              Multidisciplinary Problems (Resolved)          Problem: Occupational Therapy    Goal Priority Disciplines Outcome Interventions   Occupational Therapy Goal   (Resolved)     OT, PT/OT Met    Description: Goals to be met by: 6/6/23     Patient will increase functional independence with ADLs by performing:    UE Dressing with Modified [place].  LE Dressing with Modified [place] and Assistive Devices as needed.  Grooming while standing at sink with Modified [place].  Toileting from bedside commode with Modified [place] for hygiene and clothing management.   Bathing from  shower chair/bench with Modified [place].  Toilet transfer to bedside commode with Modified [place].                         Time Tracking:     OT Date of Treatment: 09/06/23  OT Start Time: 0904  OT Stop Time: 0930  OT Total Time (min): 26 min    Billable Minutes:Self Care/Home Management 13  Therapeutic Activity 13               6/6/2023

## 2023-06-07 ENCOUNTER — CLINICAL SUPPORT (OUTPATIENT)
Dept: ORTHOPEDICS | Facility: CLINIC | Age: 68
End: 2023-06-07
Payer: COMMERCIAL

## 2023-06-07 ENCOUNTER — CLINICAL SUPPORT (OUTPATIENT)
Dept: REHABILITATION | Facility: HOSPITAL | Age: 68
End: 2023-06-07
Attending: ORTHOPAEDIC SURGERY
Payer: COMMERCIAL

## 2023-06-07 DIAGNOSIS — M25.669 POSTOPERATIVE STIFFNESS OF TOTAL KNEE REPLACEMENT, INITIAL ENCOUNTER: ICD-10-CM

## 2023-06-07 DIAGNOSIS — Z96.659 POSTOPERATIVE STIFFNESS OF TOTAL KNEE REPLACEMENT, INITIAL ENCOUNTER: ICD-10-CM

## 2023-06-07 DIAGNOSIS — M17.11 PRIMARY OSTEOARTHRITIS OF RIGHT KNEE: ICD-10-CM

## 2023-06-07 DIAGNOSIS — Z96.659 STATUS POST KNEE REPLACEMENT, UNSPECIFIED LATERALITY: Primary | ICD-10-CM

## 2023-06-07 DIAGNOSIS — M25.561 ACUTE PAIN OF RIGHT KNEE: ICD-10-CM

## 2023-06-07 DIAGNOSIS — Z74.09 DECREASED FUNCTIONAL MOBILITY AND ENDURANCE: ICD-10-CM

## 2023-06-07 DIAGNOSIS — T84.89XA POSTOPERATIVE STIFFNESS OF TOTAL KNEE REPLACEMENT, INITIAL ENCOUNTER: ICD-10-CM

## 2023-06-07 PROCEDURE — 99499 NO LOS: ICD-10-PCS | Mod: 95,,, | Performed by: ORTHOPAEDIC SURGERY

## 2023-06-07 PROCEDURE — 97161 PT EVAL LOW COMPLEX 20 MIN: CPT

## 2023-06-07 PROCEDURE — 99499 UNLISTED E&M SERVICE: CPT | Mod: 95,,, | Performed by: ORTHOPAEDIC SURGERY

## 2023-06-07 PROCEDURE — 97110 THERAPEUTIC EXERCISES: CPT

## 2023-06-07 NOTE — PROGRESS NOTES
I called the patient today regarding her surgery with Dr. Sanford Calvert III. The patient had a right TKA on 6/5.     Pain Scale: 6 / 10    Any issues with Fever: No.    Any issues with medications (specifically DVT prophylaxis): No. ASA 81 BID    Any issues with bowel movements:  Passing shirlene: No.                                                                 Urination: No.                                                                 Constipation: No.OTC laxatives discussed    Completing at home exercises: Yes:     Any concerns regarding their dressing/bandage:  No.    Patient confirmed first OP-PT appointment:  [facility] on  6/7 at 0900 am.     Any other concerns: No.        The patient was informed that if they have any urgent issues with their bandage, medications or any other health concerns regarding their surgery to call the 24/7 Orthopedic Post-op Hot Line at [phone]. The patient was reminded that if they have any chest pain or shortness of breath to call 911 or go to the ER.    The patient verbalized understanding and does not have any other questions

## 2023-06-07 NOTE — PROGRESS NOTES
See evaluation in POC for goals and assessment     Eval Date: 06/08/2023    Verenice Oates, PT, DPT, CLT

## 2023-06-07 NOTE — ANESTHESIA POSTPROCEDURE EVALUATION
Anesthesia Post Evaluation    Patient: Raine Medley    Procedure(s) Performed: Procedure(s) (LRB):  ARTHROPLASTY, KNEE, TOTAL: RIGHT: DEPUY - ATTUNE (Right)    Final Anesthesia Type: spinal      Patient location during evaluation: PACU  Patient participation: Yes- Able to Participate  Level of consciousness: awake and alert and oriented  Post-procedure vital signs: reviewed and stable  Pain management: adequate  Airway patency: patent    PONV status at discharge: No PONV  Anesthetic complications: no      Cardiovascular status: blood pressure returned to baseline and hemodynamically stable  Respiratory status: unassisted, room air and spontaneous ventilation  Hydration status: euvolemic  Follow-up not needed.          Vitals Value Taken Time   /61 06/06/23 0953   Temp 36.8 °C (98.3 °F) 06/06/23 0953   Pulse 69 06/06/23 0953   Resp 18 06/06/23 0953   SpO2 96 % 06/06/23 0953         Event Time   Out of Recovery 06/05/2023 15:35:00         Pain/Araseli Score: Pain Rating Prior to Med Admin: 3 (6/6/2023  8:59 AM)  Pain Rating Post Med Admin: 3 (6/6/2023  7:33 AM)

## 2023-06-08 ENCOUNTER — CLINICAL SUPPORT (OUTPATIENT)
Dept: REHABILITATION | Facility: HOSPITAL | Age: 68
End: 2023-06-08
Payer: COMMERCIAL

## 2023-06-08 DIAGNOSIS — M25.669 POSTOPERATIVE STIFFNESS OF TOTAL KNEE REPLACEMENT, INITIAL ENCOUNTER: ICD-10-CM

## 2023-06-08 DIAGNOSIS — Z74.09 DECREASED FUNCTIONAL MOBILITY AND ENDURANCE: Primary | ICD-10-CM

## 2023-06-08 DIAGNOSIS — M25.561 ACUTE PAIN OF RIGHT KNEE: ICD-10-CM

## 2023-06-08 DIAGNOSIS — T84.89XA POSTOPERATIVE STIFFNESS OF TOTAL KNEE REPLACEMENT, INITIAL ENCOUNTER: ICD-10-CM

## 2023-06-08 DIAGNOSIS — Z96.659 POSTOPERATIVE STIFFNESS OF TOTAL KNEE REPLACEMENT, INITIAL ENCOUNTER: ICD-10-CM

## 2023-06-08 PROCEDURE — 97014 ELECTRIC STIMULATION THERAPY: CPT

## 2023-06-08 PROCEDURE — 97112 NEUROMUSCULAR REEDUCATION: CPT | Mod: 97

## 2023-06-08 NOTE — PROGRESS NOTES
[facility]   Physical Therapy Treatment Note      Name: Raine Medley  Clinic Number: [id number]    Therapy Diagnosis:   Encounter Diagnoses   Name Primary?    Decreased functional mobility and endurance Yes    Acute pain of right knee     Postoperative stiffness of total knee replacement, initial encounter      Physician: Sanford Calvert III, *    Visit Date: 6/8/2023    Physician Orders: PT Eval and Treat   Medical Diagnosis from Referral: M17.11 (ICD-10-CM) - Primary osteoarthritis of right knee   Evaluation Date: 6/7/2023  Authorization Period Expiration: 12/31/2023  Plan of Care Expiration: 7/20/2023`  Progress Note Due: 6/29/2023  Visit # / Visits authorized: 1/ 1   FOTO: 1/3     Precautions: Standard   PTA Visit #: 0/5     Time In: 4:00 pm  Time Out: 4:59 pm  Total Billable Time: 51 minutes    Subjective     Pt reports: compliance with HEP; doing a little better overall.  She was compliant with home exercise program.  Response to previous treatment: no adverse effects  Functional change: ambulates with RW with reciprocal gait pattern upon cue    Pain: NT/10  Location: right knee      Objective      Objective Measures updated at progress report unless specified.     6/8/20203: 1-0- 65 degrees; 80 degrees at end of session      Treatment     Raine received the treatments listed below:      therapeutic exercises to develop ROM and flexibility for 5 minutes including:  Heel slides 10 seconds, 5x  Heel prop: 5 minutes  Hamstring stretch with heel prop 30 seconds, 3x    manual therapy techniques: Joint mobilizations, Myofacial release, and Soft tissue Mobilization were applied to the: right knee for 3 minutes, including:  Grade II-III patella mobilizations    neuromuscular re-education activities to improve: Proprioception, Posture, and motor control for 51 minutes. The following activities were included:  After being cleared for contradictions: symmetrical biphasic Electrical  Stimulation: Raine received symmetrical biphasic Electrical Stimulation supervised to elicit muscle contraction of the quadriceps for  41 minutes. Pt received stimulation: Freuqeuncy: 35 Hz, Phase duration: 300 msec, amplitude 29 Meet with 10 second on time and 10 second off time while performing quad set. Patient tolerated treatment well without any adverse effects.  Quad sets with towel under knee   Short arc quads with medium bolster + green strap  Short arc qauds with small bolster - held  Long arc quads + green strap  Ambulated 328 feet with rolling walker 2x emphasis on quad motor control during stance phase      Patient Education and Home Exercises       Education provided:   - HEP  -importance of heel prop    Written Home Exercises Provided: Patient instructed to cont prior HEP. Exercises were reviewed and Raine was able to demonstrate them prior to the end of the session.  Raine demonstrated good  understanding of the education provided. See EMR under Patient Instructions for exercises provided during therapy sessions    Assessment     Patient presents to first follow up visit since initial evaluation. Patient is 3 days s/p right total knee arthroplasty. Patient demonstrates 1-0-65 AROM at beginning of session; 80 degrees AAROM at end of session.    Raine Is progressing well towards her goals.   Pt prognosis is Good.     Pt will continue to benefit from skilled outpatient physical therapy to address the deficits listed in the problem list box on initial evaluation, provide pt/family education and to maximize pt's level of independence in the home and community environment.     Pt's spiritual, cultural and educational needs considered and pt agreeable to plan of care and goals.     Anticipated barriers to physical therapy: none    Goals:   Short Term Goals (3 Weeks):   1. Patient will be independent with home exercise program to supplement physical therapy treatment in improving functional status.  2. Patient  will improve Rlower extremity strength to at least 75% of L lower extremity strength as measured via MicroFet handheld dynamometer to improve strength for closed chain tasks.   3. Patient will improve R knee active range of motion to 0-90 degrees to promote increased ease of sit<>stand transfers.  4. Patient will perform timed up and go with less restrictive assistive device in < 20 seconds to improve gait speed and safety with community ambulation.     Long Term Goals (6 Weeks):   1. Patient will improve R lower extremity strength to at least 90% of L lower extremity strength as measured via MicroFet handheld dynamometer to improve strength for closed chain tasks.   2. Patient will improve the total FOTO Knee Survey Score to </= 20% limited to demonstrate increased perceived functional mobility.  3. Patient will perform timed up and go with least restrictive assistive device in < 13.5  seconds to improve gait speed and safety with community ambulation.  4. Patient will perform at least 15 sit to stands in 30 seconds without UE support to demonstrate increased functional strength.   5. Patient will improve R knee active range of motion to 0-120 degrees to promote higher level transfers and transitions without limitation.     Plan     Short Term Goals (3 Weeks):   1. Patient will be independent with home exercise program to supplement physical therapy treatment in improving functional status.  2. Patient will improve Rlower extremity strength to at least 75% of L lower extremity strength as measured via MicroFet handheld dynamometer to improve strength for closed chain tasks.   3. Patient will improve R knee active range of motion to 0-90 degrees to promote increased ease of sit<>stand transfers.  4. Patient will perform timed up and go with less restrictive assistive device in < 20 seconds to improve gait speed and safety with community ambulation.     Long Term Goals (6 Weeks):   1. Patient will improve R lower  extremity strength to at least 90% of L lower extremity strength as measured via MicroFet handheld dynamometer to improve strength for closed chain tasks.   2. Patient will improve the total FOTO Knee Survey Score to </= 20% limited to demonstrate increased perceived functional mobility.  3. Patient will perform timed up and go with least restrictive assistive device in < 13.5  seconds to improve gait speed and safety with community ambulation.  4. Patient will perform at least 15 sit to stands in 30 seconds without UE support to demonstrate increased functional strength.   5. Patient will improve R knee active range of motion to 0-120 degrees to promote higher level transfers and transitions without limitation.     Jacque Pride, PT

## 2023-06-08 NOTE — PLAN OF CARE
[facility]   Physical Therapy Initial Evaluation      Name: Ranie Medley  Clinic Number: [id number]    Therapy Diagnosis:   Encounter Diagnoses   Name Primary?    Primary osteoarthritis of right knee     Decreased functional mobility and endurance     Acute pain of right knee     Postoperative stiffness of total knee replacement, initial encounter         Physician: Sanford Calvert III, *    Physician Orders: PT Eval and Treat   Medical Diagnosis from Referral: M17.11 (ICD-10-CM) - Primary osteoarthritis of right knee   Evaluation Date: 6/7/2023  Authorization Period Expiration: 12/31/2023  Plan of Care Expiration: 7/20/2023`  Progress Note Due: 6/29/2023  Visit # / Visits authorized: 1/ 1   FOTO: 1/3    Precautions: Standard     Time In: 9:07am  Time Out: 10:00am  Total Appointment Time (timed & untimed codes): 53 minutes    Subjective     Date of onset: 6/5/2023    History of current condition - Raine reports: This is her first knee replacement. She is currently living at her daughters house. She has been taking her medication. She started having swelling last night, she had to remove her SWETHA hose     Falls: None     Imaging:     Impression:     Status post interval recent right TKA, as above.    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   Prior Level of Function: Independent, no AD   Current Level of Function: driving, needs assistance getting into bed, getting in the car     Pain:  Current 10/10, worst 10/10, best 2/10   Location: quad area, hurting in the back of the knee when she bends it   Description: burns   Aggravating Factors: bending the knee   Easing Factors: pain medication    Patients goals: decrease pain and inflammation      Medical History:   Past Medical History:   Diagnosis Date    Asthma     as a child    Hyperlipidemia     Microcytic anemia 5/15/2023       Surgical  "History:   Raine Medley  has a past surgical history that includes Cholecystectomy; Colonoscopy (N/A, 1/3/2019); and Total knee arthroplasty (Right, 6/5/2023).    Medications:   Raine has a current medication list which includes the following prescription(s): acetaminophen, aspirin, celecoxib, diclofenac sodium, docusate sodium, methocarbamol, fish oil-omega-3 fatty acids, oxycodone, pantoprazole, and vitamin d.    Allergies:   Review of patient's allergies indicates:  No Known Allergies     Objective      Posture: FHP  Palpation: tender around bandage, not tender in calf. Calf not hot or red. Slight redness around medial knee     Active range of motion:  Right knee: -6-0-60  Left knee: 0-120    Lower extremity srength with MicroFET handheld dynamometer Right Left Pain/dysfunction with movement   (approx 4 sec hold w/ max contraction)   Hip flexion 3.5 kg  17.0 kg     Hip abduction 8.5 kg  28,.6 kg     Quadriceps 4.3 kg  18.2 kg     Hamstrings 8.1 kg  6.2 kg       Timed Up and Go:  55 seconds (with assistive device)    30" Chair Stand: 4 with upper extremity support;     Gait Analysis: Modified independent with rolling walker: lack of TKE with gait, antalgic on R     Limitation/Restriction for FOTO Knee Survey    Therapist reviewed FOTO scores for Raine Medley on 6/7/2023.   FOTO documents entered into EPIC - see Media section.    Limitation Score: 46%         Treatment     Total Treatment time (time-based codes) separate from Evaluation: 30 minutes     Raine received the treatments listed below:      therapeutic exercises to develop strength, endurance, ROM, flexibility, and posture for 30 minutes including:    Quad sets: 5"x10  Short arc quads: x10   Long arc quads: x10   Seated marching: x10   Seated heel prop: 3'   Seated hamstring stretch in heel prop: 3x30"     cold pack for 10  minutes to R knee .    Patient Education and Home Exercises     Education provided:   - Findings; prognosis and plan "  care  - Home exercise program  - Modality options  - Therapist contact information    Written Home Exercises Provided: yes. Exercises were reviewed and Raine was able to demonstrate them prior to the end of the session.  Raine demonstrated good  understanding of the education provided. See EMR under Patient Instructions for exercises provided during therapy sessions.    Assessment     Raine is a 68 y.o. female referred to outpatient Physical Therapy with a medical diagnosis of M17.11 (ICD-10-CM) - Primary osteoarthritis of right knee . Patient presents with R knee pain, decreased ROM of R knee, and decreased mobility and endurance. Pt presents to PT with her daughter with walker. Pt's calf is non tender and not red or hot. Mild redness around lateral knee. Pt and family educated on warning signs and confirmed they have TKA hotline. Due to pt's knee flexion stiffness (max of 60 degrees) Pt educated to perform knee flexion with strap. Pt was able to perform HEP in clinic and was educated on importance of daily compliance. Pt would benefit from therapy to decrease pain, improve ROM, and prepare pt for HEP.     Patient prognosis is Good.   Patient will benefit from skilled outpatient Physical Therapy to address the deficits stated above and in the chart below, provide patient /family education, and to maximize patientt's level of independence.     Plan of care discussed with patient: Yes  Patient's spiritual, cultural and educational needs considered and patient is agreeable to the plan of care and goals as stated below:     Anticipated Barriers for therapy: none     Medical Necessity is demonstrated by the following  History  Co-morbidities and personal factors that may impact the plan of care [x] LOW: no personal factors / co-morbidities  [] MODERATE: 1-2 personal factors / co-morbidities  [] HIGH: 3+ personal factors / co-morbidities    Moderate / High Support Documentation:   Co-morbidities affecting plan of care:  "    Personal Factors:   no deficits     Examination  Body Structures and Functions, activity limitations and participation restrictions that may impact the plan of care [x] LOW: addressing 1-2 elements  [] MODERATE: 3+ elements  [] HIGH: 4+ elements (please support below)    Moderate / High Support Documentation:      Clinical Presentation [x] LOW: stable  [] MODERATE: Evolving  [] HIGH: Unstable     Decision Making/ Complexity Score: low       Short Term Goals (3 Weeks):   1. Patient will be independent with home exercise program to supplement physical therapy treatment in improving functional status.  2. Patient will improve Rlower extremity strength to at least 75% of L lower extremity strength as measured via MicroFet handheld dynamometer to improve strength for closed chain tasks.   3. Patient will improve R knee active range of motion to 0-90 degrees to promote increased ease of sit<>stand transfers.  4. Patient will perform timed up and go with less restrictive assistive device in < 20 seconds to improve gait speed and safety with community ambulation.     Long Term Goals (6 Weeks):   1. Patient will improve R lower extremity strength to at least 90% of L lower extremity strength as measured via MicroFet handheld dynamometer to improve strength for closed chain tasks.   2. Patient will improve the total FOTO Knee Survey Score to </= 20% limited to demonstrate increased perceived functional mobility.  3. Patient will perform timed up and go with least restrictive assistive device in < 13.5  seconds to improve gait speed and safety with community ambulation.  4. Patient will perform at least 15 sit to stands in 30 seconds without UE support to demonstrate increased functional strength.   5. Patient will improve R knee active range of motion to 0-120 degrees to promote higher level transfers and transitions without limitation.     Timed Up and Go Cutoff Scores:        30" Chair Stand Cutoff " Scores:            Plan     Plan of care Certification: 6/7/2023 to 7/20/2023    Outpatient Physical Therapy 3 times weekly for 6 weeks to include the following interventions: Electrical Stimulation of LE, Gait Training, Manual Therapy, Neuromuscular Re-ed, Patient Education, Self Care, Therapeutic Activities, and Therapeutic Exercise.     Verenice Oates, PT

## 2023-06-09 ENCOUNTER — TELEPHONE (OUTPATIENT)
Dept: ORTHOPEDICS | Facility: CLINIC | Age: 68
End: 2023-06-09
Payer: COMMERCIAL

## 2023-06-09 ENCOUNTER — OFFICE VISIT (OUTPATIENT)
Dept: ORTHOPEDICS | Facility: CLINIC | Age: 68
End: 2023-06-09
Payer: COMMERCIAL

## 2023-06-09 VITALS — HEIGHT: 61 IN | BODY MASS INDEX: 25.48 KG/M2 | WEIGHT: 134.94 LBS

## 2023-06-09 DIAGNOSIS — Z96.651 STATUS POST RIGHT KNEE REPLACEMENT: Primary | ICD-10-CM

## 2023-06-09 PROCEDURE — 1160F PR REVIEW ALL MEDS BY PRESCRIBER/CLIN PHARMACIST DOCUMENTED: ICD-10-PCS | Mod: CPTII,S$GLB,, | Performed by: NURSE PRACTITIONER

## 2023-06-09 PROCEDURE — 99024 POSTOP FOLLOW-UP VISIT: CPT | Mod: S$GLB,,, | Performed by: NURSE PRACTITIONER

## 2023-06-09 PROCEDURE — 99024 PR POST-OP FOLLOW-UP VISIT: ICD-10-PCS | Mod: S$GLB,,, | Performed by: NURSE PRACTITIONER

## 2023-06-09 PROCEDURE — 99999 PR PBB SHADOW E&M-EST. PATIENT-LVL III: CPT | Mod: PBBFAC,,, | Performed by: NURSE PRACTITIONER

## 2023-06-09 PROCEDURE — 3008F PR BODY MASS INDEX (BMI) DOCUMENTED: ICD-10-PCS | Mod: CPTII,S$GLB,, | Performed by: NURSE PRACTITIONER

## 2023-06-09 PROCEDURE — 1159F MED LIST DOCD IN RCRD: CPT | Mod: CPTII,S$GLB,, | Performed by: NURSE PRACTITIONER

## 2023-06-09 PROCEDURE — 1125F PR PAIN SEVERITY QUANTIFIED, PAIN PRESENT: ICD-10-PCS | Mod: CPTII,S$GLB,, | Performed by: NURSE PRACTITIONER

## 2023-06-09 PROCEDURE — 3288F FALL RISK ASSESSMENT DOCD: CPT | Mod: CPTII,S$GLB,, | Performed by: NURSE PRACTITIONER

## 2023-06-09 PROCEDURE — 99999 PR PBB SHADOW E&M-EST. PATIENT-LVL III: ICD-10-PCS | Mod: PBBFAC,,, | Performed by: NURSE PRACTITIONER

## 2023-06-09 PROCEDURE — 3008F BODY MASS INDEX DOCD: CPT | Mod: CPTII,S$GLB,, | Performed by: NURSE PRACTITIONER

## 2023-06-09 PROCEDURE — 1125F AMNT PAIN NOTED PAIN PRSNT: CPT | Mod: CPTII,S$GLB,, | Performed by: NURSE PRACTITIONER

## 2023-06-09 PROCEDURE — 3288F PR FALLS RISK ASSESSMENT DOCUMENTED: ICD-10-PCS | Mod: CPTII,S$GLB,, | Performed by: NURSE PRACTITIONER

## 2023-06-09 PROCEDURE — 1160F RVW MEDS BY RX/DR IN RCRD: CPT | Mod: CPTII,S$GLB,, | Performed by: NURSE PRACTITIONER

## 2023-06-09 PROCEDURE — 1101F PR PT FALLS ASSESS DOC 0-1 FALLS W/OUT INJ PAST YR: ICD-10-PCS | Mod: CPTII,S$GLB,, | Performed by: NURSE PRACTITIONER

## 2023-06-09 PROCEDURE — 1101F PT FALLS ASSESS-DOCD LE1/YR: CPT | Mod: CPTII,S$GLB,, | Performed by: NURSE PRACTITIONER

## 2023-06-09 PROCEDURE — 1159F PR MEDICATION LIST DOCUMENTED IN MEDICAL RECORD: ICD-10-PCS | Mod: CPTII,S$GLB,, | Performed by: NURSE PRACTITIONER

## 2023-06-09 NOTE — PROGRESS NOTES
Pt returns today with c/o blister to the medial dressing below the joint line. She sent a picture to the joint hotline earlier today. The tape was cut away and the blister spontaneously drained. Xeroform placed over the area and a mepilex dressing was placed. She will maintain the dressing until she returns for her 2 week visit.

## 2023-06-09 NOTE — TELEPHONE ENCOUNTER
Daughter called hotline concerned with a blister, asked to send a picture. Picture uploaded in media. Pt scheduled for same day appt with Verenice VIDES. Understanding verbalized.

## 2023-06-12 ENCOUNTER — CLINICAL SUPPORT (OUTPATIENT)
Dept: REHABILITATION | Facility: HOSPITAL | Age: 68
End: 2023-06-12
Payer: COMMERCIAL

## 2023-06-12 DIAGNOSIS — Z96.659 POSTOPERATIVE STIFFNESS OF TOTAL KNEE REPLACEMENT, INITIAL ENCOUNTER: ICD-10-CM

## 2023-06-12 DIAGNOSIS — T84.89XA POSTOPERATIVE STIFFNESS OF TOTAL KNEE REPLACEMENT, INITIAL ENCOUNTER: ICD-10-CM

## 2023-06-12 DIAGNOSIS — M25.561 ACUTE PAIN OF RIGHT KNEE: ICD-10-CM

## 2023-06-12 DIAGNOSIS — Z74.09 DECREASED FUNCTIONAL MOBILITY AND ENDURANCE: Primary | ICD-10-CM

## 2023-06-12 DIAGNOSIS — M25.669 POSTOPERATIVE STIFFNESS OF TOTAL KNEE REPLACEMENT, INITIAL ENCOUNTER: ICD-10-CM

## 2023-06-12 PROCEDURE — 97110 THERAPEUTIC EXERCISES: CPT | Mod: 97

## 2023-06-12 PROCEDURE — 97014 ELECTRIC STIMULATION THERAPY: CPT | Mod: 97

## 2023-06-12 PROCEDURE — 97112 NEUROMUSCULAR REEDUCATION: CPT | Mod: 97

## 2023-06-12 NOTE — PROGRESS NOTES
[facility]   Physical Therapy Treatment Note      Name: Raine Medley  Clinic Number: [id number]    Therapy Diagnosis:   Encounter Diagnoses   Name Primary?    Decreased functional mobility and endurance Yes    Acute pain of right knee     Postoperative stiffness of total knee replacement, initial encounter        Physician: Sanford Calvert III, *    Visit Date: 6/12/2023    Physician Orders: PT Eval and Treat   Medical Diagnosis from Referral: M17.11 (ICD-10-CM) - Primary osteoarthritis of right knee   Evaluation Date: 6/7/2023  Authorization Period Expiration: 12/31/2023  Plan of Care Expiration: 7/20/2023`  Progress Note Due: 6/29/2023  Visit # / Visits authorized: 1/ 1   FOTO: 1/3     Precautions: Standard   PTA Visit #: 0/5     Time In: 9:00 pm  Time Out: 10:10 pm  Total Billable Time: 59 minutes    Subjective     Pt reports: having a rough day; been having difficulty sleeping   She was compliant with home exercise program.  Response to previous treatment: no adverse effects  Functional change: ambulates with RW with reciprocal gait pattern upon cue    Pain: NT/10  Location: right knee      Objective      Objective Measures updated at progress report unless specified.     6/8/20203: 1-0- 65 degrees; 80 degrees at end of session    6/12/2023: 0 -62 AROM      Treatment     Raine received the treatments listed below:      therapeutic exercises to develop ROM and flexibility for 10 minutes including:  Heel slides 10 seconds, 10  Hamstring stretch with heel prop 30 seconds, 3x    manual therapy techniques: Joint mobilizations, Myofacial release, and Soft tissue Mobilization were applied to the: right knee for 00 minutes, including:  Grade II-III patella mobilizations    neuromuscular re-education activities to improve: Proprioception, Posture, and motor control for 49 minutes. The following activities were included:  After being cleared for contradictions: symmetrical biphasic  Electrical Stimulation: Raine received symmetrical biphasic Electrical Stimulation supervised to elicit muscle contraction of the quadriceps for  30 minutes. Pt received stimulation: Freuqeuncy: 35 Hz, Phase duration: 300 msec, amplitude 29 Meet with 10 second on time and 10 second off time while performing quad set. Patient tolerated treatment well without any adverse effects.  Quad sets with towel under knee   Short arc quads with medium bolster + green strap  Long arc quads + green strap      Patient Education and Home Exercises       Education provided:   - HEP  -importance of heel prop    Written Home Exercises Provided: Patient instructed to cont prior HEP. Exercises were reviewed and Raine was able to demonstrate them prior to the end of the session.  Raine demonstrated good  understanding of the education provided. See EMR under Patient Instructions for exercises provided during therapy sessions    Assessment     Patient demonstrated poor tolerance for NMR activities and knee flexion range of motion. Patient demonstrated 0-62 degrees of AROM which is significantly less range compared to previous visit. Patient required several rest breaks throughout today's session.     Raine Is progressing well towards her goals.   Pt prognosis is Good.     Pt will continue to benefit from skilled outpatient physical therapy to address the deficits listed in the problem list box on initial evaluation, provide pt/family education and to maximize pt's level of independence in the home and community environment.     Pt's spiritual, cultural and educational needs considered and pt agreeable to plan of care and goals.     Anticipated barriers to physical therapy: none    Goals:   Short Term Goals (3 Weeks):   1. Patient will be independent with home exercise program to supplement physical therapy treatment in improving functional status.  2. Patient will improve Rlower extremity strength to at least 75% of L lower extremity strength  as measured via MicroFet handheld dynamometer to improve strength for closed chain tasks.   3. Patient will improve R knee active range of motion to 0-90 degrees to promote increased ease of sit<>stand transfers.  4. Patient will perform timed up and go with less restrictive assistive device in < 20 seconds to improve gait speed and safety with community ambulation.     Long Term Goals (6 Weeks):   1. Patient will improve R lower extremity strength to at least 90% of L lower extremity strength as measured via MicroFet handheld dynamometer to improve strength for closed chain tasks.   2. Patient will improve the total FOTO Knee Survey Score to </= 20% limited to demonstrate increased perceived functional mobility.  3. Patient will perform timed up and go with least restrictive assistive device in < 13.5  seconds to improve gait speed and safety with community ambulation.  4. Patient will perform at least 15 sit to stands in 30 seconds without UE support to demonstrate increased functional strength.   5. Patient will improve R knee active range of motion to 0-120 degrees to promote higher level transfers and transitions without limitation.     Plan     Plan of care Certification: 6/7/2023 to 7/20/2023     Outpatient Physical Therapy 3 times weekly for 6 weeks to include the following interventions: Electrical Stimulation of LE, Gait Training, Manual Therapy, Neuromuscular Re-ed, Patient Education, Self Care, Therapeutic Activities, and Therapeutic Exercise.     Jacque Pride, PT

## 2023-06-14 ENCOUNTER — CLINICAL SUPPORT (OUTPATIENT)
Dept: REHABILITATION | Facility: HOSPITAL | Age: 68
End: 2023-06-14
Payer: COMMERCIAL

## 2023-06-14 DIAGNOSIS — M25.669 POSTOPERATIVE STIFFNESS OF TOTAL KNEE REPLACEMENT, INITIAL ENCOUNTER: ICD-10-CM

## 2023-06-14 DIAGNOSIS — M25.561 ACUTE PAIN OF RIGHT KNEE: ICD-10-CM

## 2023-06-14 DIAGNOSIS — T84.89XA POSTOPERATIVE STIFFNESS OF TOTAL KNEE REPLACEMENT, INITIAL ENCOUNTER: ICD-10-CM

## 2023-06-14 DIAGNOSIS — Z96.659 POSTOPERATIVE STIFFNESS OF TOTAL KNEE REPLACEMENT, INITIAL ENCOUNTER: ICD-10-CM

## 2023-06-14 DIAGNOSIS — Z74.09 DECREASED FUNCTIONAL MOBILITY AND ENDURANCE: Primary | ICD-10-CM

## 2023-06-14 PROCEDURE — 97014 ELECTRIC STIMULATION THERAPY: CPT | Mod: CQ

## 2023-06-14 PROCEDURE — 97112 NEUROMUSCULAR REEDUCATION: CPT | Mod: CQ

## 2023-06-14 PROCEDURE — 97110 THERAPEUTIC EXERCISES: CPT | Mod: CQ

## 2023-06-14 PROCEDURE — 97140 MANUAL THERAPY 1/> REGIONS: CPT | Mod: CQ,97

## 2023-06-14 NOTE — PROGRESS NOTES
[facility]   Physical Therapy Treatment Note      Name: Raine Medley  Clinic Number: [id number]    Therapy Diagnosis:   Encounter Diagnoses   Name Primary?    Decreased functional mobility and endurance Yes    Acute pain of right knee     Postoperative stiffness of total knee replacement, initial encounter      Physician: Sanford Calvert III, *    Visit Date: 6/14/2023    Physician Orders: PT Eval and Treat   Medical Diagnosis from Referral: M17.11 (ICD-10-CM) - Primary osteoarthritis of right knee   Evaluation Date: 6/7/2023  Authorization Period Expiration: 12/31/2023  Plan of Care Expiration: 7/20/2023  Progress Note Due: 6/29/2023  Visit # / Visits authorized: 3 / 20 + eval   FOTO: 1/3     Precautions: Standard   PTA Visit #: 1 / 5     Time In: 1000  Time Out: 1115  Total Billable Time: 65 minutes (3 NMR, 1 TE, 1 MT, 1 Estim)    Subjective     Patient reports: that her knee is tight today. She states that she is heel propping 2 times a day however is unable to recall for how long at a time.   She was compliant with home exercise program.  Response to previous treatment: appropriate muscle response  Functional change: ongoing, ambulates with RW with reciprocal gait pattern upon cue    Pain: NT/10, currently  Location: Right knee      Objective      Objective Measures updated at progress report unless specified.   Date of Surgery: 6/5/2023  *Patient is 1 week, 2 days s/p R TKA as of 6/14/2023.*    6/8/20203: 1-0- 65 degrees; 80 degrees at end of session  6/12/2023: 0 -62 AROM  6/14/2023; 90 degrees Flexion in SEATED at EOM; 87 degrees AAROM with heel slides    Treatment     Raine received the treatments listed below:      therapeutic exercises to develop ROM and flexibility for 15 minutes including:  Aerobic Activity to improve knee flexion ROM, tissue extensibility, and mobility; Nustep for 10 minutes  Heel slides; 10 second hold, 10 reps  Gait training with RW; emphasis on  heel strike and knee flexion  Hamstring stretch with heel prop; 30 seconds, 3 reps - NP    manual therapy techniques: Joint mobilizations, Myofacial release, and Soft tissue Mobilization were applied to the: Right knee for 10 minutes, including:  Patient education: ROM expectations with TKA  Grade II-III patella mobilizations  Tibfib distraction at EOM for pain management  Gentle knee flexion stretch at EOM to improve tissue extensibility and ROM (poor tolerance to this with visible hip hike on Right side)    neuromuscular re-education activities to improve: Proprioception, Posture, and motor control for 40 minutes.  The following activities were included after being cleared for contradictions: [place] Electrical Stimulation supervised to elicit muscle contraction of the quadriceps for 28 minutes. Pt received stimulation: Burst Frequency: 50 bps, Ramp: 2 sec, 10% duty cycle, amplitude 30 Meet with 10 second on time and 10 second off time while performing the following exercises. Patient tolerated treatment well without any adverse effects.    - Quad sets with towel under knee; 10 minutes (heavy verbal and tactile cueing for quadriceps isolation)   - Quad sets with heel prop; unable to tolerate, unable to isolate quadriceps   - Short arc quads with medium bolster; 10 minutes with use of green strap for TKE (heavy education of active participation with lift prior to use of strap)   - Long arc quads at EOM; 8 minutes with use of green strap for TKE (heavy education of active participation with lift prior to use of strap)    *Encouraged active knee flexion stretch at rest*    cold pack for 10 minutes to Right knee post session.     Patient Education and Home Exercises       Education provided:   - Compliance with HEP  - ROM expectations post TKA  - use of ice and elevation for swelling management  - importance of heel prop and heel slides to improve tissue extensibility and ROM    Written Home Exercises Provided:  Patient instructed to cont prior HEP. Exercises were reviewed and Raine was able to demonstrate them prior to the end of the session.  Raine demonstrated good  understanding of the education provided. See EMR under Patient Instructions for exercises provided during therapy sessions    Assessment     Ms. Ni is 1 week, 2 days s/p R TKA. She presents ambulating with RW with no heel strike and minimal knee flexion during swing phase; however, she responds well to cueing. She demonstrates increased difficulty with quadriceps isolation during NMES requiring heavy verbal and tactile cueing. Poor tolerance to knee flexion. Achieved 87 degrees AAROM today. Continue per POC towards treatment goals.   Raine Is progressing well towards her goals.   Pt prognosis is Good.     Pt will continue to benefit from skilled outpatient physical therapy to address the deficits listed in the problem list box on initial evaluation, provide pt/family education and to maximize pt's level of independence in the home and community environment.     Pt's spiritual, cultural and educational needs considered and pt agreeable to plan of care and goals.     Anticipated barriers to physical therapy: none    Goals:   Short Term Goals (3 Weeks):   1. Patient will be independent with home exercise program to supplement physical therapy treatment in improving functional status.  2. Patient will improve Rlower extremity strength to at least 75% of L lower extremity strength as measured via MicroFet handheld dynamometer to improve strength for closed chain tasks.   3. Patient will improve R knee active range of motion to 0-90 degrees to promote increased ease of sit<>stand transfers.  4. Patient will perform timed up and go with less restrictive assistive device in < 20 seconds to improve gait speed and safety with community ambulation.     Long Term Goals (6 Weeks):   1. Patient will improve R lower extremity strength to at least 90% of L lower extremity  strength as measured via MicroFet handheld dynamometer to improve strength for closed chain tasks.   2. Patient will improve the total FOTO Knee Survey Score to </= 20% limited to demonstrate increased perceived functional mobility.  3. Patient will perform timed up and go with least restrictive assistive device in < 13.5  seconds to improve gait speed and safety with community ambulation.  4. Patient will perform at least 15 sit to stands in 30 seconds without UE support to demonstrate increased functional strength.   5. Patient will improve R knee active range of motion to 0-120 degrees to promote higher level transfers and transitions without limitation.     Plan     Plan of care Certification: 6/7/2023 to 7/20/2023     Outpatient Physical Therapy 3 times weekly for 6 weeks to include the following interventions: Electrical Stimulation of LE, Gait Training, Manual Therapy, Neuromuscular Re-ed, Patient Education, Self Care, Therapeutic Activities, and Therapeutic Exercise.     Karis Clemens, PTA

## 2023-06-16 ENCOUNTER — CLINICAL SUPPORT (OUTPATIENT)
Dept: REHABILITATION | Facility: HOSPITAL | Age: 68
End: 2023-06-16
Payer: COMMERCIAL

## 2023-06-16 DIAGNOSIS — T84.89XA POSTOPERATIVE STIFFNESS OF TOTAL KNEE REPLACEMENT, INITIAL ENCOUNTER: ICD-10-CM

## 2023-06-16 DIAGNOSIS — M25.669 POSTOPERATIVE STIFFNESS OF TOTAL KNEE REPLACEMENT, INITIAL ENCOUNTER: ICD-10-CM

## 2023-06-16 DIAGNOSIS — Z96.659 POSTOPERATIVE STIFFNESS OF TOTAL KNEE REPLACEMENT, INITIAL ENCOUNTER: ICD-10-CM

## 2023-06-16 DIAGNOSIS — M25.561 ACUTE PAIN OF RIGHT KNEE: ICD-10-CM

## 2023-06-16 DIAGNOSIS — Z74.09 DECREASED FUNCTIONAL MOBILITY AND ENDURANCE: Primary | ICD-10-CM

## 2023-06-16 PROCEDURE — 97112 NEUROMUSCULAR REEDUCATION: CPT | Mod: CQ

## 2023-06-16 PROCEDURE — 97014 ELECTRIC STIMULATION THERAPY: CPT | Mod: CQ,97

## 2023-06-16 PROCEDURE — 97110 THERAPEUTIC EXERCISES: CPT | Mod: CQ,97

## 2023-06-16 NOTE — PROGRESS NOTES
[facility]   Physical Therapy Treatment Note      Name: Raine Medley  Clinic Number: [id number]    Therapy Diagnosis:   Encounter Diagnoses   Name Primary?    Decreased functional mobility and endurance Yes    Acute pain of right knee     Postoperative stiffness of total knee replacement, initial encounter      Physician: Sanford Calvert III, *    Visit Date: 6/16/2023    Physician Orders: PT Eval and Treat   Medical Diagnosis from Referral: M17.11 (ICD-10-CM) - Primary osteoarthritis of right knee   Evaluation Date: 6/7/2023  Authorization Period Expiration: 12/31/2023  Plan of Care Expiration: 7/20/2023  Progress Note Due: 6/29/2023  Visit # / Visits authorized: 4 / 20 + eval   FOTO: 1/3     Precautions: Standard   PTA Visit #: 2 / 5     Time In: 0807  Time Out: 0920  Total Treatment Time: 73 minutes  Total Billable Time: 63 minutes (3 NMR, 1 TE, 1 Estim)    Subjective     Patient reports: that she is having a lot of pain today. She felt good after last visit.  She was compliant with home exercise program.  Response to previous treatment: appropriate muscle response  Functional change: ongoing, ambulates with RW with reciprocal gait pattern upon cue    Pain: 9/10, currently  Location: Right knee      Objective      Objective Measures updated at progress report unless specified.   Date of Surgery: 6/5/2023  *Patient is 1 week, 4 days s/p R TKA as of 6/16/2023.*    6/8/20203: 1-0- 65 degrees; 80 degrees at end of session  6/12/2023: 0 -62 AROM  6/14/2023; 90 degrees Flexion in SEATED at EOM; 87 degrees AAROM with heel slides  6/16/2023; 87 degrees AAROM     Treatment     Raine received the treatments listed below:      therapeutic exercises to develop ROM and flexibility for 20 minutes including:  Aerobic Activity to improve knee flexion ROM, tissue extensibility, and mobility; Nustep for 10 minutes  Heel slides; 10 second hold, 10 reps  Gait training with RW; emphasis on heel  strike and knee flexion  Hamstring stretch with heel prop; 30 seconds, 3 reps - NP    manual therapy techniques: Joint mobilizations, Myofacial release, and Soft tissue Mobilization were applied to the: Right knee for 00 minutes, including:  Patient education: ROM expectations with TKA  Grade II-III patella mobilizations  Tibfib distraction at EOM for pain management  Gentle knee flexion stretch at EOM to improve tissue extensibility and ROM (poor tolerance to this with visible hip hike on Right side)    neuromuscular re-education activities to improve: Proprioception, Posture, and motor control for 43 minutes.  The following activities were included after being cleared for contradictions: [place] Electrical Stimulation supervised to elicit muscle contraction of the quadriceps for 30 minutes. Pt received stimulation: Burst Frequency: 50 bps, Ramp: 2 sec, 10% duty cycle, amplitude 30 Meet with 10 second on time and 10 second off time while performing the following exercises. Patient tolerated treatment well without any adverse effects.    - Quad sets with towel under knee; 10 minutes (heavy verbal and tactile cueing for quadriceps isolation)   - Quad sets with heel prop; unable to tolerate, unable to isolate quadriceps   - Short arc quads with medium bolster; 10 minutes with use of green strap for TKE (heavy education of active participation with lift prior to use of strap)   - Long arc quads at EOM; 10 minutes with use of green strap for TKE (heavy education of active participation with lift prior to use of strap)    *Encouraged active knee flexion stretch at rest*    cold pack for 10 minutes to Right knee post session.     Patient Education and Home Exercises       Education provided:   - Compliance with HEP  - ROM expectations post TKA  - use of ice and elevation for swelling management  - importance of heel prop and heel slides to improve tissue extensibility and ROM    Written Home Exercises Provided: Patient  instructed to cont prior HEP. Exercises were reviewed and Raine was able to demonstrate them prior to the end of the session.  Raine demonstrated good  understanding of the education provided. See EMR under Patient Instructions for exercises provided during therapy sessions    Assessment     Ms. Ni is 1 week, 4 days s/p R TKA. She presents today ambulating with RW; there is no visible knee flexion during swing phase requiring heavy cueing throughout. She demonstrates fair tolerance to NMES with quad based exercises requiring heavy education on quadriceps isolation. Requires use of strap for terminal knee extension. She is challenged with knee flexion stretching requiring extra encouragement to achieve full potential. Measured 87 degrees AAROM today. Continue per POC towards treatment goals.   Raine Is progressing well towards her goals.   Pt prognosis is Good.     Pt will continue to benefit from skilled outpatient physical therapy to address the deficits listed in the problem list box on initial evaluation, provide pt/family education and to maximize pt's level of independence in the home and community environment.     Pt's spiritual, cultural and educational needs considered and pt agreeable to plan of care and goals.     Anticipated barriers to physical therapy: none    Goals:   Short Term Goals (3 Weeks):   1. Patient will be independent with home exercise program to supplement physical therapy treatment in improving functional status.  2. Patient will improve Rlower extremity strength to at least 75% of L lower extremity strength as measured via MicroFet handheld dynamometer to improve strength for closed chain tasks.   3. Patient will improve R knee active range of motion to 0-90 degrees to promote increased ease of sit<>stand transfers.  4. Patient will perform timed up and go with less restrictive assistive device in < 20 seconds to improve gait speed and safety with community ambulation.     Long Term Goals  (6 Weeks):   1. Patient will improve R lower extremity strength to at least 90% of L lower extremity strength as measured via MicroFet handheld dynamometer to improve strength for closed chain tasks.   2. Patient will improve the total FOTO Knee Survey Score to </= 20% limited to demonstrate increased perceived functional mobility.  3. Patient will perform timed up and go with least restrictive assistive device in < 13.5  seconds to improve gait speed and safety with community ambulation.  4. Patient will perform at least 15 sit to stands in 30 seconds without UE support to demonstrate increased functional strength.   5. Patient will improve R knee active range of motion to 0-120 degrees to promote higher level transfers and transitions without limitation.     Plan     Plan of care Certification: 6/7/2023 to 7/20/2023     Outpatient Physical Therapy 3 times weekly for 6 weeks to include the following interventions: Electrical Stimulation of LE, Gait Training, Manual Therapy, Neuromuscular Re-ed, Patient Education, Self Care, Therapeutic Activities, and Therapeutic Exercise.     Karis Clemens, PTA

## 2023-06-19 ENCOUNTER — CLINICAL SUPPORT (OUTPATIENT)
Dept: REHABILITATION | Facility: HOSPITAL | Age: 68
End: 2023-06-19
Payer: COMMERCIAL

## 2023-06-19 DIAGNOSIS — M25.561 ACUTE PAIN OF RIGHT KNEE: ICD-10-CM

## 2023-06-19 DIAGNOSIS — M25.669 POSTOPERATIVE STIFFNESS OF TOTAL KNEE REPLACEMENT, INITIAL ENCOUNTER: ICD-10-CM

## 2023-06-19 DIAGNOSIS — Z96.659 POSTOPERATIVE STIFFNESS OF TOTAL KNEE REPLACEMENT, INITIAL ENCOUNTER: ICD-10-CM

## 2023-06-19 DIAGNOSIS — T84.89XA POSTOPERATIVE STIFFNESS OF TOTAL KNEE REPLACEMENT, INITIAL ENCOUNTER: ICD-10-CM

## 2023-06-19 DIAGNOSIS — Z74.09 DECREASED FUNCTIONAL MOBILITY AND ENDURANCE: Primary | ICD-10-CM

## 2023-06-19 PROCEDURE — 97112 NEUROMUSCULAR REEDUCATION: CPT | Mod: CQ

## 2023-06-19 PROCEDURE — 97110 THERAPEUTIC EXERCISES: CPT | Mod: CQ

## 2023-06-19 NOTE — PROGRESS NOTES
"[facility] OUTPAT[facility] THERAPY AND WELL[facility]   Physical Therapy Treatment Note      Name: Raine Medley  Clinic Number: [id number]    Therapy Diagnosis:   Encounter Diagnoses   Name Primary?    Decreased functional mobility and endurance Yes    Acute pain of right knee     Postoperative stiffness of total knee replacement, initial encounter      Physician: Sanford Calvert III, *    Visit Date: 6/19/2023    Physician Orders: PT Eval and Treat   Medical Diagnosis from Referral: M17.11 (ICD-10-CM) - Primary osteoarthritis of right knee   Evaluation Date: 6/7/2023  Authorization Period Expiration: 12/31/2023  Plan of Care Expiration: 7/20/2023  Progress Note Due: 6/29/2023  Visit # / Visits authorized: 5 / 20 + eval   FOTO: 1/3     Precautions: Standard   PTA Visit #: 3 / 5     Time In: 1500  Time Out: 1610  Total Treatment Time: 70 minutes  Total Billable Time: 60 minutes (3 NMR, 1 TE)    Subjective     Patient reports: her pain is a "little" today. She follows up with the MD tomorrow.   She was compliant with home exercise program.  Response to previous treatment: appropriate muscle response  Functional change: ongoing, ambulates with RW with reciprocal gait pattern upon cue    Pain: NT/10, currently  Location: Right knee      Objective      Objective Measures updated at progress report unless specified.   Date of Surgery: 6/5/2023  *Patient is 2 weeks s/p R TKA as of 6/19/2023.*    6/8/20203: 1-0- 65 degrees; 80 degrees at end of session  6/12/2023: 0 -62 AROM  6/14/2023; 90 degrees Flexion in SEATED at EOM; 87 degrees AAROM with heel slides  6/16/2023; 87 degrees AAROM   6/19/2023; 80 degrees ACTIVE knee flexion ROM    Treatment     Raine received the treatments listed below:      therapeutic exercises to develop ROM and flexibility for 20 minutes including:  Aerobic Activity to improve knee flexion ROM, tissue extensibility, and mobility; Nustep for 10 minutes  Heel slides; 10 second hold, 10 reps  Gait training " with RW; emphasis on heel strike and knee flexion  Hamstring stretch with heel prop; 30 seconds, 3 reps - NP    manual therapy techniques: Joint mobilizations, Myofacial release, and Soft tissue Mobilization were applied to the: Right knee for 8 minutes, including:  Patient education: ROM expectations with TKA  Grade II-III patella mobilizations    *Not today;  Tibfib distraction at EOM for pain management  Gentle knee flexion stretch at EOM to improve tissue extensibility and ROM (poor tolerance to this with visible hip hike on Right side)    neuromuscular re-education activities to improve: Proprioception, Posture, and motor control for 40 minutes.  The following activities were included after being cleared for contradictions: [place] Electrical Stimulation supervised to elicit muscle contraction of the quadriceps for 30 minutes. Pt received stimulation: Burst Frequency: 50 bps, Ramp: 2 sec, 10% duty cycle, amplitude 30 Meet with 10 second on time and 10 second off time while performing the following exercises. Patient tolerated treatment well without any adverse effects.    - Quad sets with towel under knee; 10 minutes (heavy verbal and tactile cueing for quadriceps isolation)   - Quad sets with heel prop; unable to tolerate, unable to isolate quadriceps   - Short arc quads with medium bolster; 10 minutes with use of green strap for TKE (heavy education of active participation with lift prior to use of strap)   - Long arc quads at EOM; 10 minutes with use of green strap for TKE (heavy education of active participation with lift prior to use of strap)    *Encouraged active knee flexion stretch at rest*    cold pack for 10 minutes to Right knee post session.     Patient Education and Home Exercises       Education provided:   - Compliance with HEP  - ROM expectations post TKA  - use of ice and elevation for swelling management  - importance of heel prop and heel slides to improve tissue extensibility and  ROM    Written Home Exercises Provided: Patient instructed to cont prior HEP. Exercises were reviewed and Raine was able to demonstrate them prior to the end of the session.  Raine demonstrated good  understanding of the education provided. See EMR under Patient Instructions for exercises provided during therapy sessions    Assessment     Ms. Ni is 2 weeks s/p R TKA. She presents today ambulating with RW. There is minimal knee flexion through swing phase which she is able to correct with cueing. Improvement in quad isolation with quad sets today. Achieved 80 degrees of active knee flexion today. There is hesitancy to actively bend her knee that is displayed throughout session with manual care and exercise. Continue per TKA protocol towards treatment goals.  Raine Is progressing well towards her goals.   Pt prognosis is Good.     Pt will continue to benefit from skilled outpatient physical therapy to address the deficits listed in the problem list box on initial evaluation, provide pt/family education and to maximize pt's level of independence in the home and community environment.     Pt's spiritual, cultural and educational needs considered and pt agreeable to plan of care and goals.     Anticipated barriers to physical therapy: none    Goals:   Short Term Goals (3 Weeks):   1. Patient will be independent with home exercise program to supplement physical therapy treatment in improving functional status.  2. Patient will improve Rlower extremity strength to at least 75% of L lower extremity strength as measured via MicroFet handheld dynamometer to improve strength for closed chain tasks.   3. Patient will improve R knee active range of motion to 0-90 degrees to promote increased ease of sit<>stand transfers.  4. Patient will perform timed up and go with less restrictive assistive device in < 20 seconds to improve gait speed and safety with community ambulation.     Long Term Goals (6 Weeks):   1. Patient will improve  R lower extremity strength to at least 90% of L lower extremity strength as measured via MicroFet handheld dynamometer to improve strength for closed chain tasks.   2. Patient will improve the total FOTO Knee Survey Score to </= 20% limited to demonstrate increased perceived functional mobility.  3. Patient will perform timed up and go with least restrictive assistive device in < 13.5  seconds to improve gait speed and safety with community ambulation.  4. Patient will perform at least 15 sit to stands in 30 seconds without UE support to demonstrate increased functional strength.   5. Patient will improve R knee active range of motion to 0-120 degrees to promote higher level transfers and transitions without limitation.     Plan     Plan of care Certification: 6/7/2023 to 7/20/2023     Outpatient Physical Therapy 3 times weekly for 6 weeks to include the following interventions: Electrical Stimulation of LE, Gait Training, Manual Therapy, Neuromuscular Re-ed, Patient Education, Self Care, Therapeutic Activities, and Therapeutic Exercise.     Karis Clemens, PTA

## 2023-06-20 ENCOUNTER — OFFICE VISIT (OUTPATIENT)
Dept: ORTHOPEDICS | Facility: CLINIC | Age: 68
End: 2023-06-20
Payer: COMMERCIAL

## 2023-06-20 VITALS — HEIGHT: 61 IN | WEIGHT: 134.94 LBS | BODY MASS INDEX: 25.48 KG/M2

## 2023-06-20 DIAGNOSIS — Z96.651 STATUS POST RIGHT KNEE REPLACEMENT: Primary | ICD-10-CM

## 2023-06-20 PROCEDURE — 99999 PR PBB SHADOW E&M-EST. PATIENT-LVL III: CPT | Mod: PBBFAC,,, | Performed by: NURSE PRACTITIONER

## 2023-06-20 PROCEDURE — 1101F PT FALLS ASSESS-DOCD LE1/YR: CPT | Mod: CPTII,S$GLB,, | Performed by: NURSE PRACTITIONER

## 2023-06-20 PROCEDURE — 99024 PR POST-OP FOLLOW-UP VISIT: ICD-10-PCS | Mod: S$GLB,,, | Performed by: NURSE PRACTITIONER

## 2023-06-20 PROCEDURE — 3288F PR FALLS RISK ASSESSMENT DOCUMENTED: ICD-10-PCS | Mod: CPTII,S$GLB,, | Performed by: NURSE PRACTITIONER

## 2023-06-20 PROCEDURE — 1101F PR PT FALLS ASSESS DOC 0-1 FALLS W/OUT INJ PAST YR: ICD-10-PCS | Mod: CPTII,S$GLB,, | Performed by: NURSE PRACTITIONER

## 2023-06-20 PROCEDURE — 99024 POSTOP FOLLOW-UP VISIT: CPT | Mod: S$GLB,,, | Performed by: NURSE PRACTITIONER

## 2023-06-20 PROCEDURE — 3008F BODY MASS INDEX DOCD: CPT | Mod: CPTII,S$GLB,, | Performed by: NURSE PRACTITIONER

## 2023-06-20 PROCEDURE — 3288F FALL RISK ASSESSMENT DOCD: CPT | Mod: CPTII,S$GLB,, | Performed by: NURSE PRACTITIONER

## 2023-06-20 PROCEDURE — 1159F MED LIST DOCD IN RCRD: CPT | Mod: CPTII,S$GLB,, | Performed by: NURSE PRACTITIONER

## 2023-06-20 PROCEDURE — 3008F PR BODY MASS INDEX (BMI) DOCUMENTED: ICD-10-PCS | Mod: CPTII,S$GLB,, | Performed by: NURSE PRACTITIONER

## 2023-06-20 PROCEDURE — 1160F RVW MEDS BY RX/DR IN RCRD: CPT | Mod: CPTII,S$GLB,, | Performed by: NURSE PRACTITIONER

## 2023-06-20 PROCEDURE — 1126F PR PAIN SEVERITY QUANTIFIED, NO PAIN PRESENT: ICD-10-PCS | Mod: CPTII,S$GLB,, | Performed by: NURSE PRACTITIONER

## 2023-06-20 PROCEDURE — 1159F PR MEDICATION LIST DOCUMENTED IN MEDICAL RECORD: ICD-10-PCS | Mod: CPTII,S$GLB,, | Performed by: NURSE PRACTITIONER

## 2023-06-20 PROCEDURE — 1160F PR REVIEW ALL MEDS BY PRESCRIBER/CLIN PHARMACIST DOCUMENTED: ICD-10-PCS | Mod: CPTII,S$GLB,, | Performed by: NURSE PRACTITIONER

## 2023-06-20 PROCEDURE — 99999 PR PBB SHADOW E&M-EST. PATIENT-LVL III: ICD-10-PCS | Mod: PBBFAC,,, | Performed by: NURSE PRACTITIONER

## 2023-06-20 PROCEDURE — 1126F AMNT PAIN NOTED NONE PRSNT: CPT | Mod: CPTII,S$GLB,, | Performed by: NURSE PRACTITIONER

## 2023-06-20 RX ORDER — METHOCARBAMOL 750 MG/1
750 TABLET, FILM COATED ORAL 3 TIMES DAILY PRN
Qty: 40 TABLET | Refills: 0 | Status: ON HOLD | OUTPATIENT
Start: 2023-06-20 | End: 2023-07-20 | Stop reason: HOSPADM

## 2023-06-20 NOTE — PROGRESS NOTES
"Raine Medley presents for initial post-operative visit following a right total knee arthroplasty performed by Dr. Calvert on 6/5/2023     Exam:   Height 5' 1" (1.549 m), weight 61.2 kg (134 lb 14.7 oz).   Ambulating well with assistive device.  Incision is clean and dry without drainage or erythema.   ROM:0-90    Initial post-operative radiographs reviewed today revealing a well fixed and aligned prosthesis.    A/P:  2 weeks s/p right total knee replacement  - The patient was advised to keep the incision clean and dry for the next 24 hours after which she may wash the area with antibacterial soap in the shower. Will not submerge incision until one month post op.  - Outpatient PT: ongoing at the [place] location  - Continue aspirin for 1 month post op.  - Pain medication refill not needed  - Follow up in 4 weeks with surgeon. Pt will call clinic with problems/concerns.      "

## 2023-06-21 ENCOUNTER — CLINICAL SUPPORT (OUTPATIENT)
Dept: REHABILITATION | Facility: HOSPITAL | Age: 68
End: 2023-06-21
Payer: COMMERCIAL

## 2023-06-21 DIAGNOSIS — M25.561 ACUTE PAIN OF RIGHT KNEE: ICD-10-CM

## 2023-06-21 DIAGNOSIS — Z74.09 DECREASED FUNCTIONAL MOBILITY AND ENDURANCE: Primary | ICD-10-CM

## 2023-06-21 DIAGNOSIS — Z96.659 POSTOPERATIVE STIFFNESS OF TOTAL KNEE REPLACEMENT, INITIAL ENCOUNTER: ICD-10-CM

## 2023-06-21 DIAGNOSIS — M25.669 POSTOPERATIVE STIFFNESS OF TOTAL KNEE REPLACEMENT, INITIAL ENCOUNTER: ICD-10-CM

## 2023-06-21 DIAGNOSIS — T84.89XA POSTOPERATIVE STIFFNESS OF TOTAL KNEE REPLACEMENT, INITIAL ENCOUNTER: ICD-10-CM

## 2023-06-21 PROCEDURE — 97014 ELECTRIC STIMULATION THERAPY: CPT | Mod: CQ

## 2023-06-21 PROCEDURE — 97110 THERAPEUTIC EXERCISES: CPT | Mod: CQ

## 2023-06-21 PROCEDURE — 97140 MANUAL THERAPY 1/> REGIONS: CPT | Mod: CQ

## 2023-06-21 PROCEDURE — 97112 NEUROMUSCULAR REEDUCATION: CPT | Mod: CQ

## 2023-06-21 NOTE — PROGRESS NOTES
"[facility]   Physical Therapy Treatment Note      Name: Raine Medley  Clinic Number: [id number]    Therapy Diagnosis:   Encounter Diagnoses   Name Primary?    Decreased functional mobility and endurance Yes    Acute pain of right knee     Postoperative stiffness of total knee replacement, initial encounter      Physician: Sanford Calvert III, *    Visit Date: 6/21/2023    Physician Orders: PT Eval and Treat   Medical Diagnosis from Referral: M17.11 (ICD-10-CM) - Primary osteoarthritis of right knee   Evaluation Date: 6/7/2023  Authorization Period Expiration: 12/31/2023  Plan of Care Expiration: 7/20/2023  Progress Note Due: 6/29/2023  Visit # / Visits authorized: 6 / 20 + eval   FOTO: 1/3     Precautions: Standard   PTA Visit #: 4 / 5     Time In: 1105  Time Out: 1210  Total Treatment Time: 65 minutes  Total Billable Time: 55 minutes (2 NMR, 1 TE, 1 MT, 1 Estim)    Subjective     Patient reports: having her bandage removed yesterday. States that they gave her a cane. States she is wearing the Tubigrip which seems to be helping but she still has ankle pain.  She was compliant with home exercise program.  Response to previous treatment: appropriate muscle response  Functional change: ongoing, ambulates with RW with reciprocal gait pattern upon cue    Pain: "low"/10, currently  Location: Right knee      Objective      Objective Measures updated at progress report unless specified.   Date of Surgery: 6/5/2023  *Patient is 2 weeks, 2 days s/p R TKA as of 6/19/2023.*    6/8/20203: 1-0- 65 degrees; 80 degrees at end of session  6/12/2023: 0 -62 AROM  6/14/2023; 90 degrees Flexion in SEATED at EOM; 87 degrees AAROM with heel slides  6/16/2023; 87 degrees AAROM   6/19/2023; 80 degrees ACTIVE knee flexion ROM  6/21/2023; 86 degrees AROM Flexion; 89 degrees AAROM Flexion    Treatment     Raine received the treatments listed below:      therapeutic exercises to develop ROM and flexibility " for 15 minutes including:  Aerobic Activity to improve knee flexion ROM, tissue extensibility, and mobility; Nustep for 10 minutes  Heel slides; 10 second hold, 10 reps  Gait training with SPC; emphasis on reciprocal gait pattern, heel strike, and knee flexion during swing phase    *Not today;  Gait training with RW; emphasis on heel strike and knee flexion  Hamstring stretch with heel prop; 30 seconds, 3 reps - NP    manual therapy techniques: Joint mobilizations, Myofacial release, and Soft tissue Mobilization were applied to the: Right knee for 8 minutes, including:  Patient education: ROM expectations with TKA  Grade II-III patella mobilizations    *Not today;  Tibfib distraction at EOM for pain management  Gentle knee flexion stretch at EOM to improve tissue extensibility and ROM (poor tolerance to this with visible hip hike on Right side)    neuromuscular re-education activities to improve: Proprioception, Posture, and motor control for 32 minutes.  The following activities were included after being cleared for contradictions: [place] Electrical Stimulation supervised to elicit muscle contraction of the quadriceps for 27 minutes. Pt received stimulation: Burst Frequency: 50 bps, Ramp: 2 sec, 10% duty cycle, amplitude 30 Meet with 10 second on time and 10 second off time while performing the following exercises. Patient tolerated treatment well without any adverse effects.    - Quad sets with towel under knee; 10 minutes (heavy verbal and tactile cueing for quadriceps isolation)   - Quad sets with heel prop; not performed, unable to isolate quadriceps   - Short arc quads with medium bolster; 7 minutes able to complete without use of strap   - Short arc quads with 1/2 foam; 5 minutes   - Long arc quads at EOM; 5 minutes with use of green strap for TKE (heavy education of active participation with lift prior to use of strap)    *Encouraged active knee flexion stretch at rest*    cold pack for 10 minutes to Right  knee post session.     Patient Education and Home Exercises       Education provided:   - Compliance with HEP  - ROM expectations post TKA  - use of ice and elevation for swelling management  - importance of heel prop and heel slides to improve tissue extensibility and ROM    Written Home Exercises Provided: Patient instructed to cont prior HEP. Exercises were reviewed and Raine was able to demonstrate them prior to the end of the session.  Raine demonstrated good  understanding of the education provided. See EMR under Patient Instructions for exercises provided during therapy sessions    Assessment     Ms. Ni is 2 weeks, 2 days s/p R TKA. She presents today ambulating with RW with a SPC from the MD office. She continues to ambulate with minimal knee flexion through swing phase with use of RW requiring constant verbal cueing which she responds well to. Gait training performed with SPC today. Emphasized reciprocal gait pattern, heel strike and knee flexion during swing phase. She demonstrates hesitancy with ambulation and decreased stance time on Right LE; encouraged use of walker for long distances until there is improvement in quad strength and overall stability. There is improvement in quad isolation however she demonstrates difficulty maintaining quad contraction on her own for longer than 3 seconds. Continued use of NMES and encouraged compliance with HEP as there appears to be poor carryover with exercise and strength. She demonstrates 86 degrees of AROM knee flexion and demonstrates self-limiting behavior with knee flexion based exercises. Continue per POC towards treatment goals.   Raine Is progressing well towards her goals.   Pt prognosis is Good.     Pt will continue to benefit from skilled outpatient physical therapy to address the deficits listed in the problem list box on initial evaluation, provide pt/family education and to maximize pt's level of independence in the home and community environment.      Pt's spiritual, cultural and educational needs considered and pt agreeable to plan of care and goals.     Anticipated barriers to physical therapy: none    Goals:   Short Term Goals (3 Weeks):   1. Patient will be independent with home exercise program to supplement physical therapy treatment in improving functional status.  2. Patient will improve Rlower extremity strength to at least 75% of L lower extremity strength as measured via MicroFet handheld dynamometer to improve strength for closed chain tasks.   3. Patient will improve R knee active range of motion to 0-90 degrees to promote increased ease of sit<>stand transfers.  4. Patient will perform timed up and go with less restrictive assistive device in < 20 seconds to improve gait speed and safety with community ambulation.     Long Term Goals (6 Weeks):   1. Patient will improve R lower extremity strength to at least 90% of L lower extremity strength as measured via MicroFet handheld dynamometer to improve strength for closed chain tasks.   2. Patient will improve the total FOTO Knee Survey Score to </= 20% limited to demonstrate increased perceived functional mobility.  3. Patient will perform timed up and go with least restrictive assistive device in < 13.5  seconds to improve gait speed and safety with community ambulation.  4. Patient will perform at least 15 sit to stands in 30 seconds without UE support to demonstrate increased functional strength.   5. Patient will improve R knee active range of motion to 0-120 degrees to promote higher level transfers and transitions without limitation.     Plan     Plan of care Certification: 6/7/2023 to 7/20/2023     Outpatient Physical Therapy 3 times weekly for 6 weeks to include the following interventions: Electrical Stimulation of LE, Gait Training, Manual Therapy, Neuromuscular Re-ed, Patient Education, Self Care, Therapeutic Activities, and Therapeutic Exercise.     Karis Clemens, PTA

## 2023-06-23 ENCOUNTER — CLINICAL SUPPORT (OUTPATIENT)
Dept: REHABILITATION | Facility: HOSPITAL | Age: 68
End: 2023-06-23
Payer: COMMERCIAL

## 2023-06-23 DIAGNOSIS — T84.89XA POSTOPERATIVE STIFFNESS OF TOTAL KNEE REPLACEMENT, INITIAL ENCOUNTER: ICD-10-CM

## 2023-06-23 DIAGNOSIS — Z96.659 POSTOPERATIVE STIFFNESS OF TOTAL KNEE REPLACEMENT, INITIAL ENCOUNTER: ICD-10-CM

## 2023-06-23 DIAGNOSIS — M25.669 POSTOPERATIVE STIFFNESS OF TOTAL KNEE REPLACEMENT, INITIAL ENCOUNTER: ICD-10-CM

## 2023-06-23 DIAGNOSIS — Z74.09 DECREASED FUNCTIONAL MOBILITY AND ENDURANCE: Primary | ICD-10-CM

## 2023-06-23 DIAGNOSIS — M25.561 ACUTE PAIN OF RIGHT KNEE: ICD-10-CM

## 2023-06-23 PROCEDURE — 97112 NEUROMUSCULAR REEDUCATION: CPT | Mod: CQ,97

## 2023-06-23 PROCEDURE — 97140 MANUAL THERAPY 1/> REGIONS: CPT | Mod: CQ,97

## 2023-06-23 PROCEDURE — 97110 THERAPEUTIC EXERCISES: CPT | Mod: CQ

## 2023-06-23 NOTE — PROGRESS NOTES
"[facility]   Physical Therapy Treatment Note      Name: Raine Medley  Clinic Number: [id number]    Therapy Diagnosis:   Encounter Diagnoses   Name Primary?    Decreased functional mobility and endurance Yes    Acute pain of right knee     Postoperative stiffness of total knee replacement, initial encounter      Physician: Sanford Calvert III, *    Visit Date: 6/23/2023    Physician Orders: PT Eval and Treat   Medical Diagnosis from Referral: M17.11 (ICD-10-CM) - Primary osteoarthritis of right knee   Evaluation Date: 6/7/2023  Authorization Period Expiration: 12/31/2023  Plan of Care Expiration: 7/20/2023  Progress Note Due: 6/29/2023  Visit # / Visits authorized: 7 / 20 + eval   FOTO: 1/3     Precautions: Standard   PTA Visit #: 5 / 5     Time In: 0800  Time Out: 0900  Total Treatment Time: 60 minutes  Total Billable Time: 60 minutes (2 NMR, 1 MT, 1 TE)    Subjective     Patient reports: a lot of pain around her knee joint that she describes as a "pulling stretch." States that she is still having ankle pain, but the stocking is helping. She reports trying to do her home exercises but having a lot of pain with heel slides. She reports calf pain yesterday with some swelling, however states it has gone away.  She was compliant with home exercise program.  Response to previous treatment: appropriate muscle response  Functional change: ongoing, able to get in and out of the car without assistance; ambulating long distances with RW; use of SPC at home     Pain: "high"/10, currently  Location: Right knee      Objective      Objective Measures updated at progress report unless specified.   Date of Surgery: 6/5/2023  *Patient is 2 weeks, 4 days s/p R TKA as of 6/23/2023.*    6/8/20203: 1-0- 65 degrees; 80 degrees at end of session  6/12/2023: 0 -62 AROM  6/14/2023; 90 degrees Flexion in SEATED at EOM; 87 degrees AAROM with heel slides  6/16/2023; 87 degrees AAROM   6/19/2023; 80 degrees " ACTIVE knee flexion ROM  6/21/2023; 86 degrees AROM Flexion; 89 degrees AAROM Flexion  6/23/2023; 0 - 100 degrees AAROM Flexion post bike and manual   Observation: Patient ambulates with RW. There is no heel strike at initial contact and minimal knee flexion during swing phase. Decreased stance time on RLE observed. There is no visible redness along the Right LE. There is normal post op swelling along knee joint. Denies tenderness or pain at posterior calf. She has a Negative Chris's sign.    Treatment     Raine received the treatments listed below:      therapeutic exercises to develop ROM and flexibility for 20 minutes including:  Aerobic Activity to improve knee flexion ROM, tissue extensibility, and mobility; Recumbent Bike for 5 minutes, half revolutions seat 5  Heel slides; 10 second hold, 10 reps with gastroc/HS stretch between each rep  Hamstring stretch with heel prop; 30 seconds, 3 reps  Gait training with RW; emphasis on heel strike at initial contact and knee flexion during swing phase of gait cycle.    manual therapy techniques: Joint mobilizations, Myofacial release, and Soft tissue Mobilization were applied to the: Right knee for 15 minutes, including:  Patient education:   - ROM expectations with TKA  - compliance with HEP; emphasized importance of heel slides and long sitting hamstring/gastroc stretch  - swelling management with use of Tubigrip, ice and elevation    Grade II-III patella mobilizations  Tibiofemoral joint distraction at EOM for pain management  Gentle knee flexion stretch at EOM to improve tissue extensibility and ROM (poor tolerance to this with visible hip hike on Right side)  Provided Tubigrip size E for swelling management. Educated on removal if pain increases and at night.     neuromuscular re-education activities to improve: Proprioception, Posture, and motor control for 25 minutes.  Quad sets with towel under knee; 5 second hold, 20 reps (verbal cueing for quad isolation;  responds well)  Quad sets with heel prop; 5 second hold, 20 reps (verbal cueing for quad isolation; responds well)  Short arc quads with medium bolster; 5 second hold, 20 reps  Short arc quads with 1/2 foam; 5 second hold, 20 reps  Long arc quads at EOM; 5 second hold, 20 reps    cold pack for 00 minutes to Right knee post session. (Not today.)    Patient Education and Home Exercises       Education provided:   - Compliance with HEP  - ROM expectations post TKA  - use of ice and elevation for swelling management  - importance of heel prop and heel slides to improve tissue extensibility and ROM    Written Home Exercises Provided: Patient instructed to cont prior HEP. Exercises were reviewed and Raine was able to demonstrate them prior to the end of the session.  Raine demonstrated good  understanding of the education provided. See EMR under Patient Instructions for exercises provided during therapy sessions    Assessment     Ms. Ni is 2 weeks, 4 days s/p R TKA. She presents ambulating with RW; there is no heel strike at initial contact and minimal knee flexion during swing phase. Observed decreased stance time on Right LE during ambulation. She demonstrates good tolerance to exercises performed. Able to complete quad based exercises without use of NMES and improved isolation of quadriceps contraction. Achieved 100 degrees of AAROM knee flexion today. Provided Tubigrip size E for Right LE swelling management. Educated on removal if pain increases or if paresthesia occurs; discussed removal at night.  Raine Is progressing well towards her goals.   Pt prognosis is Good.     Pt will continue to benefit from skilled outpatient physical therapy to address the deficits listed in the problem list box on initial evaluation, provide pt/family education and to maximize pt's level of independence in the home and community environment.     Pt's spiritual, cultural and educational needs considered and pt agreeable to plan of care  and goals.     Anticipated barriers to physical therapy: none    Goals:   Short Term Goals (3 Weeks):   1. Patient will be independent with home exercise program to supplement physical therapy treatment in improving functional status.  2. Patient will improve Rlower extremity strength to at least 75% of L lower extremity strength as measured via MicroFet handheld dynamometer to improve strength for closed chain tasks.   3. Patient will improve R knee active range of motion to 0-90 degrees to promote increased ease of sit<>stand transfers.  4. Patient will perform timed up and go with less restrictive assistive device in < 20 seconds to improve gait speed and safety with community ambulation.     Long Term Goals (6 Weeks):   1. Patient will improve R lower extremity strength to at least 90% of L lower extremity strength as measured via MicroFet handheld dynamometer to improve strength for closed chain tasks.   2. Patient will improve the total FOTO Knee Survey Score to </= 20% limited to demonstrate increased perceived functional mobility.  3. Patient will perform timed up and go with least restrictive assistive device in < 13.5  seconds to improve gait speed and safety with community ambulation.  4. Patient will perform at least 15 sit to stands in 30 seconds without UE support to demonstrate increased functional strength.   5. Patient will improve R knee active range of motion to 0-120 degrees to promote higher level transfers and transitions without limitation.     Plan     Plan of care Certification: 6/7/2023 to 7/20/2023     Outpatient Physical Therapy 3 times weekly for 6 weeks to include the following interventions: Electrical Stimulation of LE, Gait Training, Manual Therapy, Neuromuscular Re-ed, Patient Education, Self Care, Therapeutic Activities, and Therapeutic Exercise.     Karis Clemens, PTA

## 2023-06-26 ENCOUNTER — PATIENT MESSAGE (OUTPATIENT)
Dept: ORTHOPEDICS | Facility: CLINIC | Age: 68
End: 2023-06-26
Payer: COMMERCIAL

## 2023-06-26 DIAGNOSIS — Z96.651 STATUS POST RIGHT KNEE REPLACEMENT: Primary | ICD-10-CM

## 2023-06-27 ENCOUNTER — CLINICAL SUPPORT (OUTPATIENT)
Dept: REHABILITATION | Facility: HOSPITAL | Age: 68
End: 2023-06-27
Payer: COMMERCIAL

## 2023-06-27 DIAGNOSIS — Z96.659 POSTOPERATIVE STIFFNESS OF TOTAL KNEE REPLACEMENT, INITIAL ENCOUNTER: ICD-10-CM

## 2023-06-27 DIAGNOSIS — M25.669 POSTOPERATIVE STIFFNESS OF TOTAL KNEE REPLACEMENT, INITIAL ENCOUNTER: ICD-10-CM

## 2023-06-27 DIAGNOSIS — Z74.09 DECREASED FUNCTIONAL MOBILITY AND ENDURANCE: Primary | ICD-10-CM

## 2023-06-27 DIAGNOSIS — M25.561 ACUTE PAIN OF RIGHT KNEE: ICD-10-CM

## 2023-06-27 DIAGNOSIS — T84.89XA POSTOPERATIVE STIFFNESS OF TOTAL KNEE REPLACEMENT, INITIAL ENCOUNTER: ICD-10-CM

## 2023-06-27 PROCEDURE — 97140 MANUAL THERAPY 1/> REGIONS: CPT | Mod: 97

## 2023-06-27 PROCEDURE — 97110 THERAPEUTIC EXERCISES: CPT

## 2023-06-27 PROCEDURE — 97530 THERAPEUTIC ACTIVITIES: CPT | Mod: 97

## 2023-06-27 NOTE — PROGRESS NOTES
"[facility]   Physical Therapy Treatment Note      Name: Raine Medley  Clinic Number: [id number]    Therapy Diagnosis:   Encounter Diagnoses   Name Primary?    Decreased functional mobility and endurance Yes    Acute pain of right knee     Postoperative stiffness of total knee replacement, initial encounter      Physician: Sanford Calvert III, *    Visit Date: 6/27/2023    Physician Orders: PT Eval and Treat   Medical Diagnosis from Referral: M17.11 (ICD-10-CM) - Primary osteoarthritis of right knee   Evaluation Date: 6/7/2023  Authorization Period Expiration: 12/31/2023  Plan of Care Expiration: 7/20/2023  Progress Note Due: 6/29/2023  Visit # / Visits authorized: 7 / 20 + eval   FOTO: 1/3     Precautions: Standard   PTA Visit #: 0 / 5     Time In:2:00 pm  Time Out: 2:59 pm  Total Treatment Time: 59 minutes  Total Billable Time: 59 minutes (2 NMR, 1 MT, 2 TE)    Subjective     Patient reports: doing well today; reports she gets stomach pains when taking her pain medication.   She was compliant with home exercise program.  Response to previous treatment: appropriate muscle response  Functional change: ongoing, able to get in and out of the car without assistance; ambulating long distances with RW; use of SPC at home     Pain: "high"/10, currently  Location: Right knee      Objective      Objective Measures updated at progress report unless specified.   Date of Surgery: 6/5/2023  *Patient is 2 weeks, 4 days s/p R TKA as of 6/23/2023.*    6/8/20203: 1-0- 65 degrees; 80 degrees at end of session  6/12/2023: 0 -62 AROM  6/14/2023; 90 degrees Flexion in SEATED at EOM; 87 degrees AAROM with heel slides  6/16/2023; 87 degrees AAROM   6/19/2023; 80 degrees ACTIVE knee flexion ROM  6/21/2023; 86 degrees AROM Flexion; 89 degrees AAROM Flexion  6/23/2023; 0 - 100 degrees AAROM Flexion post bike and manual   Observation: Patient ambulates with RW. There is no heel strike at initial contact " and minimal knee flexion during swing phase. Decreased stance time on RLE observed. There is no visible redness along the Right LE. There is normal post op swelling along knee joint. Denies tenderness or pain at posterior calf. She has a Negative Chris's sign.    6/27/2023: 102 degrees post LLLD stretch   At end of session patient reported becoming dizzy with stomach pains; quick resolution of symptoms   BP: 122/70 mmHg; 77 bpm    Treatment     Raine received the treatments listed below:      therapeutic exercises to develop ROM and flexibility for 26 minutes including:  Aerobic Activity to improve knee flexion ROM, tissue extensibility, and mobility; Recumbent Bike for 5 minutes, half revolutions seat 5; 5 minutes with FULL revolutions at end of session  Heel slides; 10 second hold, 10 reps with gastroc/HS stretch between each rep  LLLD flexion stretch with 3 lbs 5 minutes with LE on wall    manual therapy techniques: Joint mobilizations, Myofacial release, and Soft tissue Mobilization were applied to the: Right knee for 8 minutes, including:  Patient education:   - ROM expectations with TKA  - compliance with HEP; emphasized importance of heel slides and long sitting hamstring/gastroc stretch  - swelling management with use of Tubigrip, ice and elevation    Grade II-III patella mobilizations  Grade II-III PA tibiofemoral mobilizations    THERAPEUTIC ACTIVITIES to improve dynamic and functional performance for 25 minutes including activities below.  Shuttle double leg press: 50 lbs 3x10  Shuttle single leg press: 12 lbs 3x10    neuromuscular re-education activities to improve: Proprioception, Posture, and motor control for 00 minutes.  Quad sets with towel under knee; 5 second hold, 20 reps (verbal cueing for quad isolation; responds well)  Quad sets with heel prop; 10 second hold, 10 reps (verbal cueing for quad isolation; responds well)  Short arc quads with medium bolster; 5 second hold, 20 reps  Short arc quads  with 1/2 foam; 5 second hold, 20 reps  Long arc quads at EOM; 5 second hold, 20 reps    cold pack for 00 minutes to Right knee post session. (Not today.)    Patient Education and Home Exercises       Education provided:   - Compliance with HEP  - ROM expectations post TKA  - use of ice and elevation for swelling management  - importance of heel prop and heel slides to improve tissue extensibility and ROM    Written Home Exercises Provided: Patient instructed to cont prior HEP. Exercises were reviewed and Raine was able to demonstrate them prior to the end of the session.  Raine demonstrated good  understanding of the education provided. See EMR under Patient Instructions for exercises provided during therapy sessions    Assessment     Requires significant encouragement to challenge herself in regards to flexion range of motion; demonstrates self limiting behavior secondary to fear of pain. Significant changes in ROM post flexion based activities and LLLD. Increased time spent on shuttle during SL; cues to activate appropriate mm. Patient demonstrated 89 degrees at beginning of session and achieved 102 degrees flexion and full revolutions around the recumbent bike.       Raine Is progressing well towards her goals.   Pt prognosis is Good.     Pt will continue to benefit from skilled outpatient physical therapy to address the deficits listed in the problem list box on initial evaluation, provide pt/family education and to maximize pt's level of independence in the home and community environment.     Pt's spiritual, cultural and educational needs considered and pt agreeable to plan of care and goals.     Anticipated barriers to physical therapy: none    Goals:   Short Term Goals (3 Weeks):   1. Patient will be independent with home exercise program to supplement physical therapy treatment in improving functional status.  2. Patient will improve Rlower extremity strength to at least 75% of L lower extremity strength as  measured via MicroFet handheld dynamometer to improve strength for closed chain tasks.   3. Patient will improve R knee active range of motion to 0-90 degrees to promote increased ease of sit<>stand transfers.  4. Patient will perform timed up and go with less restrictive assistive device in < 20 seconds to improve gait speed and safety with community ambulation.     Long Term Goals (6 Weeks):   1. Patient will improve R lower extremity strength to at least 90% of L lower extremity strength as measured via MicroFet handheld dynamometer to improve strength for closed chain tasks.   2. Patient will improve the total FOTO Knee Survey Score to </= 20% limited to demonstrate increased perceived functional mobility.  3. Patient will perform timed up and go with least restrictive assistive device in < 13.5  seconds to improve gait speed and safety with community ambulation.  4. Patient will perform at least 15 sit to stands in 30 seconds without UE support to demonstrate increased functional strength.   5. Patient will improve R knee active range of motion to 0-120 degrees to promote higher level transfers and transitions without limitation.     Plan     Plan of care Certification: 6/7/2023 to 7/20/2023     Outpatient Physical Therapy 3 times weekly for 6 weeks to include the following interventions: Electrical Stimulation of LE, Gait Training, Manual Therapy, Neuromuscular Re-ed, Patient Education, Self Care, Therapeutic Activities, and Therapeutic Exercise.     Jacque Pride, PT

## 2023-06-28 ENCOUNTER — CLINICAL SUPPORT (OUTPATIENT)
Dept: REHABILITATION | Facility: HOSPITAL | Age: 68
End: 2023-06-28
Payer: COMMERCIAL

## 2023-06-28 DIAGNOSIS — Z96.659 POSTOPERATIVE STIFFNESS OF TOTAL KNEE REPLACEMENT, INITIAL ENCOUNTER: ICD-10-CM

## 2023-06-28 DIAGNOSIS — M25.669 POSTOPERATIVE STIFFNESS OF TOTAL KNEE REPLACEMENT, INITIAL ENCOUNTER: ICD-10-CM

## 2023-06-28 DIAGNOSIS — M25.561 ACUTE PAIN OF RIGHT KNEE: ICD-10-CM

## 2023-06-28 DIAGNOSIS — Z74.09 DECREASED FUNCTIONAL MOBILITY AND ENDURANCE: Primary | ICD-10-CM

## 2023-06-28 DIAGNOSIS — T84.89XA POSTOPERATIVE STIFFNESS OF TOTAL KNEE REPLACEMENT, INITIAL ENCOUNTER: ICD-10-CM

## 2023-06-28 PROCEDURE — 97110 THERAPEUTIC EXERCISES: CPT | Mod: 97

## 2023-06-28 PROCEDURE — 97112 NEUROMUSCULAR REEDUCATION: CPT

## 2023-06-28 PROCEDURE — 97530 THERAPEUTIC ACTIVITIES: CPT | Mod: 97

## 2023-06-28 NOTE — PROGRESS NOTES
"[facility]   Physical Therapy Treatment Note      Name: Raine Medley  Clinic Number: [id number]    Therapy Diagnosis:   Encounter Diagnoses   Name Primary?    Decreased functional mobility and endurance Yes    Acute pain of right knee     Postoperative stiffness of total knee replacement, initial encounter      Physician: Sanford Calvert III, *    Visit Date: 6/28/2023    Physician Orders: PT Eval and Treat   Medical Diagnosis from Referral: M17.11 (ICD-10-CM) - Primary osteoarthritis of right knee   Evaluation Date: 6/7/2023  Authorization Period Expiration: 12/31/2023  Plan of Care Expiration: 7/20/2023  Progress Note Due: 6/29/2023  Visit # / Visits authorized: 7 / 20 + eval   FOTO: 1/3     Precautions: Standard   PTA Visit #: 0 / 5     Time In:2:00 pm  Time Out: 2:59 pm  Total Treatment Time: 59 minutes  Total Billable Time: 59 minutes (2 NMR, 1 MT, 2 TE)    Subjective     Patient reports:feeling better today.  She was compliant with home exercise program.  Response to previous treatment: appropriate muscle response  Functional change: ongoing, able to get in and out of the car without assistance; ambulating long distances with RW; use of SPC at home     Pain: "high"/10, currently  Location: Right knee      Objective      Objective Measures updated at progress report unless specified.   Date of Surgery: 6/5/2023  *Patient is 2 weeks, 4 days s/p R TKA as of 6/23/2023.*    6/8/20203: 1-0- 65 degrees; 80 degrees at end of session  6/12/2023: 0 -62 AROM  6/14/2023; 90 degrees Flexion in SEATED at EOM; 87 degrees AAROM with heel slides  6/16/2023; 87 degrees AAROM   6/19/2023; 80 degrees ACTIVE knee flexion ROM  6/21/2023; 86 degrees AROM Flexion; 89 degrees AAROM Flexion  6/23/2023; 0 - 100 degrees AAROM Flexion post bike and manual   Observation: Patient ambulates with RW. There is no heel strike at initial contact and minimal knee flexion during swing phase. Decreased stance " time on RLE observed. There is no visible redness along the Right LE. There is normal post op swelling along knee joint. Denies tenderness or pain at posterior calf. She has a Negative Chris's sign.    6/27/2023: 102 degrees post LLLD stretch   At end of session patient reported becoming dizzy with stomach pains; quick resolution of symptoms   BP: 122/70 mmHg; 77 bpm    6/28/2023: full revolutions around bike. Measured on table at 90 degrees flexion    Treatment     Raine received the treatments listed below:      therapeutic exercises to develop ROM and flexibility for 19 minutes including:  Aerobic Activity to improve knee flexion ROM, tissue extensibility, and mobility; Recumbent Bike for 5 minutes, full revolutions seat 5; 5 minutes with FULL revolutions at end of session  Heel slides; 10 second hold, 10 reps with gastroc/HS stretch between each rep  LLLD flexion stretch with 3 lbs 5 minutes with LE on wall - not today    manual therapy techniques: Joint mobilizations, Myofacial release, and Soft tissue Mobilization were applied to the: Right knee for 8 minutes, including:  Patient education:   - ROM expectations with TKA  - compliance with HEP; emphasized importance of heel slides and long sitting hamstring/gastroc stretch  - swelling management with use of Tubigrip, ice and elevation    Grade II-III patella mobilizations  Grade II-III PA tibiofemoral mobilizations    THERAPEUTIC ACTIVITIES to improve dynamic and functional performance for 25 minutes including activities below.  Shuttle double leg press: 50 lbs 3x10  Shuttle single leg press: 12 lbs 3x10  Step ups 2x10    neuromuscular re-education activities to improve: Proprioception, Posture, and motor control for 15 minutes.  Short arc quads with medium bolster; 5 second hold, 30 reps 3 lbs  Long arc quads at EOM; 5 second hold, 30 reps 3 lbs  TKE - next sesion      cold pack for 00 minutes to Right knee post session. (Not today.)    Patient Education and  Home Exercises       Education provided:   - Compliance with HEP  - ROM expectations post TKA  - use of ice and elevation for swelling management  - importance of heel prop and heel slides to improve tissue extensibility and ROM    Written Home Exercises Provided: Patient instructed to cont prior HEP. Exercises were reviewed and Raine was able to demonstrate them prior to the end of the session.  Raine demonstrated good  understanding of the education provided. See EMR under Patient Instructions for exercises provided during therapy sessions    Assessment     Emphasized functional quadriceps motor control and strengthening this session using open and closed chain activities. Swelling of superior tibiofemoral joint likely limiting flexion ROM tolerance as tibiofemoral joint motion is good.       Raine Is progressing well towards her goals.   Pt prognosis is Good.     Pt will continue to benefit from skilled outpatient physical therapy to address the deficits listed in the problem list box on initial evaluation, provide pt/family education and to maximize pt's level of independence in the home and community environment.     Pt's spiritual, cultural and educational needs considered and pt agreeable to plan of care and goals.     Anticipated barriers to physical therapy: none    Goals:   Short Term Goals (3 Weeks):   1. Patient will be independent with home exercise program to supplement physical therapy treatment in improving functional status.  2. Patient will improve Rlower extremity strength to at least 75% of L lower extremity strength as measured via MicroFet handheld dynamometer to improve strength for closed chain tasks.   3. Patient will improve R knee active range of motion to 0-90 degrees to promote increased ease of sit<>stand transfers.  4. Patient will perform timed up and go with less restrictive assistive device in < 20 seconds to improve gait speed and safety with community ambulation.     Long Term Goals  (6 Weeks):   1. Patient will improve R lower extremity strength to at least 90% of L lower extremity strength as measured via MicroFet handheld dynamometer to improve strength for closed chain tasks.   2. Patient will improve the total FOTO Knee Survey Score to </= 20% limited to demonstrate increased perceived functional mobility.  3. Patient will perform timed up and go with least restrictive assistive device in < 13.5  seconds to improve gait speed and safety with community ambulation.  4. Patient will perform at least 15 sit to stands in 30 seconds without UE support to demonstrate increased functional strength.   5. Patient will improve R knee active range of motion to 0-120 degrees to promote higher level transfers and transitions without limitation.     Plan     Plan of care Certification: 6/7/2023 to 7/20/2023     Outpatient Physical Therapy 3 times weekly for 6 weeks to include the following interventions: Electrical Stimulation of LE, Gait Training, Manual Therapy, Neuromuscular Re-ed, Patient Education, Self Care, Therapeutic Activities, and Therapeutic Exercise.     Jacque Pride, PT

## 2023-06-29 ENCOUNTER — TELEPHONE (OUTPATIENT)
Dept: ORTHOPEDICS | Facility: CLINIC | Age: 68
End: 2023-06-29
Payer: COMMERCIAL

## 2023-06-29 NOTE — TELEPHONE ENCOUNTER
I called and spoke to the patient regarding the message below. I informed the patient and her daughter that we are waiting for Dr. Calvert to sign it and we will send it back.    The patient and her daughter verbalized understanding and has no further questions.     ----- Message from Rashaad Walker sent at 6/29/2023  1:37 PM CDT -----  Good afternoon,     They sent the forms to Dr. Calvert. He just has to sign them. He will sign them this week.     Sincerely,   Porter Walker MS, O[facility]  OR & Clinical Assistant to Dr. Sanford Calvert III  Phone: [phone]  Fax: [phone]    ----- Message -----  From: Haley Chamorro  Sent: 6/29/2023   1:33 PM CDT  To: Enrike Christina A Staff    Who Called: Pt    What is the request in detail: Requesting call back to discuss sending the short term disability information. Please advise    Can the clinic reply by [facility]? Yes    Best Call Back Number: [phone]      Additional Information:

## 2023-06-30 ENCOUNTER — CLINICAL SUPPORT (OUTPATIENT)
Dept: REHABILITATION | Facility: HOSPITAL | Age: 68
End: 2023-06-30
Payer: COMMERCIAL

## 2023-06-30 DIAGNOSIS — Z74.09 DECREASED FUNCTIONAL MOBILITY AND ENDURANCE: Primary | ICD-10-CM

## 2023-06-30 DIAGNOSIS — T84.89XA POSTOPERATIVE STIFFNESS OF TOTAL KNEE REPLACEMENT, INITIAL ENCOUNTER: ICD-10-CM

## 2023-06-30 DIAGNOSIS — Z96.659 POSTOPERATIVE STIFFNESS OF TOTAL KNEE REPLACEMENT, INITIAL ENCOUNTER: ICD-10-CM

## 2023-06-30 DIAGNOSIS — M25.561 ACUTE PAIN OF RIGHT KNEE: ICD-10-CM

## 2023-06-30 DIAGNOSIS — M25.669 POSTOPERATIVE STIFFNESS OF TOTAL KNEE REPLACEMENT, INITIAL ENCOUNTER: ICD-10-CM

## 2023-06-30 PROCEDURE — 97110 THERAPEUTIC EXERCISES: CPT | Mod: CQ,97

## 2023-06-30 PROCEDURE — 97140 MANUAL THERAPY 1/> REGIONS: CPT | Mod: CQ

## 2023-06-30 PROCEDURE — 97530 THERAPEUTIC ACTIVITIES: CPT | Mod: CQ,97

## 2023-06-30 PROCEDURE — 97112 NEUROMUSCULAR REEDUCATION: CPT | Mod: CQ,97

## 2023-06-30 NOTE — PROGRESS NOTES
[facility]   Physical Therapy Treatment Note      Name: Raine Medley  Clinic Number: [id number]    Therapy Diagnosis:   Encounter Diagnoses   Name Primary?    Decreased functional mobility and endurance Yes    Acute pain of right knee     Postoperative stiffness of total knee replacement, initial encounter      Physician: Sanford Calvert III, *    Visit Date: 6/30/2023    Physician Orders: PT Eval and Treat   Medical Diagnosis from Referral: M17.11 (ICD-10-CM) - Primary osteoarthritis of right knee   Evaluation Date: 6/7/2023  Authorization Period Expiration: 12/31/2023  Plan of Care Expiration: 7/20/2023  Progress Note Due: 6/29/2023  Visit # / Visits authorized: 8 / 20 + eval   FOTO: 1/3     Precautions: Standard   PTA Visit #: 1 / 5     Time In: 0900  Time Out: 1005  Total Treatment Time: 65 minutes  Total Billable Time: 60 minutes (1 TE, 1 MT, 1 TA, 1 NMR)    Subjective     Patient reports: when she walks she feels like her knee gets tight.  She was compliant with home exercise program.  Response to previous treatment: appropriate muscle response  Functional change: ongoing, able to get in and out of the car without assistance; ambulating long distances with RW; use of SPC at home     Pain: NT/10, currently  Location: Right knee      Objective      Objective Measures updated at progress report unless specified.   Date of Surgery: 6/5/2023  *Patient is 3 weeks, 4 days s/p R TKA as of 6/30/2023.*    6/8/20203: 1-0- 65 degrees; 80 degrees at end of session  6/12/2023: 0 -62 AROM  6/14/2023; 90 degrees Flexion in SEATED at EOM; 87 degrees AAROM with heel slides  6/16/2023; 87 degrees AAROM   6/19/2023; 80 degrees ACTIVE knee flexion ROM  6/21/2023; 86 degrees AROM Flexion; 89 degrees AAROM Flexion  6/23/2023; 0 - 100 degrees AAROM Flexion post bike and manual   Observation: Patient ambulates with RW. There is no heel strike at initial contact and minimal knee flexion during swing  phase. Decreased stance time on RLE observed. There is no visible redness along the Right LE. There is normal post op swelling along knee joint. Denies tenderness or pain at posterior calf. She has a Negative Chris's sign.  6/27/2023: 102 degrees post LLLD stretch   At end of session patient reported becoming dizzy with stomach pains; quick resolution of symptoms   BP: 122/70 mmHg; 77 bpm  6/28/2023: full revolutions around bike. Measured on table at 90 degrees flexion  6/30/2023; 102 degrees A/PROM post manual care; 105 degrees AAROM Flexion post heel slides; 107 degrees post session    Treatment     Raine received the treatments listed below:      therapeutic exercises to develop ROM and flexibility for 12 minutes including:  Aerobic Activity to improve knee flexion ROM, tissue extensibility, and mobility; Recumbent Bike for 5 minutes, half > full revolutions seat 5   - Recumbent Bike post session for 5 minutes at seat 5, FULL revolutions  Heel slides; 10 second hold, 10 reps    manual therapy techniques: Joint mobilizations, Myofacial release, and Soft tissue Mobilization were applied to the: Right knee for 20 minutes, including:  Patient education:   - ROM expectations with TKA  - compliance with HEP; emphasized importance of heel slides and long sitting hamstring/gastroc stretch  - swelling management with use of Tubigrip, ice and elevation    Grade II-III patella mobilizations  Grade II-III PA tibiofemoral mobilizations  Contract/relax at EOM  Tibiofemoral joint distraction at EOM    therapeutic activities to improve dynamic and functional performance for 15 minutes including activities below.  Shuttle double leg press: 50 lbs (2 black cords), 3 x 10 reps (seat cushion at 4 for knee flexion ROM)  Shuttle single leg press: 12 lbs (1 red cord), 3 x 10 reps (seat cushion at 4 for knee flexion ROM)  Step up onto 6-inch step; 2 x 10 reps leading with RLE (cueing to avoid hip circumduction to  compensate)    neuromuscular re-education activities to improve: Proprioception, Posture, and motor control for 18 minutes.  Short arc quads with medium bolster; 5 second hold, 30 reps 3 lbs  Long arc quads at EOM; 5 second hold, 30 reps 3 lbs  Contract/Relax on Matrix machine (105lbs to anchor);   - 10 second hold, 2 x 10 reps at #10 starting LE position   - 10 second hold, 1 x 10 reps at #11 starting LE position  TKE - next sesion    cold pack for 00 minutes to Right knee post session. (Not today.)    Patient Education and Home Exercises       Education provided:   - Compliance with HEP  - ROM expectations post TKA  - use of ice and elevation for swelling management  - importance of heel prop and heel slides to improve tissue extensibility and ROM    Written Home Exercises Provided: Patient instructed to cont prior HEP. Exercises were reviewed and Raine was able to demonstrate them prior to the end of the session.  Raine demonstrated good  understanding of the education provided. See EMR under Patient Instructions for exercises provided during therapy sessions    Assessment     Ms. Ni is 3 weeks, 4 days s/p R TKA. She presents ambulating with SPC; there is improvement in active knee flexion during swing phase of gait. She continues to have difficulty with knee flexion ROM. There is improvement in mobility post manual care. Achieved 107 degrees of knee flexion post session. Encouraged continued compliance with HEP to continue improving strength and tissue extensibility. Continue per POC towards treatment goals.   Raine Is progressing well towards her goals.   Pt prognosis is Good.     Pt will continue to benefit from skilled outpatient physical therapy to address the deficits listed in the problem list box on initial evaluation, provide pt/family education and to maximize pt's level of independence in the home and community environment.     Pt's spiritual, cultural and educational needs considered and pt agreeable  to plan of care and goals.     Anticipated barriers to physical therapy: none    Goals:   Short Term Goals (3 Weeks):   1. Patient will be independent with home exercise program to supplement physical therapy treatment in improving functional status.  2. Patient will improve Rlower extremity strength to at least 75% of L lower extremity strength as measured via MicroFet handheld dynamometer to improve strength for closed chain tasks.   3. Patient will improve R knee active range of motion to 0-90 degrees to promote increased ease of sit<>stand transfers.  4. Patient will perform timed up and go with less restrictive assistive device in < 20 seconds to improve gait speed and safety with community ambulation.     Long Term Goals (6 Weeks):   1. Patient will improve R lower extremity strength to at least 90% of L lower extremity strength as measured via MicroFet handheld dynamometer to improve strength for closed chain tasks.   2. Patient will improve the total FOTO Knee Survey Score to </= 20% limited to demonstrate increased perceived functional mobility.  3. Patient will perform timed up and go with least restrictive assistive device in < 13.5  seconds to improve gait speed and safety with community ambulation.  4. Patient will perform at least 15 sit to stands in 30 seconds without UE support to demonstrate increased functional strength.   5. Patient will improve R knee active range of motion to 0-120 degrees to promote higher level transfers and transitions without limitation.     Plan     Plan of care Certification: 6/7/2023 to 7/20/2023     Outpatient Physical Therapy 3 times weekly for 6 weeks to include the following interventions: Electrical Stimulation of LE, Gait Training, Manual Therapy, Neuromuscular Re-ed, Patient Education, Self Care, Therapeutic Activities, and Therapeutic Exercise.     Karis Clemens, PTA

## 2023-07-03 ENCOUNTER — CLINICAL SUPPORT (OUTPATIENT)
Dept: REHABILITATION | Facility: HOSPITAL | Age: 68
End: 2023-07-03
Payer: COMMERCIAL

## 2023-07-03 DIAGNOSIS — M25.669 POSTOPERATIVE STIFFNESS OF TOTAL KNEE REPLACEMENT, INITIAL ENCOUNTER: ICD-10-CM

## 2023-07-03 DIAGNOSIS — Z74.09 DECREASED FUNCTIONAL MOBILITY AND ENDURANCE: Primary | ICD-10-CM

## 2023-07-03 DIAGNOSIS — M25.561 ACUTE PAIN OF RIGHT KNEE: ICD-10-CM

## 2023-07-03 DIAGNOSIS — Z96.659 POSTOPERATIVE STIFFNESS OF TOTAL KNEE REPLACEMENT, INITIAL ENCOUNTER: ICD-10-CM

## 2023-07-03 DIAGNOSIS — T84.89XA POSTOPERATIVE STIFFNESS OF TOTAL KNEE REPLACEMENT, INITIAL ENCOUNTER: ICD-10-CM

## 2023-07-03 PROCEDURE — 97110 THERAPEUTIC EXERCISES: CPT | Mod: CQ

## 2023-07-03 PROCEDURE — 97140 MANUAL THERAPY 1/> REGIONS: CPT | Mod: CQ

## 2023-07-03 PROCEDURE — 97530 THERAPEUTIC ACTIVITIES: CPT | Mod: CQ

## 2023-07-03 PROCEDURE — 97112 NEUROMUSCULAR REEDUCATION: CPT | Mod: CQ

## 2023-07-03 NOTE — PROGRESS NOTES
[facility]   Physical Therapy Treatment Note      Name: Raine Medley  Clinic Number: [id number]    Therapy Diagnosis:   Encounter Diagnoses   Name Primary?    Decreased functional mobility and endurance Yes    Acute pain of right knee     Postoperative stiffness of total knee replacement, initial encounter        Physician: Sanford Calvert III, *    Visit Date: 7/3/2023    Physician Orders: PT Eval and Treat   Medical Diagnosis from Referral: M17.11 (ICD-10-CM) - Primary osteoarthritis of right knee   Evaluation Date: 6/7/2023  Authorization Period Expiration: 12/31/2023  Plan of Care Expiration: 7/20/2023  Progress Note Due: 6/29/2023  Visit # / Visits authorized: 9 / 20 + eval   FOTO: 1/3     Precautions: Standard   PTA Visit #: 2 / 5     Time In: 11:07 am   Time Out: 12:00 pm  Total Treatment Time: 53 minutes  Total Billable Time: 53 minutes (1 TE, 1 MT, 1 TA, 1 NMR)    Subjective     Patient reports: its feeling a little bit better today .   She was compliant with home exercise program.  Response to previous treatment: appropriate muscle response  Functional change: ongoing, able to get in and out of the car without assistance; ambulating long distances with RW; use of SPC at home     Pain: NT/10, currently  Location: Right knee      Objective      Objective Measures updated at progress report unless specified.   Date of Surgery: 6/5/2023  *Patient is 4 weeks s/p R TKA as of 7/3/2023.*    6/8/20203: 1-0- 65 degrees; 80 degrees at end of session  6/12/2023: 0 -62 AROM  6/14/2023; 90 degrees Flexion in SEATED at EOM; 87 degrees AAROM with heel slides  6/16/2023; 87 degrees AAROM   6/19/2023; 80 degrees ACTIVE knee flexion ROM  6/21/2023; 86 degrees AROM Flexion; 89 degrees AAROM Flexion  6/23/2023; 0 - 100 degrees AAROM Flexion post bike and manual   Observation: Patient ambulates with RW. There is no heel strike at initial contact and minimal knee flexion during swing phase.  Decreased stance time on RLE observed. There is no visible redness along the Right LE. There is normal post op swelling along knee joint. Denies tenderness or pain at posterior calf. She has a Negative Chris's sign.  6/27/2023: 102 degrees post LLLD stretch   At end of session patient reported becoming dizzy with stomach pains; quick resolution of symptoms   BP: 122/70 mmHg; 77 bpm  6/28/2023: full revolutions around bike. Measured on table at 90 degrees flexion  6/30/2023; 102 degrees A/PROM post manual care; 105 degrees AAROM Flexion post heel slides; 107 degrees post session  7/3/2023: 96 degrees PROM    Treatment     Raine received the treatments listed below:      therapeutic exercises to develop ROM and flexibility for 10 minutes including:  Aerobic Activity to improve knee flexion ROM, tissue extensibility, and mobility; Recumbent Bike for 5 minutes, half > full revolutions seat 5   - Recumbent Bike post session for 5 minutes at seat 5, FULL revolutions- NP  Heel slides; 10 second hold, 10 reps    manual therapy techniques: Joint mobilizations, Myofacial release, and Soft tissue Mobilization were applied to the: Right knee for 15 minutes, including:  Patient education:   - ROM expectations with TKA  - compliance with HEP; emphasized importance of heel slides and long sitting hamstring/gastroc stretch  - swelling management with use of Tubigrip, ice and elevation    Grade II-III patella mobilizations  Grade II-III PA tibiofemoral mobilizations  Contract/relax at EOM  Tibiofemoral joint distraction at EOM    therapeutic activities to improve dynamic and functional performance for 15 minutes including activities below.  Shuttle double leg press: 50 lbs (2 black cords), 3 x 10 reps (seat cushion at 4 for knee flexion ROM)  Shuttle single leg press: 25 lbs (1 black cord), 3 x 10 reps (seat cushion at 4 for knee flexion ROM)  Step up onto 6-inch step; 2 x 10 reps leading with RLE (cueing to avoid hip circumduction  to compensate)    neuromuscular re-education activities to improve: Proprioception, Posture, and motor control for 13 minutes.  Short arc quads with medium bolster; 5 second hold, 30 reps 3 lbs  Long arc quads at EOM; 5 second hold, 30 reps 3 lbs  TKE with GTB : 2 x 10 reps , 5'' hold     Not Today :   Contract/Relax on Matrix machine (105lbs to anchor);   - 10 second hold, 2 x 10 reps at #10 starting LE position   - 10 second hold, 1 x 10 reps at #11 starting LE position    cold pack for 00 minutes to Right knee post session. (Not today.)    Patient Education and Home Exercises       Education provided:   - Compliance with HEP  - ROM expectations post TKA  - use of ice and elevation for swelling management  - importance of heel prop and heel slides to improve tissue extensibility and ROM    Written Home Exercises Provided: Patient instructed to cont prior HEP. Exercises were reviewed and Raine was able to demonstrate them prior to the end of the session.  Raine demonstrated good  understanding of the education provided. See EMR under Patient Instructions for exercises provided during therapy sessions    Assessment     Pt presents s/p 4 weeks TKA. She continues to have pain and guarding with knee flexion ROM . Noted increased quadriceps soft tissue restrictions. She responds well to manual interventions performed .  Encouraged continued compliance with HEP to continue improving strength and tissue extensibility. Continue per POC towards treatment goals.   Raine Is progressing well towards her goals.   Pt prognosis is Good.     Pt will continue to benefit from skilled outpatient physical therapy to address the deficits listed in the problem list box on initial evaluation, provide pt/family education and to maximize pt's level of independence in the home and community environment.   Pt's spiritual, cultural and educational needs considered and pt agreeable to plan of care and goals.     Anticipated barriers to physical  therapy: none    Goals:   Short Term Goals (3 Weeks):   1. Patient will be independent with home exercise program to supplement physical therapy treatment in improving functional status.  2. Patient will improve Rlower extremity strength to at least 75% of L lower extremity strength as measured via MicroFet handheld dynamometer to improve strength for closed chain tasks.   3. Patient will improve R knee active range of motion to 0-90 degrees to promote increased ease of sit<>stand transfers.  4. Patient will perform timed up and go with less restrictive assistive device in < 20 seconds to improve gait speed and safety with community ambulation.     Long Term Goals (6 Weeks):   1. Patient will improve R lower extremity strength to at least 90% of L lower extremity strength as measured via MicroFet handheld dynamometer to improve strength for closed chain tasks.   2. Patient will improve the total FOTO Knee Survey Score to </= 20% limited to demonstrate increased perceived functional mobility.  3. Patient will perform timed up and go with least restrictive assistive device in < 13.5  seconds to improve gait speed and safety with community ambulation.  4. Patient will perform at least 15 sit to stands in 30 seconds without UE support to demonstrate increased functional strength.   5. Patient will improve R knee active range of motion to 0-120 degrees to promote higher level transfers and transitions without limitation.     Plan     Plan of care Certification: 6/7/2023 to 7/20/2023     Outpatient Physical Therapy 3 times weekly for 6 weeks to include the following interventions: Electrical Stimulation of LE, Gait Training, Manual Therapy, Neuromuscular Re-ed, Patient Education, Self Care, Therapeutic Activities, and Therapeutic Exercise.     Aline Ibanez, PTA

## 2023-07-05 ENCOUNTER — CLINICAL SUPPORT (OUTPATIENT)
Dept: REHABILITATION | Facility: HOSPITAL | Age: 68
End: 2023-07-05
Payer: COMMERCIAL

## 2023-07-05 DIAGNOSIS — M25.561 ACUTE PAIN OF RIGHT KNEE: ICD-10-CM

## 2023-07-05 DIAGNOSIS — Z74.09 DECREASED FUNCTIONAL MOBILITY AND ENDURANCE: Primary | ICD-10-CM

## 2023-07-05 DIAGNOSIS — Z96.659 POSTOPERATIVE STIFFNESS OF TOTAL KNEE REPLACEMENT, INITIAL ENCOUNTER: ICD-10-CM

## 2023-07-05 DIAGNOSIS — M25.669 POSTOPERATIVE STIFFNESS OF TOTAL KNEE REPLACEMENT, INITIAL ENCOUNTER: ICD-10-CM

## 2023-07-05 DIAGNOSIS — T84.89XA POSTOPERATIVE STIFFNESS OF TOTAL KNEE REPLACEMENT, INITIAL ENCOUNTER: ICD-10-CM

## 2023-07-05 PROCEDURE — 97112 NEUROMUSCULAR REEDUCATION: CPT

## 2023-07-05 PROCEDURE — 97530 THERAPEUTIC ACTIVITIES: CPT

## 2023-07-05 PROCEDURE — 97110 THERAPEUTIC EXERCISES: CPT

## 2023-07-05 PROCEDURE — 97140 MANUAL THERAPY 1/> REGIONS: CPT

## 2023-07-05 NOTE — PROGRESS NOTES
[facility]   Physical Therapy Treatment Note      Name: Raine Medley  Clinic Number: [id number]    Therapy Diagnosis:   Encounter Diagnoses   Name Primary?    Decreased functional mobility and endurance Yes    Acute pain of right knee     Postoperative stiffness of total knee replacement, initial encounter          Physician: Sanford Calvert III, *    Visit Date: 7/5/2023    Physician Orders: PT Eval and Treat   Medical Diagnosis from Referral: M17.11 (ICD-10-CM) - Primary osteoarthritis of right knee   Evaluation Date: 6/7/2023  Authorization Period Expiration: 12/31/2023  Plan of Care Expiration: 7/20/2023  Progress Note Due: 6/29/2023  Visit # / Visits authorized: 11 / 20 + eval   FOTO: 1/3     Precautions: Standard   PTA Visit #: 0 / 5     Time In: 9:17  am   Time Out: 10:05 am   Total Treatment Time: 48 minutes  Total Billable Time: 43 minutes (1 TE, 1 MT, 1 TA, 1 NMR)    Subjective     Patient reports: she is late due to traffic.  She was compliant with home exercise program.  Response to previous treatment: appropriate muscle response  Functional change: ongoing, able to get in and out of the car without assistance; ambulating long distances with RW; use of SPC at home     Pain: NT/10, currently  Location: Right knee      Objective      Objective Measures updated at progress report unless specified.   Date of Surgery: 6/5/2023  *Patient is 4 weeks s/p R TKA as of 7/3/2023.*    6/8/20203: 1-0- 65 degrees; 80 degrees at end of session  6/12/2023: 0 -62 AROM  6/14/2023; 90 degrees Flexion in SEATED at EOM; 87 degrees AAROM with heel slides  6/16/2023; 87 degrees AAROM   6/19/2023; 80 degrees ACTIVE knee flexion ROM  6/21/2023; 86 degrees AROM Flexion; 89 degrees AAROM Flexion  6/23/2023; 0 - 100 degrees AAROM Flexion post bike and manual   Observation: Patient ambulates with RW. There is no heel strike at initial contact and minimal knee flexion during swing phase. Decreased  stance time on RLE observed. There is no visible redness along the Right LE. There is normal post op swelling along knee joint. Denies tenderness or pain at posterior calf. She has a Negative Chris's sign.  6/27/2023: 102 degrees post LLLD stretch   At end of session patient reported becoming dizzy with stomach pains; quick resolution of symptoms   BP: 122/70 mmHg; 77 bpm  6/28/2023: full revolutions around bike. Measured on table at 90 degrees flexion  6/30/2023; 102 degrees A/PROM post manual care; 105 degrees AAROM Flexion post heel slides; 107 degrees post session  7/3/2023: 96 degrees PROM    7/5/2023: 105 AAROM    Treatment     Raine received the treatments listed below:      therapeutic exercises to develop ROM and flexibility for 10 minutes including:  Aerobic Activity to improve knee flexion ROM, tissue extensibility, and mobility; Recumbent Bike post session for 5 minutes at seat 5, FULL revolutions  Heel slides; 10 second hold, 10 reps  Prone knee flexion stretch: 30 seconds, 3x    manual therapy techniques: Joint mobilizations, Myofacial release, and Soft tissue Mobilization were applied to the: Right knee for 10 minutes, including:  Patient education:   - ROM expectations with TKA  - compliance with HEP; emphasized importance of heel slides and long sitting hamstring/gastroc stretch  - swelling management with use of Tubigrip, ice and elevation    Grade II-III patella mobilizations  Grade II-III PA tibiofemoral mobilizations  Contract/relax in prone    therapeutic activities to improve dynamic and functional performance for 15 minutes including activities below.  Shuttle double leg press: 50 lbs (2 black cords), 3 x 10 reps (seat cushion at 4 for knee flexion ROM)  Shuttle single leg press: 25 lbs (1 black cord), 3 x 10 reps (seat cushion at 4 for knee flexion ROM)  Step up onto 6-inch step; 3 x 10 reps leading with RLE (cueing to avoid hip circumduction to compensate)    neuromuscular re-education  activities to improve: Proprioception, Posture, and motor control for 8 minutes.  MATRIX: hamstring curl: double leg for flexion, hold 5 seconds, R SL return to extension: 2x10      Not Today :   Contract/Relax on Matrix machine (105lbs to anchor);   - 10 second hold, 2 x 10 reps at #10 starting LE position   - 10 second hold, 1 x 10 reps at #11 starting LE position  Short arc quads with medium bolster; 5 second hold, 30 reps 3 lbs  Long arc quads at EOM; 5 second hold, 30 reps 3 lbs  TKE with GTB : 2 x 10 reps , 5'' hold     cold pack for 00 minutes to Right knee post session. (Not today.)    Patient Education and Home Exercises       Education provided:   - Compliance with HEP  - ROM expectations post TKA  - use of ice and elevation for swelling management  - importance of heel prop and heel slides to improve tissue extensibility and ROM    Written Home Exercises Provided: Patient instructed to cont prior HEP. Exercises were reviewed and Raine was able to demonstrate them prior to the end of the session.  Raine demonstrated good  understanding of the education provided. See EMR under Patient Instructions for exercises provided during therapy sessions    Assessment     Swelling local to superior tibiofemoral/patellofemoral joint likely limiting flexion range of motion at this time, however patient guarding present with end range flexion ROM. Good tibiofemoral and patellofemoral joint mobility. Tenderness noted to distal medial and lateral hamstring mm with concordant pain elicited. Continues to ambulate with antalgic gait; no observable change with or without AD.      Raine Is progressing well towards her goals.   Pt prognosis is Good.     Pt will continue to benefit from skilled outpatient physical therapy to address the deficits listed in the problem list box on initial evaluation, provide pt/family education and to maximize pt's level of independence in the home and community environment.   Pt's spiritual, cultural  and educational needs considered and pt agreeable to plan of care and goals.     Anticipated barriers to physical therapy: none    Goals:   Short Term Goals (3 Weeks):   1. Patient will be independent with home exercise program to supplement physical therapy treatment in improving functional status.  2. Patient will improve Rlower extremity strength to at least 75% of L lower extremity strength as measured via MicroFet handheld dynamometer to improve strength for closed chain tasks.   3. Patient will improve R knee active range of motion to 0-90 degrees to promote increased ease of sit<>stand transfers.  4. Patient will perform timed up and go with less restrictive assistive device in < 20 seconds to improve gait speed and safety with community ambulation.     Long Term Goals (6 Weeks):   1. Patient will improve R lower extremity strength to at least 90% of L lower extremity strength as measured via MicroFet handheld dynamometer to improve strength for closed chain tasks.   2. Patient will improve the total FOTO Knee Survey Score to </= 20% limited to demonstrate increased perceived functional mobility.  3. Patient will perform timed up and go with least restrictive assistive device in < 13.5  seconds to improve gait speed and safety with community ambulation.  4. Patient will perform at least 15 sit to stands in 30 seconds without UE support to demonstrate increased functional strength.   5. Patient will improve R knee active range of motion to 0-120 degrees to promote higher level transfers and transitions without limitation.     Plan     Plan of care Certification: 6/7/2023 to 7/20/2023     Outpatient Physical Therapy 3 times weekly for 6 weeks to include the following interventions: Electrical Stimulation of LE, Gait Training, Manual Therapy, Neuromuscular Re-ed, Patient Education, Self Care, Therapeutic Activities, and Therapeutic Exercise.     Jacque Pride, PT

## 2023-07-07 ENCOUNTER — CLINICAL SUPPORT (OUTPATIENT)
Dept: REHABILITATION | Facility: HOSPITAL | Age: 68
End: 2023-07-07
Payer: COMMERCIAL

## 2023-07-07 DIAGNOSIS — M25.669 POSTOPERATIVE STIFFNESS OF TOTAL KNEE REPLACEMENT, INITIAL ENCOUNTER: ICD-10-CM

## 2023-07-07 DIAGNOSIS — Z74.09 DECREASED FUNCTIONAL MOBILITY AND ENDURANCE: Primary | ICD-10-CM

## 2023-07-07 DIAGNOSIS — T84.89XA POSTOPERATIVE STIFFNESS OF TOTAL KNEE REPLACEMENT, INITIAL ENCOUNTER: ICD-10-CM

## 2023-07-07 DIAGNOSIS — M25.561 ACUTE PAIN OF RIGHT KNEE: ICD-10-CM

## 2023-07-07 DIAGNOSIS — Z96.659 POSTOPERATIVE STIFFNESS OF TOTAL KNEE REPLACEMENT, INITIAL ENCOUNTER: ICD-10-CM

## 2023-07-07 PROCEDURE — 97140 MANUAL THERAPY 1/> REGIONS: CPT

## 2023-07-07 PROCEDURE — 97110 THERAPEUTIC EXERCISES: CPT

## 2023-07-07 PROCEDURE — 97530 THERAPEUTIC ACTIVITIES: CPT

## 2023-07-07 PROCEDURE — 97112 NEUROMUSCULAR REEDUCATION: CPT

## 2023-07-07 NOTE — PROGRESS NOTES
[facility]   Physical Therapy Treatment Note      Name: Raine Medley  Clinic Number: [id number]    Therapy Diagnosis:   Encounter Diagnoses   Name Primary?    Decreased functional mobility and endurance Yes    Acute pain of right knee     Postoperative stiffness of total knee replacement, initial encounter          Physician: Sanford Calvert III, *    Visit Date: 7/7/2023    Physician Orders: PT Eval and Treat   Medical Diagnosis from Referral: M17.11 (ICD-10-CM) - Primary osteoarthritis of right knee   Evaluation Date: 6/7/2023  Authorization Period Expiration: 12/31/2023  Plan of Care Expiration: 7/20/2023  Progress Note Due: 6/29/2023  Visit # / Visits authorized: 11 / 20 + eval   FOTO: 1/3     Precautions: Standard   PTA Visit #: 0 / 5     Time In: 9:00 am   Time Out:9:58  am   Total Treatment Time: 58 minutes  Total Billable Time: 58 minutes (1 TE, 1 MT, 1 TA, 1 NMR)    Subjective     Patient reports: doing okay today; has been working on bending her knee  She was compliant with home exercise program.  Response to previous treatment: appropriate muscle response  Functional change: ongoing, able to get in and out of the car without assistance; ambulating long distances with RW; use of SPC at home     Pain: NT/10, currently  Location: Right knee      Objective      Objective Measures updated at progress report unless specified.   Date of Surgery: 6/5/2023  *Patient is 4 weeks s/p R TKA as of 7/3/2023.*    6/8/20203: 1-0- 65 degrees; 80 degrees at end of session  6/12/2023: 0 -62 AROM  6/14/2023; 90 degrees Flexion in SEATED at EOM; 87 degrees AAROM with heel slides  6/16/2023; 87 degrees AAROM   6/19/2023; 80 degrees ACTIVE knee flexion ROM  6/21/2023; 86 degrees AROM Flexion; 89 degrees AAROM Flexion  6/23/2023; 0 - 100 degrees AAROM Flexion post bike and manual   Observation: Patient ambulates with RW. There is no heel strike at initial contact and minimal knee flexion  during swing phase. Decreased stance time on RLE observed. There is no visible redness along the Right LE. There is normal post op swelling along knee joint. Denies tenderness or pain at posterior calf. She has a Negative Chris's sign.  6/27/2023: 102 degrees post LLLD stretch   At end of session patient reported becoming dizzy with stomach pains; quick resolution of symptoms   BP: 122/70 mmHg; 77 bpm  6/28/2023: full revolutions around bike. Measured on table at 90 degrees flexion  6/30/2023; 102 degrees A/PROM post manual care; 105 degrees AAROM Flexion post heel slides; 107 degrees post session  7/3/2023: 96 degrees PROM    7/5/2023: 105 AAROM    7/7/2023: 0-110 flexion AAROM    Treatment     Raine received the treatments listed below:      therapeutic exercises to develop ROM and flexibility for 10 minutes including:  Aerobic Activity to improve knee flexion ROM, tissue extensibility, and mobility; Recumbent Bike post session for 5 minutes at seat 5, FULL revolutions  Heel slides; 10 second hold, 10 reps  Prone knee flexion stretch: 30 seconds, 3x - not today    manual therapy techniques: Joint mobilizations, Myofacial release, and Soft tissue Mobilization were applied to the: Right knee for 15 minutes, including:  Patient education:   - ROM expectations with TKA  - compliance with HEP; emphasized importance of heel slides and long sitting hamstring/gastroc stretch  - swelling management with use of Tubigrip, ice and elevation    Grade II-III patella mobilizations  Grade II-III PA tibiofemoral mobilizations    therapeutic activities to improve dynamic and functional performance for 23 minutes including activities below.  Shuttle double leg press: 75 lbs (3 black cords), 3 x 15 reps (seat cushion at 4 for knee flexion ROM)  Shuttle single leg press: 37.5 lbs (1 black and 1 red cord), 3 x 10 reps (seat cushion at 4 for knee flexion ROM)  Step up onto 6-inch step; 3 x 10 reps leading with RLE (cueing to avoid hip  circumduction to compensate)    neuromuscular re-education activities to improve: Proprioception, Posture, and motor control for 15 minutes.  Long arc quads at EOM; 5 second hold, 30 reps 5 lbs  TKE with GTB : 2 x 10 reps , 5'' hold     cold pack for 00 minutes to Right knee post session. (Not today.)    Patient Education and Home Exercises       Education provided:   - Compliance with HEP  - ROM expectations post TKA  - use of ice and elevation for swelling management  - importance of heel prop and heel slides to improve tissue extensibility and ROM    Written Home Exercises Provided: Patient instructed to cont prior HEP. Exercises were reviewed and Raine was able to demonstrate them prior to the end of the session.  Raine demonstrated good  understanding of the education provided. See EMR under Patient Instructions for exercises provided during therapy sessions    Assessment     Patient demonstrated 0-110 degrees of AAROM at this time. Increased resistance on several activities to address strength and motor control of right quadriceps. Progressing well per phase of rehab.      Raine Is progressing well towards her goals.   Pt prognosis is Good.     Pt will continue to benefit from skilled outpatient physical therapy to address the deficits listed in the problem list box on initial evaluation, provide pt/family education and to maximize pt's level of independence in the home and community environment.   Pt's spiritual, cultural and educational needs considered and pt agreeable to plan of care and goals.     Anticipated barriers to physical therapy: none    Goals:   Short Term Goals (3 Weeks):   1. Patient will be independent with home exercise program to supplement physical therapy treatment in improving functional status.  2. Patient will improve Rlower extremity strength to at least 75% of L lower extremity strength as measured via MicroFet handheld dynamometer to improve strength for closed chain tasks.   3.  Patient will improve R knee active range of motion to 0-90 degrees to promote increased ease of sit<>stand transfers.  4. Patient will perform timed up and go with less restrictive assistive device in < 20 seconds to improve gait speed and safety with community ambulation.     Long Term Goals (6 Weeks):   1. Patient will improve R lower extremity strength to at least 90% of L lower extremity strength as measured via MicroFet handheld dynamometer to improve strength for closed chain tasks.   2. Patient will improve the total FOTO Knee Survey Score to </= 20% limited to demonstrate increased perceived functional mobility.  3. Patient will perform timed up and go with least restrictive assistive device in < 13.5  seconds to improve gait speed and safety with community ambulation.  4. Patient will perform at least 15 sit to stands in 30 seconds without UE support to demonstrate increased functional strength.   5. Patient will improve R knee active range of motion to 0-120 degrees to promote higher level transfers and transitions without limitation.     Plan     Plan of care Certification: 6/7/2023 to 7/20/2023     Outpatient Physical Therapy 3 times weekly for 6 weeks to include the following interventions: Electrical Stimulation of LE, Gait Training, Manual Therapy, Neuromuscular Re-ed, Patient Education, Self Care, Therapeutic Activities, and Therapeutic Exercise.     Jacque Pride, PT

## 2023-07-10 ENCOUNTER — CLINICAL SUPPORT (OUTPATIENT)
Dept: REHABILITATION | Facility: HOSPITAL | Age: 68
End: 2023-07-10
Payer: COMMERCIAL

## 2023-07-10 DIAGNOSIS — M25.669 POSTOPERATIVE STIFFNESS OF TOTAL KNEE REPLACEMENT, INITIAL ENCOUNTER: ICD-10-CM

## 2023-07-10 DIAGNOSIS — Z96.659 POSTOPERATIVE STIFFNESS OF TOTAL KNEE REPLACEMENT, INITIAL ENCOUNTER: ICD-10-CM

## 2023-07-10 DIAGNOSIS — T84.89XA POSTOPERATIVE STIFFNESS OF TOTAL KNEE REPLACEMENT, INITIAL ENCOUNTER: ICD-10-CM

## 2023-07-10 DIAGNOSIS — M25.561 ACUTE PAIN OF RIGHT KNEE: ICD-10-CM

## 2023-07-10 DIAGNOSIS — Z74.09 DECREASED FUNCTIONAL MOBILITY AND ENDURANCE: Primary | ICD-10-CM

## 2023-07-10 PROCEDURE — 97530 THERAPEUTIC ACTIVITIES: CPT | Mod: CQ

## 2023-07-10 PROCEDURE — 97140 MANUAL THERAPY 1/> REGIONS: CPT | Mod: CQ

## 2023-07-10 PROCEDURE — 97110 THERAPEUTIC EXERCISES: CPT | Mod: CQ

## 2023-07-10 PROCEDURE — 97112 NEUROMUSCULAR REEDUCATION: CPT | Mod: CQ

## 2023-07-10 NOTE — PROGRESS NOTES
[facility]   Physical Therapy Treatment Note      Name: Raine Medley  Clinic Number: [id number]    Therapy Diagnosis:   Encounter Diagnoses   Name Primary?    Decreased functional mobility and endurance Yes    Acute pain of right knee     Postoperative stiffness of total knee replacement, initial encounter      Physician: Sanford Calvert III, *    Visit Date: 7/10/2023    Physician Orders: PT Eval and Treat   Medical Diagnosis from Referral: M17.11 (ICD-10-CM) - Primary osteoarthritis of right knee   Evaluation Date: 6/7/2023  Authorization Period Expiration: 12/31/2023  Plan of Care Expiration: 7/20/2023  Progress Note Due: 6/29/2023  Visit # / Visits authorized: 14 / 20 + eval   FOTO: 1/3     Precautions: Standard   PTA Visit #: 1 / 5     Time In: 0900  Time Out: 1003   Total Treatment Time: 63 minutes  Total Billable Time: 53 minutes (2 TA, 1 NMR, 1 TE, 1 MT)    Subjective     Patient reports: that she had a lot of pain last night that she describes as a cramp and ankle pain.  She was compliant with home exercise program.  Response to previous treatment: appropriate muscle response  Functional change: ongoing, able to get in and out of the car without assistance; ambulating long distances with RW; use of SPC at home     Pain: NT/10, currently  Location: Right knee      Objective      Objective Measures updated at progress report unless specified.   Date of Surgery: 6/5/2023  *Patient is 5 weeks s/p R TKA as of 7/10/2023.*    6/8/20203: 1-0- 65 degrees; 80 degrees at end of session  6/12/2023: 0 -62 AROM  6/14/2023; 90 degrees Flexion in SEATED at EOM; 87 degrees AAROM with heel slides  6/16/2023; 87 degrees AAROM   6/19/2023; 80 degrees ACTIVE knee flexion ROM  6/21/2023; 86 degrees AROM Flexion; 89 degrees AAROM Flexion  6/23/2023; 0 - 100 degrees AAROM Flexion post bike and manual   Observation: Patient ambulates with RW. There is no heel strike at initial contact and  minimal knee flexion during swing phase. Decreased stance time on RLE observed. There is no visible redness along the Right LE. There is normal post op swelling along knee joint. Denies tenderness or pain at posterior calf. She has a Negative Chris's sign.  6/27/2023: 102 degrees post LLLD stretch   At end of session patient reported becoming dizzy with stomach pains; quick resolution of symptoms   BP: 122/70 mmHg; 77 bpm  6/28/2023: full revolutions around bike. Measured on table at 90 degrees flexion  6/30/2023; 102 degrees A/PROM post manual care; 105 degrees AAROM Flexion post heel slides; 107 degrees post session  7/3/2023: 96 degrees PROM  7/5/2023: 105 AAROM  7/7/2023: 0-110 flexion AAROM  7/10/2023; 100 degrees AAROM Flexion; medial knee pain noted, unable to push    Treatment     Raine received the treatments listed below:      therapeutic exercises to develop ROM and flexibility for 10 minutes including:  Aerobic Activity to improve knee flexion ROM, tissue extensibility, and mobility; Recumbent Bike post session for 5 minutes at seat 5, FULL revolutions  Heel slides; 10 second hold, 10 reps  Prone knee flexion stretch: 30 seconds, 3x - not today    manual therapy techniques: Joint mobilizations, Myofacial release, and Soft tissue Mobilization were applied to the: Right knee for 10 minutes, including:  Patient education:   - ROM expectations with TKA  - compliance with HEP; emphasized importance of heel slides and long sitting hamstring/gastroc stretch  - swelling management with use of Tubigrip, ice and elevation    Grade II-III patella mobilizations  Grade II-III PA tibiofemoral mobilizations    therapeutic activities to improve dynamic and functional performance for 23 minutes including activities below.  Shuttle double leg press: 75 lbs (3 black cords), 3 x 15 reps (seat cushion at 4 for knee flexion ROM)  Shuttle single leg press: 37.5 lbs (1 black and 1 red cord), 3 x 10 reps (seat cushion at 4 for  knee flexion ROM)   Step up onto 6-inch step; 3 x 10 reps leading with RLE (cueing to avoid hip circumduction to compensate)    neuromuscular re-education activities to improve: Proprioception, Posture, and motor control for 10 minutes.  Long arc quads at EOM; 5 second hold, 30 reps + 5 lb AW  TKE with Green TB; 3 x 10 reps, 5 second hold     cold pack for 10 minutes to Right knee post session.     Patient Education and Home Exercises       Education provided:   - Compliance with HEP  - ROM expectations post TKA  - use of ice and elevation for swelling management  - importance of heel prop and heel slides to improve tissue extensibility and ROM    Written Home Exercises Provided: Patient instructed to cont prior HEP. Exercises were reviewed and Raine was able to demonstrate them prior to the end of the session.  Raine demonstrated good  understanding of the education provided. See EMR under Patient Instructions for exercises provided during therapy sessions    Assessment     Raine is 5 weeks s/p R TKA. She presents ambulating with SPC; there is visible circumduction of Right LE with no knee flexion during swing phase secondary to patient report of stiffness and discomfort. Reports medial knee pain and joint discomfort with heel slides which limits her knee flexion ROM. She achieved 100 degrees of AAROM today with heel slides, however is able to complete full revolutions on recumbent bike with no difficulty.   Raine Is progressing well towards her goals.   Pt prognosis is Good.     Pt will continue to benefit from skilled outpatient physical therapy to address the deficits listed in the problem list box on initial evaluation, provide pt/family education and to maximize pt's level of independence in the home and community environment.   Pt's spiritual, cultural and educational needs considered and pt agreeable to plan of care and goals.     Anticipated barriers to physical therapy: none    Goals:   Short Term Goals (3  Weeks):   1. Patient will be independent with home exercise program to supplement physical therapy treatment in improving functional status.  2. Patient will improve Rlower extremity strength to at least 75% of L lower extremity strength as measured via MicroFet handheld dynamometer to improve strength for closed chain tasks.   3. Patient will improve R knee active range of motion to 0-90 degrees to promote increased ease of sit<>stand transfers.  4. Patient will perform timed up and go with less restrictive assistive device in < 20 seconds to improve gait speed and safety with community ambulation.     Long Term Goals (6 Weeks):   1. Patient will improve R lower extremity strength to at least 90% of L lower extremity strength as measured via MicroFet handheld dynamometer to improve strength for closed chain tasks.   2. Patient will improve the total FOTO Knee Survey Score to </= 20% limited to demonstrate increased perceived functional mobility.  3. Patient will perform timed up and go with least restrictive assistive device in < 13.5  seconds to improve gait speed and safety with community ambulation.  4. Patient will perform at least 15 sit to stands in 30 seconds without UE support to demonstrate increased functional strength.   5. Patient will improve R knee active range of motion to 0-120 degrees to promote higher level transfers and transitions without limitation.     Plan     Plan of care Certification: 6/7/2023 to 7/20/2023     Outpatient Physical Therapy 3 times weekly for 6 weeks to include the following interventions: Electrical Stimulation of LE, Gait Training, Manual Therapy, Neuromuscular Re-ed, Patient Education, Self Care, Therapeutic Activities, and Therapeutic Exercise.     Karis Clemens, PTA

## 2023-07-13 ENCOUNTER — CLINICAL SUPPORT (OUTPATIENT)
Dept: REHABILITATION | Facility: HOSPITAL | Age: 68
End: 2023-07-13
Payer: COMMERCIAL

## 2023-07-13 DIAGNOSIS — T84.89XA POSTOPERATIVE STIFFNESS OF TOTAL KNEE REPLACEMENT, INITIAL ENCOUNTER: ICD-10-CM

## 2023-07-13 DIAGNOSIS — M25.669 POSTOPERATIVE STIFFNESS OF TOTAL KNEE REPLACEMENT, INITIAL ENCOUNTER: ICD-10-CM

## 2023-07-13 DIAGNOSIS — Z96.659 POSTOPERATIVE STIFFNESS OF TOTAL KNEE REPLACEMENT, INITIAL ENCOUNTER: ICD-10-CM

## 2023-07-13 DIAGNOSIS — Z74.09 DECREASED FUNCTIONAL MOBILITY AND ENDURANCE: Primary | ICD-10-CM

## 2023-07-13 DIAGNOSIS — M25.561 ACUTE PAIN OF RIGHT KNEE: ICD-10-CM

## 2023-07-13 PROCEDURE — 97112 NEUROMUSCULAR REEDUCATION: CPT | Mod: CQ

## 2023-07-13 PROCEDURE — 97110 THERAPEUTIC EXERCISES: CPT | Mod: CQ

## 2023-07-13 PROCEDURE — 97140 MANUAL THERAPY 1/> REGIONS: CPT | Mod: CQ

## 2023-07-13 NOTE — PROGRESS NOTES
"[facility]   Physical Therapy Treatment Note      Name: Raine Medley  Clinic Number: [id number]    Therapy Diagnosis:   Encounter Diagnoses   Name Primary?    Decreased functional mobility and endurance Yes    Acute pain of right knee     Postoperative stiffness of total knee replacement, initial encounter      Physician: Sanford Calvert III, *    Visit Date: 7/13/2023    Physician Orders: PT Eval and Treat   Medical Diagnosis from Referral: M17.11 (ICD-10-CM) - Primary osteoarthritis of right knee   Evaluation Date: 6/7/2023  Authorization Period Expiration: 12/31/2023  Plan of Care Expiration: 7/20/2023  Progress Note Due: 6/29/2023  Visit # / Visits authorized: 15 / 20 + eval   FOTO: 2/3     Precautions: Standard   PTA Visit #: 2 / 5     Time In: 0900  Time Out: 0946   Total Treatment Time: 46 minutes  Total Billable Time: 46 minutes (1 TE, 1 MT, 1 NMR)    Subjective     Patient reports: that the night time is worse for her when she has the most pain. Notes "a little" pain along the inside of her knee. She follows up with the MD on the 18th. She goes up and down 25 steps at work 4-5 times a day and she is worried how it feel when she returns. She continues to have ankle pain that is on the inside and outside as well as the top of her foot. She feels like sometimes she walks ok and other times it is different. Her stomach is better on the medicine because she is taking something over the counter with it to help; she cannot recall the name. Started with pain in the back of her knee and her hamstring last week that occurs with heel slides and bending.  She was compliant with home exercise program.  Response to previous treatment: appropriate muscle response  Functional change: ongoing, able to get in and out of the car without assistance; ambulating long distances with RW; use of SPC at home     Pain: NT/10, currently  Location: Right knee      Objective      Objective Measures " updated at progress report unless specified.   Date of Surgery: 6/5/2023  *Patient is 5 weeks, 3 days s/p R TKA as of 7/13/2023.*    6/8/20203: 1-0- 65 degrees; 80 degrees at end of session  6/12/2023: 0 -62 AROM  6/14/2023; 90 degrees Flexion in SEATED at EOM; 87 degrees AAROM with heel slides  6/16/2023; 87 degrees AAROM   6/19/2023; 80 degrees ACTIVE knee flexion ROM  6/21/2023; 86 degrees AROM Flexion; 89 degrees AAROM Flexion  6/23/2023; 0 - 100 degrees AAROM Flexion post bike and manual   Observation: Patient ambulates with RW. There is no heel strike at initial contact and minimal knee flexion during swing phase. Decreased stance time on RLE observed. There is no visible redness along the Right LE. There is normal post op swelling along knee joint. Denies tenderness or pain at posterior calf. She has a Negative Chris's sign.  6/27/2023: 102 degrees post LLLD stretch   At end of session patient reported becoming dizzy with stomach pains; quick resolution of symptoms   BP: 122/70 mmHg; 77 bpm  6/28/2023: full revolutions around bike. Measured on table at 90 degrees flexion  6/30/2023; 102 degrees A/PROM post manual care; 105 degrees AAROM Flexion post heel slides; 107 degrees post session  7/3/2023: 96 degrees PROM  7/5/2023: 105 AAROM  7/7/2023: 0-110 flexion AAROM  7/10/2023; 100 degrees AAROM Flexion; medial knee pain noted, unable to push  7/13/2023; 102 degrees AAROM Flexion; posterior knee pain and hamstring pain reported with bending, unable to push   FOTO: 30% Limitation    Treatment     Raine received the treatments listed below:      therapeutic exercises to develop ROM and flexibility for 10 minutes including:  Aerobic Activity to improve knee flexion ROM, tissue extensibility, and mobility; Recumbent Bike post session for 5 minutes at seat 4, FULL revolutions  Long sitting hamstring stretch to improve tolerance to knee flexion; 30 seconds, 4 reps  Heel slides; 10 second hold, 10 reps  Prone knee  flexion stretch: 30 seconds, 3 reps - NP    manual therapy techniques: Joint mobilizations, Myofacial release, and Soft tissue Mobilization were applied to the: Right knee for 15 minutes, including:  Patient education:   - ROM expectations with TKA  - compliance with HEP; emphasized importance of heel slides and long sitting hamstring/gastroc stretch  - swelling management with use of Tubigrip, ice and elevation    Grade II-III patella mobilizations  Grade II-III PA tibiofemoral mobilizations  Knee flexion stretch at EOM (notes pain in posterior knee), there is poor tolerance to this    therapeutic activities to improve dynamic and functional performance for 00 minutes including activities below.  *Not performed*  Shuttle double leg press: 75 lbs (3 black cords), 3 x 15 reps (seat cushion at 4 for knee flexion ROM)  Shuttle single leg press: 37.5 lbs (1 black and 1 red cord), 3 x 10 reps (seat cushion at 4 for knee flexion ROM)   Step up onto 6-inch step; 3 x 10 reps leading with RLE (cueing to avoid hip circumduction to compensate)    neuromuscular re-education activities to improve: Proprioception, Posture, and motor control for 21 minutes.  Long arc quads at EOM; 5 second hold, 30 reps + 5 lb AW  TKE with Green TB; 2 x 10 reps, 5 second hold     cold pack for 00 minutes to Right knee post session. (Not today, pt declined.)    Patient Education and Home Exercises       Education provided:   - Compliance with HEP  - ROM expectations post TKA  - use of ice and elevation for swelling management  - importance of heel prop and heel slides to improve tissue extensibility and ROM    Written Home Exercises Provided: Patient instructed to cont prior HEP. Exercises were reviewed and Raine was able to demonstrate them prior to the end of the session.  Raine demonstrated good  understanding of the education provided. See EMR under Patient Instructions for exercises provided during therapy sessions    Assessment     Raine is 5  weeks, 3 days s/p R TKA. She presents ambulating with SPC; observed no heel contact at initial strike of gait sequence and minimal knee flexion during swing phase. She corrects with verbal cueing from therapist. There is visible lateral sway during ambulation. Poor tolerance to flexion based exercises noting posterior knee and hamstring pain. She declined use of ice today for pain reduction. She continues to tolerate full revolutions on recumbent bike, however measures 102 degrees of flexion ROM today with heel slides. FOTO Limitation measured as 30% today.   Raine Is progressing well towards her goals.   Pt prognosis is Good.     Pt will continue to benefit from skilled outpatient physical therapy to address the deficits listed in the problem list box on initial evaluation, provide pt/family education and to maximize pt's level of independence in the home and community environment.   Pt's spiritual, cultural and educational needs considered and pt agreeable to plan of care and goals.     Anticipated barriers to physical therapy: none    Goals:   Short Term Goals (3 Weeks):   1. Patient will be independent with home exercise program to supplement physical therapy treatment in improving functional status.  2. Patient will improve R lower extremity strength to at least 75% of L lower extremity strength as measured via MicroFet handheld dynamometer to improve strength for closed chain tasks.   3. Patient will improve R knee active range of motion to 0-90 degrees to promote increased ease of sit<>stand transfers.  4. Patient will perform timed up and go with less restrictive assistive device in < 20 seconds to improve gait speed and safety with community ambulation.     Long Term Goals (6 Weeks):   1. Patient will improve R lower extremity strength to at least 90% of L lower extremity strength as measured via MicroFet handheld dynamometer to improve strength for closed chain tasks.   2. Patient will improve the total  FOTO Knee Survey Score to </= 20% limited to demonstrate increased perceived functional mobility.  3. Patient will perform timed up and go with least restrictive assistive device in < 13.5  seconds to improve gait speed and safety with community ambulation.  4. Patient will perform at least 15 sit to stands in 30 seconds without UE support to demonstrate increased functional strength.   5. Patient will improve R knee active range of motion to 0-120 degrees to promote higher level transfers and transitions without limitation.     Plan     Plan of care Certification: 6/7/2023 to 7/20/2023     Outpatient Physical Therapy 3 times weekly for 6 weeks to include the following interventions: Electrical Stimulation of LE, Gait Training, Manual Therapy, Neuromuscular Re-ed, Patient Education, Self Care, Therapeutic Activities, and Therapeutic Exercise.     Karis Clemens, PTA

## 2023-07-17 ENCOUNTER — CLINICAL SUPPORT (OUTPATIENT)
Dept: REHABILITATION | Facility: HOSPITAL | Age: 68
End: 2023-07-17
Payer: COMMERCIAL

## 2023-07-17 DIAGNOSIS — Z74.09 DECREASED FUNCTIONAL MOBILITY AND ENDURANCE: Primary | ICD-10-CM

## 2023-07-17 DIAGNOSIS — M25.669 POSTOPERATIVE STIFFNESS OF TOTAL KNEE REPLACEMENT, INITIAL ENCOUNTER: ICD-10-CM

## 2023-07-17 DIAGNOSIS — Z96.659 POSTOPERATIVE STIFFNESS OF TOTAL KNEE REPLACEMENT, INITIAL ENCOUNTER: ICD-10-CM

## 2023-07-17 DIAGNOSIS — M25.561 ACUTE PAIN OF RIGHT KNEE: ICD-10-CM

## 2023-07-17 DIAGNOSIS — T84.89XA POSTOPERATIVE STIFFNESS OF TOTAL KNEE REPLACEMENT, INITIAL ENCOUNTER: ICD-10-CM

## 2023-07-17 PROCEDURE — 97140 MANUAL THERAPY 1/> REGIONS: CPT

## 2023-07-17 PROCEDURE — 97110 THERAPEUTIC EXERCISES: CPT

## 2023-07-17 PROCEDURE — 97530 THERAPEUTIC ACTIVITIES: CPT

## 2023-07-17 NOTE — PROGRESS NOTES
[facility]   Physical Therapy Treatment Note      Name: Raine Medley  Clinic Number: [id number]    Therapy Diagnosis:   Encounter Diagnoses   Name Primary?    Decreased functional mobility and endurance Yes    Acute pain of right knee     Postoperative stiffness of total knee replacement, initial encounter      Physician: Sanford Calvert III, *    Visit Date: 7/17/2023    Physician Orders: PT Eval and Treat   Medical Diagnosis from Referral: M17.11 (ICD-10-CM) - Primary osteoarthritis of right knee   Evaluation Date: 6/7/2023  Authorization Period Expiration: 12/31/2023  Plan of Care Expiration: 7/20/2023  Progress Note Due: 6/29/2023  Visit # / Visits authorized: 15 / 20 + eval   FOTO: 2/3     Precautions: Standard   PTA Visit #: 2 / 5     Time In:9:00 am  Time Out: 9:50 am  Total Treatment Time: 50 minutes  Total Billable Time: 50 minutes     Subjective     Patient reports: that she is experiencing medial knee pain when she bends her knee. Patient reports she would like to work more on stairs. Patient reported at end of session that she has now had a total of 3 random onset dizzy spells; 1 time in PT, at the grocery this past weekend, and today. Patient reports she ate banana bread this AM. Notes stomach pains associated with dizziness.   She was compliant with home exercise program.  Response to previous treatment: appropriate muscle response  Functional change: ongoing, able to get in and out of the car without assistance; ambulating long distances with RW; use of SPC at home     Pain: NT/10, currently  Location: Right knee      Objective      Objective Measures updated at progress report unless specified.   Date of Surgery: 6/5/2023  *Patient is 5 weeks, 3 days s/p R TKA as of 7/13/2023.*    6/8/20203: 1-0- 65 degrees; 80 degrees at end of session  6/12/2023: 0 -62 AROM  6/14/2023; 90 degrees Flexion in SEATED at EOM; 87 degrees AAROM with heel slides  6/16/2023; 87 degrees  AAROM   6/19/2023; 80 degrees ACTIVE knee flexion ROM  6/21/2023; 86 degrees AROM Flexion; 89 degrees AAROM Flexion  6/23/2023; 0 - 100 degrees AAROM Flexion post bike and manual   Observation: Patient ambulates with RW. There is no heel strike at initial contact and minimal knee flexion during swing phase. Decreased stance time on RLE observed. There is no visible redness along the Right LE. There is normal post op swelling along knee joint. Denies tenderness or pain at posterior calf. She has a Negative Chris's sign.  6/27/2023: 102 degrees post LLLD stretch   At end of session patient reported becoming dizzy with stomach pains; quick resolution of symptoms   BP: 122/70 mmHg; 77 bpm  6/28/2023: full revolutions around bike. Measured on table at 90 degrees flexion  6/30/2023; 102 degrees A/PROM post manual care; 105 degrees AAROM Flexion post heel slides; 107 degrees post session  7/3/2023: 96 degrees PROM  7/5/2023: 105 AAROM  7/7/2023: 0-110 flexion AAROM  7/10/2023; 100 degrees AAROM Flexion; medial knee pain noted, unable to push  7/13/2023; 102 degrees AAROM Flexion; posterior knee pain and hamstring pain reported with bending, unable to push   FOTO: 30% Limitation    7/17/2023: 0-100 AROM/AAROM  114/68 mmHg 79 bpm    Treatment     Raine received the treatments listed below:      therapeutic exercises to develop ROM and flexibility for 10 minutes including:  Aerobic Activity to improve knee flexion ROM, tissue extensibility, and mobility; Recumbent Bike post session for 5 minutes at seat 4, FULL revolutions  Long sitting hamstring stretch to improve tolerance to knee flexion; 30 seconds, 4 reps - np  Heel slides; 10 second hold, 10 reps  Prone knee flexion stretch: 30 seconds, 3 reps - NP    manual therapy techniques: Joint mobilizations, Myofacial release, and Soft tissue Mobilization were applied to the: Right knee for 25 minutes, including:  Patient education:   - ROM expectations with TKA  - compliance with  HEP; emphasized importance of heel slides and long sitting hamstring/gastroc stretch  - swelling management with use of Tubigrip, ice and elevation    Grade II-III patella mobilizations  Grade II-III PA tibiofemoral mobilizations  Soft tissue mobilization with IASTM to medial knee    therapeutic activities to improve dynamic and functional performance for 10 minutes including activities below.  Shuttle double leg press: 75 lbs (3 black cords), 3 x 15 reps (seat cushion at 4 for knee flexion ROM) - not today  Shuttle single leg press: 37.5 lbs (1 black and 1 red cord), 3 x 10 reps (seat cushion at 4 for knee flexion ROM) - not today    Step up onto 6-inch step; 3 x 10 reps leading with RLE (cueing to avoid hip circumduction to compensate)  Forward step downs: slow : 2x10 - Patient became dizzy and required a seated position.     neuromuscular re-education activities to improve: Proprioception, Posture, and motor control for 5 minutes.  Long arc quads at EOM; 5 second hold, 30 reps + 6 lb AW  TKE with Green TB; 2 x 10 reps, 5 second hold - not today      Patient Education and Home Exercises       Education provided:   - Compliance with HEP  - ROM expectations post TKA  - use of ice and elevation for swelling management  - importance of heel prop and heel slides to improve tissue extensibility and ROM    Written Home Exercises Provided: Patient instructed to cont prior HEP. Exercises were reviewed and Raine was able to demonstrate them prior to the end of the session.  Raine demonstrated good  understanding of the education provided. See EMR under Patient Instructions for exercises provided during therapy sessions    Assessment   Notable swelling of medial knee local to pes anserine landmark. Decreased pain with knee flexion post manual interventions.  Addition of forward step downs with emphasis on eccentric control upon descent. Patient became dizzy during forward step down activity where she reported her vision was  ""black spots." She denied anxiety or significant pain, chest pain, jaw pain or radiating ain, but noted stomach pain. BP taken as shown in objective section. Patient educated to inform MD and PCP. In basket message sent to referring MD regarding dizzy episodes.     Raine Is progressing well towards her goals.   Pt prognosis is Good.     Pt will continue to benefit from skilled outpatient physical therapy to address the deficits listed in the problem list box on initial evaluation, provide pt/family education and to maximize pt's level of independence in the home and community environment.   Pt's spiritual, cultural and educational needs considered and pt agreeable to plan of care and goals.     Anticipated barriers to physical therapy: none    Goals:   Short Term Goals (3 Weeks):   1. Patient will be independent with home exercise program to supplement physical therapy treatment in improving functional status.  2. Patient will improve R lower extremity strength to at least 75% of L lower extremity strength as measured via MicroFet handheld dynamometer to improve strength for closed chain tasks.   3. Patient will improve R knee active range of motion to 0-90 degrees to promote increased ease of sit<>stand transfers.  4. Patient will perform timed up and go with less restrictive assistive device in < 20 seconds to improve gait speed and safety with community ambulation.     Long Term Goals (6 Weeks):   1. Patient will improve R lower extremity strength to at least 90% of L lower extremity strength as measured via MicroFet handheld dynamometer to improve strength for closed chain tasks.   2. Patient will improve the total FOTO Knee Survey Score to </= 20% limited to demonstrate increased perceived functional mobility.  3. Patient will perform timed up and go with least restrictive assistive device in < 13.5  seconds to improve gait speed and safety with community ambulation.  4. Patient will perform at least 15 sit to " stands in 30 seconds without UE support to demonstrate increased functional strength.   5. Patient will improve R knee active range of motion to 0-120 degrees to promote higher level transfers and transitions without limitation.     Plan     Plan of care Certification: 6/7/2023 to 7/20/2023     Outpatient Physical Therapy 3 times weekly for 6 weeks to include the following interventions: Electrical Stimulation of LE, Gait Training, Manual Therapy, Neuromuscular Re-ed, Patient Education, Self Care, Therapeutic Activities, and Therapeutic Exercise.     Jacque Pride, PT

## 2023-07-18 ENCOUNTER — OFFICE VISIT (OUTPATIENT)
Dept: ORTHOPEDICS | Facility: CLINIC | Age: 68
End: 2023-07-18
Payer: COMMERCIAL

## 2023-07-18 ENCOUNTER — HOSPITAL ENCOUNTER (OUTPATIENT)
Dept: RADIOLOGY | Facility: HOSPITAL | Age: 68
Discharge: HOME | End: 2023-07-18
Attending: ORTHOPAEDIC SURGERY
Payer: COMMERCIAL

## 2023-07-18 VITALS — WEIGHT: 134.56 LBS | BODY MASS INDEX: 25.41 KG/M2 | HEIGHT: 61 IN

## 2023-07-18 DIAGNOSIS — Z96.651 STATUS POST RIGHT KNEE REPLACEMENT: Primary | ICD-10-CM

## 2023-07-18 DIAGNOSIS — Z96.651 STATUS POST RIGHT KNEE REPLACEMENT: ICD-10-CM

## 2023-07-18 PROCEDURE — 1101F PR PT FALLS ASSESS DOC 0-1 FALLS W/OUT INJ PAST YR: ICD-10-PCS | Mod: CPTII,S$GLB,, | Performed by: ORTHOPAEDIC SURGERY

## 2023-07-18 PROCEDURE — 3008F BODY MASS INDEX DOCD: CPT | Mod: CPTII,S$GLB,, | Performed by: ORTHOPAEDIC SURGERY

## 2023-07-18 PROCEDURE — 1125F AMNT PAIN NOTED PAIN PRSNT: CPT | Mod: CPTII,S$GLB,, | Performed by: ORTHOPAEDIC SURGERY

## 2023-07-18 PROCEDURE — 3008F PR BODY MASS INDEX (BMI) DOCUMENTED: ICD-10-PCS | Mod: CPTII,S$GLB,, | Performed by: ORTHOPAEDIC SURGERY

## 2023-07-18 PROCEDURE — 73562 XR KNEE 3 VIEW RIGHT: ICD-10-PCS | Mod: 26,RT,, | Performed by: RADIOLOGY

## 2023-07-18 PROCEDURE — 73562 X-RAY EXAM OF KNEE 3: CPT | Mod: TC,RT

## 2023-07-18 PROCEDURE — 3288F FALL RISK ASSESSMENT DOCD: CPT | Mod: CPTII,S$GLB,, | Performed by: ORTHOPAEDIC SURGERY

## 2023-07-18 PROCEDURE — 99024 PR POST-OP FOLLOW-UP VISIT: ICD-10-PCS | Mod: S$GLB,,, | Performed by: ORTHOPAEDIC SURGERY

## 2023-07-18 PROCEDURE — 1159F MED LIST DOCD IN RCRD: CPT | Mod: CPTII,S$GLB,, | Performed by: ORTHOPAEDIC SURGERY

## 2023-07-18 PROCEDURE — 99999 PR PBB SHADOW E&M-EST. PATIENT-LVL III: CPT | Mod: PBBFAC,,, | Performed by: ORTHOPAEDIC SURGERY

## 2023-07-18 PROCEDURE — 73562 X-RAY EXAM OF KNEE 3: CPT | Mod: 26,RT,, | Performed by: RADIOLOGY

## 2023-07-18 PROCEDURE — 1125F PR PAIN SEVERITY QUANTIFIED, PAIN PRESENT: ICD-10-PCS | Mod: CPTII,S$GLB,, | Performed by: ORTHOPAEDIC SURGERY

## 2023-07-18 PROCEDURE — 1159F PR MEDICATION LIST DOCUMENTED IN MEDICAL RECORD: ICD-10-PCS | Mod: CPTII,S$GLB,, | Performed by: ORTHOPAEDIC SURGERY

## 2023-07-18 PROCEDURE — 99999 PR PBB SHADOW E&M-EST. PATIENT-LVL III: ICD-10-PCS | Mod: PBBFAC,,, | Performed by: ORTHOPAEDIC SURGERY

## 2023-07-18 PROCEDURE — 1101F PT FALLS ASSESS-DOCD LE1/YR: CPT | Mod: CPTII,S$GLB,, | Performed by: ORTHOPAEDIC SURGERY

## 2023-07-18 PROCEDURE — 3288F PR FALLS RISK ASSESSMENT DOCUMENTED: ICD-10-PCS | Mod: CPTII,S$GLB,, | Performed by: ORTHOPAEDIC SURGERY

## 2023-07-18 PROCEDURE — 99024 POSTOP FOLLOW-UP VISIT: CPT | Mod: S$GLB,,, | Performed by: ORTHOPAEDIC SURGERY

## 2023-07-18 RX ORDER — METHYLPREDNISOLONE 4 MG/1
TABLET ORAL
Qty: 21 EACH | Refills: 0 | Status: SHIPPED | OUTPATIENT
Start: 2023-07-18 | End: 2023-08-20 | Stop reason: SDUPTHER

## 2023-07-18 RX ORDER — METHYLPREDNISOLONE 4 MG/1
TABLET ORAL
Qty: 1 EACH | Refills: 0 | Status: SHIPPED | OUTPATIENT
Start: 2023-07-18 | End: 2023-07-18

## 2023-07-18 NOTE — PROGRESS NOTES
"  Subjective:     HPI:   Raine Medley is a 68 y.o. female who presents 6 weeks out from right TKA    Date of surgery: 6/5/23    Medications: aleve only twice since stopped celebrex  Off everything  Only have pain at night    Assistive Devices: cane out of the house, not at home    PT: ongoing    Limitations: none, wants to drive but avoiding due to episodes described below    Overall, feels like "mas o menos" things she is going slow  Difficulty sleeping due to pain  Some post pain     Message from P\T:   2-3 episodes of sweating and "starts to go dark"   Two times going into a restaurant as well  Has not happened at home  No chest pain, no shortness of breath, off narcotics and m relaxants  Will obtain CBC, and CMP today  Messaged PCP Dr Gotti who said her staff would work on getting her in for eval     Objective:   Body mass index is 25.43 kg/m².  Exam:    Gait: limp/antalgic none, slow    Incision: healed    Stability:  Knee stable anterior-posterior varus and valgus stresses, no extensor lag    Extension: 0    Flexion: 100 seated and supine, quad tight, joint normal laxity flexion and extension    Valgus angle: 5    Pre-op 0-100  PT 0-100/102      Imaging:  Indication:  Exam status post right total knee arthroplasty  Exam Ordered: Radiographs of the right knee include a standing anteroposterior view, a lateral view in full flexion, and a sunrise view  Details of Examination: Todays exam show a well fixed, well positioned total knee arthroplasty with no evidence of wear, osteolysis, or loosening.  Impression:  Status post right total knee arthroplasty, implant in good position with no abnormality      Assessment:       ICD-10-CM ICD-9-CM   1. Status post right knee replacement  Z96.651 V43.65      TKA developing arthrofibrosis    ?pre-syncopal episodes     Plan:       Patient is doing very well with their total knee arthroplasty.  They will continue with their routine care of the knee replacement and see " me back for their follow-up at the routine interval.  If there are problems in the interim they will see me back sooner.    Tylenol arthritis 650mg three times a day  Rx compound cream  Rx medrol dose pack  Will avoid narcotics, lyrica, and muscle relaxants due to ?pre-syncopal episodes    Continue PT 2x/week x2 more weeks  Do HEP twice a day at home (daughter admits not doing regularly)     CBC/CMP today  Messaged PCP Dr Gotti who said her staff would work on getting her in for eval    2 week follow up for ROM check      Orders Placed This Encounter   Procedures    CBC Auto Differential     Standing Status:   Future     Standing Expiration Date:   9/15/2024    Comprehensive Metabolic Panel     Standing Status:   Future     Standing Expiration Date:   9/15/2024             Past Medical History:   Diagnosis Date    Asthma     as a child    Hyperlipidemia     Microcytic anemia 5/15/2023       Past Surgical History:   Procedure Laterality Date    CHOLECYSTECTOMY      COLONOSCOPY N/A 1/3/2019    Procedure: COLONOSCOPY;  Surgeon: Gaston Beebe MD;  Location: [address];  Service: Endoscopy;  Laterality: N/A;    TOTAL KNEE ARTHROPLASTY Right 6/5/2023    Procedure: ARTHROPLASTY, KNEE, TOTAL: RIGHT: DEPUY - ATTUNE;  Surgeon: Sanford Calvert III, MD;  Location: [facility];  Service: Orthopedics;  Laterality: Right;       Family History   Problem Relation Age of Onset    No Known Problems Mother     No Known Problems Father     No Known Problems Sister     No Known Problems Daughter     No Known Problems Son     No Known Problems Son     No Known Problems Son     Melanoma Neg Hx     Lupus Neg Hx     Psoriasis Neg Hx     Colon cancer Neg Hx     Esophageal cancer Neg Hx     Stomach cancer Neg Hx        Social History     Socioeconomic History    Marital status:    Occupational History     Employer: [employer]   Tobacco Use    Smoking status: Never    Smokeless tobacco: Never   Substance  and Sexual Activity    Alcohol use: No    Drug use: No   Social History Narrative    She lives in [place] with her 15-year-old son in [place]. 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 She is from [facility].

## 2023-07-19 ENCOUNTER — HOSPITAL ENCOUNTER (OUTPATIENT)
Facility: HOSPITAL | Age: 68
Discharge: HOME | End: 2023-07-20
Attending: STUDENT IN AN ORGANIZED HEALTH CARE EDUCATION/TRAINING PROGRAM | Admitting: STUDENT IN AN ORGANIZED HEALTH CARE EDUCATION/TRAINING PROGRAM
Payer: COMMERCIAL

## 2023-07-19 ENCOUNTER — TELEPHONE (OUTPATIENT)
Dept: ORTHOPEDICS | Facility: CLINIC | Age: 68
End: 2023-07-19
Payer: COMMERCIAL

## 2023-07-19 DIAGNOSIS — D64.9 SYMPTOMATIC ANEMIA: ICD-10-CM

## 2023-07-19 DIAGNOSIS — K29.60 NSAID INDUCED GASTRITIS: ICD-10-CM

## 2023-07-19 DIAGNOSIS — R07.9 CHEST PAIN: ICD-10-CM

## 2023-07-19 DIAGNOSIS — K92.2 GI BLEED DUE TO NSAIDS: ICD-10-CM

## 2023-07-19 DIAGNOSIS — T39.395A GI BLEED DUE TO NSAIDS: ICD-10-CM

## 2023-07-19 DIAGNOSIS — D62 ACUTE BLOOD LOSS ANEMIA: ICD-10-CM

## 2023-07-19 DIAGNOSIS — D64.9 ACUTE ANEMIA: Primary | ICD-10-CM

## 2023-07-19 DIAGNOSIS — T39.395A NSAID INDUCED GASTRITIS: ICD-10-CM

## 2023-07-19 PROBLEM — E55.9 VITAMIN D DEFICIENCY: Status: ACTIVE | Noted: 2023-07-19

## 2023-07-19 PROBLEM — K21.9 GASTROESOPHAGEAL REFLUX DISEASE WITHOUT ESOPHAGITIS: Status: ACTIVE | Noted: 2023-07-19

## 2023-07-19 LAB
ABO + RH BLD: NORMAL
ALBUMIN SERPL BCP-MCNC: 3.5 G/DL (ref 3.5–5.2)
ALP SERPL-CCNC: 84 U/L (ref 55–135)
ALT SERPL W/O P-5'-P-CCNC: 9 U/L (ref 10–44)
ANION GAP SERPL CALC-SCNC: 8 MMOL/L (ref 8–16)
AST SERPL-CCNC: 11 U/L (ref 10–40)
BASOPHILS # BLD AUTO: 0.05 K/UL (ref 0–0.2)
BASOPHILS NFR BLD: 0.7 % (ref 0–1.9)
BILIRUB SERPL-MCNC: 0.2 MG/DL (ref 0.1–1)
BLD GP AB SCN CELLS X3 SERPL QL: NORMAL
BLD PROD TYP BPU: NORMAL
BLD PROD TYP BPU: NORMAL
BLOOD UNIT EXPIRATION DATE: NORMAL
BLOOD UNIT EXPIRATION DATE: NORMAL
BLOOD UNIT TYPE CODE: 7300
BLOOD UNIT TYPE CODE: 7300
BLOOD UNIT TYPE: NORMAL
BLOOD UNIT TYPE: NORMAL
BUN SERPL-MCNC: 13 MG/DL (ref 8–23)
CALCIUM SERPL-MCNC: 8.8 MG/DL (ref 8.7–10.5)
CHLORIDE SERPL-SCNC: 108 MMOL/L (ref 95–110)
CO2 SERPL-SCNC: 24 MMOL/L (ref 23–29)
CODING SYSTEM: NORMAL
CODING SYSTEM: NORMAL
CREAT SERPL-MCNC: 0.7 MG/DL (ref 0.5–1.4)
CROSSMATCH INTERPRETATION: NORMAL
CROSSMATCH INTERPRETATION: NORMAL
DIFFERENTIAL METHOD: ABNORMAL
DISPENSE STATUS: NORMAL
DISPENSE STATUS: NORMAL
EOSINOPHIL # BLD AUTO: 0.2 K/UL (ref 0–0.5)
EOSINOPHIL NFR BLD: 2.1 % (ref 0–8)
ERYTHROCYTE [DISTWIDTH] IN BLOOD BY AUTOMATED COUNT: 17.7 % (ref 11.5–14.5)
EST. GFR  (NO RACE VARIABLE): >60 ML/MIN/1.73 M^2
FERRITIN SERPL-MCNC: 13 NG/ML (ref 20–300)
GLUCOSE SERPL-MCNC: 111 MG/DL (ref 70–110)
HCT VFR BLD AUTO: 19.2 % (ref 37–48.5)
HCV AB SERPL QL IA: NORMAL
HGB BLD-MCNC: 5 G/DL (ref 12–16)
HGB BLD-MCNC: 7.7 G/DL (ref 12–16)
HGB BLD-MCNC: 8.1 G/DL (ref 12–16)
HIV 1+2 AB+HIV1 P24 AG SERPL QL IA: NORMAL
IMM GRANULOCYTES # BLD AUTO: 0.06 K/UL (ref 0–0.04)
IMM GRANULOCYTES NFR BLD AUTO: 0.8 % (ref 0–0.5)
IRON SERPL-MCNC: 15 UG/DL (ref 30–160)
LYMPHOCYTES # BLD AUTO: 1.1 K/UL (ref 1–4.8)
LYMPHOCYTES NFR BLD: 14.3 % (ref 18–48)
MAGNESIUM SERPL-MCNC: 2.1 MG/DL (ref 1.6–2.6)
MCH RBC QN AUTO: 19.2 PG (ref 27–31)
MCHC RBC AUTO-ENTMCNC: 26 G/DL (ref 32–36)
MCV RBC AUTO: 74 FL (ref 82–98)
MONOCYTES # BLD AUTO: 0.6 K/UL (ref 0.3–1)
MONOCYTES NFR BLD: 8.4 % (ref 4–15)
NEUTROPHILS # BLD AUTO: 5.6 K/UL (ref 1.8–7.7)
NEUTROPHILS NFR BLD: 73.7 % (ref 38–73)
NRBC BLD-RTO: 0 /100 WBC
PLATELET # BLD AUTO: 352 K/UL (ref 150–450)
PMV BLD AUTO: 11.9 FL (ref 9.2–12.9)
POTASSIUM SERPL-SCNC: 3.9 MMOL/L (ref 3.5–5.1)
PROT SERPL-MCNC: 6.4 G/DL (ref 6–8.4)
RBC # BLD AUTO: 2.61 M/UL (ref 4–5.4)
SATURATED IRON: 3 % (ref 20–50)
SODIUM SERPL-SCNC: 140 MMOL/L (ref 136–145)
SPECIMEN OUTDATE: NORMAL
TOTAL IRON BINDING CAPACITY: 540 UG/DL (ref 250–450)
TRANS ERYTHROCYTES VOL PATIENT: NORMAL ML
TRANS ERYTHROCYTES VOL PATIENT: NORMAL ML
TRANSFERRIN SERPL-MCNC: 365 MG/DL (ref 200–375)
TROPONIN I SERPL DL<=0.01 NG/ML-MCNC: 0.01 NG/ML (ref 0–0.03)
WBC # BLD AUTO: 7.61 K/UL (ref 3.9–12.7)

## 2023-07-19 PROCEDURE — 93005 ELECTROCARDIOGRAM TRACING: CPT

## 2023-07-19 PROCEDURE — 85018 HEMOGLOBIN: CPT | Performed by: STUDENT IN AN ORGANIZED HEALTH CARE EDUCATION/TRAINING PROGRAM

## 2023-07-19 PROCEDURE — A4216 STERILE WATER/SALINE, 10 ML: HCPCS | Performed by: STUDENT IN AN ORGANIZED HEALTH CARE EDUCATION/TRAINING PROGRAM

## 2023-07-19 PROCEDURE — 85025 COMPLETE CBC W/AUTO DIFF WBC: CPT | Performed by: STUDENT IN AN ORGANIZED HEALTH CARE EDUCATION/TRAINING PROGRAM

## 2023-07-19 PROCEDURE — 25000003 PHARM REV CODE 250: Performed by: STUDENT IN AN ORGANIZED HEALTH CARE EDUCATION/TRAINING PROGRAM

## 2023-07-19 PROCEDURE — 94761 N-INVAS EAR/PLS OXIMETRY MLT: CPT

## 2023-07-19 PROCEDURE — 86803 HEPATITIS C AB TEST: CPT | Performed by: PHYSICIAN ASSISTANT

## 2023-07-19 PROCEDURE — 36430 TRANSFUSION BLD/BLD COMPNT: CPT

## 2023-07-19 PROCEDURE — G0378 HOSPITAL OBSERVATION PER HR: HCPCS

## 2023-07-19 PROCEDURE — 86920 COMPATIBILITY TEST SPIN: CPT | Performed by: STUDENT IN AN ORGANIZED HEALTH CARE EDUCATION/TRAINING PROGRAM

## 2023-07-19 PROCEDURE — 80053 COMPREHEN METABOLIC PANEL: CPT | Performed by: STUDENT IN AN ORGANIZED HEALTH CARE EDUCATION/TRAINING PROGRAM

## 2023-07-19 PROCEDURE — 87389 HIV-1 AG W/HIV-1&-2 AB AG IA: CPT | Performed by: PHYSICIAN ASSISTANT

## 2023-07-19 PROCEDURE — 99223 1ST HOSP IP/OBS HIGH 75: CPT | Mod: ,,, | Performed by: STUDENT IN AN ORGANIZED HEALTH CARE EDUCATION/TRAINING PROGRAM

## 2023-07-19 PROCEDURE — 99285 EMERGENCY DEPT VISIT HI MDM: CPT

## 2023-07-19 PROCEDURE — 93010 ELECTROCARDIOGRAM REPORT: CPT | Mod: ,,, | Performed by: INTERNAL MEDICINE

## 2023-07-19 PROCEDURE — 83735 ASSAY OF MAGNESIUM: CPT | Performed by: STUDENT IN AN ORGANIZED HEALTH CARE EDUCATION/TRAINING PROGRAM

## 2023-07-19 PROCEDURE — 99223 PR INITIAL HOSPITAL CARE,LEVL III: ICD-10-PCS | Mod: ,,, | Performed by: STUDENT IN AN ORGANIZED HEALTH CARE EDUCATION/TRAINING PROGRAM

## 2023-07-19 PROCEDURE — 84484 ASSAY OF TROPONIN QUANT: CPT | Performed by: STUDENT IN AN ORGANIZED HEALTH CARE EDUCATION/TRAINING PROGRAM

## 2023-07-19 PROCEDURE — P9021 RED BLOOD CELLS UNIT: HCPCS | Performed by: STUDENT IN AN ORGANIZED HEALTH CARE EDUCATION/TRAINING PROGRAM

## 2023-07-19 PROCEDURE — 82728 ASSAY OF FERRITIN: CPT | Performed by: STUDENT IN AN ORGANIZED HEALTH CARE EDUCATION/TRAINING PROGRAM

## 2023-07-19 PROCEDURE — 93010 EKG 12-LEAD: ICD-10-PCS | Mod: ,,, | Performed by: INTERNAL MEDICINE

## 2023-07-19 PROCEDURE — 36415 COLL VENOUS BLD VENIPUNCTURE: CPT | Performed by: STUDENT IN AN ORGANIZED HEALTH CARE EDUCATION/TRAINING PROGRAM

## 2023-07-19 PROCEDURE — 86900 BLOOD TYPING SEROLOGIC ABO: CPT | Performed by: STUDENT IN AN ORGANIZED HEALTH CARE EDUCATION/TRAINING PROGRAM

## 2023-07-19 PROCEDURE — 84466 ASSAY OF TRANSFERRIN: CPT | Performed by: STUDENT IN AN ORGANIZED HEALTH CARE EDUCATION/TRAINING PROGRAM

## 2023-07-19 RX ORDER — METHOCARBAMOL 750 MG/1
750 TABLET, FILM COATED ORAL 3 TIMES DAILY PRN
Status: DISCONTINUED | OUTPATIENT
Start: 2023-07-19 | End: 2023-07-20 | Stop reason: HOSPADM

## 2023-07-19 RX ORDER — PANTOPRAZOLE SODIUM 40 MG/1
40 TABLET, DELAYED RELEASE ORAL DAILY
Status: DISCONTINUED | OUTPATIENT
Start: 2023-07-19 | End: 2023-07-20 | Stop reason: HOSPADM

## 2023-07-19 RX ORDER — MAG HYDROX/ALUMINUM HYD/SIMETH 200-200-20
30 SUSPENSION, ORAL (FINAL DOSE FORM) ORAL 4 TIMES DAILY PRN
Status: DISCONTINUED | OUTPATIENT
Start: 2023-07-19 | End: 2023-07-20 | Stop reason: HOSPADM

## 2023-07-19 RX ORDER — TALC
6 POWDER (GRAM) TOPICAL NIGHTLY PRN
Status: DISCONTINUED | OUTPATIENT
Start: 2023-07-19 | End: 2023-07-19

## 2023-07-19 RX ORDER — NALOXONE HCL 0.4 MG/ML
0.02 VIAL (ML) INJECTION
Status: DISCONTINUED | OUTPATIENT
Start: 2023-07-19 | End: 2023-07-20 | Stop reason: HOSPADM

## 2023-07-19 RX ORDER — POLYETHYLENE GLYCOL 3350 17 G/17G
17 POWDER, FOR SOLUTION ORAL DAILY PRN
Status: DISCONTINUED | OUTPATIENT
Start: 2023-07-19 | End: 2023-07-20 | Stop reason: HOSPADM

## 2023-07-19 RX ORDER — SODIUM CHLORIDE 0.9 % (FLUSH) 0.9 %
10 SYRINGE (ML) INJECTION
Status: DISCONTINUED | OUTPATIENT
Start: 2023-07-19 | End: 2023-07-20 | Stop reason: HOSPADM

## 2023-07-19 RX ORDER — SIMETHICONE 80 MG
1 TABLET,CHEWABLE ORAL 4 TIMES DAILY PRN
Status: DISCONTINUED | OUTPATIENT
Start: 2023-07-19 | End: 2023-07-20 | Stop reason: HOSPADM

## 2023-07-19 RX ORDER — TALC
6 POWDER (GRAM) TOPICAL NIGHTLY PRN
Status: DISCONTINUED | OUTPATIENT
Start: 2023-07-19 | End: 2023-07-20 | Stop reason: HOSPADM

## 2023-07-19 RX ORDER — ACETAMINOPHEN 325 MG/1
650 TABLET ORAL EVERY 4 HOURS PRN
Status: DISCONTINUED | OUTPATIENT
Start: 2023-07-19 | End: 2023-07-20 | Stop reason: HOSPADM

## 2023-07-19 RX ORDER — IPRATROPIUM BROMIDE AND ALBUTEROL SULFATE 2.5; .5 MG/3ML; MG/3ML
3 SOLUTION RESPIRATORY (INHALATION) EVERY 6 HOURS PRN
Status: DISCONTINUED | OUTPATIENT
Start: 2023-07-19 | End: 2023-07-20 | Stop reason: HOSPADM

## 2023-07-19 RX ORDER — CHOLECALCIFEROL (VITAMIN D3) 25 MCG
1000 TABLET ORAL DAILY
Status: DISCONTINUED | OUTPATIENT
Start: 2023-07-19 | End: 2023-07-20 | Stop reason: HOSPADM

## 2023-07-19 RX ORDER — ONDANSETRON 2 MG/ML
4 INJECTION INTRAMUSCULAR; INTRAVENOUS EVERY 8 HOURS PRN
Status: DISCONTINUED | OUTPATIENT
Start: 2023-07-19 | End: 2023-07-20 | Stop reason: HOSPADM

## 2023-07-19 RX ORDER — SODIUM CHLORIDE 0.9 % (FLUSH) 0.9 %
10 SYRINGE (ML) INJECTION EVERY 8 HOURS
Status: DISCONTINUED | OUTPATIENT
Start: 2023-07-19 | End: 2023-07-20 | Stop reason: HOSPADM

## 2023-07-19 RX ORDER — HYDROCODONE BITARTRATE AND ACETAMINOPHEN 500; 5 MG/1; MG/1
TABLET ORAL
Status: DISCONTINUED | OUTPATIENT
Start: 2023-07-19 | End: 2023-07-20 | Stop reason: HOSPADM

## 2023-07-19 RX ORDER — PROCHLORPERAZINE EDISYLATE 5 MG/ML
5 INJECTION INTRAMUSCULAR; INTRAVENOUS EVERY 6 HOURS PRN
Status: DISCONTINUED | OUTPATIENT
Start: 2023-07-19 | End: 2023-07-20 | Stop reason: HOSPADM

## 2023-07-19 RX ADMIN — Medication 10 ML: at 02:07

## 2023-07-19 RX ADMIN — CHOLECALCIFEROL TAB 25 MCG (1000 UNIT) 1000 UNITS: 25 TAB at 02:07

## 2023-07-19 RX ADMIN — Medication 10 ML: at 09:07

## 2023-07-19 RX ADMIN — PANTOPRAZOLE SODIUM 40 MG: 40 TABLET, DELAYED RELEASE ORAL at 02:07

## 2023-07-19 NOTE — ED TRIAGE NOTES
Pt reports to ED as a referral for RKA post-op abnormal lab work showing low hemoglobin. Pt endorses generalized weakness, lightheadedness, and near syncopal episodes at physical therapy. Pt denies LOC, HA, blurred vision, chest pain. Pt denies blood in stools.

## 2023-07-19 NOTE — TELEPHONE ENCOUNTER
Spoke to daughter, informed that her mother's H&H which is her blood levels are very low and she needs to go to the ER for a blood transfusion and to evaluate what is causing her anemia. Daughter states they will get ready and head to main [place] [facility]. She states he mother feels fine she just gets tired and has trouble doing PT. Understanding verbalized.

## 2023-07-19 NOTE — SUBJECTIVE & OBJECTIVE
Past Medical History:   Diagnosis Date    Asthma     as a child    Hyperlipidemia     Microcytic anemia 5/15/2023       Past Surgical History:   Procedure Laterality Date    CHOLECYSTECTOMY      COLONOSCOPY N/A 1/3/2019    Procedure: COLONOSCOPY;  Surgeon: Gaston Beebe MD;  Location: [address]);  Service: Endoscopy;  Laterality: N/A;    TOTAL KNEE ARTHROPLASTY Right 6/5/2023    Procedure: ARTHROPLASTY, KNEE, TOTAL: RIGHT: DEPUY - ATTUNE;  Surgeon: Sanford Calvert III, MD;  Location: [facility];  Service: Orthopedics;  Laterality: Right;       Review of patient's allergies indicates:  No Known Allergies    No current facility-administered medications on file prior to encounter.     Current Outpatient Medications on File Prior to Encounter   Medication Sig    acetaminophen (TYLENOL) 650 MG TbSR Take 1 tablet (650 mg total) by mouth every 8 (eight) hours.    celecoxib (CELEBREX) 200 MG capsule Take 1 capsule (200 mg total) by mouth once daily.    methocarbamoL (ROBAXIN) 750 MG Tab Take 1 tablet (750 mg total) by mouth 3 (three) times daily as needed (muscle spasms).    methylPREDNISolone (MEDROL DOSEPACK) 4 mg tablet use as directed    omega-3 fatty acids/fish oil (FISH OIL-OMEGA-3 FATTY ACIDS) 300-1,000 mg capsule Take by mouth once daily.    pantoprazole (PROTONIX) 40 MG tablet Take 1 tablet (40 mg total) by mouth once daily.    vitamin D (VITAMIN D3) 1000 units Tab Take 1,000 Units by mouth once daily.    aspirin (ECOTRIN) 81 MG EC tablet Take 1 tablet (81 mg total) by mouth 2 (two) times daily.    diclofenac sodium (VOLTAREN) 1 % Gel Apply 2 g topically 3 (three) times daily as needed (pain).    oxyCODONE (ROXICODONE) 10 mg Tab immediate release tablet Take 0.5 - 1 tablet by mouth every 4-6 hours as needed for pain (Patient not taking: Reported on 7/18/2023)     Family History       Problem Relation (Age of Onset)    No Known Problems Mother, Father, Sister, Daughter, Son, Son, Son          Tobacco Use     Smoking status: Never    Smokeless tobacco: Never   Substance and Sexual Activity    Alcohol use: No    Drug use: No    Sexual activity: Not on file       ROS  General ROS: negative for weight loss, weight gain, fevers, chills, night sweats  ENT ROS: negative for epistaxis, headaches or sinus pain  Ophthalmic ROS: negative for blurry vision, decreased vision, double vision or itchy eyes  Cardiovascular ROS: negative for chest pain or dyspnea on exertion  Respiratory ROS: negative for cough, shortness of breath, or wheezing  Gastrointestinal ROS: negative for abdominal pain, change in bowel habits, black or bloody stools, nausea, emesis, or bloating  Genito-Urinary ROS: negative for dysuria, trouble voiding, or hematuria  Neurological ROS: negative for TIA or stroke symptoms  Endocrine ROS: negative for hair pattern changes or unexpected weight changes  Musculoskeletal ROS: negative for muscle pain, weakness, joint pain or joint swelling  Skin: negative for rash, wounds, skin breakdown, or issues otherwise  Hematological and Lymphatic ROS: negative for bleeding, bruising, swollen lymph nodes  Psychological ROS: negative for anxiety or depression      Objective:     Vital Signs (Most Recent):  Temp: 98 °F (36.7 °C) (07/19/23 1013)  Pulse: 75 (07/19/23 1200)  Resp: 18 (07/19/23 1145)  BP: (!) 136/59 (07/19/23 1200)  SpO2: 100 % (07/19/23 1200) Vital Signs (24h Range):  Temp:  [98 °F (36.7 °C)] 98 °F (36.7 °C)  Pulse:  [75-88] 75  Resp:  [18-19] 18  SpO2:  [100 %] 100 %  BP: (136-138)/(59-61) 136/59     Weight: 59.9 kg (132 lb)  Body mass index is 24.94 kg/m².       Physical Exam  Gen: in NAD, appears stated age  Neuro: AAOx4, CN2-12 grossly intact BL; motor, sensory, and strength grossly intact BL  HEENT: NTNC, EOMI, PERRLA, MMM; no thyromegaly or lymphadenopathy; no JVD appreciated  CVS: RRR, no m/r/g; S1/S2 auscultated with no S3 or S4; capillary refill < 2 sec  Resp: lungs CTAB, no w/r/r; no belabored breathing  or accessory muscle use appreciated   Abd: BS+ in all 4 quadrants; NTND, soft to palpation; no organomegaly appreciated   Extrem: pulses full, equal, and regular over all 4 extremities; no UE or LE edema BL; well-healing scar over right knee, uncomplicated       Significant Labs: All pertinent labs within the past 24 hours have been reviewed.    Significant Imaging: I have reviewed all pertinent imaging results/findings within the past 24 hours.

## 2023-07-19 NOTE — HPI
This is a 68 year old lady with GERD and recent TKA who presents to the ED with anemia. Patient was becoming dizzy during PT and had a CBC which showed a Hgb of 5 which prompted her to come to the ED. Patient currently denies any shortness of breath, abdominal pain, melena, hematochezia, and hematemesis. Patient was prescribed ASA for DVT prophylaxis and noted that she had abdominal pain when taking the medication and when she stopped, the pain subsided. The pain was worse after eating.     Upon chart review, patient had colonoscopy in 2019 which was unremarkable.     In the hospital, patient was transfused 2u pRBC and admitted. GI consulted for anemia with concern for GI source. Hgb stable after transfuions.

## 2023-07-19 NOTE — ASSESSMENT & PLAN NOTE
- Interval history and physical exam findings as described above  - Grossly asymptomatic at present, though significant decrease from 11.6 two months prior  - Given lack of consistent symptoms, etiology is unclear  - Transfusing 2u pRBCs  - GI consulted for recommendations and possible scope  - Holding off on IV PPI 2/2 no symptoms of GI blood loss  - May consider hematology consult  - Iron studies ordered with AM labs  - Will trend H/H q8h and transfuse to maintain Hb>7

## 2023-07-19 NOTE — H&P
[facility] - Emergency Dept  [facility]  History & Physical    Patient Name: Raine Medley  MRN: [id number]  Patient Class: OP- Observation  Admission Date: 7/19/2023  Attending Physician: Nabil Bah MD   Primary Care Provider: Maribell Gotti MD         Patient information was obtained from patient and ER records.     Subjective:     Principal Problem:Acute anemia    Chief Complaint:   Chief Complaint   Patient presents with    Abnormal Lab     Hgb = 5.0 and Hct 19.0; knee replacement June 5th        HPI: Raine Enriquez is a 69yo F with a PMH of microcytic anemia, GERD, and vitamin D deficiency who [place] [facility] [place] after being informed of abnormal labwork. Pt recently underwent right knee surgery and was on a 30-day course of eliquis (completed 2 weeks PTA), and has been undergoing regular PT as part of her post-op recovery. During several episodes when working with PT, she was noted to become dizzy and lightheaded (no overt LOC). When at home, she reports that she has been doing well, and none of these episodes have happened other than while at PT. Denies F/C/N/V/D/C, HA, SOB, CP, palpitations, abdominal pain, hematemesis, hematochezia, melena, dysuria, extremity swelling, or symptoms otherwise. Routine labwork was performed and showed Hb 5.0, so she was instructed to come to the ER.    In the ED, VSS. Labs showed Hb 5.0 (down from 11.6 two months prior), though were otherwise grossly unremarkable. ED ordered 2u pRBC transfusion, and hospital medicine was consulted for admission and further management.      Past Medical History:   Diagnosis Date    Asthma     as a child    Hyperlipidemia     Microcytic anemia 5/15/2023       Past Surgical History:   Procedure Laterality Date    CHOLECYSTECTOMY      COLONOSCOPY N/A 1/3/2019    Procedure: COLONOSCOPY;  Surgeon: Gaston Beebe MD;  Location: [address];  Service: Endoscopy;  Laterality: N/A;    TOTAL KNEE ARTHROPLASTY  Right 6/5/2023    Procedure: ARTHROPLASTY, KNEE, TOTAL: RIGHT: DEPUY - ATTUNE;  Surgeon: Sanford Calvert III, MD;  Location: [facility];  Service: Orthopedics;  Laterality: Right;       Review of patient's allergies indicates:  No Known Allergies    No current facility-administered medications on file prior to encounter.     Current Outpatient Medications on File Prior to Encounter   Medication Sig    acetaminophen (TYLENOL) 650 MG TbSR Take 1 tablet (650 mg total) by mouth every 8 (eight) hours.    celecoxib (CELEBREX) 200 MG capsule Take 1 capsule (200 mg total) by mouth once daily.    methocarbamoL (ROBAXIN) 750 MG Tab Take 1 tablet (750 mg total) by mouth 3 (three) times daily as needed (muscle spasms).    methylPREDNISolone (MEDROL DOSEPACK) 4 mg tablet use as directed    omega-3 fatty acids/fish oil (FISH OIL-OMEGA-3 FATTY ACIDS) 300-1,000 mg capsule Take by mouth once daily.    pantoprazole (PROTONIX) 40 MG tablet Take 1 tablet (40 mg total) by mouth once daily.    vitamin D (VITAMIN D3) 1000 units Tab Take 1,000 Units by mouth once daily.    aspirin (ECOTRIN) 81 MG EC tablet Take 1 tablet (81 mg total) by mouth 2 (two) times daily.    diclofenac sodium (VOLTAREN) 1 % Gel Apply 2 g topically 3 (three) times daily as needed (pain).    oxyCODONE (ROXICODONE) 10 mg Tab immediate release tablet Take 0.5 - 1 tablet by mouth every 4-6 hours as needed for pain (Patient not taking: Reported on 7/18/2023)     Family History       Problem Relation (Age of Onset)    No Known Problems Mother, Father, Sister, Daughter, Son, Son, Son          Tobacco Use    Smoking status: Never    Smokeless tobacco: Never   Substance and Sexual Activity    Alcohol use: No    Drug use: No    Sexual activity: Not on file       ROS  General ROS: negative for weight loss, weight gain, fevers, chills, night sweats  ENT ROS: negative for epistaxis, headaches or sinus pain  Ophthalmic ROS: negative for blurry vision, decreased  vision, double vision or itchy eyes  Cardiovascular ROS: negative for chest pain or dyspnea on exertion  Respiratory ROS: negative for cough, shortness of breath, or wheezing  Gastrointestinal ROS: negative for abdominal pain, change in bowel habits, black or bloody stools, nausea, emesis, or bloating  Genito-Urinary ROS: negative for dysuria, trouble voiding, or hematuria  Neurological ROS: negative for TIA or stroke symptoms  Endocrine ROS: negative for hair pattern changes or unexpected weight changes  Musculoskeletal ROS: negative for muscle pain, weakness, joint pain or joint swelling  Skin: negative for rash, wounds, skin breakdown, or issues otherwise  Hematological and Lymphatic ROS: negative for bleeding, bruising, swollen lymph nodes  Psychological ROS: negative for anxiety or depression      Objective:     Vital Signs (Most Recent):  Temp: 98 °F (36.7 °C) (07/19/23 1013)  Pulse: 75 (07/19/23 1200)  Resp: 18 (07/19/23 1145)  BP: (!) 136/59 (07/19/23 1200)  SpO2: 100 % (07/19/23 1200) Vital Signs (24h Range):  Temp:  [98 °F (36.7 °C)] 98 °F (36.7 °C)  Pulse:  [75-88] 75  Resp:  [18-19] 18  SpO2:  [100 %] 100 %  BP: (136-138)/(59-61) 136/59     Weight: 59.9 kg (132 lb)  Body mass index is 24.94 kg/m².       Physical Exam  Gen: in NAD, appears stated age  Neuro: AAOx4, CN2-12 grossly intact BL; motor, sensory, and strength grossly intact BL  HEENT: NTNC, EOMI, PERRLA, MMM; no thyromegaly or lymphadenopathy; no JVD appreciated  CVS: RRR, no m/r/g; S1/S2 auscultated with no S3 or S4; capillary refill < 2 sec  Resp: lungs CTAB, no w/r/r; no belabored breathing or accessory muscle use appreciated   Abd: BS+ in all 4 quadrants; NTND, soft to palpation; no organomegaly appreciated   Extrem: pulses full, equal, and regular over all 4 extremities; no UE or LE edema BL; well-healing scar over right knee, uncomplicated       Significant Labs: All pertinent labs within the past 24 hours have been  reviewed.    Significant Imaging: I have reviewed all pertinent imaging results/findings within the past 24 hours.    Assessment/Plan:     * Acute anemia  - Interval history and physical exam findings as described above  - Grossly asymptomatic at present, though significant decrease from 11.6 two months prior  - Given lack of consistent symptoms, etiology is unclear  - Transfusing 2u pRBCs  - GI consulted for recommendations and possible scope  - Holding off on IV PPI 2/2 no symptoms of GI blood loss  - May consider hematology consult  - Iron studies ordered with AM labs  - Will trend H/H q8h and transfuse to maintain Hb>7    Gastroesophageal reflux disease without esophagitis  - Currently asymptomatic  - Continue home PPI regimen    Vitamin D deficiency  - Continue home vitamin D supplementation regimen      VTE Risk Mitigation (From admission, onward)         Ordered     IP VTE LOW RISK PATIENT  Once         07/19/23 1308     IP VTE LOW RISK PATIENT  Once         07/19/23 1308     Place sequential compression device  Until discontinued         07/19/23 1308                   On 07/19/2023, patient should be placed in hospital observation services under my care.        Nabil Bah MD  Attending Physician  Medical Director - [facility] Observation Unit  Department of Hospital Medicine  7/19/2023

## 2023-07-19 NOTE — PLAN OF CARE
07/19/23 1700   Post-Acute Status   Post-Acute Authorization Other   Other Status No Post-Acute Service Needs   Discharge Delays None known at this time   Discharge Plan   Discharge Plan A Home     No post acute services required at this time      Luly Castillo CD, MSW, LMSW, RSW   Case Management  [facility]  Email: [email or web address]

## 2023-07-19 NOTE — PHARMACY MED REC
"Admission Medication History     The home medication history was taken by Hesham Santo.    You may go to "Admission" then "Reconcile Home Medications" tabs to review and/or act upon these items.     The home medication list has been updated by the Pharmacy department.   Please read ALL comments highlighted in yellow.   Please address this information as you see fit.    Feel free to contact us if you have any questions or require assistance.      The medications listed below were removed from the home medication list. Please reorder if appropriate:  Patient reports no longer taking the following medication(s):  CELEBREX 200 MG CAPSULE  FISH OIL CAPSULE  OXYCODONE IR 10 MG TABLET      Current Outpatient Medications on File Prior to Encounter   Medication Sig    acetaminophen (TYLENOL) 650 MG TbSR   Take 1 tablet (650 mg total) by mouth every 8 (eight) hours.    diclofenac sodium (VOLTAREN) 1 % Gel   Apply 2 g topically 3 (three) times daily as needed (pain).    vitamin D (VITAMIN D3) 1000 units Tab   Take 1,000 Units by mouth once daily.    aspirin (ECOTRIN) 81 MG EC tablet   Take 1 tablet (81 mg total) by mouth 2 (two) times daily. (Patient not taking: Reported on 7/19/2023)    methocarbamoL (ROBAXIN) 750 MG Tab     Take 1 tablet (750 mg total) by mouth 3 (three) times daily as needed (muscle spasms). (Patient not taking: Reported on 7/19/2023)    methylPREDNISolone (MEDROL DOSEPACK) 4 mg tablet   use as directed    NEW START    pantoprazole (PROTONIX) 40 MG tablet   Take 1 tablet (40 mg total) by mouth once daily. (Patient not taking: Reported on 7/19/2023)       Potential issues to be addressed PRIOR TO DISCHARGE  Please discuss with the patient barriers to adherence with medication treatment plans  Patient requires education regarding drug therapies     Hesham Santo  EXT [id number]                    .        "

## 2023-07-19 NOTE — HPI
Raine Enriquez is a 69yo F with a PMH of microcytic anemia, GERD, and vitamin D deficiency who presented to the [facility] ED on 7/19/23 after being informed of abnormal labwork. Pt recently underwent right knee surgery and was on a 30-day course of eliquis (completed 2 weeks PTA), and has been undergoing regular PT as part of her post-op recovery. During several episodes when working with PT, she was noted to become dizzy and lightheaded (no overt LOC). When at home, she reports that she has been doing well, and none of these episodes have happened other than while at PT. Denies F/C/N/V/D/C, HA, SOB, CP, palpitations, abdominal pain, hematemesis, hematochezia, melena, dysuria, extremity swelling, or symptoms otherwise. Routine labwork was performed and showed Hb 5.0, so she was instructed to come to the ER.    In the ED, VSS. Labs showed Hb 5.0 (down from 11.6 two months prior), though were otherwise grossly unremarkable. ED ordered 2u pRBC transfusion, and hospital medicine was consulted for admission and further management.

## 2023-07-19 NOTE — PLAN OF CARE
[facility] - Emergency Dept  Initial Discharge Assessment       Primary Care Provider: Maribell Gotti MD    Admission Diagnosis: Symptomatic anemia [D64.9]    Admission Date: 7/19/2023  Expected Discharge Date: 7/20/2023    Pt daughter Maryam at bedside assisting with assessment.    Pt stated she uses a straight cane and occasionally a walker with 2 wheels to assist with ambulation.  Pt stated she is independent with her ADL's and does not require assistance.      Pt and daughter stated pt can d/c home with no needs when ready    Transition of Care Barriers: (P) None    Payor: [employer] / Plan: [employer] TOTAL CARE / Product Type: HMO /     Extended Emergency Contact Information  Primary Emergency Contact: Maryam Davey   [place]  Mobile Phone: [phone]  Relation: Daughter  Secondary Emergency Contact: Michele Medley   [place]  Mobile Phone: [phone]  Relation: Son    Discharge Plan A: (P) Home  Discharge Plan B: (P) Home      [id number] [place], [place] - [address] [place] [address] AT [address]  [address] [facility] [address]  Phone: [phone] Fax: [phone]    [facility]  [address] [facility] [address]  [facility] [address]  Phone: [phone] Fax: [phone]      Initial Assessment (most recent)       Adult Discharge Assessment - 07/19/23 1656          Discharge Assessment    Assessment Type Discharge Planning Assessment (P)      Confirmed/corrected address, phone number and insurance Yes (P)      Confirmed Demographics Correct on Facesheet (P)      Source of Information patient;family (P)      Reason For Admission Acute anemia (P)      People in Home child(shawanda), adult (P)      Facility Arrived From: home (P)      Do you expect to return to your current living situation? Yes (P)      Do you have help at home or someone to help you manage your care at home? No (P)      Prior to hospitilization cognitive status: Alert/Oriented;No  Deficits (P)      Current cognitive status: Alert/Oriented;No Deficits (P)      Walking or Climbing Stairs ambulation difficulty, requires equipment (P)      Mobility Management straight cane, walker 2 wheels (P)      Home Accessibility stairs to enter home;stairs within home (P)      Number of Stairs, Within Home, Primary other (see comments) (P)    1 flight    Number of Stairs, Main Entrance five (P)      Home Layout Bathroom on 2nd floor;Bedroom on 2nd floor;Able to live on 1st floor (P)      Equipment Currently Used at Home cane, straight;walker, standard;commode (P)      Patient currently being followed by outpatient case management? No (P)      Do you currently have service(s) that help you manage your care at home? No (P)      Do you have any problems affording any of your prescribed medications? No (P)      Is the patient taking medications as prescribed? yes (P)      Who is going to help you get home at discharge? family (P)      How do you get to doctors appointments? car, drives self;family or friend will provide (P)      Are you on dialysis? No (P)      Do you take coumadin? No (P)      Discharge Plan A Home (P)      Discharge Plan B Home (P)      DME Needed Upon Discharge  none (P)      Discharge Plan discussed with: Patient;Adult children (P)      Transition of Care Barriers None (P)         OTHER    Name(s) of People in Home adult son Bandar (P)                       Luly Castillo CD, MSW, LMSW, RSW   Case Management  [facility]  Email: [email or web address]

## 2023-07-19 NOTE — ED PROVIDER NOTES
Source of History  patient, family, and EMR    Chief Complaint    Abnormal Lab (Hgb = 5.0 and Hct 19.0; knee replacement June 5th)      History of Present Illness    Raine Medley is a 68 y.o. female presenting with concern for symptomatic anemia.  She has had near syncopal episodes x 4 in the last few weeks while doing PT for knee replacement (R) 6/5/2023.  No GI bleeding.  No swelling. No prior anemia.  Was on eliquis but stopped 2 weeks ago after her rx was over following the surgery.  No medications now.  No other bleeding noted.  No prior transfusions.  Knee healing nicely.  Hb 5.0 outpatient.    Review of Systems    As per HPI and below:  Constitutional symptoms:  No chills, no sweats, no fever   Skin symptoms:  No rash    Eye symptoms:  No blurred vision  ENMT symptoms:  No sore throat    Respiratory symptoms:  No shortness of breath  Cardiovascular symptoms:  No chest pain , + near syncope  Gastrointestinal symptoms:  No abdominal pain, no nausea, no vomiting, no diarrhea, no GIB  Genitourinary symptoms:  No dysuria  Musculoskeletal symptoms:  No back pain, No joint pains or aches  , normal ROM  Neurologic symptoms:  No headache, no weakness  Endocrine symptoms:  None except as in HPI     Past History    As per HPI and below:  Past Medical History:   Diagnosis Date    Asthma     as a child    Hyperlipidemia     Microcytic anemia 5/15/2023       Past Surgical History:   Procedure Laterality Date    CHOLECYSTECTOMY      COLONOSCOPY N/A 1/3/2019    Procedure: COLONOSCOPY;  Surgeon: Gaston Beebe MD;  Location: [address];  Service: Endoscopy;  Laterality: N/A;    TOTAL KNEE ARTHROPLASTY Right 6/5/2023    Procedure: ARTHROPLASTY, KNEE, TOTAL: RIGHT: DEPUY - ATTUNE;  Surgeon: Sanford Calvert III, MD;  Location: [facility];  Service: Orthopedics;  Laterality: Right;       Social History     Socioeconomic History    Marital status:    Occupational History     Employer: [employer]  [employer]   Tobacco Use    Smoking status: Never    Smokeless tobacco: Never   Substance and Sexual Activity    Alcohol use: No    Drug use: No   Social History Narrative    She lives in [place] with her 15-year-old son in [place]. 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 She is from [facility].        Family History   Problem Relation Age of Onset    No Known Problems Mother     No Known Problems Father     No Known Problems Sister     No Known Problems Daughter     No Known Problems Son     No Known Problems Son     No Known Problems Son     Melanoma Neg Hx     Lupus Neg Hx     Psoriasis Neg Hx     Colon cancer Neg Hx     Esophageal cancer Neg Hx     Stomach cancer Neg Hx        Review of patient's allergies indicates:  No Known Allergies    No current facility-administered medications on file prior to encounter.     Current Outpatient Medications on File Prior to Encounter   Medication Sig Dispense Refill    acetaminophen (TYLENOL) 650 MG TbSR Take 1 tablet (650 mg total) by mouth every 8 (eight) hours. 120 tablet 0    celecoxib (CELEBREX) 200 MG capsule Take 1 capsule (200 mg total) by mouth once daily. 30 capsule 0    methocarbamoL (ROBAXIN) 750 MG Tab Take 1 tablet (750 mg total) by mouth 3 (three) times daily as needed (muscle spasms). 40 tablet 0    methylPREDNISolone (MEDROL DOSEPACK) 4 mg tablet use as directed 21 each 0    omega-3 fatty acids/fish oil (FISH OIL-OMEGA-3 FATTY ACIDS) 300-1,000 mg capsule Take by mouth once daily.      pantoprazole (PROTONIX) 40 MG tablet Take 1 tablet (40 mg total) by mouth once daily. 30 tablet 0    vitamin D (VITAMIN D3) 1000 units Tab Take 1,000 Units by mouth once daily.      aspirin (ECOTRIN) 81 MG EC tablet Take 1 tablet (81 mg total) by mouth 2 (two) times daily. 60 tablet 0    diclofenac sodium (VOLTAREN) 1 % Gel Apply 2 g topically 3 (three) times daily as needed (pain). 100 g 2    oxyCODONE (ROXICODONE) 10 mg Tab immediate  "release tablet Take 0.5 - 1 tablet by mouth every 4-6 hours as needed for pain (Patient not taking: Reported on 7/18/2023) 25 tablet 0       Physical Exam    Reviewed nursing notes.  Vitals:    07/19/23 1013 07/19/23 1145 07/19/23 1200   BP: 138/61  (!) 136/59   BP Location: Right arm     Patient Position: Sitting     Pulse: 88  75   Resp: 19 18    Temp: 98 °F (36.7 °C)     TempSrc: Oral     SpO2: 100%  100%   Weight: 59.9 kg (132 lb)     Height: 5' 1" (1.549 m)       General:  Alert, no acute distress.    Skin:  Warm, dry, intact.  No rash.  Head:  Normocephalic, atraumatic.    Neck:  Supple.   HEENT:  Pupils are equal and round, appropriate for room, extraocular movements are intact.  Normal phonation.  Moist mucous membranes. Pallor conjunctiva.  Cardiovascular:  Regular rate and rhythm, Normal peripheral perfusion, No edema.    Respiratory:  Lungs are clear to auscultation, respirations are non-labored, breath sounds are equal.    Gastrointestinal:  Soft, Nontender, Non distended.   Rectal (nurse chaperone at bedside): negative no blood   Back:  Nontender. Normal gait.  Ambulatory.  Musculoskeletal:  Normal range of motion observed.  Neurological:  Alert and oriented to person, place, time, and situation.  No focal deficits observed.   Psychiatric:  Cooperative, appropriate mood & affect.       Initial MDM and Data Review    68 y.o. female presenting for evaluation of near syncope with anemia.  Rectal exam negative.  Previous use of Eliquis and NSAIDs following knee surgery.    Differential includes:  Symptomatic anemia with near-syncope, possible upper GI bleed    Work-up includes:  CBC, CMP, type and screen    Interventions include:  Blood transfusion    The patient has significant medical comorbidities that influence decision making for this acute process, such as:  Recent surgery and DOAC use    I decided to obtain the patient's medical records and review relevant documentation from clinic records.  " Pertinent information is noted.  Hemoglobin 5 outside records 2 days ago    Medications   0.9%  NaCl infusion (for blood administration) (has no administration in time range)       Results and ED Course    Labs Reviewed   CBC W/ AUTO DIFFERENTIAL - Abnormal; Notable for the following components:       Result Value    RBC 2.61 (*)     Hemoglobin 5.0 (*)     Hematocrit 19.2 (*)     MCV 74 (*)     MCH 19.2 (*)     MCHC 26.0 (*)     RDW 17.7 (*)     Immature Granulocytes 0.8 (*)     Immature Grans (Abs) 0.06 (*)     Gran % 73.7 (*)     Lymph % 14.3 (*)     All other components within normal limits    Narrative:     H&H critical result(s) called and verbal readback obtained from                   Rupinder Anders RN. by [facility] 07/19/2023 11:43   COMPREHENSIVE METABOLIC PANEL - Abnormal; Notable for the following components:    Glucose 111 (*)     ALT 9 (*)     All other components within normal limits   HIV 1 / 2 ANTIBODY    Narrative:     Release to patient->Immediate   HEPATITIS C ANTIBODY    Narrative:     Release to patient->Immediate   MAGNESIUM   TROPONIN I   IRON AND TIBC   FERRITIN   TYPE & SCREEN   PREPARE RBC SOFT       Imaging Results    None         ED Course as of 07/19/23 1245   Wed Jul 19, 2023   1201 Sodium: 140 [AC]   1201 Potassium: 3.9 [AC]   1201 Creatinine: 0.7 [AC]   1201 Hemoglobin(!!): 5.0  Baseline 11 [AC]   1201 Hematocrit(!!): 19.2 [AC]   1201 Troponin I: 0.007 [AC]      ED Course User Index  [AC] Ramesh Bosch DO           EKG interpreted by myself    EKG  Performed: 1118  Rate/Rhythm/Axis: 81 bpm, sinus rhythm, nml axis  QRS 74 ms  Qtc 441 ms  Impression:  Normal Sinus Rhythm.  EKG without evidence of Na channel blockade, K channel blockade, there is no prolonged QTc or digoxin effect.  There is no STEMI or signs of ischemia.      Impression and Plan    68 y.o. female with findings of symptomatic anemia based on the work up in the emergency department as above.    Important lab/imaging findings  include:  Anemia    All tests, treatment options and disposition options were discussed with the patient.  The decision was made to admit the patient to the hospital.    The patient was admitted in stable condition and all further questions/concerns by patient and/or family were addressed.    Critical Care:  Date: 07/19/2023  Performed by: Dr. Ramesh Bosch  Authorized by: Dr. Ramesh Bosch   Total critical care time (exclusive of procedural time) : 35 minutes  Upon my evaluation, this patient had a high probability of imminent or life-threatening deterioration due to [ symptomatic anemia ] which required my direct attention, intervention and personal management.  Critical care was necessary to treat or prevent imminent or life-threatening deterioration.  This may include review of laboratory data, radiology results, discussion with consultants and family, and monitoring for potential decompensations. Interventions were performed as documented.                 Final diagnoses:  [D64.9] Symptomatic anemia        ED Disposition Condition    Observation Stable                 Ramesh Bosch DO  07/19/23 1246

## 2023-07-20 VITALS
HEIGHT: 61 IN | RESPIRATION RATE: 18 BRPM | BODY MASS INDEX: 24.89 KG/M2 | OXYGEN SATURATION: 99 % | TEMPERATURE: 98 F | WEIGHT: 131.81 LBS | DIASTOLIC BLOOD PRESSURE: 65 MMHG | HEART RATE: 72 BPM | SYSTOLIC BLOOD PRESSURE: 128 MMHG

## 2023-07-20 LAB
ALBUMIN SERPL BCP-MCNC: 3.4 G/DL (ref 3.5–5.2)
ALP SERPL-CCNC: 85 U/L (ref 55–135)
ALT SERPL W/O P-5'-P-CCNC: 9 U/L (ref 10–44)
ANION GAP SERPL CALC-SCNC: 8 MMOL/L (ref 8–16)
AST SERPL-CCNC: 11 U/L (ref 10–40)
BASOPHILS # BLD AUTO: 0.06 K/UL (ref 0–0.2)
BASOPHILS NFR BLD: 0.5 % (ref 0–1.9)
BILIRUB SERPL-MCNC: 1.1 MG/DL (ref 0.1–1)
BUN SERPL-MCNC: 11 MG/DL (ref 8–23)
CALCIUM SERPL-MCNC: 9 MG/DL (ref 8.7–10.5)
CHLORIDE SERPL-SCNC: 109 MMOL/L (ref 95–110)
CO2 SERPL-SCNC: 25 MMOL/L (ref 23–29)
CREAT SERPL-MCNC: 0.7 MG/DL (ref 0.5–1.4)
DIFFERENTIAL METHOD: ABNORMAL
EOSINOPHIL # BLD AUTO: 0.3 K/UL (ref 0–0.5)
EOSINOPHIL NFR BLD: 1.9 % (ref 0–8)
ERYTHROCYTE [DISTWIDTH] IN BLOOD BY AUTOMATED COUNT: 19.4 % (ref 11.5–14.5)
EST. GFR  (NO RACE VARIABLE): >60 ML/MIN/1.73 M^2
FERRITIN SERPL-MCNC: 24 NG/ML (ref 20–300)
GLUCOSE SERPL-MCNC: 101 MG/DL (ref 70–110)
HCT VFR BLD AUTO: 25.7 % (ref 37–48.5)
HGB BLD-MCNC: 7.8 G/DL (ref 12–16)
HGB BLD-MCNC: 8.1 G/DL (ref 12–16)
IMM GRANULOCYTES # BLD AUTO: 0.2 K/UL (ref 0–0.04)
IMM GRANULOCYTES NFR BLD AUTO: 1.5 % (ref 0–0.5)
IRON SERPL-MCNC: 322 UG/DL (ref 30–160)
LYMPHOCYTES # BLD AUTO: 2.1 K/UL (ref 1–4.8)
LYMPHOCYTES NFR BLD: 16 % (ref 18–48)
MAGNESIUM SERPL-MCNC: 2.1 MG/DL (ref 1.6–2.6)
MCH RBC QN AUTO: 23.4 PG (ref 27–31)
MCHC RBC AUTO-ENTMCNC: 30.4 G/DL (ref 32–36)
MCV RBC AUTO: 77 FL (ref 82–98)
MONOCYTES # BLD AUTO: 0.9 K/UL (ref 0.3–1)
MONOCYTES NFR BLD: 6.6 % (ref 4–15)
NEUTROPHILS # BLD AUTO: 9.7 K/UL (ref 1.8–7.7)
NEUTROPHILS NFR BLD: 73.5 % (ref 38–73)
NRBC BLD-RTO: 0 /100 WBC
PHOSPHATE SERPL-MCNC: 4.1 MG/DL (ref 2.7–4.5)
PLATELET # BLD AUTO: 326 K/UL (ref 150–450)
PMV BLD AUTO: 12.5 FL (ref 9.2–12.9)
POTASSIUM SERPL-SCNC: 4.3 MMOL/L (ref 3.5–5.1)
PROT SERPL-MCNC: 6.2 G/DL (ref 6–8.4)
RBC # BLD AUTO: 3.34 M/UL (ref 4–5.4)
SATURATED IRON: 64 % (ref 20–50)
SODIUM SERPL-SCNC: 142 MMOL/L (ref 136–145)
TOTAL IRON BINDING CAPACITY: 500 UG/DL (ref 250–450)
TRANSFERRIN SERPL-MCNC: 338 MG/DL (ref 200–375)
WBC # BLD AUTO: 13.15 K/UL (ref 3.9–12.7)

## 2023-07-20 PROCEDURE — 83735 ASSAY OF MAGNESIUM: CPT | Performed by: STUDENT IN AN ORGANIZED HEALTH CARE EDUCATION/TRAINING PROGRAM

## 2023-07-20 PROCEDURE — 99238 PR HOSPITAL DISCHARGE DAY,<30 MIN: ICD-10-PCS | Mod: GT,,, | Performed by: HOSPITALIST

## 2023-07-20 PROCEDURE — 84100 ASSAY OF PHOSPHORUS: CPT | Performed by: STUDENT IN AN ORGANIZED HEALTH CARE EDUCATION/TRAINING PROGRAM

## 2023-07-20 PROCEDURE — 25000003 PHARM REV CODE 250: Performed by: STUDENT IN AN ORGANIZED HEALTH CARE EDUCATION/TRAINING PROGRAM

## 2023-07-20 PROCEDURE — 97110 THERAPEUTIC EXERCISES: CPT

## 2023-07-20 PROCEDURE — 84466 ASSAY OF TRANSFERRIN: CPT | Performed by: STUDENT IN AN ORGANIZED HEALTH CARE EDUCATION/TRAINING PROGRAM

## 2023-07-20 PROCEDURE — 85018 HEMOGLOBIN: CPT | Mod: 91 | Performed by: STUDENT IN AN ORGANIZED HEALTH CARE EDUCATION/TRAINING PROGRAM

## 2023-07-20 PROCEDURE — 99222 1ST HOSP IP/OBS MODERATE 55: CPT | Mod: ,,, | Performed by: STUDENT IN AN ORGANIZED HEALTH CARE EDUCATION/TRAINING PROGRAM

## 2023-07-20 PROCEDURE — 99222 PR INITIAL HOSPITAL CARE,LEVL II: ICD-10-PCS | Mod: ,,, | Performed by: STUDENT IN AN ORGANIZED HEALTH CARE EDUCATION/TRAINING PROGRAM

## 2023-07-20 PROCEDURE — 97161 PT EVAL LOW COMPLEX 20 MIN: CPT

## 2023-07-20 PROCEDURE — 36415 COLL VENOUS BLD VENIPUNCTURE: CPT | Performed by: STUDENT IN AN ORGANIZED HEALTH CARE EDUCATION/TRAINING PROGRAM

## 2023-07-20 PROCEDURE — 85025 COMPLETE CBC W/AUTO DIFF WBC: CPT | Performed by: STUDENT IN AN ORGANIZED HEALTH CARE EDUCATION/TRAINING PROGRAM

## 2023-07-20 PROCEDURE — 80053 COMPREHEN METABOLIC PANEL: CPT | Performed by: STUDENT IN AN ORGANIZED HEALTH CARE EDUCATION/TRAINING PROGRAM

## 2023-07-20 PROCEDURE — 82728 ASSAY OF FERRITIN: CPT | Performed by: STUDENT IN AN ORGANIZED HEALTH CARE EDUCATION/TRAINING PROGRAM

## 2023-07-20 PROCEDURE — A4216 STERILE WATER/SALINE, 10 ML: HCPCS | Performed by: STUDENT IN AN ORGANIZED HEALTH CARE EDUCATION/TRAINING PROGRAM

## 2023-07-20 PROCEDURE — 99238 HOSP IP/OBS DSCHRG MGMT 30/<: CPT | Mod: GT,,, | Performed by: HOSPITALIST

## 2023-07-20 PROCEDURE — G0378 HOSPITAL OBSERVATION PER HR: HCPCS

## 2023-07-20 RX ORDER — PANTOPRAZOLE SODIUM 40 MG/1
40 TABLET, DELAYED RELEASE ORAL 2 TIMES DAILY
Qty: 60 TABLET | Refills: 0 | Status: SHIPPED | OUTPATIENT
Start: 2023-07-20 | End: 2023-08-25 | Stop reason: SDUPTHER

## 2023-07-20 RX ADMIN — Medication 10 ML: at 06:07

## 2023-07-20 RX ADMIN — CHOLECALCIFEROL TAB 25 MCG (1000 UNIT) 1000 UNITS: 25 TAB at 08:07

## 2023-07-20 RX ADMIN — ACETAMINOPHEN 650 MG: 325 TABLET ORAL at 06:07

## 2023-07-20 RX ADMIN — METHOCARBAMOL 750 MG: 750 TABLET ORAL at 06:07

## 2023-07-20 RX ADMIN — PANTOPRAZOLE SODIUM 40 MG: 40 TABLET, DELAYED RELEASE ORAL at 08:07

## 2023-07-20 NOTE — SUBJECTIVE & OBJECTIVE
Past Medical History:   Diagnosis Date    Asthma     as a child    Gastroesophageal reflux disease without esophagitis 7/19/2023    Hyperlipidemia     Microcytic anemia 5/15/2023       Past Surgical History:   Procedure Laterality Date    CHOLECYSTECTOMY      COLONOSCOPY N/A 1/3/2019    Procedure: COLONOSCOPY;  Surgeon: Gaston Beebe MD;  Location: [address];  Service: Endoscopy;  Laterality: N/A;    TOTAL KNEE ARTHROPLASTY Right 6/5/2023    Procedure: ARTHROPLASTY, KNEE, TOTAL: RIGHT: DEPUY - ATTUNE;  Surgeon: Sanford Calvert III, MD;  Location: [facility];  Service: Orthopedics;  Laterality: Right;       Review of patient's allergies indicates:  No Known Allergies  Family History       Problem Relation (Age of Onset)    No Known Problems Mother, Father, Sister, Daughter, Son, Son, Son          Tobacco Use    Smoking status: Never    Smokeless tobacco: Never   Substance and Sexual Activity    Alcohol use: No    Drug use: No    Sexual activity: Not on file     Review of Systems   Constitutional:  Negative for chills, fatigue and fever.   HENT:  Negative for ear pain and sinus pain.    Eyes:  Negative for photophobia, pain and visual disturbance.   Respiratory:  Negative for cough and shortness of breath.    Cardiovascular:  Negative for chest pain and leg swelling.   Gastrointestinal:  Negative for abdominal pain, blood in stool, constipation, diarrhea, nausea and vomiting.   Endocrine: Negative for polyuria.   Genitourinary:  Negative for difficulty urinating, dysuria, flank pain, pelvic pain and vaginal pain.   Musculoskeletal:  Negative for back pain and neck pain.   Skin:  Negative for color change and rash.   Neurological:  Negative for dizziness, weakness, light-headedness, numbness and headaches.   Psychiatric/Behavioral:  Negative for confusion and sleep disturbance.    Objective:     Vital Signs (Most Recent):  Temp: 97.9 °F (36.6 °C) (07/20/23 0755)  Pulse: 67 (07/20/23 0755)  Resp: 18 (07/20/23  0755)  BP: 108/63 (07/20/23 0755)  SpO2: 97 % (07/20/23 0755) Vital Signs (24h Range):  Temp:  [96.7 °F (35.9 °C)-99.9 °F (37.7 °C)] 97.9 °F (36.6 °C)  Pulse:  [67-93] 67  Resp:  [14-18] 18  SpO2:  [96 %-100 %] 97 %  BP: (108-158)/(58-75) 108/63     Weight: 59.8 kg (131 lb 13.4 oz) (07/19/23 1842)  Body mass index is 24.91 kg/m².      Intake/Output Summary (Last 24 hours) at 7/20/2023 1100  Last data filed at 7/19/2023 1838  Gross per 24 hour   Intake 1146.75 ml   Output --   Net 1146.75 ml       Lines/Drains/Airways       Peripheral Intravenous Line  Duration                  Peripheral IV - Single Lumen 07/19/23 1123 20 G Left Antecubital <1 day                     Physical Exam  Constitutional:       General: She is not in acute distress.     Appearance: Normal appearance. She is not ill-appearing or diaphoretic.   HENT:      Head: Normocephalic and atraumatic.      Right Ear: External ear normal.      Left Ear: External ear normal.      Nose: Nose normal. No congestion or rhinorrhea.      Mouth/Throat:      Mouth: Mucous membranes are moist.      Pharynx: Oropharynx is clear. No oropharyngeal exudate or posterior oropharyngeal erythema.   Eyes:      General: No scleral icterus.     Extraocular Movements: Extraocular movements intact.      Conjunctiva/sclera: Conjunctivae normal.   Neck:      Vascular: No carotid bruit.   Cardiovascular:      Rate and Rhythm: Normal rate and regular rhythm.      Pulses: Normal pulses.      Heart sounds: Normal heart sounds. No murmur heard.    No friction rub.   Pulmonary:      Effort: Pulmonary effort is normal. No respiratory distress.      Breath sounds: Normal breath sounds. No stridor. No wheezing.   Chest:      Chest wall: No tenderness.   Abdominal:      General: Abdomen is flat. Bowel sounds are normal. There is no distension.      Palpations: There is no mass.      Tenderness: There is no right CVA tenderness, left CVA tenderness or guarding.   Musculoskeletal:          General: No swelling, tenderness or deformity. Normal range of motion.      Cervical back: Normal range of motion. No rigidity or tenderness.      Right lower leg: No edema.      Left lower leg: No edema.   Skin:     General: Skin is warm and dry.      Capillary Refill: Capillary refill takes less than 2 seconds.      Coloration: Skin is not jaundiced or pale.      Findings: No bruising or erythema.   Neurological:      General: No focal deficit present.      Mental Status: She is alert and oriented to person, place, and time. Mental status is at baseline.      Motor: No weakness.      Coordination: Coordination normal.   Psychiatric:         Mood and Affect: Mood normal.         Behavior: Behavior normal.         Thought Content: Thought content normal.        Significant Labs:  CBC:   Recent Labs   Lab 07/18/23  1628 07/19/23  1117 07/19/23  1940 07/19/23  2239 07/20/23  0457 07/20/23  0801   WBC 10.26 7.61  --   --  13.15*  --    HGB 5.0* 5.0*   < > 7.7* 7.8* 8.1*   HCT 19.1* 19.2*  --   --  25.7*  --     352  --   --  326  --     < > = values in this interval not displayed.     CMP:   Recent Labs   Lab 07/20/23  0457      CALCIUM 9.0   ALBUMIN 3.4*   PROT 6.2      K 4.3   CO2 25      BUN 11   CREATININE 0.7   ALKPHOS 85   ALT 9*   AST 11   BILITOT 1.1*     All pertinent lab results from the last 24 hours have been reviewed.    Significant Imaging:  Imaging results within the past 24 hours have been reviewed.

## 2023-07-20 NOTE — PLAN OF CARE
Problem: Physical Therapy  Goal: Physical Therapy Goal  Outcome: Met   PT D/Benjy due to pt able to perform functional mobility including up/down steps. Karis Wei PT 7/20/23   Impression: S/P Cataract Extraction by phacoemulsification with IOL placement OD - 86 Days. Encounter for surgical aftercare following surgery on a sense organ  Z48.810. Plan: Pt has residual cyl OD. Pt would like consult with Dr Justyna Matthews for possible LRI OD. Cont aliyah 128 QID OD at this time. Educated pt. on all findings.

## 2023-07-20 NOTE — DISCHARGE INSTRUCTIONS
Do not take any NSAID medications including but not limited to Advil, ibuprofen, Aleve, naproxen, Naprosyn, Goody powders, BC powders, Excedrin, Motrin, diclofenac, Voltaren.    Can take Tylenol.      Please see your primary care doctor and have your blood levels rechecked within the next 7 days and make an appointment with your gastroenterologist to schedule for a colonoscopy/EGD.    No justino ningun medicamentos anti-inflammatorios vale advil, ibuprofen, Aleve, naproxen, Naprosyn, Goody powders, BC powders Excedrin, Motrin. Puede lainey tylenol.    Jayde chuy vinh con marsh medico primario para christopher marsh nivel de wali en chuy semana. Jayde chuy vinh con gastroenterologo para coordinar chuy colonoscopia.

## 2023-07-20 NOTE — PT/OT/SLP EVAL
"Physical Therapy Evaluation/D/C Summary    Patient Name:  Raine Medley   MRN:  [id number]    Recommendations:     Discharge Recommendations: outpatient PT   Discharge Equipment Recommendations: none   Barriers to discharge: None    Assessment:     Raine Medley is a 68 y.o. female admitted with a medical diagnosis of Acute anemia.  She presents with the following impairments/functional limitations: decreased ROM, decreased lower extremity function, gait instability . PT D/Benjy due to pt able to perform functional mobility. PT educated pt and her daughter in safety with mobility and positioning of her R LE upon D/C. Also, pt educated in the importance of continuing her R LE exercise program.  They expressed understanding.    Recent Surgery: * No surgery found *      Plan:       (D/C PT due to pt able to perform functional mobility including up/down steps)   Plan of Care Expires:  08/20/23    Subjective   "I want to walk first" (prior to R LE exs)    Pain/Comfort:  Pain Rating 1: 0/10  Pain Rating Post-Intervention 1: 0/10    Patients cultural, spiritual, Taoist conflicts given the current situation: no    Living Environment:  Pt has been living with her daughter since her R TKR surgery in a 1 story home with 1 GAURI/pt can stay on the 1st floor. Pt's home is 2 story with 5 GUARI with B UE rails (too far apart to use both)  Prior to admission, patients level of function was independent, used the SC at times mainly when she would go out of the home.  Equipment used at home: cane, straight, shower chair, walker, rolling, bedside commode.   Upon discharge, patient will have assistance from daughter.    Objective:     Communicated with nurse prior to session.  Patient found HOB elevated with  (none)  upon PT entry to room.    General Precautions: Standard, fall  Orthopedic Precautions:N/A   Braces: N/A  Respiratory Status: Room air    Exams:  Cognitive Exam:  Patient is oriented to Person, Place, and " Time  Sensation:    -       Intact  light/touch B LE  RLE ROM: WFL except knee flex/ext limited due to recent TKR  RLE Strength: WFL except knee ext 3-/5; knee flex 3-/5  LLE ROM: WFL  LLE Strength: WFL    Functional Mobility:  Bed Mobility:     Supine to Sit: modified independence  Sit to Supine: modified independence  Transfers:     Sit to Stand:  independence with no AD  Gait: 8ft with SC with mod independent-pt not using the cane so then progressed to ambulating 100ft with no AD with supervision for verbal cues for R knee flex during swing phase and to avoid hiking her R hip up to clear her R LE. Pt improved as she progressed with mobility  Stairs:  Pt ascended/descended 5 stair(s) with No Assistive Device with left handrail with Supervision or Set-up Assistance.     AM-PAC 6 CLICK MOBILITY  Total Score:24     Treatment & Education:  Pt performed R LE exs per TKR protocol: (in supine)SLR with QS prior to lifting, abd/add, heel slides with 5 sec hold at end ROM,  and quad sets x 10-15 reps;.(in sitting) knee extension and knee flexion with 5 sec hold at end ROM x 15 reps.     Patient left HOB elevated with all lines intact, call button in reach, nurse notified, and daughter present.    GOALS:   Multidisciplinary Problems       Physical Therapy Goals       Not on file              Multidisciplinary Problems (Resolved)          Problem: Physical Therapy    Goal Priority Disciplines Outcome Goal Variances Interventions   Physical Therapy Goal   (Resolved)     PT, PT/OT Met                         History:     Past Medical History:   Diagnosis Date    Asthma     as a child    Gastroesophageal reflux disease without esophagitis 7/19/2023    Hyperlipidemia     Microcytic anemia 5/15/2023       Past Surgical History:   Procedure Laterality Date    CHOLECYSTECTOMY      COLONOSCOPY N/A 1/3/2019    Procedure: COLONOSCOPY;  Surgeon: Gaston Beebe MD;  Location: [facility] ([address] [place] [address]);  Service: Endoscopy;  Laterality: N/A;     TOTAL KNEE ARTHROPLASTY Right 6/5/2023    Procedure: ARTHROPLASTY, KNEE, TOTAL: RIGHT: DEPUY - ATTUNE;  Surgeon: Sanford Calvert III, MD;  Location: [facility];  Service: Orthopedics;  Laterality: Right;       Time Tracking:     PT Received On: 07/20/23  PT Start Time: 1101     PT Stop Time: 1127  PT Total Time (min): 26 min     Billable Minutes: Evaluation 10 and Therapeutic Exercise 16      07/20/2023

## 2023-07-20 NOTE — ASSESSMENT & PLAN NOTE
68 yoF admitted for significant anemia to 5.0 requiring transfusions. Patient was previously on ASA for DVT ppx given recent TKA and noticed abdominal pain when taking medication. She also admits to NSAID use. Hgb stable at 8.1 after transfusions and no overt GI bleeding noted.     - will perform outpatient EGD and colonoscopy  - discharge with PPI

## 2023-07-20 NOTE — HOSPITAL COURSE
Patient was transfused 2 units of PRBCs with no signs or symptoms of GI bleeding.  She was tolerating p.o. intake well with stable hemoglobin post transfusion.  Does concluded that the patient most likely for suffering from an upper GI bleed caused by NSAID use as she affirmed using ibuprofen quite regularly in the past couple of weeks after her orthopedic surgery.  Gastroenterology cleared patient for discharge with outpatient follow-up.  Patient and sister were extensively counseled both in English and Malian directly by me to refrain from NSAIDs and to have her hemoglobin checked again in the coming days with her PCP.  Patient has met maximal benefit from hospitalization is clinically stable for discharge.      Heart sounds indicated regular rate rhythm, clear lungs bilaterally, on RA, no cyanosis  Abdomen soft, nontender, nondistended   No Obvious lower extremity edema

## 2023-07-20 NOTE — NURSING
Care provided as ordered  Discharge instructions reviewed  Understanding verbalized  Questions encouraged and answered  IV and telemetry monitor removed  No falls, injuries or acute incidents noted during shift  Pt dressed appropriately and awaiting discharge/ bedside medication

## 2023-07-20 NOTE — PLAN OF CARE
[facility] - Observation 11H  Discharge Final Note    Primary Care Provider: Maribell Gotti MD    Expected Discharge Date: 7/20/2023    Patient discharged to home via personal transportation.     Patient's bedside nurse and patient notified of the above.      Final Discharge Note (most recent)       Final Note - 07/20/23 1524          Final Note    Assessment Type Final Discharge Note (P)      Anticipated Discharge Disposition Home or Self Care (P)         Post-Acute Status    Post-Acute Authorization Other (P)      Other Status No Post-Acute Service Needs (P)                      Important Message from Medicare             Contact Info       Maribell Gotti MD   Specialty: Family Medicine   Relationship: PCP - General    [address]  [place] [address]   Phone: [phone]       Next Steps: Schedule an appointment as soon as possible for a visit in 1 week(s)            Future Appointments   Date Time Provider Department Center   7/27/2023  8:20 AM Jacob Nowak MD [facility]   8/8/2023  2:00 PM Sanford Calvert III, MD [facility]THO [address]   8/29/2023  3:40 PM Sanford Calvert III, MD [facility]TH[facility] [address]   10/10/2023  9:00 AM Jensen Shell MD [facility] GASTR[facility] [address] scheduled post-discharge follow-up appointment and information added to AVS.     Radha Arriola, LMSW [facility]  Ext. [phone]

## 2023-07-20 NOTE — DISCHARGE SUMMARY
[facility] [address] [place] [address] Medicine  Discharge Summary      Patient Name: Raine Medley  MRN: [id number]  HILTON: [id number]  Patient Class: OP- Observation  Admission Date: 7/19/2023  Hospital Length of Stay: 0 days  Discharge Date and Time: 7/20/2023  2:40 PM  Attending Physician: Irina att. providers found   Discharging Provider: Richard Trinidad MD  Primary Care Provider: Maribell Gotti MD  Hospital Medicine Team: [facility]  Richard Trinidad MD  Primary Care Team: [facility]    HPI:   Raine Enriquez is a 67yo F with a PMH of microcytic anemia, GERD, and vitamin D deficiency who presented to the [facility] ED on 7/19/23 after being informed of abnormal labwork. Pt recently underwent right knee surgery and was on a 30-day course of eliquis (completed 2 weeks PTA), and has been undergoing regular PT as part of her post-op recovery. During several episodes when working with PT, she was noted to become dizzy and lightheaded (no overt LOC). When at home, she reports that she has been doing well, and none of these episodes have happened other than while at PT. Denies F/C/N/V/D/C, HA, SOB, CP, palpitations, abdominal pain, hematemesis, hematochezia, melena, dysuria, extremity swelling, or symptoms otherwise. Routine labwork was performed and showed Hb 5.0, so she was instructed to come to the ER.    In the ED, VSS. Labs showed Hb 5.0 (down from 11.6 two months prior), though were otherwise grossly unremarkable. ED ordered 2u pRBC transfusion, and hospital medicine was consulted for admission and further management.      * No surgery found *      Hospital Course:   Patient was transfused 2 units of PRBCs with no signs or symptoms of GI bleeding.  She was tolerating p.o. intake well with stable hemoglobin post transfusion.  Does concluded that the patient most likely for suffering from an upper GI bleed caused by NSAID use as she affirmed using ibuprofen quite regularly in the past couple of weeks after her  orthopedic surgery.  Gastroenterology cleared patient for discharge with outpatient follow-up.  Patient and sister were extensively counseled both in English and Chinese directly by me to refrain from NSAIDs and to have her hemoglobin checked again in the coming days with her PCP.  Patient has met maximal benefit from hospitalization is clinically stable for discharge.      Heart sounds indicated regular rate rhythm, clear lungs bilaterally, on RA, no cyanosis  Abdomen soft, nontender, nondistended   No Obvious lower extremity edema            Goals of Care Treatment Preferences:  Code Status: Full Code      Consults:   Consults (From admission, onward)        Status Ordering Provider     Inpatient consult to Gastroenterology  Once        Provider:  (Not yet assigned)    Completed [place]          No new Assessment & Plan notes have been filed under this hospital service since the last note was generated.  Service: Hospital Medicine    Final Active Diagnoses:    Diagnosis Date Noted POA    PRINCIPAL PROBLEM:  Acute anemia [D64.9] 07/19/2023 Yes    Gastroesophageal reflux disease without esophagitis [K21.9] 07/19/2023 Yes    Vitamin D deficiency [E55.9] 07/19/2023 Yes      Problems Resolved During this Admission:       Discharged Condition: good    Disposition: Home or Self Care    Follow Up:   Follow-up Information     Maribell Gotti MD. Schedule an appointment as soon as possible for a visit in 1 week(s).    Specialty: Family Medicine  Contact information:  [address] [address] [phone]                       Patient Instructions:      Ambulatory referral/consult to Gastroenterology   Standing Status: Future   Referral Priority: Routine Referral Type: Consultation   Referral Reason: Specialty Services Required   Requested Specialty: Gastroenterology   Number of Visits Requested: 1       Significant Diagnostic Studies:     Pending Diagnostic Studies:     Procedure Component  Value Units Date/Time    Hemoglobin [[id number]]     Order Status: Sent Lab Status: No result     Specimen: Blood          Medications:  Reconciled Home Medications:      Medication List      CHANGE how you take these medications    pantoprazole 40 MG tablet  Commonly known as: PROTONIX  [place] chuy tableta (40 mg en total) por vía oral 2 veces al día.  (Take 1 tablet (40 mg total) by mouth 2 (two) times daily.)  What changed: when to take this        CONTINUE taking these medications    acetaminophen 650 MG Tbsr  Commonly known as: TYLENOL  Take 1 tablet (650 mg total) by mouth every 8 (eight) hours.     methylPREDNISolone 4 mg tablet  Commonly known as: MEDROL DOSEPACK  Use según las indicaciones  (use as directed)     vitamin D 1000 units Tab  Commonly known as: VITAMIN D3  Take 1,000 Units by mouth once daily.        STOP taking these medications    aspirin 81 MG EC tablet  Commonly known as: ECOTRIN     diclofenac sodium 1 % Gel  Commonly known as: VOLTAREN     methocarbamoL 750 MG Tab  Commonly known as: ROBAXIN            Indwelling Lines/Drains at time of discharge:   Lines/Drains/Airways     None                      Richard Trinidad MD  Department of Hospital Medicine  [facility]

## 2023-07-20 NOTE — PLAN OF CARE
CHW scheduled the [facility] hospital follow up for July 27 @ 8:20am    CHW scheduled the Gastroenterology hospital follow up for October 10 @ 9:00am, patient added to the wait list for a sooner appointment

## 2023-07-20 NOTE — CONSULTS
[facility]  Gastroenterology  Consult Note    Patient Name: Raine Medley  MRN: [id number]  Admission Date: 7/19/2023  Hospital Length of Stay: 0 days  Code Status: Full Code   Attending Provider: Richard Trinidad MD   Consulting Provider: Milka Betancourt MD  Primary Care Physician: Maribell Gotti MD  Principal Problem:Acute anemia    Inpatient consult to Gastroenterology  Consult performed by: Milka Betancourt MD  Consult ordered by: Nabil Bah MD        Subjective:     HPI:  This is a 68 year old lady with GERD and recent TKA who presents to the ED with anemia. Patient was becoming dizzy during PT and had a CBC which showed a Hgb of 5 which prompted her to come to the ED. Patient currently denies any shortness of breath, abdominal pain, melena, hematochezia, and hematemesis. Patient was prescribed ASA for DVT prophylaxis and noted that she had abdominal pain when taking the medication and when she stopped, the pain subsided. The pain was worse after eating.     Upon chart review, patient had colonoscopy in 2019 which was unremarkable.     In the hospital, patient was transfused 2u pRBC and admitted. GI consulted for anemia with concern for GI source. Hgb stable after transfuions.       Past Medical History:   Diagnosis Date    Asthma     as a child    Gastroesophageal reflux disease without esophagitis 7/19/2023    Hyperlipidemia     Microcytic anemia 5/15/2023       Past Surgical History:   Procedure Laterality Date    CHOLECYSTECTOMY      COLONOSCOPY N/A 1/3/2019    Procedure: COLONOSCOPY;  Surgeon: Gaston Beebe MD;  Location: [address];  Service: Endoscopy;  Laterality: N/A;    TOTAL KNEE ARTHROPLASTY Right 6/5/2023    Procedure: ARTHROPLASTY, KNEE, TOTAL: RIGHT: DEPUY - ATTUNE;  Surgeon: Sanford Calvert III, MD;  Location: [facility];  Service: Orthopedics;  Laterality: Right;       Review of patient's allergies indicates:  No Known Allergies  Family History        Problem Relation (Age of Onset)    No Known Problems Mother, Father, Sister, Daughter, Son, Son, Son          Tobacco Use    Smoking status: Never    Smokeless tobacco: Never   Substance and Sexual Activity    Alcohol use: No    Drug use: No    Sexual activity: Not on file     Review of Systems   Constitutional:  Negative for chills, fatigue and fever.   HENT:  Negative for ear pain and sinus pain.    Eyes:  Negative for photophobia, pain and visual disturbance.   Respiratory:  Negative for cough and shortness of breath.    Cardiovascular:  Negative for chest pain and leg swelling.   Gastrointestinal:  Negative for abdominal pain, blood in stool, constipation, diarrhea, nausea and vomiting.   Endocrine: Negative for polyuria.   Genitourinary:  Negative for difficulty urinating, dysuria, flank pain, pelvic pain and vaginal pain.   Musculoskeletal:  Negative for back pain and neck pain.   Skin:  Negative for color change and rash.   Neurological:  Negative for dizziness, weakness, light-headedness, numbness and headaches.   Psychiatric/Behavioral:  Negative for confusion and sleep disturbance.    Objective:     Vital Signs (Most Recent):  Temp: 97.9 °F (36.6 °C) (07/20/23 0755)  Pulse: 67 (07/20/23 0755)  Resp: 18 (07/20/23 0755)  BP: 108/63 (07/20/23 0755)  SpO2: 97 % (07/20/23 0755) Vital Signs (24h Range):  Temp:  [96.7 °F (35.9 °C)-99.9 °F (37.7 °C)] 97.9 °F (36.6 °C)  Pulse:  [67-93] 67  Resp:  [14-18] 18  SpO2:  [96 %-100 %] 97 %  BP: (108-158)/(58-75) 108/63     Weight: 59.8 kg (131 lb 13.4 oz) (07/19/23 1842)  Body mass index is 24.91 kg/m².      Intake/Output Summary (Last 24 hours) at 7/20/2023 1100  Last data filed at 7/19/2023 1838  Gross per 24 hour   Intake 1146.75 ml   Output --   Net 1146.75 ml       Lines/Drains/Airways       Peripheral Intravenous Line  Duration                  Peripheral IV - Single Lumen 07/19/23 1123 20 G Left Antecubital <1 day                     Physical  Exam  Constitutional:       General: She is not in acute distress.     Appearance: Normal appearance. She is not ill-appearing or diaphoretic.   HENT:      Head: Normocephalic and atraumatic.      Right Ear: External ear normal.      Left Ear: External ear normal.      Nose: Nose normal. No congestion or rhinorrhea.      Mouth/Throat:      Mouth: Mucous membranes are moist.      Pharynx: Oropharynx is clear. No oropharyngeal exudate or posterior oropharyngeal erythema.   Eyes:      General: No scleral icterus.     Extraocular Movements: Extraocular movements intact.      Conjunctiva/sclera: Conjunctivae normal.   Neck:      Vascular: No carotid bruit.   Cardiovascular:      Rate and Rhythm: Normal rate and regular rhythm.      Pulses: Normal pulses.      Heart sounds: Normal heart sounds. No murmur heard.    No friction rub.   Pulmonary:      Effort: Pulmonary effort is normal. No respiratory distress.      Breath sounds: Normal breath sounds. No stridor. No wheezing.   Chest:      Chest wall: No tenderness.   Abdominal:      General: Abdomen is flat. Bowel sounds are normal. There is no distension.      Palpations: There is no mass.      Tenderness: There is no right CVA tenderness, left CVA tenderness or guarding.   Musculoskeletal:         General: No swelling, tenderness or deformity. Normal range of motion.      Cervical back: Normal range of motion. No rigidity or tenderness.      Right lower leg: No edema.      Left lower leg: No edema.   Skin:     General: Skin is warm and dry.      Capillary Refill: Capillary refill takes less than 2 seconds.      Coloration: Skin is not jaundiced or pale.      Findings: No bruising or erythema.   Neurological:      General: No focal deficit present.      Mental Status: She is alert and oriented to person, place, and time. Mental status is at baseline.      Motor: No weakness.      Coordination: Coordination normal.   Psychiatric:         Mood and Affect: Mood normal.          Behavior: Behavior normal.         Thought Content: Thought content normal.        Significant Labs:  CBC:   Recent Labs   Lab 07/18/23  1628 07/19/23  1117 07/19/23  1940 07/19/23  2239 07/20/23  0457 07/20/23  0801   WBC 10.26 7.61  --   --  13.15*  --    HGB 5.0* 5.0*   < > 7.7* 7.8* 8.1*   HCT 19.1* 19.2*  --   --  25.7*  --     352  --   --  326  --     < > = values in this interval not displayed.     CMP:   Recent Labs   Lab 07/20/23  0457      CALCIUM 9.0   ALBUMIN 3.4*   PROT 6.2      K 4.3   CO2 25      BUN 11   CREATININE 0.7   ALKPHOS 85   ALT 9*   AST 11   BILITOT 1.1*     All pertinent lab results from the last 24 hours have been reviewed.    Significant Imaging:  Imaging results within the past 24 hours have been reviewed.    Assessment/Plan:     Oncology  * Acute anemia  68 yoF admitted for significant anemia to 5.0 requiring transfusions. Patient was previously on ASA for DVT ppx given recent TKA and noticed abdominal pain when taking medication. She also admits to NSAID use. Hgb stable at 8.1 after transfusions and no overt GI bleeding noted.     - will perform outpatient EGD and colonoscopy  - discharge with PPI    GI  Gastroesophageal reflux disease without esophagitis  See anemia        Thank you for your consult. I will sign off. Please contact us if you have any additional questions.    Milka Betancourt MD  Gastroenterology  [facility]

## 2023-07-21 ENCOUNTER — TELEPHONE (OUTPATIENT)
Dept: ENDOSCOPY | Facility: HOSPITAL | Age: 68
End: 2023-07-21
Payer: COMMERCIAL

## 2023-07-21 NOTE — TELEPHONE ENCOUNTER
"----- Message -----   From: Jalen Rodriguez MD   Sent: 2023  12:16 PM CDT   To: [facility] Patients     Procedure: EGD/Colonoscopy     Diagnosis: Iron deficiency anemia     Procedure Timin-4 weeks     *If within 4 weeks selected, please mary as high priority*     *If greater than 12 weeks, please select "5-12 weeks" and delay sending until 2 months prior to requested date*     Provider: Any endoscopist     Location: No Preference     Additional Scheduling Information: No scheduling concerns     Prep Specifications:Standard prep     Have you attached a patient to this message: yes    "

## 2023-07-21 NOTE — TELEPHONE ENCOUNTER
Contacted the patient to schedule an endoscopy procedure(s) EGD & Colonoscopy. The patient did not answer the call and left a voice message requesting a call back.

## 2023-07-24 ENCOUNTER — TELEPHONE (OUTPATIENT)
Dept: FAMILY MEDICINE | Facility: CLINIC | Age: 68
End: 2023-07-24
Payer: COMMERCIAL

## 2023-07-24 NOTE — TELEPHONE ENCOUNTER
----- Message from Emma Oconnor MA sent at 7/21/2023  4:47 PM CDT -----  Regarding: FW: call back  Contact: [phone]    ----- Message -----  From: Emma Oconnor MA  Sent: 7/20/2023   4:45 PM CDT  To: Emma Oconnor MA  Subject: FW: call back                                      ----- Message -----  From: Vicky Lino  Sent: 7/20/2023   2:10 PM CDT  To: [facility] Staff  Subject: call back                                        Type:  Sooner Apoointment Request    Caller is requesting a sooner appointment.  Caller declined first available appointment listed below.  Caller will not accept being placed on the waitlist and is requesting a message be sent to doctor.  Name of Caller: PT   When is the first available appointment? Was asked to send a message   Symptoms: 2nd hospital visit and needs to be seen by Monday   Would the patient rather a call back or a response via [facility]? Call back   Best Call Back Number: [phone]  Additional Information:

## 2023-07-25 ENCOUNTER — TELEPHONE (OUTPATIENT)
Dept: ENDOSCOPY | Facility: HOSPITAL | Age: 68
End: 2023-07-25
Payer: COMMERCIAL

## 2023-07-25 ENCOUNTER — PATIENT MESSAGE (OUTPATIENT)
Dept: ORTHOPEDICS | Facility: CLINIC | Age: 68
End: 2023-07-25
Payer: COMMERCIAL

## 2023-07-25 DIAGNOSIS — D50.9 IRON DEFICIENCY ANEMIA, UNSPECIFIED IRON DEFICIENCY ANEMIA TYPE: Primary | ICD-10-CM

## 2023-07-25 RX ORDER — POLYETHYLENE GLYCOL 3350, SODIUM SULFATE ANHYDROUS, SODIUM BICARBONATE, SODIUM CHLORIDE, POTASSIUM CHLORIDE 236; 22.74; 6.74; 5.86; 2.97 G/4L; G/4L; G/4L; G/4L; G/4L
4 POWDER, FOR SOLUTION ORAL ONCE
Qty: 4000 ML | Refills: 0 | Status: SHIPPED | OUTPATIENT
Start: 2023-07-25 | End: 2023-07-25

## 2023-07-25 NOTE — TELEPHONE ENCOUNTER
Spoke to pt to schedule procedure(s) Colonoscopy/EGD       Physician to perform procedure(s) Dr. MIRACLE Shell  Date of Procedure (s) 9/8/23  Arrival Time 12:45 PM  Time of Procedure(s) 1:45 PM   Location of Procedure(s) [facility] [address] Floor  Type of Rx Prep sent to patient: PEG  Instructions provided to patient via [facility]    Patient was informed on the following information and verbalized understanding. Screening questionnaire reviewed with patient and complete. If procedure requires anesthesia, a responsible adult needs to be present to accompany the patient home, patient cannot drive after receiving anesthesia. Appointment details are tentative, especially check-in time. Patient will receive a prep-op call 4 days prior to confirm check-in time for procedure. If applicable the patient should contact their pharmacy to verify Rx for procedure prep is ready for pick-up. Patient was advised to call the scheduling department at [phone] if pharmacy states no Rx is available. Patient was advised to call the endoscopy scheduling department if any questions or concerns arise.      [facility] Scheduling Department

## 2023-07-26 ENCOUNTER — LAB VISIT (OUTPATIENT)
Dept: LAB | Facility: HOSPITAL | Age: 68
End: 2023-07-26
Attending: FAMILY MEDICINE
Payer: COMMERCIAL

## 2023-07-26 ENCOUNTER — OFFICE VISIT (OUTPATIENT)
Dept: FAMILY MEDICINE | Facility: CLINIC | Age: 68
End: 2023-07-26
Attending: FAMILY MEDICINE
Payer: COMMERCIAL

## 2023-07-26 ENCOUNTER — OFFICE VISIT (OUTPATIENT)
Dept: GASTROENTEROLOGY | Facility: CLINIC | Age: 68
End: 2023-07-26
Attending: FAMILY MEDICINE
Payer: MEDICARE

## 2023-07-26 VITALS
OXYGEN SATURATION: 97 % | BODY MASS INDEX: 24.55 KG/M2 | SYSTOLIC BLOOD PRESSURE: 120 MMHG | TEMPERATURE: 98 F | DIASTOLIC BLOOD PRESSURE: 70 MMHG | HEIGHT: 61 IN | HEART RATE: 79 BPM | WEIGHT: 130.06 LBS

## 2023-07-26 VITALS — BODY MASS INDEX: 24.91 KG/M2 | WEIGHT: 131.81 LBS

## 2023-07-26 DIAGNOSIS — Z09 HOSPITAL DISCHARGE FOLLOW-UP: ICD-10-CM

## 2023-07-26 DIAGNOSIS — D64.9 ACUTE ANEMIA: ICD-10-CM

## 2023-07-26 DIAGNOSIS — K92.2 GASTROINTESTINAL HEMORRHAGE, UNSPECIFIED GASTROINTESTINAL HEMORRHAGE TYPE: ICD-10-CM

## 2023-07-26 DIAGNOSIS — Z09 HOSPITAL DISCHARGE FOLLOW-UP: Primary | ICD-10-CM

## 2023-07-26 LAB
ALBUMIN SERPL BCP-MCNC: 3.9 G/DL (ref 3.5–5.2)
ALP SERPL-CCNC: 94 U/L (ref 55–135)
ALT SERPL W/O P-5'-P-CCNC: 12 U/L (ref 10–44)
ANION GAP SERPL CALC-SCNC: 10 MMOL/L (ref 8–16)
AST SERPL-CCNC: 10 U/L (ref 10–40)
BASOPHILS # BLD AUTO: 0.07 K/UL (ref 0–0.2)
BASOPHILS NFR BLD: 0.7 % (ref 0–1.9)
BILIRUB SERPL-MCNC: 0.3 MG/DL (ref 0.1–1)
BUN SERPL-MCNC: 16 MG/DL (ref 8–23)
CALCIUM SERPL-MCNC: 9.2 MG/DL (ref 8.7–10.5)
CHLORIDE SERPL-SCNC: 106 MMOL/L (ref 95–110)
CO2 SERPL-SCNC: 25 MMOL/L (ref 23–29)
CREAT SERPL-MCNC: 0.8 MG/DL (ref 0.5–1.4)
DIFFERENTIAL METHOD: ABNORMAL
EOSINOPHIL # BLD AUTO: 0.1 K/UL (ref 0–0.5)
EOSINOPHIL NFR BLD: 0.7 % (ref 0–8)
ERYTHROCYTE [DISTWIDTH] IN BLOOD BY AUTOMATED COUNT: 21.9 % (ref 11.5–14.5)
EST. GFR  (NO RACE VARIABLE): >60 ML/MIN/1.73 M^2
FERRITIN SERPL-MCNC: 25 NG/ML (ref 20–300)
GLUCOSE SERPL-MCNC: 128 MG/DL (ref 70–110)
HCT VFR BLD AUTO: 33.8 % (ref 37–48.5)
HGB BLD-MCNC: 9.9 G/DL (ref 12–16)
IMM GRANULOCYTES # BLD AUTO: 0.05 K/UL (ref 0–0.04)
IMM GRANULOCYTES NFR BLD AUTO: 0.5 % (ref 0–0.5)
IRON SERPL-MCNC: 21 UG/DL (ref 30–160)
LYMPHOCYTES # BLD AUTO: 1.4 K/UL (ref 1–4.8)
LYMPHOCYTES NFR BLD: 13.5 % (ref 18–48)
MCH RBC QN AUTO: 22.8 PG (ref 27–31)
MCHC RBC AUTO-ENTMCNC: 29.3 G/DL (ref 32–36)
MCV RBC AUTO: 78 FL (ref 82–98)
MONOCYTES # BLD AUTO: 0.7 K/UL (ref 0.3–1)
MONOCYTES NFR BLD: 6.6 % (ref 4–15)
NEUTROPHILS # BLD AUTO: 8.3 K/UL (ref 1.8–7.7)
NEUTROPHILS NFR BLD: 78 % (ref 38–73)
NRBC BLD-RTO: 0 /100 WBC
PLATELET # BLD AUTO: 374 K/UL (ref 150–450)
PMV BLD AUTO: 11.5 FL (ref 9.2–12.9)
POTASSIUM SERPL-SCNC: 3.9 MMOL/L (ref 3.5–5.1)
PROT SERPL-MCNC: 7.2 G/DL (ref 6–8.4)
RBC # BLD AUTO: 4.35 M/UL (ref 4–5.4)
SATURATED IRON: 4 % (ref 20–50)
SODIUM SERPL-SCNC: 141 MMOL/L (ref 136–145)
TOTAL IRON BINDING CAPACITY: 559 UG/DL (ref 250–450)
TRANSFERRIN SERPL-MCNC: 378 MG/DL (ref 200–375)
WBC # BLD AUTO: 10.68 K/UL (ref 3.9–12.7)

## 2023-07-26 PROCEDURE — 99999 PR PBB SHADOW E&M-EST. PATIENT-LVL III: ICD-10-PCS | Mod: PBBFAC,,, | Performed by: FAMILY MEDICINE

## 2023-07-26 PROCEDURE — 99214 PR OFFICE/OUTPT VISIT, EST, LEVL IV, 30-39 MIN: ICD-10-PCS | Mod: S$GLB,,, | Performed by: FAMILY MEDICINE

## 2023-07-26 PROCEDURE — 3074F SYST BP LT 130 MM HG: CPT | Mod: CPTII,S$GLB,, | Performed by: FAMILY MEDICINE

## 2023-07-26 PROCEDURE — 84466 ASSAY OF TRANSFERRIN: CPT | Performed by: FAMILY MEDICINE

## 2023-07-26 PROCEDURE — 3288F PR FALLS RISK ASSESSMENT DOCUMENTED: ICD-10-PCS | Mod: CPTII,S$GLB,,

## 2023-07-26 PROCEDURE — 3078F DIAST BP <80 MM HG: CPT | Mod: CPTII,S$GLB,, | Performed by: FAMILY MEDICINE

## 2023-07-26 PROCEDURE — 99214 OFFICE O/P EST MOD 30 MIN: CPT | Mod: S$GLB,,,

## 2023-07-26 PROCEDURE — 80053 COMPREHEN METABOLIC PANEL: CPT | Performed by: FAMILY MEDICINE

## 2023-07-26 PROCEDURE — 1101F PR PT FALLS ASSESS DOC 0-1 FALLS W/OUT INJ PAST YR: ICD-10-PCS | Mod: CPTII,S$GLB,,

## 2023-07-26 PROCEDURE — 99214 PR OFFICE/OUTPT VISIT, EST, LEVL IV, 30-39 MIN: ICD-10-PCS | Mod: S$GLB,,,

## 2023-07-26 PROCEDURE — 1159F MED LIST DOCD IN RCRD: CPT | Mod: CPTII,S$GLB,, | Performed by: FAMILY MEDICINE

## 2023-07-26 PROCEDURE — 1159F PR MEDICATION LIST DOCUMENTED IN MEDICAL RECORD: ICD-10-PCS | Mod: CPTII,S$GLB,, | Performed by: FAMILY MEDICINE

## 2023-07-26 PROCEDURE — 3074F PR MOST RECENT SYSTOLIC BLOOD PRESSURE < 130 MM HG: ICD-10-PCS | Mod: CPTII,S$GLB,, | Performed by: FAMILY MEDICINE

## 2023-07-26 PROCEDURE — 1126F PR PAIN SEVERITY QUANTIFIED, NO PAIN PRESENT: ICD-10-PCS | Mod: CPTII,S$GLB,,

## 2023-07-26 PROCEDURE — 1101F PT FALLS ASSESS-DOCD LE1/YR: CPT | Mod: CPTII,S$GLB,,

## 2023-07-26 PROCEDURE — 1126F AMNT PAIN NOTED NONE PRSNT: CPT | Mod: CPTII,S$GLB,,

## 2023-07-26 PROCEDURE — 1159F PR MEDICATION LIST DOCUMENTED IN MEDICAL RECORD: ICD-10-PCS | Mod: CPTII,S$GLB,,

## 2023-07-26 PROCEDURE — 99999 PR PBB SHADOW E&M-EST. PATIENT-LVL III: ICD-10-PCS | Mod: PBBFAC,,,

## 2023-07-26 PROCEDURE — 99214 OFFICE O/P EST MOD 30 MIN: CPT | Mod: S$GLB,,, | Performed by: FAMILY MEDICINE

## 2023-07-26 PROCEDURE — 1160F RVW MEDS BY RX/DR IN RCRD: CPT | Mod: CPTII,S$GLB,, | Performed by: FAMILY MEDICINE

## 2023-07-26 PROCEDURE — 99999 PR PBB SHADOW E&M-EST. PATIENT-LVL III: CPT | Mod: PBBFAC,,,

## 2023-07-26 PROCEDURE — 3008F PR BODY MASS INDEX (BMI) DOCUMENTED: ICD-10-PCS | Mod: CPTII,S$GLB,, | Performed by: FAMILY MEDICINE

## 2023-07-26 PROCEDURE — 3008F PR BODY MASS INDEX (BMI) DOCUMENTED: ICD-10-PCS | Mod: CPTII,S$GLB,,

## 2023-07-26 PROCEDURE — 1159F MED LIST DOCD IN RCRD: CPT | Mod: CPTII,S$GLB,,

## 2023-07-26 PROCEDURE — 3078F PR MOST RECENT DIASTOLIC BLOOD PRESSURE < 80 MM HG: ICD-10-PCS | Mod: CPTII,S$GLB,, | Performed by: FAMILY MEDICINE

## 2023-07-26 PROCEDURE — 3008F BODY MASS INDEX DOCD: CPT | Mod: CPTII,S$GLB,,

## 2023-07-26 PROCEDURE — 3288F FALL RISK ASSESSMENT DOCD: CPT | Mod: CPTII,S$GLB,,

## 2023-07-26 PROCEDURE — 3008F BODY MASS INDEX DOCD: CPT | Mod: CPTII,S$GLB,, | Performed by: FAMILY MEDICINE

## 2023-07-26 PROCEDURE — 99999 PR PBB SHADOW E&M-EST. PATIENT-LVL III: CPT | Mod: PBBFAC,,, | Performed by: FAMILY MEDICINE

## 2023-07-26 PROCEDURE — 82728 ASSAY OF FERRITIN: CPT | Performed by: FAMILY MEDICINE

## 2023-07-26 PROCEDURE — 36415 COLL VENOUS BLD VENIPUNCTURE: CPT | Performed by: FAMILY MEDICINE

## 2023-07-26 PROCEDURE — 85025 COMPLETE CBC W/AUTO DIFF WBC: CPT | Performed by: FAMILY MEDICINE

## 2023-07-26 PROCEDURE — 1160F PR REVIEW ALL MEDS BY PRESCRIBER/CLIN PHARMACIST DOCUMENTED: ICD-10-PCS | Mod: CPTII,S$GLB,, | Performed by: FAMILY MEDICINE

## 2023-07-26 NOTE — PROGRESS NOTES
Subjective     Patient ID: Raine Medley is a 68 y.o. female.    Chief Complaint: No chief complaint on file.    68 yr old pleasant female with GERD, and other co morbidities presented today along with her daughter for post hospital discharge follow up. She was recently admitted for anemia and upper GI bleed. She was transfused two units and her H/H improving. She has no GI follow up until October. She is scheduled for EGD in September 23. She denies any symptoms or melena, hematemesis or pain in her abdomen. Details as follows -    Anemia  Presents for initial visit. The condition has lasted for 1 month. Symptoms include pallor. There has been no anorexia, bruising/bleeding easily or confusion. Signs of blood loss that are not present include hematochezia, melena, menorrhagia and vaginal bleeding. Past treatments include nothing. There is no history of alcohol abuse, chronic liver disease, clotting disorder, heart failure, HIV/AIDS, inflammatory bowel disease, malnutrition, recent illness or recent trauma.   Review of Systems   Constitutional: Negative.  Negative for activity change, diaphoresis and unexpected weight change.   HENT: Negative.  Negative for nasal congestion, ear discharge, hearing loss, rhinorrhea, sore throat and voice change.    Eyes: Negative.  Negative for pain, discharge and visual disturbance.   Respiratory: Negative.  Negative for chest tightness, shortness of breath and wheezing.    Cardiovascular: Negative.  Negative for chest pain.   Gastrointestinal: Negative.  Negative for abdominal distention, anal bleeding, anorexia, constipation, hematochezia, melena and nausea.   Endocrine: Negative.  Negative for cold intolerance, polydipsia and polyuria.   Genitourinary: Negative.  Negative for decreased urine volume, difficulty urinating, dysuria, frequency, hematuria, menorrhagia, menstrual problem, vaginal bleeding and vaginal pain.   Musculoskeletal: Negative.  Negative for arthralgias,  gait problem and myalgias.   Integumentary:  Positive for pallor. Negative for color change and wound.   Allergic/Immunologic: Negative.  Negative for environmental allergies and immunocompromised state.   Neurological: Negative.  Negative for dizziness, tremors, seizures, speech difficulty and headaches.   Hematological: Negative.  Negative for adenopathy. Does not bruise/bleed easily.   Psychiatric/Behavioral: Negative.  Negative for agitation, confusion, decreased concentration, hallucinations, self-injury and suicidal ideas. The patient is not nervous/anxious.      Past Medical History:   Diagnosis Date    Asthma     as a child    Gastroesophageal reflux disease without esophagitis 7/19/2023    Hyperlipidemia     Microcytic anemia 5/15/2023       Past Surgical History:   Procedure Laterality Date    CHOLECYSTECTOMY      COLONOSCOPY N/A 1/3/2019    Procedure: COLONOSCOPY;  Surgeon: Gaston Beebe MD;  Location: [address];  Service: Endoscopy;  Laterality: N/A;    TOTAL KNEE ARTHROPLASTY Right 6/5/2023    Procedure: ARTHROPLASTY, KNEE, TOTAL: RIGHT: DEPUY - ATTUNE;  Surgeon: Sanford Calvert III, MD;  Location: [facility];  Service: Orthopedics;  Laterality: Right;       Family History   Problem Relation Age of Onset    No Known Problems Mother     No Known Problems Father     No Known Problems Sister     No Known Problems Daughter     No Known Problems Son     No Known Problems Son     No Known Problems Son     Melanoma Neg Hx     Lupus Neg Hx     Psoriasis Neg Hx     Colon cancer Neg Hx     Esophageal cancer Neg Hx     Stomach cancer Neg Hx        Social History     Socioeconomic History    Marital status:    Occupational History     Employer: [employer]   Tobacco Use    Smoking status: Never     Passive exposure: Never    Smokeless tobacco: Never   Substance and Sexual Activity    Alcohol use: No    Drug use: No   Social History Narrative    She lives in [place] with  her 15-year-old son in [place]. 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 She is from [facility].        Current Outpatient Medications   Medication Sig Dispense Refill    acetaminophen (TYLENOL) 650 MG TbSR Take 1 tablet (650 mg total) by mouth every 8 (eight) hours. 120 tablet 0    methylPREDNISolone (MEDROL DOSEPACK) 4 mg tablet use as directed 21 each 0    pantoprazole (PROTONIX) 40 MG tablet Take 1 tablet (40 mg total) by mouth 2 (two) times daily. 60 tablet 0    polyethylene glycol (GOLYTELY) 236-22.74-6.74 -5.86 gram suspension Take 4,000 mLs (4 L total) by mouth once. for 1 dose 4000 mL 0    vitamin D (VITAMIN D3) 1000 units Tab Take 1,000 Units by mouth once daily.       No current facility-administered medications for this visit.       Review of patient's allergies indicates:  No Known Allergies       Objective   Vitals:    07/26/23 0837   BP: 120/70   Pulse: 79   Temp: 98 °F (36.7 °C)       Physical Exam  Constitutional:       General: She is not in acute distress.     Appearance: She is well-developed. She is not diaphoretic.   HENT:      Head: Normocephalic and atraumatic.      Right Ear: External ear normal.      Left Ear: External ear normal.      Nose: Nose normal.      Mouth/Throat:      Pharynx: No oropharyngeal exudate.   Eyes:      General: No scleral icterus.        Right eye: No discharge.         Left eye: No discharge.      Conjunctiva/sclera: Conjunctivae normal.      Pupils: Pupils are equal, round, and reactive to light.   Neck:      Thyroid: No thyromegaly.      Vascular: No JVD.      Trachea: No tracheal deviation.   Cardiovascular:      Rate and Rhythm: Normal rate and regular rhythm.      Heart sounds: Normal heart sounds. No murmur heard.    No friction rub. No gallop.   Pulmonary:      Effort: Pulmonary effort is normal.      Breath sounds: Normal breath sounds. No stridor. No wheezing or rales.   Chest:      Chest wall: No tenderness.   Abdominal:       General: Bowel sounds are normal. There is no distension.      Palpations: Abdomen is soft. There is no mass.      Tenderness: There is no abdominal tenderness. There is no guarding or rebound.      Hernia: No hernia is present.   Musculoskeletal:         General: No tenderness. Normal range of motion.      Cervical back: Normal range of motion and neck supple.   Lymphadenopathy:      Cervical: No cervical adenopathy.   Skin:     General: Skin is warm and dry.      Coloration: Skin is pale.      Findings: No erythema or rash.   Neurological:      Mental Status: She is alert and oriented to person, place, and time.      Cranial Nerves: No cranial nerve deficit.      Motor: No abnormal muscle tone.      Coordination: Coordination normal.      Deep Tendon Reflexes: Reflexes are normal and symmetric. Reflexes normal.   Psychiatric:         Behavior: Behavior normal.         Thought Content: Thought content normal.         Judgment: Judgment normal.          Assessment and Plan     1. Hospital discharge follow-up  -     CBC Auto Differential; Future; Expected date: 07/26/2023  -     Comprehensive Metabolic Panel; Future; Expected date: 07/26/2023  -     Ferritin; Future; Expected date: 07/26/2023  -     IRON AND TIBC; Future; Expected date: 07/26/2023    2. Gastrointestinal hemorrhage, unspecified gastrointestinal hemorrhage type  -     Ambulatory referral/consult to Gastroenterology; Future; Expected date: 08/02/2023  -     CBC Auto Differential; Future; Expected date: 07/26/2023  -     Comprehensive Metabolic Panel; Future; Expected date: 07/26/2023  -     Ferritin; Future; Expected date: 07/26/2023  -     IRON AND TIBC; Future; Expected date: 07/26/2023    3. Acute anemia  -     Ambulatory referral/consult to Gastroenterology; Future; Expected date: 08/02/2023  -     CBC Auto Differential; Future; Expected date: 07/26/2023  -     Comprehensive Metabolic Panel; Future; Expected date: 07/26/2023  -     Ferritin; Future;  Expected date: 07/26/2023  -     IRON AND TIBC; Future; Expected date: 07/26/2023        Diagnoses and all orders for this visit:    Hospital discharge follow-up  -     CBC Auto Differential; Future  -     Comprehensive Metabolic Panel; Future  -     Ferritin; Future  -     IRON AND TIBC; Future    Gastrointestinal hemorrhage, unspecified gastrointestinal hemorrhage type  -     Ambulatory referral/consult to Gastroenterology; Future  -     CBC Auto Differential; Future  -     Comprehensive Metabolic Panel; Future  -     Ferritin; Future  -     IRON AND TIBC; Future    Acute anemia  -     Ambulatory referral/consult to Gastroenterology; Future  -     CBC Auto Differential; Future  -     Comprehensive Metabolic Panel; Future  -     Ferritin; Future  -     IRON AND TIBC; Future      Hospital DC follow up  -doing well  -H/H improving  -refer GI again for an earlier appt and EGD soon  -ER precautions given    Anemia  -H/H repeat  -iron rich diet    GERD/GI bleed  -stable now  -monitor    Spent adequate time in obtaining history and explaining differentials    30 minutes spent during this visit of which greater than 50% devoted to face-face counseling and coordination of care regarding diagnosis and management plan    No follow-ups on file.           No follow-ups on file.

## 2023-07-26 NOTE — PROGRESS NOTES
GASTROENTEROLOGY CLINIC NOTE    Reason for visit: Diagnoses of Gastrointestinal hemorrhage, unspecified gastrointestinal hemorrhage type and Acute anemia were pertinent to this visit.  Referring provider/PCP: Jacob Nowak MD    HPI:  Raine Medley is a 68 y.o. female here today for HFU, she is accompanied by her daughter who is providing translation. Pt recently hospitalized after fatigue during PT appt for recent knee surgery. Upon arrival to ED, hgb 5.0, transfused 2uPRBC. She denies seeing overt bleeding, no blood or black stool. She was taking 2 81mg ASA daily after surgery. Has since stopped all NSAID's and taking tylenol PRN. She also was d/c on PPI BID. Hgb continues to uptrend, 9.9 this AM.     Prior Endoscopy:  EGD:  Colon: 2019 with Dr. Beebe:   - The entire examined colon is normal.               - No specimens collected.              - Repeat colonoscopy in 10 years for screening purposes    (Portions of this note were dictated using voice recognition software and may contain dictation related errors in spelling/grammar/syntax not found on text review)    Review of Systems   Gastrointestinal:  Negative for abdominal pain, blood in stool and melena.     Past Medical History: has a past medical history of Asthma, Gastroesophageal reflux disease without esophagitis, Hyperlipidemia, and Microcytic anemia.    Past Surgical History: has a past surgical history that includes Cholecystectomy; Colonoscopy (N/A, 1/3/2019); and Total knee arthroplasty (Right, 6/5/2023).    Home medications:   Current Outpatient Medications on File Prior to Visit   Medication Sig Dispense Refill    acetaminophen (TYLENOL) 650 MG TbSR Take 1 tablet (650 mg total) by mouth every 8 (eight) hours. 120 tablet 0    methylPREDNISolone (MEDROL DOSEPACK) 4 mg tablet use as directed 21 each 0    pantoprazole (PROTONIX) 40 MG tablet Take 1 tablet (40 mg total) by mouth 2 (two) times daily. 60 tablet 0    polyethylene glycol  (GOLYTELY) 236-22.74-6.74 -5.86 gram suspension Take 4,000 mLs (4 L total) by mouth once. for 1 dose 4000 mL 0    vitamin D (VITAMIN D3) 1000 units Tab Take 1,000 Units by mouth once daily.       No current facility-administered medications on file prior to visit.       Vital signs:  Wt 59.8 kg (131 lb 13.4 oz)   BMI 24.91 kg/m²     Physical Exam  Constitutional:       Appearance: Normal appearance. She is normal weight.   Abdominal:      General: Abdomen is flat. There is no distension.   Neurological:      Mental Status: She is alert.       I have reviewed associated labs, imaging and notes.     Assessment:  1. Gastrointestinal hemorrhage, unspecified gastrointestinal hemorrhage type    2. Acute anemia    Recent knee surgery   Placed on 2 81mg ASA daily   Became fatigued at PT appt- hgb 5.0 in ER-- 2uPRBC  No overt bleeding noted  Hgb uptrending, 9.9 this AM    Possible PUD from NSAID use? Currently being treated as so with protonix BID. Hgb continues to uptrend, no overt bleeding. She is scheduled to undergo EGD/colon at [facility] in September. At this point, do not need to expedite procedures- advised pt to reach out if symptoms worsen or bleeding noted.    Plan:     Continue taking Protonix twice daily   Atleast until procedures completed     Continue with previously scheduled EGD/colonoscopy at [facility]     F/U    Lindsey Ray, NP [facility]

## 2023-07-28 ENCOUNTER — CLINICAL SUPPORT (OUTPATIENT)
Dept: REHABILITATION | Facility: HOSPITAL | Age: 68
End: 2023-07-28
Payer: COMMERCIAL

## 2023-07-28 DIAGNOSIS — Z96.659 POSTOPERATIVE STIFFNESS OF TOTAL KNEE REPLACEMENT, INITIAL ENCOUNTER: ICD-10-CM

## 2023-07-28 DIAGNOSIS — M25.669 POSTOPERATIVE STIFFNESS OF TOTAL KNEE REPLACEMENT, INITIAL ENCOUNTER: ICD-10-CM

## 2023-07-28 DIAGNOSIS — M25.561 ACUTE PAIN OF RIGHT KNEE: ICD-10-CM

## 2023-07-28 DIAGNOSIS — Z74.09 DECREASED FUNCTIONAL MOBILITY AND ENDURANCE: Primary | ICD-10-CM

## 2023-07-28 DIAGNOSIS — T84.89XA POSTOPERATIVE STIFFNESS OF TOTAL KNEE REPLACEMENT, INITIAL ENCOUNTER: ICD-10-CM

## 2023-07-28 PROCEDURE — 97110 THERAPEUTIC EXERCISES: CPT

## 2023-07-28 PROCEDURE — 97530 THERAPEUTIC ACTIVITIES: CPT

## 2023-07-28 NOTE — PLAN OF CARE
[facility]  Physical Therapy Plan of Care Note     Name: Raine Medley  Clinic Number: [id number]    Therapy Diagnosis:   Encounter Diagnoses   Name Primary?    Decreased functional mobility and endurance Yes    Acute pain of right knee     Postoperative stiffness of total knee replacement, initial encounter      Physician: Sanford Calvert III, *    Visit Date: 7/28/2023    Physician Orders: PT Eval and Treat   Medical Diagnosis from Referral: M17.11 (ICD-10-CM) - Primary osteoarthritis of right knee   Evaluation Date: 6/7/2023  Authorization Period Expiration: 12/31/2023  Plan of Care Expiration: 7/20/2023  Progress Note Due: 6/29/2023  Visit # / Visits authorized: 17 / 20 + eval   FOTO: 2/3     Precautions: Standard   PTA Visit #: 0 / 5     Time In:10:00 am  Time Out: 10:45 am  Total Treatment Time: 45 minutes  Total Billable Time: 45 minutes     Precautions: Standard  Functional Level Prior to Evaluation:  s/p R TKA    Subjective     Update: Patient reports she was hospitalized for 2 days where she received a blood transfusions. Patient reports MD would like her to be seen for two more weeks.    Objective      Update:   7/28/2023:  TUG: 10 seconds  30 STS: 11x  ROM 0-100 AROM    Assessment     Update: Patient is 7 weeks status post R total knee replacement. Updated goals below. Patient is progressing well over all and demonstrates 0-100 range of motion. Patient continues to progress towards short and longer term lower extremity strength and endurance goals at his time. Base line function is good at this time, however motor control, strength and endurance deficits remain limiting functional mobility when challenged. Raine would continue to benefit from skilled outpatient physical therapy to address remaining short and long term physical therapy goals.     Previous Short Term Goals Status:   partially met  New Short Term Goals Status:   continue per previous plan of care due to  unmet goals.  Long Term Goal Status: continue per initial plan of care.  Reasons for Recertification of Therapy:   decreased range of motion, MD orders    GOALS  Goals:   Short Term Goals (3 Weeks):   1. Patient will be independent with home exercise program to supplement physical therapy treatment in improving functional status. MET  2. Patient will improve R lower extremity strength to at least 75% of L lower extremity strength as measured via MicroFet handheld dynamometer to improve strength for closed chain tasks. Not MET  3. Patient will improve R knee active range of motion to 0-90 degrees to promote increased ease of sit<>stand transfers. MET  4. Patient will perform timed up and go with less restrictive assistive device in < 20 seconds to improve gait speed and safety with community ambulation. MET     Long Term Goals (6 Weeks):   1. Patient will improve R lower extremity strength to at least 90% of L lower extremity strength as measured via MicroFet handheld dynamometer to improve strength for closed chain tasks. Not MET  2. Patient will improve the total FOTO Knee Survey Score to </= 20% limited to demonstrate increased perceived functional mobility. NOT MET  3. Patient will perform timed up and go with least restrictive assistive device in < 13.5  seconds to improve gait speed and safety with community ambulation. MET  4. Patient will perform at least 15 sit to stands in 30 seconds without UE support to demonstrate increased functional strength. NOT MET  5. Patient will improve R knee active range of motion to 0-120 degrees to promote higher level transfers and transitions without limitation. NOT MET    Plan     Updated Certification Period: 7/28/2023 to 8/11/2023   Recommended Treatment Plan: 1-2 times per week for 2 weeks:  Aquatic Therapy, Electrical Stimulation TENS, Gait Training, Manual Therapy, Moist Heat/ Ice, Neuromuscular Re-ed, Orthotic Management and Training, Patient Education, Self Care,  Therapeutic Activities, Therapeutic Exercise, and functional dry needling  Other Recommendations: none at this time    Jacque Pride, PT

## 2023-07-28 NOTE — PROGRESS NOTES
[facility]   Physical Therapy Treatment Note      Name: Raine Medley  Clinic Number: [id number]    Therapy Diagnosis:   Encounter Diagnoses   Name Primary?    Decreased functional mobility and endurance Yes    Acute pain of right knee     Postoperative stiffness of total knee replacement, initial encounter      Physician: Sanford Calvert III, *    Visit Date: 7/28/2023    Physician Orders: PT Eval and Treat   Medical Diagnosis from Referral: M17.11 (ICD-10-CM) - Primary osteoarthritis of right knee   Evaluation Date: 6/7/2023  Authorization Period Expiration: 12/31/2023  Plan of Care Expiration: 7/20/2023  Progress Note Due: 6/29/2023  Visit # / Visits authorized: 19 / 20 + eval   FOTO: 2/3     Precautions: Standard   PTA Visit #: 0 / 5     Time In:10:00 am  Time Out: 10:45 am  Total Treatment Time: 45 minutes  Total Billable Time: 45 minutes     Subjective     Patient reports: she was hospitalized for 2 days. Patient denies knee pain.    She was compliant with home exercise program.  Response to previous treatment: appropriate muscle response  Functional change: ongoing, able to get in and out of the car without assistance; ambulating long distances with RW; use of SPC at home     Pain: NT/10, currently  Location: Right knee      Objective      Objective Measures updated at progress report unless specified.   Date of Surgery: 6/5/2023  *Patient is 5 weeks, 3 days s/p R TKA as of 7/13/2023.*    6/8/20203: 1-0- 65 degrees; 80 degrees at end of session  6/12/2023: 0 -62 AROM  6/14/2023; 90 degrees Flexion in SEATED at EOM; 87 degrees AAROM with heel slides  6/16/2023; 87 degrees AAROM   6/19/2023; 80 degrees ACTIVE knee flexion ROM  6/21/2023; 86 degrees AROM Flexion; 89 degrees AAROM Flexion  6/23/2023; 0 - 100 degrees AAROM Flexion post bike and manual   Observation: Patient ambulates with RW. There is no heel strike at initial contact and minimal knee flexion during swing phase.  Decreased stance time on RLE observed. There is no visible redness along the Right LE. There is normal post op swelling along knee joint. Denies tenderness or pain at posterior calf. She has a Negative Chris's sign.  6/27/2023: 102 degrees post LLLD stretch   At end of session patient reported becoming dizzy with stomach pains; quick resolution of symptoms   BP: 122/70 mmHg; 77 bpm  6/28/2023: full revolutions around bike. Measured on table at 90 degrees flexion  6/30/2023; 102 degrees A/PROM post manual care; 105 degrees AAROM Flexion post heel slides; 107 degrees post session  7/3/2023: 96 degrees PROM  7/5/2023: 105 AAROM  7/7/2023: 0-110 flexion AAROM  7/10/2023; 100 degrees AAROM Flexion; medial knee pain noted, unable to push  7/13/2023; 102 degrees AAROM Flexion; posterior knee pain and hamstring pain reported with bending, unable to push   FOTO: 30% Limitation    7/17/2023:   0-100 AROM/AAROM  114/68 mmHg 79 bpm    7/28/2023:  TUG: 10 seconds  30 STS: 11x  ROM 0-100 AROM    Treatment     Raine received the treatments listed below:      therapeutic exercises to develop ROM and flexibility for 10 minutes including:  Aerobic Activity to improve knee flexion ROM, tissue extensibility, and mobility; Recumbent Bike post session for 5 minutes at seat 4, FULL revolutions  Heel slides; 10 second hold, 10 reps      manual therapy techniques: Joint mobilizations, Myofacial release, and Soft tissue Mobilization were applied to the: Right knee for 00 minutes, including:  Patient education:   - ROM expectations with TKA  - compliance with HEP; emphasized importance of heel slides and long sitting hamstring/gastroc stretch  - swelling management with use of Tubigrip, ice and elevation    Grade II-III patella mobilizations  Grade II-III PA tibiofemoral mobilizations  Soft tissue mobilization with IASTM to medial knee    therapeutic activities to improve dynamic and functional performance for 35 minutes including activities  below.  reassessment  Shuttle double leg press: 75 lbs (3 black cords), 3 x 15 reps (seat cushion at 4 for knee flexion ROM)   Shuttle single leg press: 37.5 lbs (1 black and 1 red cord), 3 x 10 reps (seat cushion at 4 for knee flexion ROM)   MATRIX flexion: 15 lbs 3x10, 5 second holds  MATRIX extension: 15 lbs 3x10 5 seconds    neuromuscular re-education activities to improve: Proprioception, Posture, and motor control for 00 minutes.  Long arc quads at EOM; 5 second hold, 30 reps + 6 lb AW  TKE with Green TB; 2 x 10 reps, 5 second hold - not today      Patient Education and Home Exercises       Education provided:   - Compliance with HEP  - ROM expectations post TKA  - use of ice and elevation for swelling management  - importance of heel prop and heel slides to improve tissue extensibility and ROM    Written Home Exercises Provided: Patient instructed to cont prior HEP. Exercises were reviewed and Raine was able to demonstrate them prior to the end of the session.  Raine demonstrated good  understanding of the education provided. See EMR under Patient Instructions for exercises provided during therapy sessions    Assessment   Please see updated POC.    Raine Is progressing well towards her goals.   Pt prognosis is Good.     Pt will continue to benefit from skilled outpatient physical therapy to address the deficits listed in the problem list box on initial evaluation, provide pt/family education and to maximize pt's level of independence in the home and community environment.   Pt's spiritual, cultural and educational needs considered and pt agreeable to plan of care and goals.     Anticipated barriers to physical therapy: none    Goals:   Short Term Goals (3 Weeks):   1. Patient will be independent with home exercise program to supplement physical therapy treatment in improving functional status. MET  2. Patient will improve R lower extremity strength to at least 75% of L lower extremity strength as measured via  MicroFet handheld dynamometer to improve strength for closed chain tasks. Not MET  3. Patient will improve R knee active range of motion to 0-90 degrees to promote increased ease of sit<>stand transfers. MET  4. Patient will perform timed up and go with less restrictive assistive device in < 20 seconds to improve gait speed and safety with community ambulation. MET     Long Term Goals (6 Weeks):   1. Patient will improve R lower extremity strength to at least 90% of L lower extremity strength as measured via MicroFet handheld dynamometer to improve strength for closed chain tasks. Not MET  2. Patient will improve the total FOTO Knee Survey Score to </= 20% limited to demonstrate increased perceived functional mobility. NOT MET  3. Patient will perform timed up and go with least restrictive assistive device in < 13.5  seconds to improve gait speed and safety with community ambulation. MET  4. Patient will perform at least 15 sit to stands in 30 seconds without UE support to demonstrate increased functional strength. NOT MET  5. Patient will improve R knee active range of motion to 0-120 degrees to promote higher level transfers and transitions without limitation. NOT MET    Plan     Plan of care Certification: 6/7/2023 to 7/20/2023     Outpatient Physical Therapy 3 times weekly for 6 weeks to include the following interventions: Electrical Stimulation of LE, Gait Training, Manual Therapy, Neuromuscular Re-ed, Patient Education, Self Care, Therapeutic Activities, and Therapeutic Exercise.     Jacque Pride, PT

## 2023-07-31 ENCOUNTER — CLINICAL SUPPORT (OUTPATIENT)
Dept: REHABILITATION | Facility: HOSPITAL | Age: 68
End: 2023-07-31
Payer: COMMERCIAL

## 2023-07-31 DIAGNOSIS — Z96.659 POSTOPERATIVE STIFFNESS OF TOTAL KNEE REPLACEMENT, INITIAL ENCOUNTER: ICD-10-CM

## 2023-07-31 DIAGNOSIS — M25.669 POSTOPERATIVE STIFFNESS OF TOTAL KNEE REPLACEMENT, INITIAL ENCOUNTER: ICD-10-CM

## 2023-07-31 DIAGNOSIS — Z74.09 DECREASED FUNCTIONAL MOBILITY AND ENDURANCE: Primary | ICD-10-CM

## 2023-07-31 DIAGNOSIS — M25.561 ACUTE PAIN OF RIGHT KNEE: ICD-10-CM

## 2023-07-31 DIAGNOSIS — T84.89XA POSTOPERATIVE STIFFNESS OF TOTAL KNEE REPLACEMENT, INITIAL ENCOUNTER: ICD-10-CM

## 2023-07-31 PROCEDURE — 97140 MANUAL THERAPY 1/> REGIONS: CPT

## 2023-07-31 PROCEDURE — 97110 THERAPEUTIC EXERCISES: CPT

## 2023-07-31 PROCEDURE — 97112 NEUROMUSCULAR REEDUCATION: CPT

## 2023-07-31 NOTE — PROGRESS NOTES
[facility]   Physical Therapy Treatment Note      Name: Raine Medley  Clinic Number: [id number]    Therapy Diagnosis:   Encounter Diagnoses   Name Primary?    Decreased functional mobility and endurance Yes    Acute pain of right knee     Postoperative stiffness of total knee replacement, initial encounter      Physician: Sanford Calvert III, *    Visit Date: 7/31/2023    Physician Orders: PT Eval and Treat   Medical Diagnosis from Referral: M17.11 (ICD-10-CM) - Primary osteoarthritis of right knee   Evaluation Date: 6/7/2023  Authorization Period Expiration: 12/31/2023  Plan of Care Expiration: 7/20/2023  Progress Note Due: 6/29/2023  Visit # / Visits authorized: 18 / 20 + eval   FOTO: 2/3     Precautions: Standard   PTA Visit #: 0 / 5     Time In: 1300  Time Out: 1400  Total Treatment Time: 60 minutes  Total Billable Time: 60 minutes     Subjective     Patient reports: Today medial knee is sore ~5/10 (points to pes anserine). No events of dizziness since last seen.    She was compliant with home exercise program.  Response to previous treatment: appropriate muscle response  Functional change: ongoing, able to get in and out of the car without assistance; ambulating long distances with RW; use of SPC at home     Pain: NT/10, currently  Location: Right knee      Objective      Objective Measures updated at progress report unless specified.   Date of Surgery: 6/5/2023  *Patient is 8 weeks, 0 days s/p R TKA as of 7/31/2023.*     6/8/20203: 1-0- 65 degrees; 80 degrees at end of session  6/12/2023: 0 -62 AROM  6/14/2023; 90 degrees Flexion in SEATED at EOM; 87 degrees AAROM with heel slides  6/16/2023; 87 degrees AAROM   6/19/2023; 80 degrees ACTIVE knee flexion ROM  6/21/2023; 86 degrees AROM Flexion; 89 degrees AAROM Flexion  6/23/2023; 0 - 100 degrees AAROM Flexion post bike and manual   Observation: Patient ambulates with RW. There is no heel strike at initial contact and minimal  knee flexion during swing phase. Decreased stance time on RLE observed. There is no visible redness along the Right LE. There is normal post op swelling along knee joint. Denies tenderness or pain at posterior calf. She has a Negative Chris's sign.  6/27/2023: 102 degrees post LLLD stretch   At end of session patient reported becoming dizzy with stomach pains; quick resolution of symptoms   BP: 122/70 mmHg; 77 bpm  6/28/2023: full revolutions around bike. Measured on table at 90 degrees flexion  6/30/2023; 102 degrees A/PROM post manual care; 105 degrees AAROM Flexion post heel slides; 107 degrees post session  7/3/2023: 96 degrees PROM  7/5/2023: 105 AAROM  7/7/2023: 0-110 flexion AAROM  7/10/2023; 100 degrees AAROM Flexion; medial knee pain noted, unable to push  7/13/2023; 102 degrees AAROM Flexion; posterior knee pain and hamstring pain reported with bending, unable to push   FOTO: 30% Limitation    7/17/2023:   0-100 AROM/AAROM  114/68 mmHg 79 bpm    7/28/2023:  TUG: 10 seconds  30 STS: 11x  ROM 0-100 AROM    Treatment     Raine received the treatments listed below:      therapeutic exercises to develop ROM and flexibility for 10 minutes including:    Aerobic Activity to improve knee flexion ROM, tissue extensibility, and mobility; Recumbent Bike post session for 8 minutes at seat 4, FULL revolutions  Prone quad stretch w/ bolster 10x10 sec    Held:  Long sitting hamstring stretch to improve tolerance to knee flexion; 30 seconds, 4 reps - np  Heel slides; 10 second hold, 10 reps  Prone knee flexion stretch: 30 seconds, 3 reps - NP    manual therapy techniques: Joint mobilizations, Myofacial release, and Soft tissue Mobilization were applied to the: Right knee for 15 minutes, including:    Patient education:  - anatomy, pes anserine  - self PFJ mobilization    Assessment of joint play and knee  PFJ mobilizations all directions Grade III-IV holds and reps  Fat pad mobilizations    Held:  Grade II-III PA  tibiofemoral mobilizations  Soft tissue mobilization with IASTM to medial knee    therapeutic activities to improve dynamic and functional performance for 00 minutes including activities below.    Held:  Shuttle double leg press: 75 lbs (3 black cords), 3 x 15 reps (seat cushion at 4 for knee flexion ROM)   Shuttle single leg press: 37.5 lbs (1 black and 1 red cord), 3 x 10 reps (seat cushion at 4 for knee flexion ROM)   Step up onto 6-inch step; 3 x 10 reps leading with RLE (cueing to avoid hip circumduction to compensate)  lateral step downs: slow : 2x10  6 in  MATRIX flexion  MATRIX extension    neuromuscular re-education activities to improve: Proprioception, Posture, and motor control for 35 minutes.    LAQ holds x5 min with 10 sec hold  SL hip abd 3x15  Prone HS curl 3x10 reps - half FR under thigh  Matrix flexion 25# DL 3x10  Matrix extension 15# SL x5 min  Sideways walks at table x5 laps    Held:  Long arc quads at EOM; 5 second hold, 30 reps + 6 lb AW  TKE with Green TB; 2 x 10 reps, 5 second hold - not today      Patient Education and Home Exercises       Education provided:   - Compliance with HEP  - ROM expectations post TKA  - use of ice and elevation for swelling management  - importance of heel prop and heel slides to improve tissue extensibility and ROM    Written Home Exercises Provided: Patient instructed to cont prior HEP. Exercises were reviewed and Raine was able to demonstrate them prior to the end of the session.  Raine demonstrated good  understanding of the education provided. See EMR under Patient Instructions for exercises provided during therapy sessions    Assessment     Cont swelling at pes anserine. Able to reduce medial knee pain following PFJ mobilizations to improve joint mobility and reduce strain to pes anserine.     Patient demonstrates excessive quad tightness and weakness along with hip abductor weakness requiring focus within visit. Raj all other interventions well and without  complaint within visit.     Cont to progress mobility and functional strength vicki of hip abd and quad as able to maximize function s/p TKA.    Raine Is progressing well towards her goals.   Pt prognosis is Good.     Pt will continue to benefit from skilled outpatient physical therapy to address the deficits listed in the problem list box on initial evaluation, provide pt/family education and to maximize pt's level of independence in the home and community environment.   Pt's spiritual, cultural and educational needs considered and pt agreeable to plan of care and goals.     Anticipated barriers to physical therapy: none    Goals:   Short Term Goals (3 Weeks):   1. Patient will be independent with home exercise program to supplement physical therapy treatment in improving functional status. MET  2. Patient will improve R lower extremity strength to at least 75% of L lower extremity strength as measured via MicroFet handheld dynamometer to improve strength for closed chain tasks. Not MET  3. Patient will improve R knee active range of motion to 0-90 degrees to promote increased ease of sit<>stand transfers. MET  4. Patient will perform timed up and go with less restrictive assistive device in < 20 seconds to improve gait speed and safety with community ambulation. MET     Long Term Goals (6 Weeks):   1. Patient will improve R lower extremity strength to at least 90% of L lower extremity strength as measured via MicroFet handheld dynamometer to improve strength for closed chain tasks. Not MET  2. Patient will improve the total FOTO Knee Survey Score to </= 20% limited to demonstrate increased perceived functional mobility. NOT MET  3. Patient will perform timed up and go with least restrictive assistive device in < 13.5  seconds to improve gait speed and safety with community ambulation. MET  4. Patient will perform at least 15 sit to stands in 30 seconds without UE support to demonstrate increased functional strength.  NOT MET  5. Patient will improve R knee active range of motion to 0-120 degrees to promote higher level transfers and transitions without limitation. NOT MET    Plan     Plan of care Certification: 6/7/2023 to 7/20/2023     Outpatient Physical Therapy 3 times weekly for 6 weeks to include the following interventions: Electrical Stimulation of LE, Gait Training, Manual Therapy, Neuromuscular Re-ed, Patient Education, Self Care, Therapeutic Activities, and Therapeutic Exercise.     HENRY PALMA, PT, DPT, OCS

## 2023-08-04 ENCOUNTER — CLINICAL SUPPORT (OUTPATIENT)
Dept: REHABILITATION | Facility: HOSPITAL | Age: 68
End: 2023-08-04
Payer: COMMERCIAL

## 2023-08-04 DIAGNOSIS — Z74.09 DECREASED FUNCTIONAL MOBILITY AND ENDURANCE: Primary | ICD-10-CM

## 2023-08-04 DIAGNOSIS — T84.89XA POSTOPERATIVE STIFFNESS OF TOTAL KNEE REPLACEMENT, INITIAL ENCOUNTER: ICD-10-CM

## 2023-08-04 DIAGNOSIS — M25.561 ACUTE PAIN OF RIGHT KNEE: ICD-10-CM

## 2023-08-04 DIAGNOSIS — M25.669 POSTOPERATIVE STIFFNESS OF TOTAL KNEE REPLACEMENT, INITIAL ENCOUNTER: ICD-10-CM

## 2023-08-04 DIAGNOSIS — Z96.659 POSTOPERATIVE STIFFNESS OF TOTAL KNEE REPLACEMENT, INITIAL ENCOUNTER: ICD-10-CM

## 2023-08-04 PROCEDURE — 97110 THERAPEUTIC EXERCISES: CPT | Mod: CQ

## 2023-08-04 PROCEDURE — 97112 NEUROMUSCULAR REEDUCATION: CPT | Mod: CQ

## 2023-08-04 PROCEDURE — 97530 THERAPEUTIC ACTIVITIES: CPT | Mod: CQ

## 2023-08-04 NOTE — PROGRESS NOTES
[facility]   Physical Therapy Treatment Note      Name: Raine Medley  Clinic Number: [id number]    Therapy Diagnosis:   Encounter Diagnoses   Name Primary?    Decreased functional mobility and endurance Yes    Acute pain of right knee     Postoperative stiffness of total knee replacement, initial encounter      Physician: Sanford Calvert III, *    Visit Date: 8/4/2023    Physician Orders: PT Eval and Treat   Medical Diagnosis from Referral: M17.11 (ICD-10-CM) - Primary osteoarthritis of right knee   Evaluation Date: 6/7/2023  Authorization Period Expiration: 12/31/2023  Plan of Care Expiration: 8/11/2023   Progress Note Due: 6/29/2023  Visit # / Visits authorized: 19 / 20 + eval   FOTO: 2/3     PTA Visit #: 1 / 5     Time In: 0806  Time Out: 0915  Total Treatment Time: 59 minutes  Total Billable Time: 59 minutes (2 NMR, 1 TA, 1 TE)    Precautions: Standard     Subjective     Patient reports: that her pain is ok today.  She was compliant with home exercise program.  Response to previous treatment: appropriate muscle response  Functional change: ongoing, able to get in and out of the car without assistance; ambulating long distances with RW; use of SPC at home     Pain: NT/10, currently  Location: Right knee      Objective      Objective Measures updated at progress report unless specified.   Date of Surgery: 6/5/2023  *Patient is 8 weeks, 4 days s/p R TKA as of 8/4/2023.*     6/8/20203: 1-0- 65 degrees; 80 degrees at end of session  6/12/2023: 0 -62 AROM  6/14/2023; 90 degrees Flexion in SEATED at EOM; 87 degrees AAROM with heel slides  6/16/2023; 87 degrees AAROM   6/19/2023; 80 degrees ACTIVE knee flexion ROM  6/21/2023; 86 degrees AROM Flexion; 89 degrees AAROM Flexion  6/23/2023; 0 - 100 degrees AAROM Flexion post bike and manual   Observation: Patient ambulates with RW. There is no heel strike at initial contact and minimal knee flexion during swing phase. Decreased stance  time on RLE observed. There is no visible redness along the Right LE. There is normal post op swelling along knee joint. Denies tenderness or pain at posterior calf. She has a Negative Chris's sign.  6/27/2023: 102 degrees post LLLD stretch   At end of session patient reported becoming dizzy with stomach pains; quick resolution of symptoms   BP: 122/70 mmHg; 77 bpm  6/28/2023: full revolutions around bike. Measured on table at 90 degrees flexion  6/30/2023; 102 degrees A/PROM post manual care; 105 degrees AAROM Flexion post heel slides; 107 degrees post session  7/3/2023: 96 degrees PROM  7/5/2023: 105 AAROM  7/7/2023: 0-110 flexion AAROM  7/10/2023; 100 degrees AAROM Flexion; medial knee pain noted, unable to push  7/13/2023; 102 degrees AAROM Flexion; posterior knee pain and hamstring pain reported with bending, unable to push   FOTO: 30% Limitation    7/17/2023:   0-100 AROM/AAROM  114/68 mmHg 79 bpm    7/28/2023:  TUG: 10 seconds  30 STS: 11x  ROM 0-100 AROM    Treatment     Raine received the treatments listed below:      therapeutic exercises to develop ROM and flexibility for 10 minutes including:  Aerobic Activity to improve knee flexion ROM, tissue extensibility, and mobility; Recumbent Bike for 5 minutes at seat 4, FULL revolutions  Prone quad stretch with bolster; 10 reps, 10 second hold    Held:  Long sitting hamstring stretch to improve tolerance to knee flexion; 30 seconds, 4 reps   Heel slides; 10 second hold, 10 reps  Prone knee flexion stretch: 30 seconds, 3 reps     manual therapy techniques: Joint mobilizations, Myofacial release, and Soft tissue Mobilization were applied to the: Right knee for 00 minutes, including:  Patient education:  - anatomy, pes anserine  - self PFJ mobilization    Assessment of joint play and knee  PFJ mobilizations all directions Grade III-IV holds and reps  Fat pad mobilizations    Held:  Grade II-III PA tibiofemoral mobilizations  Soft tissue mobilization with IASTM to  medial knee    therapeutic activities to improve dynamic and functional performance for 15 minutes including activities below.  Shuttle double leg press: 75 lbs (3 black cords), 3 x 15 reps (seat cushion at 4 for knee flexion ROM)   Shuttle single leg press: 37.5 lbs (1 black and 1 red cord), 3 x 10 reps (seat cushion at 4 for knee flexion ROM)     Held:  Step up onto 6-inch step; 3 x 10 reps leading with RLE (cueing to avoid hip circumduction to compensate)  Lateral step downs: slow : 2x10  6 in    neuromuscular re-education activities to improve: Proprioception, Posture, and motor control for 34 minutes.  Prone HS curl 3x10 reps - half FR under thigh  Matrix flexion; 25# DL, 3 x 10 reps  Matrix extension; 15# 2 to 1, 5 minutes  Sideways walks at table; 6 laps    Held:  LAQ holds x5 min with 10 sec hold  SL hip abd 3x1  Long arc quads at EOM; 5 second hold, 30 reps + 6 lb AW  TKE with Green TB; 2 x 10 reps, 5 second hold - not today    Patient Education and Home Exercises       Education provided:   - Compliance with HEP  - ROM expectations post TKA  - use of ice and elevation for swelling management  - importance of heel prop and heel slides to improve tissue extensibility and ROM    Written Home Exercises Provided: Patient instructed to cont prior HEP. Exercises were reviewed and Raine was able to demonstrate them prior to the end of the session.  Raine demonstrated good  understanding of the education provided. See EMR under Patient Instructions for exercises provided during therapy sessions    Assessment     Raine is 8 weeks, 4 days s/p R TKA. Presents ambulating with SPC, however ambulates without an AD around clinic. There is good tolerance to therapeutic interventions reporting appropriate muscle response throughout. She is challenged with eccentric control during prone hamstring curls. Continue per POC towards discharge.   Raine Is progressing well towards her goals.   Pt prognosis is Good.     Pt will continue  to benefit from skilled outpatient physical therapy to address the deficits listed in the problem list box on initial evaluation, provide pt/family education and to maximize pt's level of independence in the home and community environment.   Pt's spiritual, cultural and educational needs considered and pt agreeable to plan of care and goals.     Anticipated barriers to physical therapy: none    Goals:   Short Term Goals (3 Weeks):   1. Patient will be independent with home exercise program to supplement physical therapy treatment in improving functional status. MET  2. Patient will improve R lower extremity strength to at least 75% of L lower extremity strength as measured via MicroFet handheld dynamometer to improve strength for closed chain tasks. Not MET  3. Patient will improve R knee active range of motion to 0-90 degrees to promote increased ease of sit<>stand transfers. MET  4. Patient will perform timed up and go with less restrictive assistive device in < 20 seconds to improve gait speed and safety with community ambulation. MET     Long Term Goals (6 Weeks):   1. Patient will improve R lower extremity strength to at least 90% of L lower extremity strength as measured via MicroFet handheld dynamometer to improve strength for closed chain tasks. Not MET  2. Patient will improve the total FOTO Knee Survey Score to </= 20% limited to demonstrate increased perceived functional mobility. NOT MET  3. Patient will perform timed up and go with least restrictive assistive device in < 13.5  seconds to improve gait speed and safety with community ambulation. MET  4. Patient will perform at least 15 sit to stands in 30 seconds without UE support to demonstrate increased functional strength. NOT MET  5. Patient will improve R knee active range of motion to 0-120 degrees to promote higher level transfers and transitions without limitation. NOT MET    Plan     Plan of care Certification: 6/7/2023 to 8/11/2023        Outpatient Physical Therapy 3 times weekly for 6 weeks to include the following interventions: Electrical Stimulation of LE, Gait Training, Manual Therapy, Neuromuscular Re-ed, Patient Education, Self Care, Therapeutic Activities, and Therapeutic Exercise.     Karis Clemens, PTA

## 2023-08-07 ENCOUNTER — CLINICAL SUPPORT (OUTPATIENT)
Dept: REHABILITATION | Facility: HOSPITAL | Age: 68
End: 2023-08-07
Payer: COMMERCIAL

## 2023-08-07 DIAGNOSIS — M25.561 ACUTE PAIN OF RIGHT KNEE: ICD-10-CM

## 2023-08-07 DIAGNOSIS — Z96.659 POSTOPERATIVE STIFFNESS OF TOTAL KNEE REPLACEMENT, INITIAL ENCOUNTER: ICD-10-CM

## 2023-08-07 DIAGNOSIS — T84.89XA POSTOPERATIVE STIFFNESS OF TOTAL KNEE REPLACEMENT, INITIAL ENCOUNTER: ICD-10-CM

## 2023-08-07 DIAGNOSIS — Z74.09 DECREASED FUNCTIONAL MOBILITY AND ENDURANCE: Primary | ICD-10-CM

## 2023-08-07 DIAGNOSIS — M25.669 POSTOPERATIVE STIFFNESS OF TOTAL KNEE REPLACEMENT, INITIAL ENCOUNTER: ICD-10-CM

## 2023-08-07 PROCEDURE — 97140 MANUAL THERAPY 1/> REGIONS: CPT

## 2023-08-07 PROCEDURE — 97530 THERAPEUTIC ACTIVITIES: CPT

## 2023-08-07 PROCEDURE — 97112 NEUROMUSCULAR REEDUCATION: CPT

## 2023-08-07 NOTE — PROGRESS NOTES
[facility]   Physical Therapy Treatment Note      Name: Raine Medley  Clinic Number: [id number]    Therapy Diagnosis:   Encounter Diagnoses   Name Primary?    Decreased functional mobility and endurance Yes    Acute pain of right knee     Postoperative stiffness of total knee replacement, initial encounter        Physician: Sanford Calvert III, *    Visit Date: 8/7/2023    Physician Orders: PT Eval and Treat   Medical Diagnosis from Referral: M17.11 (ICD-10-CM) - Primary osteoarthritis of right knee   Evaluation Date: 6/7/2023  Authorization Period Expiration: 12/31/2023  Plan of Care Expiration: 8/11/2023   Progress Note Due: 6/29/2023  Visit # / Visits authorized: 20 / 50 + eval    FOTO: 2/3     PTA Visit #: 1 / 5     Time In: 1605  Time Out: 1710  Total Treatment Time: 65 minutes  Total Billable Time: 65 minutes     Precautions: Standard     Subjective     Patient reports: no pain in knee for the past x2 days. Feels cont difficulty with walking for long periods of time. FU with MD tomorrow    She was compliant with home exercise program.  Response to previous treatment: appropriate muscle response  Functional change: ongoing, able to get in and out of the car without assistance; ambulating long distances with RW; use of SPC at home     Pain: NT/10, currently  Location: Right knee      Objective      Objective Measures updated at progress report unless specified.   Date of Surgery: 6/5/2023  *Patient is 9 weeks, 0 days s/p R TKA as of 8/7/2023.*      6/8/20203: 1-0- 65 degrees; 80 degrees at end of session  6/12/2023: 0 -62 AROM  6/14/2023; 90 degrees Flexion in SEATED at EOM; 87 degrees AAROM with heel slides  6/16/2023; 87 degrees AAROM   6/19/2023; 80 degrees ACTIVE knee flexion ROM  6/21/2023; 86 degrees AROM Flexion; 89 degrees AAROM Flexion  6/23/2023; 0 - 100 degrees AAROM Flexion post bike and manual   Observation: Patient ambulates with RW. There is no heel strike at  initial contact and minimal knee flexion during swing phase. Decreased stance time on RLE observed. There is no visible redness along the Right LE. There is normal post op swelling along knee joint. Denies tenderness or pain at posterior calf. She has a Negative Chirs's sign.  6/27/2023: 102 degrees post LLLD stretch   At end of session patient reported becoming dizzy with stomach pains; quick resolution of symptoms   BP: 122/70 mmHg; 77 bpm  6/28/2023: full revolutions around bike. Measured on table at 90 degrees flexion  6/30/2023; 102 degrees A/PROM post manual care; 105 degrees AAROM Flexion post heel slides; 107 degrees post session  7/3/2023: 96 degrees PROM  7/5/2023: 105 AAROM  7/7/2023: 0-110 flexion AAROM  7/10/2023; 100 degrees AAROM Flexion; medial knee pain noted, unable to push  7/13/2023; 102 degrees AAROM Flexion; posterior knee pain and hamstring pain reported with bending, unable to push   FOTO: 30% Limitation    7/17/2023:   0-100 AROM/AAROM  114/68 mmHg 79 bpm    7/28/2023:  TUG: 10 seconds  30 STS: 11x  ROM 0-100 AROM    8/7/2023:  0-89 deg flexion at start of visit -> 0-95 deg at end of visit    Treatment     Raine received the treatments listed below:      therapeutic exercises to develop ROM and flexibility for 00 minutes including:      Held:  Aerobic Activity to improve knee flexion ROM, tissue extensibility, and mobility; Recumbent Bike for 5 minutes at seat 4, FULL revolutions  Prone quad stretch with bolster; 10 reps, 10 second hold  Long sitting hamstring stretch to improve tolerance to knee flexion; 30 seconds, 4 reps   Heel slides; 10 second hold, 10 reps  Prone knee flexion stretch: 30 seconds, 3 reps     manual therapy techniques: Joint mobilizations, Myofacial release, and Soft tissue Mobilization were applied to the: Right knee for 10 minutes, including:    Patient education:  - anatomy, pes anserine  - self PFJ mobilization    Assessment of joint play and knee  PFJ mobilizations  all directions Grade III-IV holds and reps  Fat pad mobilizations  Grade II-III PA tibiofemoral mobilizations    Held:  Soft tissue mobilization with IASTM to medial knee    therapeutic activities to improve dynamic and functional performance for 40 minutes including activities below.    Shuttle hip extension w/ SLB 1R 3x10 ea gab  Shuttle Sidelying 1B 3x10 ea gab  Shuttle DL -> SL eccentric  1B x4 min  Shuttle DL eccentric 1B x4 min    NEXT VISIT - bike, HS curl matrix, edyta stepping for knee flexion, functional strength    Held:  Shuttle double leg press: 75 lbs (3 black cords), 3 x 15 reps (seat cushion at 4 for knee flexion ROM)   Shuttle single leg press: 37.5 lbs (1 black and 1 red cord), 3 x 10 reps (seat cushion at 4 for knee flexion ROM)   Step up onto 6-inch step; 3 x 10 reps leading with RLE (cueing to avoid hip circumduction to compensate)  Lateral step downs: slow : 2x10  6 in    neuromuscular re-education activities to improve: Proprioception, Posture, and motor control for 15 minutes.    Prone HS curl 3x10 reps - half FR under thigh  Prone HS curl 3x10 reps w/ 3lbs      Held:  Matrix flexion; 25# DL, 3 x 10 reps  Matrix extension; 15# 2 to 1, 5 minutes  Sideways walks at table; 6 laps  LAQ holds x5 min with 10 sec hold  SL hip abd 3x1  Long arc quads at EOM; 5 second hold, 30 reps + 6 lb AW  TKE with Green TB; 2 x 10 reps, 5 second hold - not today    Patient Education and Home Exercises       Education provided:   - Compliance with HEP  - ROM expectations post TKA  - use of ice and elevation for swelling management  - importance of heel prop and heel slides to improve tissue extensibility and ROM    Written Home Exercises Provided: Patient instructed to cont prior HEP. Exercises were reviewed and Raine was able to demonstrate them prior to the end of the session.  Raine demonstrated good  understanding of the education provided. See EMR under Patient Instructions for exercises provided during therapy  sessions    Assessment     Patient is 9 weeks s/p R TKA. Improved pain secondary to improved strength without compression of saphenous nerve. Cont to demonstrate functional strength deficits of quad an hip abductors vicki with excessive lateral sway, decreased hip extension in late stance, and inadequate knee extension in LR in gait mechanics.    Today demonstrates increased joint hypomobility leading to decreased knee flexion ROM at start of visit vicki compared to prior visits. Raj all interventions well within visit.    Would likely cont to benefit from PT intervention 1-2x a week to address joint mobility deficits and functional strength deficits to maximize function s/p R TKA. Await changes to POC following MD follow up.    Raine Is progressing well towards her goals.   Pt prognosis is Good.     Pt will continue to benefit from skilled outpatient physical therapy to address the deficits listed in the problem list box on initial evaluation, provide pt/family education and to maximize pt's level of independence in the home and community environment.   Pt's spiritual, cultural and educational needs considered and pt agreeable to plan of care and goals.     Anticipated barriers to physical therapy: none    Goals:   Short Term Goals (3 Weeks):   1. Patient will be independent with home exercise program to supplement physical therapy treatment in improving functional status. MET  2. Patient will improve R lower extremity strength to at least 75% of L lower extremity strength as measured via MicroFet handheld dynamometer to improve strength for closed chain tasks. Not MET  3. Patient will improve R knee active range of motion to 0-90 degrees to promote increased ease of sit<>stand transfers. MET  4. Patient will perform timed up and go with less restrictive assistive device in < 20 seconds to improve gait speed and safety with community ambulation. MET     Long Term Goals (6 Weeks):   1. Patient will improve R lower  extremity strength to at least 90% of L lower extremity strength as measured via MicroFet handheld dynamometer to improve strength for closed chain tasks. Not MET  2. Patient will improve the total FOTO Knee Survey Score to </= 20% limited to demonstrate increased perceived functional mobility. NOT MET  3. Patient will perform timed up and go with least restrictive assistive device in < 13.5  seconds to improve gait speed and safety with community ambulation. MET  4. Patient will perform at least 15 sit to stands in 30 seconds without UE support to demonstrate increased functional strength. NOT MET  5. Patient will improve R knee active range of motion to 0-120 degrees to promote higher level transfers and transitions without limitation. NOT MET    Plan     Plan of care Certification: 6/7/2023 to 8/11/2023       Cont POC 1-2x4-6 weeks as of 8/7/2023    HENRY PALMA, PT, DPT, OCS

## 2023-08-07 NOTE — PLAN OF CARE
[facility]   Physical Therapy Treatment Note      Name: Raine Medley  Clinic Number: [id number]    Therapy Diagnosis:   Encounter Diagnoses   Name Primary?    Decreased functional mobility and endurance Yes    Acute pain of right knee     Postoperative stiffness of total knee replacement, initial encounter        Physician: Sanford Calvert III, *    Visit Date: 8/7/2023    Physician Orders: PT Eval and Treat   Medical Diagnosis from Referral: M17.11 (ICD-10-CM) - Primary osteoarthritis of right knee   Evaluation Date: 6/7/2023  Authorization Period Expiration: 12/31/2023  Plan of Care Expiration: 8/11/2023   Progress Note Due: 6/29/2023  Visit # / Visits authorized: 20 / 50 + eval    FOTO: 2/3     PTA Visit #: 1 / 5     Time In: 1605  Time Out: 1710  Total Treatment Time: 65 minutes  Total Billable Time: 65 minutes     Precautions: Standard     Subjective     Patient reports: no pain in knee for the past x2 days. Feels cont difficulty with walking for long periods of time. FU with MD tomorrow    She was compliant with home exercise program.  Response to previous treatment: appropriate muscle response  Functional change: ongoing, able to get in and out of the car without assistance; ambulating long distances with RW; use of SPC at home     Pain: NT/10, currently  Location: Right knee      Objective      Objective Measures updated at progress report unless specified.   Date of Surgery: 6/5/2023  *Patient is 9 weeks, 0 days s/p R TKA as of 8/7/2023.*      6/8/20203: 1-0- 65 degrees; 80 degrees at end of session  6/12/2023: 0 -62 AROM  6/14/2023; 90 degrees Flexion in SEATED at EOM; 87 degrees AAROM with heel slides  6/16/2023; 87 degrees AAROM   6/19/2023; 80 degrees ACTIVE knee flexion ROM  6/21/2023; 86 degrees AROM Flexion; 89 degrees AAROM Flexion  6/23/2023; 0 - 100 degrees AAROM Flexion post bike and manual   Observation: Patient ambulates with RW. There is no heel strike at  initial contact and minimal knee flexion during swing phase. Decreased stance time on RLE observed. There is no visible redness along the Right LE. There is normal post op swelling along knee joint. Denies tenderness or pain at posterior calf. She has a Negative Chris's sign.  6/27/2023: 102 degrees post LLLD stretch   At end of session patient reported becoming dizzy with stomach pains; quick resolution of symptoms   BP: 122/70 mmHg; 77 bpm  6/28/2023: full revolutions around bike. Measured on table at 90 degrees flexion  6/30/2023; 102 degrees A/PROM post manual care; 105 degrees AAROM Flexion post heel slides; 107 degrees post session  7/3/2023: 96 degrees PROM  7/5/2023: 105 AAROM  7/7/2023: 0-110 flexion AAROM  7/10/2023; 100 degrees AAROM Flexion; medial knee pain noted, unable to push  7/13/2023; 102 degrees AAROM Flexion; posterior knee pain and hamstring pain reported with bending, unable to push   FOTO: 30% Limitation    7/17/2023:   0-100 AROM/AAROM  114/68 mmHg 79 bpm    7/28/2023:  TUG: 10 seconds  30 STS: 11x  ROM 0-100 AROM    8/7/2023:  0-89 deg flexion at start of visit -> 0-95 deg at end of visit    Treatment     Raine received the treatments listed below:      therapeutic exercises to develop ROM and flexibility for 00 minutes including:      Held:  Aerobic Activity to improve knee flexion ROM, tissue extensibility, and mobility; Recumbent Bike for 5 minutes at seat 4, FULL revolutions  Prone quad stretch with bolster; 10 reps, 10 second hold  Long sitting hamstring stretch to improve tolerance to knee flexion; 30 seconds, 4 reps   Heel slides; 10 second hold, 10 reps  Prone knee flexion stretch: 30 seconds, 3 reps     manual therapy techniques: Joint mobilizations, Myofacial release, and Soft tissue Mobilization were applied to the: Right knee for 10 minutes, including:    Patient education:  - anatomy, pes anserine  - self PFJ mobilization    Assessment of joint play and knee  PFJ mobilizations  all directions Grade III-IV holds and reps  Fat pad mobilizations  Grade II-III PA tibiofemoral mobilizations    Held:  Soft tissue mobilization with IASTM to medial knee    therapeutic activities to improve dynamic and functional performance for 40 minutes including activities below.    Shuttle hip extension w/ SLB 1R 3x10 ea gab  Shuttle Sidelying 1B 3x10 ea gab  Shuttle DL -> SL eccentric  1B x4 min  Shuttle DL eccentric 1B x4 min    NEXT VISIT - bike, HS curl matrix, edyta stepping for knee flexion, functional strength    Held:  Shuttle double leg press: 75 lbs (3 black cords), 3 x 15 reps (seat cushion at 4 for knee flexion ROM)   Shuttle single leg press: 37.5 lbs (1 black and 1 red cord), 3 x 10 reps (seat cushion at 4 for knee flexion ROM)   Step up onto 6-inch step; 3 x 10 reps leading with RLE (cueing to avoid hip circumduction to compensate)  Lateral step downs: slow : 2x10  6 in    neuromuscular re-education activities to improve: Proprioception, Posture, and motor control for 15 minutes.    Prone HS curl 3x10 reps - half FR under thigh  Prone HS curl 3x10 reps w/ 3lbs      Held:  Matrix flexion; 25# DL, 3 x 10 reps  Matrix extension; 15# 2 to 1, 5 minutes  Sideways walks at table; 6 laps  LAQ holds x5 min with 10 sec hold  SL hip abd 3x1  Long arc quads at EOM; 5 second hold, 30 reps + 6 lb AW  TKE with Green TB; 2 x 10 reps, 5 second hold - not today    Patient Education and Home Exercises       Education provided:   - Compliance with HEP  - ROM expectations post TKA  - use of ice and elevation for swelling management  - importance of heel prop and heel slides to improve tissue extensibility and ROM    Written Home Exercises Provided: Patient instructed to cont prior HEP. Exercises were reviewed and Raine was able to demonstrate them prior to the end of the session.  Raine demonstrated good  understanding of the education provided. See EMR under Patient Instructions for exercises provided during therapy  sessions    Assessment     Patient is 9 weeks s/p R TKA. Improved pain secondary to improved strength without compression of saphenous nerve. Cont to demonstrate functional strength deficits of quad an hip abductors vicki with excessive lateral sway, decreased hip extension in late stance, and inadequate knee extension in LR in gait mechanics.    Today demonstrates increased joint hypomobility leading to decreased knee flexion ROM at start of visit vicki compared to prior visits. Raj all interventions well within visit.    Would likely cont to benefit from PT intervention 1-2x a week to address joint mobility deficits and functional strength deficits to maximize function s/p R TKA. Await changes to POC following MD follow up.    Raine Is progressing well towards her goals.   Pt prognosis is Good.     Pt will continue to benefit from skilled outpatient physical therapy to address the deficits listed in the problem list box on initial evaluation, provide pt/family education and to maximize pt's level of independence in the home and community environment.   Pt's spiritual, cultural and educational needs considered and pt agreeable to plan of care and goals.     Anticipated barriers to physical therapy: none    Goals:   Short Term Goals (3 Weeks):   1. Patient will be independent with home exercise program to supplement physical therapy treatment in improving functional status. MET  2. Patient will improve R lower extremity strength to at least 75% of L lower extremity strength as measured via MicroFet handheld dynamometer to improve strength for closed chain tasks. Not MET  3. Patient will improve R knee active range of motion to 0-90 degrees to promote increased ease of sit<>stand transfers. MET  4. Patient will perform timed up and go with less restrictive assistive device in < 20 seconds to improve gait speed and safety with community ambulation. MET     Long Term Goals (6 Weeks):   1. Patient will improve R lower  extremity strength to at least 90% of L lower extremity strength as measured via MicroFet handheld dynamometer to improve strength for closed chain tasks. Not MET  2. Patient will improve the total FOTO Knee Survey Score to </= 20% limited to demonstrate increased perceived functional mobility. NOT MET  3. Patient will perform timed up and go with least restrictive assistive device in < 13.5  seconds to improve gait speed and safety with community ambulation. MET  4. Patient will perform at least 15 sit to stands in 30 seconds without UE support to demonstrate increased functional strength. NOT MET  5. Patient will improve R knee active range of motion to 0-120 degrees to promote higher level transfers and transitions without limitation. NOT MET    Plan     Plan of care Certification: 6/7/2023 to 8/11/2023       Cont POC 1-2x4-6 weeks as of 8/7/2023    HENRY PALMA, PT, DPT, OCS

## 2023-08-08 ENCOUNTER — OFFICE VISIT (OUTPATIENT)
Dept: ORTHOPEDICS | Facility: CLINIC | Age: 68
End: 2023-08-08
Payer: COMMERCIAL

## 2023-08-08 VITALS — WEIGHT: 134.94 LBS | BODY MASS INDEX: 25.48 KG/M2 | HEIGHT: 61 IN

## 2023-08-08 DIAGNOSIS — M24.661 ARTHROFIBROSIS OF KNEE JOINT, RIGHT: Primary | ICD-10-CM

## 2023-08-08 DIAGNOSIS — Z96.651 STATUS POST RIGHT KNEE REPLACEMENT: ICD-10-CM

## 2023-08-08 DIAGNOSIS — Z96.651 STATUS POST RIGHT KNEE REPLACEMENT: Primary | ICD-10-CM

## 2023-08-08 DIAGNOSIS — M24.661 ARTHROFIBROSIS OF KNEE JOINT, RIGHT: ICD-10-CM

## 2023-08-08 PROCEDURE — 99999 PR PBB SHADOW E&M-EST. PATIENT-LVL III: ICD-10-PCS | Mod: PBBFAC,,, | Performed by: NURSE PRACTITIONER

## 2023-08-08 PROCEDURE — 1159F MED LIST DOCD IN RCRD: CPT | Mod: CPTII,S$GLB,, | Performed by: ORTHOPAEDIC SURGERY

## 2023-08-08 PROCEDURE — 99024 PR POST-OP FOLLOW-UP VISIT: ICD-10-PCS | Mod: S$GLB,,, | Performed by: ORTHOPAEDIC SURGERY

## 2023-08-08 PROCEDURE — 1101F PR PT FALLS ASSESS DOC 0-1 FALLS W/OUT INJ PAST YR: ICD-10-PCS | Mod: CPTII,S$GLB,, | Performed by: ORTHOPAEDIC SURGERY

## 2023-08-08 PROCEDURE — 99024 POSTOP FOLLOW-UP VISIT: CPT | Mod: S$GLB,,, | Performed by: ORTHOPAEDIC SURGERY

## 2023-08-08 PROCEDURE — 3008F PR BODY MASS INDEX (BMI) DOCUMENTED: ICD-10-PCS | Mod: CPTII,S$GLB,, | Performed by: ORTHOPAEDIC SURGERY

## 2023-08-08 PROCEDURE — 1159F PR MEDICATION LIST DOCUMENTED IN MEDICAL RECORD: ICD-10-PCS | Mod: CPTII,S$GLB,, | Performed by: ORTHOPAEDIC SURGERY

## 2023-08-08 PROCEDURE — 99499 NO LOS: ICD-10-PCS | Mod: S$GLB,,, | Performed by: NURSE PRACTITIONER

## 2023-08-08 PROCEDURE — 99999 PR PBB SHADOW E&M-EST. PATIENT-LVL III: CPT | Mod: PBBFAC,,, | Performed by: NURSE PRACTITIONER

## 2023-08-08 PROCEDURE — 99999 PR PBB SHADOW E&M-EST. PATIENT-LVL III: CPT | Mod: PBBFAC,,, | Performed by: ORTHOPAEDIC SURGERY

## 2023-08-08 PROCEDURE — 1126F AMNT PAIN NOTED NONE PRSNT: CPT | Mod: CPTII,S$GLB,, | Performed by: ORTHOPAEDIC SURGERY

## 2023-08-08 PROCEDURE — 1101F PT FALLS ASSESS-DOCD LE1/YR: CPT | Mod: CPTII,S$GLB,, | Performed by: ORTHOPAEDIC SURGERY

## 2023-08-08 PROCEDURE — 3288F FALL RISK ASSESSMENT DOCD: CPT | Mod: CPTII,S$GLB,, | Performed by: ORTHOPAEDIC SURGERY

## 2023-08-08 PROCEDURE — 99999 PR PBB SHADOW E&M-EST. PATIENT-LVL III: ICD-10-PCS | Mod: PBBFAC,,, | Performed by: ORTHOPAEDIC SURGERY

## 2023-08-08 PROCEDURE — 3288F PR FALLS RISK ASSESSMENT DOCUMENTED: ICD-10-PCS | Mod: CPTII,S$GLB,, | Performed by: ORTHOPAEDIC SURGERY

## 2023-08-08 PROCEDURE — 99499 UNLISTED E&M SERVICE: CPT | Mod: S$GLB,,, | Performed by: NURSE PRACTITIONER

## 2023-08-08 PROCEDURE — 3008F BODY MASS INDEX DOCD: CPT | Mod: CPTII,S$GLB,, | Performed by: ORTHOPAEDIC SURGERY

## 2023-08-08 PROCEDURE — 1126F PR PAIN SEVERITY QUANTIFIED, NO PAIN PRESENT: ICD-10-PCS | Mod: CPTII,S$GLB,, | Performed by: ORTHOPAEDIC SURGERY

## 2023-08-08 NOTE — PROGRESS NOTES
2 week f/u R TKA    Hgb back up to 9.9  Feeling better, anemia symptoms much improved  Has endoscopy pending - 9/8/23    Pre-op 0-100  6 week 0-100     PT 0-  Have recommended 1x/week     Walking better, smoother gait, less pain  Incision healed  3-100    Some L knee pain starting = R getting better    R TKA with persistent limited ROM = arthrofibrosis    Discussed AMADEO - patient would like to proceed     We reviewed the risks and benefits of the surgery with the patient and/or family prior to surgery including but not limited to risk of fracture, wound dehiscence, infection, bleeding, injury to nerves and vessels damage, need for revision surgery, continued pain, blood clots, cardiopulmonary complications, death.  After discussing the risks and benefits of the procedure they would like to proceed with surgery.     Restart PT post procedure  Rx tylenol, oxy, medrol dose pack, robaxin,   No nsaids         16-Feb-2023 19:08

## 2023-08-09 RX ORDER — SODIUM CHLORIDE 9 MG/ML
INJECTION, SOLUTION INTRAVENOUS CONTINUOUS
Status: CANCELLED | OUTPATIENT
Start: 2023-08-09

## 2023-08-09 RX ORDER — MUPIROCIN 20 MG/G
OINTMENT TOPICAL
Status: CANCELLED | OUTPATIENT
Start: 2023-08-09

## 2023-08-09 NOTE — H&P
CC:  Right knee pain    Raine Medley is a 68 y.o. female with history of Right knee replacement 6/5/2023. Her range of motion is 0-100, but she still feels like she has some limitation and she would like to proceed with closed manipulation to improve her flexion. She was seen by Dr. Calvert in clinic today.  Raine Medley currently ambulates independently.     Relevant medical conditions of significance in perioperative period:  GERD- on medication managed by pcp    Past Medical History:   Diagnosis Date    Asthma     as a child    Gastroesophageal reflux disease without esophagitis 7/19/2023    Hyperlipidemia     Microcytic anemia 5/15/2023       Past Surgical History:   Procedure Laterality Date    CHOLECYSTECTOMY      COLONOSCOPY N/A 1/3/2019    Procedure: COLONOSCOPY;  Surgeon: Gaston Beebe MD;  Location: [address]);  Service: Endoscopy;  Laterality: N/A;    TOTAL KNEE ARTHROPLASTY Right 6/5/2023    Procedure: ARTHROPLASTY, KNEE, TOTAL: RIGHT: DEPUY - ATTUNE;  Surgeon: Sanford Calvert III, MD;  Location: [facility];  Service: Orthopedics;  Laterality: Right;       Family History   Problem Relation Age of Onset    No Known Problems Mother     No Known Problems Father     No Known Problems Sister     No Known Problems Daughter     No Known Problems Son     No Known Problems Son     No Known Problems Son     Melanoma Neg Hx     Lupus Neg Hx     Psoriasis Neg Hx     Colon cancer Neg Hx     Esophageal cancer Neg Hx     Stomach cancer Neg Hx        Review of patient's allergies indicates:  No Known Allergies      Current Outpatient Medications:     acetaminophen (TYLENOL) 650 MG TbSR, Take 1 tablet (650 mg total) by mouth every 8 (eight) hours., Disp: 120 tablet, Rfl: 0    methylPREDNISolone (MEDROL DOSEPACK) 4 mg tablet, use as directed (Patient not taking: Reported on 8/8/2023), Disp: 21 each, Rfl: 0    pantoprazole (PROTONIX) 40 MG tablet, Take 1 tablet (40 mg total) by mouth 2 (two) times  daily., Disp: 60 tablet, Rfl: 0    vitamin D (VITAMIN D3) 1000 units Tab, Take 1,000 Units by mouth once daily., Disp: , Rfl:     Review of Systems:  Constitutional: no fever or chills  Eyes: no visual changes  ENT: no nasal congestion or sore throat  Respiratory: no cough or shortness of breath  Cardiovascular: no chest pain or palpitations  Gastrointestinal: no nausea or vomiting, tolerating diet  Genitourinary: no hematuria or dysuria  Integument/Breast: no rash or pruritis  Hematologic/Lymphatic: no easy bruising or lymphadenopathy  Musculoskeletal: positive for knee pain  Neurological: no seizures or tremors  Behavioral/Psych: no auditory or visual hallucinations  Endocrine: no heat or cold intolerance    PE:  There were no vitals taken for this visit.  General: Pleasant, cooperative, NAD   Gait: antalgic  HEENT: NCAT, sclera nonicteric   Lungs: Respirations clear bilaterally; equal and unlabored.   CV: S1S2; 2+ bilateral upper and lower extremity pulses.   Skin: Intact throughout with no rashes, erythema, or lesions  Extremities: No LE edema,  no erythema or warmth of the skin in either lower extremity.    Right knee exam:  Knee Range of Motion:0-100, pain with passive range of motion  Effusion:none  Condition of skin:intact, incision healed  Location of tenderness:None   Strength:limited by pain  Stability: stable to testing    Hip Examination: painless PROM of hip     Radiographs: Radiographs reveal  The right total knee joint prosthesis stable position alignment, no hardware failure.  Remaining subcutaneous edema at operative site    Diagnosis: arthrofibrosis, right knee    Plan: Right knee closed manipulation

## 2023-08-09 NOTE — H&P (VIEW-ONLY)
CC:  Right knee pain    Raine Medley is a 68 y.o. female with history of Right knee replacement 6/5/2023. Her range of motion is 0-100, but she still feels like she has some limitation and she would like to proceed with closed manipulation to improve her flexion. She was seen by Dr. Calvert in clinic today.  Raine Medley currently ambulates independently.     Relevant medical conditions of significance in perioperative period:  GERD- on medication managed by pcp    Past Medical History:   Diagnosis Date    Asthma     as a child    Gastroesophageal reflux disease without esophagitis 7/19/2023    Hyperlipidemia     Microcytic anemia 5/15/2023       Past Surgical History:   Procedure Laterality Date    CHOLECYSTECTOMY      COLONOSCOPY N/A 1/3/2019    Procedure: COLONOSCOPY;  Surgeon: Gaston Beebe MD;  Location: [address]);  Service: Endoscopy;  Laterality: N/A;    TOTAL KNEE ARTHROPLASTY Right 6/5/2023    Procedure: ARTHROPLASTY, KNEE, TOTAL: RIGHT: DEPUY - ATTUNE;  Surgeon: Sanford Calvert III, MD;  Location: [facility];  Service: Orthopedics;  Laterality: Right;       Family History   Problem Relation Age of Onset    No Known Problems Mother     No Known Problems Father     No Known Problems Sister     No Known Problems Daughter     No Known Problems Son     No Known Problems Son     No Known Problems Son     Melanoma Neg Hx     Lupus Neg Hx     Psoriasis Neg Hx     Colon cancer Neg Hx     Esophageal cancer Neg Hx     Stomach cancer Neg Hx        Review of patient's allergies indicates:  No Known Allergies      Current Outpatient Medications:     acetaminophen (TYLENOL) 650 MG TbSR, Take 1 tablet (650 mg total) by mouth every 8 (eight) hours., Disp: 120 tablet, Rfl: 0    methylPREDNISolone (MEDROL DOSEPACK) 4 mg tablet, use as directed (Patient not taking: Reported on 8/8/2023), Disp: 21 each, Rfl: 0    pantoprazole (PROTONIX) 40 MG tablet, Take 1 tablet (40 mg total) by mouth 2 (two) times  daily., Disp: 60 tablet, Rfl: 0    vitamin D (VITAMIN D3) 1000 units Tab, Take 1,000 Units by mouth once daily., Disp: , Rfl:     Review of Systems:  Constitutional: no fever or chills  Eyes: no visual changes  ENT: no nasal congestion or sore throat  Respiratory: no cough or shortness of breath  Cardiovascular: no chest pain or palpitations  Gastrointestinal: no nausea or vomiting, tolerating diet  Genitourinary: no hematuria or dysuria  Integument/Breast: no rash or pruritis  Hematologic/Lymphatic: no easy bruising or lymphadenopathy  Musculoskeletal: positive for knee pain  Neurological: no seizures or tremors  Behavioral/Psych: no auditory or visual hallucinations  Endocrine: no heat or cold intolerance    PE:  There were no vitals taken for this visit.  General: Pleasant, cooperative, NAD   Gait: antalgic  HEENT: NCAT, sclera nonicteric   Lungs: Respirations clear bilaterally; equal and unlabored.   CV: S1S2; 2+ bilateral upper and lower extremity pulses.   Skin: Intact throughout with no rashes, erythema, or lesions  Extremities: No LE edema,  no erythema or warmth of the skin in either lower extremity.    Right knee exam:  Knee Range of Motion:0-100, pain with passive range of motion  Effusion:none  Condition of skin:intact, incision healed  Location of tenderness:None   Strength:limited by pain  Stability: stable to testing    Hip Examination: painless PROM of hip     Radiographs: Radiographs reveal  The right total knee joint prosthesis stable position alignment, no hardware failure.  Remaining subcutaneous edema at operative site    Diagnosis: arthrofibrosis, right knee    Plan: Right knee closed manipulation

## 2023-08-09 NOTE — PROGRESS NOTES
Raine Medley is a 68 y.o. year old here today for pre surgery optimization visit  in preparation for a Right knee closed manipulation to be performed by Dr. Calvert  on 8/21/2023.  she was last seen and treated in the clinic on 8/8/2023.There has been no significant change in medical status since last visit. No fever, chills, malaise, or unexplained weight change.      Allergies, Medications, past medical and surgical history reviewed.    Focused examination performed.    Patient saw surgeon in clinic today. All questions answered. Patient encouraged to call with questions. Contact information given.     Pre, connor, and post operative procedures and expectations discussed. Goals of successful surgery reviewed and include manageable pain levels, surgical site free of infection, medication management, and ambulation with PT/OT assistance. Healthy weight management discussed with patient and caregiver who were receptive to eduction of healthy diet and activity. No other necessary lifestyle changes identified. Educated patient about signs and symptoms of infection, medication management, anticoagulation therapy, risk of tobacco and alcohol use, and self-care to promote healing.  All questions were answered.     Raine Medley verbalized an understanding to the education and goals. Patient has displayed readiness to engage in care and is ready to proceed with surgery.  Patient reports her daughter is able and ready to provide assistance at home after discharge.    Surgical consents signed.    Raine Medley will contact us if there are any questions, concerns, or changes in medical status prior to surgery.         Patient has discussed discharge planning with surgeon. Patient will be discharged to home following surgery.   patient will be scheduled with [facility]. Orders entered for daily PT after manipulation then 3x a week.    10 minutes of time was spent on patient education, review of records,  templating, H&P, , appointment scheduling and optimizing patient for surgery.

## 2023-08-10 ENCOUNTER — CLINICAL SUPPORT (OUTPATIENT)
Dept: REHABILITATION | Facility: HOSPITAL | Age: 68
End: 2023-08-10
Payer: COMMERCIAL

## 2023-08-10 DIAGNOSIS — T84.89XA POSTOPERATIVE STIFFNESS OF TOTAL KNEE REPLACEMENT, INITIAL ENCOUNTER: ICD-10-CM

## 2023-08-10 DIAGNOSIS — M25.669 POSTOPERATIVE STIFFNESS OF TOTAL KNEE REPLACEMENT, INITIAL ENCOUNTER: ICD-10-CM

## 2023-08-10 DIAGNOSIS — M25.561 ACUTE PAIN OF RIGHT KNEE: ICD-10-CM

## 2023-08-10 DIAGNOSIS — Z96.659 POSTOPERATIVE STIFFNESS OF TOTAL KNEE REPLACEMENT, INITIAL ENCOUNTER: ICD-10-CM

## 2023-08-10 DIAGNOSIS — Z74.09 DECREASED FUNCTIONAL MOBILITY AND ENDURANCE: Primary | ICD-10-CM

## 2023-08-10 PROCEDURE — 97140 MANUAL THERAPY 1/> REGIONS: CPT

## 2023-08-10 PROCEDURE — 97110 THERAPEUTIC EXERCISES: CPT

## 2023-08-10 PROCEDURE — 97112 NEUROMUSCULAR REEDUCATION: CPT

## 2023-08-10 NOTE — PROGRESS NOTES
[facility]   Physical Therapy Treatment Note      Name: Raine Medley  Clinic Number: [id number]    Therapy Diagnosis:   Encounter Diagnoses   Name Primary?    Decreased functional mobility and endurance Yes    Acute pain of right knee     Postoperative stiffness of total knee replacement, initial encounter        Physician: Sanford Calvert III, *    Visit Date: 8/10/2023    Physician Orders: PT Eval and Treat   Medical Diagnosis from Referral: M17.11 (ICD-10-CM) - Primary osteoarthritis of right knee   Evaluation Date: 6/7/2023  Authorization Period Expiration: 12/31/2023  Plan of Care Expiration: 8/11/2023   Progress Note Due: 6/29/2023  Visit # / Visits authorized: 21 / 50 + eval     FOTO: 2/3     PTA Visit #: 1 / 5     Time In: 1500  Time Out: 1600  Total Treatment Time: 60 minutes  Total Billable Time: 60 minutes     Precautions: Standard     Subjective     Patient reports: FU with MD who has advised patient to undergo AMADEO. Has manipulation scheduled on 8/21 and first FU visit scheduled on 8/22.    She was compliant with home exercise program.  Response to previous treatment: appropriate muscle response  Functional change: ongoing, able to get in and out of the car without assistance; ambulating long distances with RW; use of SPC at home     Pain: NT/10, currently  Location: Right knee      Objective      Objective Measures updated at progress report unless specified.   Date of Surgery: 6/5/2023  *Patient is 9 weeks, 3 days s/p R TKA as of 8/10/2023.*      6/8/20203: 1-0- 65 degrees; 80 degrees at end of session  6/12/2023: 0 -62 AROM  6/14/2023; 90 degrees Flexion in SEATED at EOM; 87 degrees AAROM with heel slides  6/16/2023; 87 degrees AAROM   6/19/2023; 80 degrees ACTIVE knee flexion ROM  6/21/2023; 86 degrees AROM Flexion; 89 degrees AAROM Flexion  6/23/2023; 0 - 100 degrees AAROM Flexion post bike and manual   Observation: Patient ambulates with RW. There is no heel  strike at initial contact and minimal knee flexion during swing phase. Decreased stance time on RLE observed. There is no visible redness along the Right LE. There is normal post op swelling along knee joint. Denies tenderness or pain at posterior calf. She has a Negative Chris's sign.  6/27/2023: 102 degrees post LLLD stretch   At end of session patient reported becoming dizzy with stomach pains; quick resolution of symptoms   BP: 122/70 mmHg; 77 bpm  6/28/2023: full revolutions around bike. Measured on table at 90 degrees flexion  6/30/2023; 102 degrees A/PROM post manual care; 105 degrees AAROM Flexion post heel slides; 107 degrees post session  7/3/2023: 96 degrees PROM  7/5/2023: 105 AAROM  7/7/2023: 0-110 flexion AAROM  7/10/2023; 100 degrees AAROM Flexion; medial knee pain noted, unable to push  7/13/2023; 102 degrees AAROM Flexion; posterior knee pain and hamstring pain reported with bending, unable to push   FOTO: 30% Limitation    7/17/2023:   0-100 AROM/AAROM  114/68 mmHg 79 bpm    7/28/2023:  TUG: 10 seconds  30 STS: 11x  ROM 0-100 AROM    8/7/2023:  0-89 deg flexion at start of visit -> 0-95 deg at end of visit    Treatment     Raine received the treatments listed below:      therapeutic exercises to develop ROM and flexibility for 40 minutes including:    Patient education:  - Extensive education on AMADEO procedure indications -> post op recovery  - importance of PT daily post AMADEO  - HEP in preparation for AMADEO vicki heel slides 10x a day    Aerobic Activity to improve knee flexion ROM, tissue extensibility, and mobility; Recumbent Bike for 10 minutes at seat 8, FULL revolutions    Heel slide variaions (supine, upright, IR, ER) 4x10x5 sec hold   Cory quad stretch 10x10 sec  Prone quad stretch 10x10 sec  Prone quad LLLD stretch 3x90 sec ea      manual therapy techniques: Joint mobilizations, Myofacial release, and Soft tissue Mobilization were applied to the: Right knee for 08 minutes,  including:    Assessment of joint play and knee  PFJ mobilizations all directions Grade III-IV holds and reps  Fat pad mobilizations  Grade II-III PA tibiofemoral mobilizations    neuromuscular re-education activities to improve: Proprioception, Posture, and motor control for 12 minutes.    Prone HS curl 3x10 reps - half FR under thigh  SL hip abduction 3x10 reps  Prone hip extension 3x10 reps        Patient Education and Home Exercises       Education provided:   - Compliance with HEP  - ROM expectations post TKA  - use of ice and elevation for swelling management  - importance of heel prop and heel slides to improve tissue extensibility and ROM    Written Home Exercises Provided: Patient instructed to cont prior HEP. Exercises were reviewed and Raine was able to demonstrate them prior to the end of the session.  Raine demonstrated good  understanding of the education provided. See EMR under Patient Instructions for exercises provided during therapy sessions    Assessment     Patient is 9 weeks s/p R TKA. Minimal knee pain reported currently, but functional strength deficits of quad and hip abductors persist. Cont to demonstrate poor carryover of knee flexion ROM and will be undergoing AMADEO on 8/21. Discussed and educated patient on procedure and post op protocol to ensure good understanding in preparation for procedure.    Will require daily PT from 8/22-25 following AMADEO with likely continuation of PT 2-3x a week for the following weeks. To be reassessed on 8/22.    Raine Is progressing well towards her goals.   Pt prognosis is Good.     Pt will continue to benefit from skilled outpatient physical therapy to address the deficits listed in the problem list box on initial evaluation, provide pt/family education and to maximize pt's level of independence in the home and community environment.   Pt's spiritual, cultural and educational needs considered and pt agreeable to plan of care and goals.     Anticipated barriers  to physical therapy: none    Goals:   Short Term Goals (3 Weeks):   1. Patient will be independent with home exercise program to supplement physical therapy treatment in improving functional status. MET  2. Patient will improve R lower extremity strength to at least 75% of L lower extremity strength as measured via MicroFet handheld dynamometer to improve strength for closed chain tasks. Not MET  3. Patient will improve R knee active range of motion to 0-90 degrees to promote increased ease of sit<>stand transfers. MET  4. Patient will perform timed up and go with less restrictive assistive device in < 20 seconds to improve gait speed and safety with community ambulation. MET     Long Term Goals (6 Weeks):   1. Patient will improve R lower extremity strength to at least 90% of L lower extremity strength as measured via MicroFet handheld dynamometer to improve strength for closed chain tasks. Not MET  2. Patient will improve the total FOTO Knee Survey Score to </= 20% limited to demonstrate increased perceived functional mobility. NOT MET  3. Patient will perform timed up and go with least restrictive assistive device in < 13.5  seconds to improve gait speed and safety with community ambulation. MET  4. Patient will perform at least 15 sit to stands in 30 seconds without UE support to demonstrate increased functional strength. NOT MET  5. Patient will improve R knee active range of motion to 0-120 degrees to promote higher level transfers and transitions without limitation. NOT MET    Plan     Plan of care Certification: 6/7/2023 to 8/11/2023       Will require daily PT from 8/22-25 following AMADEO with likely continuation of PT 2-3x a week for the following weeks. To be reassessed on 8/22.    HENRY PALMA, PT, DPT, OCS

## 2023-08-11 ENCOUNTER — TELEPHONE (OUTPATIENT)
Dept: ORTHOPEDICS | Facility: CLINIC | Age: 68
End: 2023-08-11
Payer: COMMERCIAL

## 2023-08-11 NOTE — TELEPHONE ENCOUNTER
I called the patient today regarding her voice message. I provided the patient with the information to the disability. The patient verbalized understanding and has no further questions.           ----- Message from Lisha Lai sent at 8/11/2023  2:50 PM CDT -----  Regarding: diablity paperwork  Contact: pt [phone]  PATIENTCALL     Pt is calling to speak with someone in provider office states she have some disability paperwork that she needs to get filled out by the doctor and she need to know if she have to bring or can she fax them to the office is asking for a return call please call pt at  [phone]

## 2023-08-18 ENCOUNTER — TELEPHONE (OUTPATIENT)
Dept: ORTHOPEDICS | Facility: CLINIC | Age: 68
End: 2023-08-18
Payer: COMMERCIAL

## 2023-08-18 NOTE — TELEPHONE ENCOUNTER
I called the patient today regarding surgery on 8/21/2023 with Dr. Sanford Calvert. I informed the patient that her surgery will be at  [facility] ([facility] [place] [address]). I informed the patient they must arrive at 5:00am and their surgery will start at 7:00am.     Per the [facility] COVID-19 Pre-Procedural Testing Policy (administered 10/26/2022), patients do not need tested for COVID-19 regardless of vaccination status.    I reminded the patient that they cannot eat or drink after midnight, the night before surgery.      I reminded the patient to be careful of their skin over the next few days to make sure they do not get any cuts, scratches or scrapes.    The patient that they have a walker, bedside commode and shower chair from a previous surgery and do not need another order for them.    The patient verbalized understanding and has no further questions.

## 2023-08-20 DIAGNOSIS — M24.661 ARTHROFIBROSIS OF KNEE JOINT, RIGHT: Primary | ICD-10-CM

## 2023-08-20 RX ORDER — METHOCARBAMOL 750 MG/1
750 TABLET, FILM COATED ORAL 4 TIMES DAILY PRN
Qty: 40 TABLET | Refills: 0 | Status: SHIPPED | OUTPATIENT
Start: 2023-08-20 | End: 2023-08-31

## 2023-08-20 RX ORDER — METHYLPREDNISOLONE 4 MG/1
TABLET ORAL
Qty: 21 TABLET | Refills: 0 | Status: SHIPPED | OUTPATIENT
Start: 2023-08-20 | End: 2023-09-10

## 2023-08-20 RX ORDER — PREGABALIN 75 MG/1
75 CAPSULE ORAL NIGHTLY
Qty: 30 CAPSULE | Refills: 1 | Status: SHIPPED | OUTPATIENT
Start: 2023-08-20 | End: 2024-02-18

## 2023-08-20 RX ORDER — OXYCODONE HYDROCHLORIDE 5 MG/1
TABLET ORAL
Qty: 50 TABLET | Refills: 0 | Status: SHIPPED | OUTPATIENT
Start: 2023-08-20

## 2023-08-20 RX ORDER — DEXTROMETHORPHAN HYDROBROMIDE, GUAIFENESIN 5; 100 MG/5ML; MG/5ML
650 LIQUID ORAL EVERY 8 HOURS
Qty: 120 TABLET | Refills: 0 | Status: SHIPPED | OUTPATIENT
Start: 2023-08-20

## 2023-08-21 ENCOUNTER — ANESTHESIA (OUTPATIENT)
Dept: ANESTHESIOLOGY | Facility: HOSPITAL | Age: 68
End: 2023-08-21
Payer: COMMERCIAL

## 2023-08-21 ENCOUNTER — ANESTHESIA EVENT (OUTPATIENT)
Dept: ANESTHESIOLOGY | Facility: HOSPITAL | Age: 68
End: 2023-08-21
Payer: COMMERCIAL

## 2023-08-21 ENCOUNTER — HOSPITAL ENCOUNTER (OUTPATIENT)
Facility: HOSPITAL | Age: 68
Discharge: HOME | End: 2023-08-21
Attending: ORTHOPAEDIC SURGERY | Admitting: ORTHOPAEDIC SURGERY
Payer: COMMERCIAL

## 2023-08-21 VITALS
DIASTOLIC BLOOD PRESSURE: 50 MMHG | HEART RATE: 65 BPM | WEIGHT: 134 LBS | SYSTOLIC BLOOD PRESSURE: 122 MMHG | OXYGEN SATURATION: 98 % | BODY MASS INDEX: 25.3 KG/M2 | HEIGHT: 61 IN | TEMPERATURE: 98 F | RESPIRATION RATE: 17 BRPM

## 2023-08-21 DIAGNOSIS — M24.661 ARTHROFIBROSIS OF KNEE JOINT, RIGHT: ICD-10-CM

## 2023-08-21 PROBLEM — Z96.651 STATUS POST RIGHT KNEE REPLACEMENT: Status: ACTIVE | Noted: 2023-08-21

## 2023-08-21 PROCEDURE — 63600175 PHARM REV CODE 636 W HCPCS: Performed by: STUDENT IN AN ORGANIZED HEALTH CARE EDUCATION/TRAINING PROGRAM

## 2023-08-21 PROCEDURE — 27570 FIXATION OF KNEE JOINT: CPT | Mod: RT

## 2023-08-21 PROCEDURE — C9113 INJ PANTOPRAZOLE SODIUM, VIA: HCPCS | Performed by: STUDENT IN AN ORGANIZED HEALTH CARE EDUCATION/TRAINING PROGRAM

## 2023-08-21 PROCEDURE — 71000033 HC RECOVERY, INTIAL HOUR: Performed by: ORTHOPAEDIC SURGERY

## 2023-08-21 PROCEDURE — 25000003 PHARM REV CODE 250: Performed by: NURSE ANESTHETIST, CERTIFIED REGISTERED

## 2023-08-21 PROCEDURE — D9220A PRA ANESTHESIA: ICD-10-PCS | Mod: CRNA,,, | Performed by: NURSE ANESTHETIST, CERTIFIED REGISTERED

## 2023-08-21 PROCEDURE — 94761 N-INVAS EAR/PLS OXIMETRY MLT: CPT

## 2023-08-21 PROCEDURE — 99900035 HC TECH TIME PER 15 MIN (STAT)

## 2023-08-21 PROCEDURE — D9220A PRA ANESTHESIA: Mod: ANES,,, | Performed by: STUDENT IN AN ORGANIZED HEALTH CARE EDUCATION/TRAINING PROGRAM

## 2023-08-21 PROCEDURE — 71000039 HC RECOVERY, EACH ADD'L HOUR: Performed by: ORTHOPAEDIC SURGERY

## 2023-08-21 PROCEDURE — 27570 FIXATION OF KNEE JOINT: CPT | Mod: 78,RT,, | Performed by: ORTHOPAEDIC SURGERY

## 2023-08-21 PROCEDURE — 63600175 PHARM REV CODE 636 W HCPCS: Performed by: NURSE ANESTHETIST, CERTIFIED REGISTERED

## 2023-08-21 PROCEDURE — 37000008 HC ANESTHESIA 1ST 15 MINUTES: Performed by: ORTHOPAEDIC SURGERY

## 2023-08-21 PROCEDURE — D9220A PRA ANESTHESIA: Mod: CRNA,,, | Performed by: NURSE ANESTHETIST, CERTIFIED REGISTERED

## 2023-08-21 PROCEDURE — 37000009 HC ANESTHESIA EA ADD 15 MINS: Performed by: ORTHOPAEDIC SURGERY

## 2023-08-21 PROCEDURE — D9220A PRA ANESTHESIA: ICD-10-PCS | Mod: ANES,,, | Performed by: STUDENT IN AN ORGANIZED HEALTH CARE EDUCATION/TRAINING PROGRAM

## 2023-08-21 PROCEDURE — 27570 PR MANIPULATN KNEE JT+ANESTHESIA: ICD-10-PCS | Mod: 78,RT,, | Performed by: ORTHOPAEDIC SURGERY

## 2023-08-21 PROCEDURE — 25000003 PHARM REV CODE 250: Performed by: ORTHOPAEDIC SURGERY

## 2023-08-21 RX ORDER — OXYCODONE HYDROCHLORIDE 5 MG/1
5 TABLET ORAL
Status: DISCONTINUED | OUTPATIENT
Start: 2023-08-21 | End: 2023-08-21 | Stop reason: HOSPADM

## 2023-08-21 RX ORDER — LIDOCAINE HYDROCHLORIDE 20 MG/ML
INJECTION INTRAVENOUS
Status: DISCONTINUED | OUTPATIENT
Start: 2023-08-21 | End: 2023-08-21

## 2023-08-21 RX ORDER — PROPOFOL 10 MG/ML
INJECTION, EMULSION INTRAVENOUS
Status: DISCONTINUED | OUTPATIENT
Start: 2023-08-21 | End: 2023-08-21

## 2023-08-21 RX ORDER — SODIUM CHLORIDE 9 MG/ML
INJECTION, SOLUTION INTRAVENOUS CONTINUOUS PRN
Status: DISCONTINUED | OUTPATIENT
Start: 2023-08-21 | End: 2023-08-21

## 2023-08-21 RX ORDER — MUPIROCIN 20 MG/G
OINTMENT TOPICAL
Status: DISCONTINUED | OUTPATIENT
Start: 2023-08-21 | End: 2023-08-21 | Stop reason: HOSPADM

## 2023-08-21 RX ORDER — MIDAZOLAM HYDROCHLORIDE 1 MG/ML
INJECTION INTRAMUSCULAR; INTRAVENOUS
Status: COMPLETED
Start: 2023-08-21 | End: 2023-08-21

## 2023-08-21 RX ORDER — FENTANYL CITRATE 50 UG/ML
25 INJECTION, SOLUTION INTRAMUSCULAR; INTRAVENOUS EVERY 5 MIN PRN
Status: DISCONTINUED | OUTPATIENT
Start: 2023-08-21 | End: 2023-08-21 | Stop reason: HOSPADM

## 2023-08-21 RX ORDER — METHOCARBAMOL 750 MG/1
750 TABLET, FILM COATED ORAL ONCE
Status: COMPLETED | OUTPATIENT
Start: 2023-08-21 | End: 2023-08-21

## 2023-08-21 RX ORDER — HYDROMORPHONE HYDROCHLORIDE 1 MG/ML
0.2 INJECTION, SOLUTION INTRAMUSCULAR; INTRAVENOUS; SUBCUTANEOUS EVERY 5 MIN PRN
Status: DISCONTINUED | OUTPATIENT
Start: 2023-08-21 | End: 2023-08-21 | Stop reason: HOSPADM

## 2023-08-21 RX ORDER — HALOPERIDOL 5 MG/ML
0.5 INJECTION INTRAMUSCULAR EVERY 10 MIN PRN
Status: DISCONTINUED | OUTPATIENT
Start: 2023-08-21 | End: 2023-08-21 | Stop reason: HOSPADM

## 2023-08-21 RX ORDER — ONDANSETRON 2 MG/ML
4 INJECTION INTRAMUSCULAR; INTRAVENOUS DAILY PRN
Status: DISCONTINUED | OUTPATIENT
Start: 2023-08-21 | End: 2023-08-21 | Stop reason: HOSPADM

## 2023-08-21 RX ORDER — PANTOPRAZOLE SODIUM 40 MG/10ML
40 INJECTION, POWDER, LYOPHILIZED, FOR SOLUTION INTRAVENOUS ONCE
Status: COMPLETED | OUTPATIENT
Start: 2023-08-21 | End: 2023-08-21

## 2023-08-21 RX ORDER — MIDAZOLAM HYDROCHLORIDE 1 MG/ML
INJECTION INTRAMUSCULAR; INTRAVENOUS
Status: DISCONTINUED | OUTPATIENT
Start: 2023-08-21 | End: 2023-08-21

## 2023-08-21 RX ORDER — ACETAMINOPHEN 500 MG
1000 TABLET ORAL ONCE
Status: COMPLETED | OUTPATIENT
Start: 2023-08-21 | End: 2023-08-21

## 2023-08-21 RX ORDER — SODIUM CHLORIDE 9 MG/ML
INJECTION, SOLUTION INTRAVENOUS CONTINUOUS
Status: DISCONTINUED | OUTPATIENT
Start: 2023-08-21 | End: 2023-08-21 | Stop reason: HOSPADM

## 2023-08-21 RX ORDER — OXYCODONE HYDROCHLORIDE 5 MG/1
5 TABLET ORAL ONCE
Status: COMPLETED | OUTPATIENT
Start: 2023-08-21 | End: 2023-08-21

## 2023-08-21 RX ADMIN — HYDROMORPHONE HYDROCHLORIDE 0.2 MG: 1 INJECTION, SOLUTION INTRAMUSCULAR; INTRAVENOUS; SUBCUTANEOUS at 07:08

## 2023-08-21 RX ADMIN — ACETAMINOPHEN 1000 MG: 500 TABLET ORAL at 07:08

## 2023-08-21 RX ADMIN — OXYCODONE HYDROCHLORIDE 5 MG: 5 TABLET ORAL at 07:08

## 2023-08-21 RX ADMIN — METHOCARBAMOL 750 MG: 750 TABLET ORAL at 07:08

## 2023-08-21 RX ADMIN — PROPOFOL 40 MG: 10 INJECTION, EMULSION INTRAVENOUS at 07:08

## 2023-08-21 RX ADMIN — MIDAZOLAM HYDROCHLORIDE 1 MG: 1 INJECTION, SOLUTION INTRAMUSCULAR; INTRAVENOUS at 06:08

## 2023-08-21 RX ADMIN — PROPOFOL 40 MG: 10 INJECTION, EMULSION INTRAVENOUS at 06:08

## 2023-08-21 RX ADMIN — SODIUM CHLORIDE: 0.9 INJECTION, SOLUTION INTRAVENOUS at 06:08

## 2023-08-21 RX ADMIN — PANTOPRAZOLE SODIUM 40 MG: 40 INJECTION, POWDER, FOR SOLUTION INTRAVENOUS at 08:08

## 2023-08-21 RX ADMIN — FENTANYL CITRATE 25 MCG: 50 INJECTION INTRAMUSCULAR; INTRAVENOUS at 07:08

## 2023-08-21 RX ADMIN — PROPOFOL 20 MG: 10 INJECTION, EMULSION INTRAVENOUS at 07:08

## 2023-08-21 RX ADMIN — LIDOCAINE HYDROCHLORIDE 60 MG: 20 INJECTION INTRAVENOUS at 06:08

## 2023-08-21 NOTE — DISCHARGE SUMMARY
[place] - Non-OR Anesthesiology  Discharge Note  Short Stay    Procedure(s) (LRB):  MANIPULATION, KNEE: RIGHT: SAME DAY (Right)      OUTCOME: Patient tolerated treatment/procedure well without complication and is now ready for discharge.    DISPOSITION: Home or Self Care    FINAL DIAGNOSIS:  <principal problem not specified> R TKA arthrofibrosis    FOLLOWUP: In clinic in 2 weeks    DISCHARGE INSTRUCTIONS:  No discharge procedures on file.   -patient will be weightbearing as tolerated with a walker  -resume PT tomorrow  -resume multi-modals except no celebrex  -rx medrol dose pack  -rx lyrica QHS      TIME SPENT ON DISCHARGE: 10 minutes

## 2023-08-21 NOTE — OP NOTE
Orthopedic Surgery  OPERATIVE NOTE    Date of Operation:   8/21/2023    Providers Performing:   Surgeon(s):  Sanford Calvert III, MD  Assistant: none    Operative Procedure:   Right TKA manipulation under anesthesia    Preoperative Diagnosis:   same    Postoperative Diagnosis:   Same    Components Used:   none    * No implants in log *    Anesthesia:   MAC    Estimated Blood Loss:   0 cc    Specimens:   none    Complications:   None    Indications:   S/p R TKA 6/5/23 with pre-op flexion 100, current flexion 100, exam consistent with arthrofibrosis.     We reviewed the risks and benefits of the surgery with the patient prior to surgery including risk of persistent stiffness, fracture, infection, bleeding, injury to nerves and vessels damage, need for revision surgery, continued pain, blood clots, cardiopulmonary complications, death.  After discussing the risks and benefits of the procedure they would like to proceed with surgery.     Operative Notes:   The patient was identified in the pre-operative holding area and the operative site was marked.    A time out was performed.     Slow stead pressure in flexion was applied to achieve improved flexion ROM. Extension was not improved. There was no evidence of fracture/ligament disruption.     Postoperative Plan:  -patient will be weightbearing as tolerated with a walker  -resume PT tomorrow  -resume multi-modals except no celebrex  -rx medrol dose pack  -rx lyrica QHS      Signed by: Sanford Calvert III, MD

## 2023-08-21 NOTE — TRANSFER OF CARE
"Anesthesia Transfer of Care Note    Patient: Raine Medley    Procedure(s) Performed: Procedure(s) (LRB):  MANIPULATION, KNEE: RIGHT: SAME DAY (Right)    Patient location: PACU    Anesthesia Type: general    Transport from OR: Transported from OR on 6-10 L/min O2 by face mask with adequate spontaneous ventilation    Post pain: adequate analgesia    Post assessment: no apparent anesthetic complications    Post vital signs: stable    Level of consciousness: awake    Nausea/Vomiting: no nausea/vomiting    Complications: none    Transfer of care protocol was followed      Last vitals:   Visit Vitals  BP (!) 154/67 (BP Location: Right arm, Patient Position: Lying)   Pulse 60   Temp 36.3 °C (97.4 °F) (Temporal)   Resp 16   Ht 5' 1" (1.549 m)   Wt 60.8 kg (134 lb)   SpO2 99%   Breastfeeding No   BMI 25.32 kg/m²     "

## 2023-08-21 NOTE — PLAN OF CARE
VSS. Pt able to tolerate oral liquids. Pt reports tolerable pain level for D/C. Ice pack and dressing intact. Daughter received home meds per bedside delivery. No distress noted. Pt states she is ready for D/C. D/C instructions reviewed with pt and daughter, verbalized understanding.

## 2023-08-21 NOTE — PROGRESS NOTES
1.  Take all medications as directed. If you have been prescribed antibiotics, make sure to complete them.   2.  Rest and keep yourself/patient well hydrated. For adults, it is recommended to drink at least 8-10 glasses of water daily.   3.  For patients above 6 months of age who are not allergic to and are not on anticoagulants, you can alternate Tylenol and Motrin every 4-6 hours for fever above 100.4F and/or pain.  For patients less than 6 months of age, allergic to or intolerant to NSAIDS, have gastritis, gastric ulcers, or history of GI bleeds, are pregnant, or are on anticoagulant therapy, you can take Tylenol every 4 hours as needed for fever above 100.4F and/or pain.   4. You should schedule a follow-up appointment with your Primary Care Provider/Pediatrician for recheck in 2-3 days or as directed at this visit.   5.  If your condition fails to improve in a timely manner, you should receive another evaluation by your Primary Care Provider/Pediatrician to discuss your concerns or return to urgent care for a recheck.  If your condition worsens at any time, you should report immediately to your nearest Emergency Department for further evaluation. **You must understand that you have received Urgent Care treatment only and that you may be released before all of your medical problems are known or treated. You, the patient, are responsible to arrange for follow-up care as instructed.         Wound Care  Taking proper care of your wound will help it heal. Your healthcare provider may show you how to clean and dress the wound. He or she will also explain how to tell if the wound is healing normally. If you are unsure of how to take care of the wound, be sure to clarify what dressing to use and how often you should change the bandages. Here are the basic steps.     A wound that's not healing normally may be dark in color or have white streaks.   Wash your hands  Tips for washing your hands include:  · Use liquid soap  AMADEO 8/21/23     and lather for 2 minutes. Scrub between your fingers and under your nails.  · Rinse with warm water, keeping your fingers pointing down.  · Use a paper towel to dry your hands and to turn off the faucet.  Remove the used dressing  Here are suggestions for removing the dressing:  · If dressing changes cause you pain, be sure to take your pain medicine as prescribed by your healthcare provider 30 minutes before dressing changes.  · Set up your supplies.  · Put on disposable gloves if youre dressing a wound for someone else or your wound is infected.  · Loosen the tape by pulling gently toward the wound.  · Gently take off the old dressing. If the dressing is stuck to the wound, moisten it with saline (if available) or clean water.  · If you have a drain or tube in the wound, be careful not to pull on it.  · Remove the dressing 1 layer at a time and put it in a plastic bag.  · Remove your gloves.  Inspect and dress the wound  Check the wound carefully:  · Each time you change the dressing, inspect the wound carefully to be sure its healing normally by making sure your wound appears to be pink and moist, and is free of infection.    · Wash your hands again. Put on a new pair of gloves.  · Clean and dress the wound as directed by your healthcare provider or nurse. Do not put anything in the wound that is not prescribed or directed by your healthcare provider. If you have a drain or tube, be careful not to pull on it. Make sure to secure the drain or tube as well.  · Put all supplies in a plastic bag. Seal the bag and put it in the trash.  · Be sure to wash your hands again.  Call your healthcare provider  Call your healthcare provider if you see any of the following signs of a problem:  · Bleeding that soaks the dressing  · Pink fluid weeping from the wound  · Increased drainage or drainage that is yellow, yellow-green, or foul-smelling  · Increased swelling or pain, or redness or swelling in the skin around the  wound  · A change in the color of the wound, or if streaks develop in a direction away from the wound  · The area between any stitches opens up  · An increase in the size of the wound  · A fever of 100.4°F (38°C) or higher, or as directed by your healthcare provider  · Chills, increased fatigue, or a loss of appetite      Date Last Reviewed: 7/30/2015  © 2417-2095 [employer]. [address] [place] [address], [place] [address]. All rights reserved. This information is not intended as a substitute for professional medical care. Always follow your healthcare professional's instructions.

## 2023-08-21 NOTE — PLAN OF CARE
Pre procedure complete. Waiting on anesthesia consent, site mary and update. Pt's daughter at bedside, will switch out with brother. Belongings will remain at bedside throughout procedure. Pt's resting comfortably with all questions addressed and call light in reach.

## 2023-08-21 NOTE — ANESTHESIA POSTPROCEDURE EVALUATION
Anesthesia Post Evaluation    Patient: Raine Medley    Procedure(s) Performed: Procedure(s) (LRB):  MANIPULATION, KNEE: RIGHT: SAME DAY (Right)    Final Anesthesia Type: general      Patient location during evaluation: PACU  Patient participation: Yes- Able to Participate  Level of consciousness: awake and alert  Post-procedure vital signs: reviewed and stable  Pain management: adequate  Airway patency: patent  ERMELINDA mitigation strategies: Multimodal analgesia  PONV status at discharge: No PONV  Anesthetic complications: no      Cardiovascular status: blood pressure returned to baseline and hemodynamically stable  Respiratory status: unassisted  Hydration status: euvolemic  Follow-up not needed.          Vitals Value Taken Time   /73 08/21/23 0730   Temp 36.4 °C (97.5 °F) 08/21/23 0720   Pulse 69 08/21/23 0735   Resp 14 08/21/23 0740   SpO2 100 % 08/21/23 0735         No case tracking events are documented in the log.      Pain/Araseli Score: Pain Rating Prior to Med Admin: 10 (8/21/2023  7:40 AM)  Araseli Score: 10 (8/21/2023  7:17 AM)

## 2023-08-21 NOTE — ANESTHESIA PREPROCEDURE EVALUATION
08/21/2023  Pre-operative evaluation for Procedure(s) (LRB):  MANIPULATION, KNEE: RIGHT: SAME DAY (Right)    Raine Medley is a 68 y.o. female     Patient Active Problem List   Diagnosis    UTI (lower urinary tract infection)    Flank pain    Cervical pain    Palpitations    Atypical pneumonia    Encounter for screening colonoscopy    Decreased strength of lower extremity    Impaired range of motion of knee    Primary osteoarthritis of right knee    Snoring    Epigastric abdominal tenderness    Microcytic anemia    Decreased functional mobility and endurance    Right knee pain    Postoperative stiffness of total knee replacement    Acute anemia    Gastroesophageal reflux disease without esophagitis    Vitamin D deficiency       Review of patient's allergies indicates:  No Known Allergies    No current facility-administered medications on file prior to encounter.     Current Outpatient Medications on File Prior to Encounter   Medication Sig Dispense Refill    pantoprazole (PROTONIX) 40 MG tablet Take 1 tablet (40 mg total) by mouth 2 (two) times daily. 60 tablet 0    vitamin D (VITAMIN D3) 1000 units Tab Take 1,000 Units by mouth once daily.         Past Surgical History:   Procedure Laterality Date    CHOLECYSTECTOMY      COLONOSCOPY N/A 1/3/2019    Procedure: COLONOSCOPY;  Surgeon: Gaston Beebe MD;  Location: [address]);  Service: Endoscopy;  Laterality: N/A;    TOTAL KNEE ARTHROPLASTY Right 6/5/2023    Procedure: ARTHROPLASTY, KNEE, TOTAL: RIGHT: DEPUY - ATTUNE;  Surgeon: Sanford Calvert III, MD;  Location: [facility];  Service: Orthopedics;  Laterality: Right;       Social History     Socioeconomic History    Marital status:    Occupational History     Employer: [employer]   Tobacco Use    Smoking status: Never     Passive exposure:  "Never    Smokeless tobacco: Never   Substance and Sexual Activity    Alcohol use: No    Drug use: No   Social History Narrative    She lives in [place] with her 15-year-old son in [place]. 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 She is from [facility].          CBC: No results for input(s): "WBC", "RBC", "HGB", "HCT", "PLT", "MCV", "MCH", "MCHC" in the last 72 hours.    CMP: No results for input(s): "NA", "K", "CL", "CO2", "BUN", "CREATININE", "GLU", "MG", "PHOS", "CALCIUM", "ALBUMIN", "PROT", "ALKPHOS", "ALT", "AST", "BILITOT" in the last 72 hours.    INR  No results for input(s): "PT", "INR", "PROTIME", "APTT" in the last 72 hours.        Diagnostic Studies:      EKD Echo:  Results for orders placed or performed during the hospital encounter of 10/17/13   2D Echo w/ Color Flow Doppler   Result Value Ref Range    EF + QEF 65     Diastolic Dysfunction No          Pre-op Assessment    I have reviewed the Patient Summary Reports.     I have reviewed the Nursing Notes. I have reviewed the NPO Status.   I have reviewed the Medications.     Review of Systems  Pulmonary:   Asthma    Hepatic/GI:   GERD    Musculoskeletal:   Arthritis         Physical Exam  General: Well nourished and Cooperative    Airway:  Mallampati: II   Mouth Opening: Normal  TM Distance: Normal  Tongue: Normal  Neck ROM: Normal ROM    Chest/Lungs:  Clear to auscultation, Normal Respiratory Rate    Heart:  Rate: Normal  Rhythm: Regular Rhythm  Sounds: Normal        Anesthesia Plan  Type of Anesthesia, risks & benefits discussed:    Anesthesia Type: Gen Natural Airway  Intra-op Monitoring Plan: Standard ASA Monitors  Post Op Pain Control Plan: multimodal analgesia and IV/PO Opioids PRN  Induction:  IV  Airway Plan: Direct and Video, Post-Induction  Informed Consent: Informed consent signed with the Patient and all parties understand the risks and agree with anesthesia plan.  All questions answered.   ASA " Score: 2    Ready For Surgery From Anesthesia Perspective.     .

## 2023-08-21 NOTE — PLAN OF CARE
"Pt c/o stomach pain. Pt states "It feels just like when I had my bleed". Pt denies nausea. Dr. Avila notified, new orders placed.  "

## 2023-08-22 ENCOUNTER — CLINICAL SUPPORT (OUTPATIENT)
Dept: REHABILITATION | Facility: HOSPITAL | Age: 68
End: 2023-08-22
Payer: COMMERCIAL

## 2023-08-22 DIAGNOSIS — M25.669 POSTOPERATIVE STIFFNESS OF TOTAL KNEE REPLACEMENT, INITIAL ENCOUNTER: ICD-10-CM

## 2023-08-22 DIAGNOSIS — Z74.09 DECREASED FUNCTIONAL MOBILITY AND ENDURANCE: Primary | ICD-10-CM

## 2023-08-22 DIAGNOSIS — Z96.659 POSTOPERATIVE STIFFNESS OF TOTAL KNEE REPLACEMENT, INITIAL ENCOUNTER: ICD-10-CM

## 2023-08-22 DIAGNOSIS — T84.89XA POSTOPERATIVE STIFFNESS OF TOTAL KNEE REPLACEMENT, INITIAL ENCOUNTER: ICD-10-CM

## 2023-08-22 DIAGNOSIS — M25.561 ACUTE PAIN OF RIGHT KNEE: ICD-10-CM

## 2023-08-22 PROCEDURE — 97112 NEUROMUSCULAR REEDUCATION: CPT

## 2023-08-22 PROCEDURE — 97110 THERAPEUTIC EXERCISES: CPT

## 2023-08-22 PROCEDURE — 97140 MANUAL THERAPY 1/> REGIONS: CPT

## 2023-08-23 ENCOUNTER — CLINICAL SUPPORT (OUTPATIENT)
Dept: REHABILITATION | Facility: HOSPITAL | Age: 68
End: 2023-08-23
Payer: COMMERCIAL

## 2023-08-23 DIAGNOSIS — T84.89XA POSTOPERATIVE STIFFNESS OF TOTAL KNEE REPLACEMENT, INITIAL ENCOUNTER: ICD-10-CM

## 2023-08-23 DIAGNOSIS — Z96.659 POSTOPERATIVE STIFFNESS OF TOTAL KNEE REPLACEMENT, INITIAL ENCOUNTER: ICD-10-CM

## 2023-08-23 DIAGNOSIS — M25.669 POSTOPERATIVE STIFFNESS OF TOTAL KNEE REPLACEMENT, INITIAL ENCOUNTER: ICD-10-CM

## 2023-08-23 DIAGNOSIS — Z74.09 DECREASED FUNCTIONAL MOBILITY AND ENDURANCE: Primary | ICD-10-CM

## 2023-08-23 DIAGNOSIS — M25.561 ACUTE PAIN OF RIGHT KNEE: ICD-10-CM

## 2023-08-23 PROCEDURE — 97112 NEUROMUSCULAR REEDUCATION: CPT

## 2023-08-23 PROCEDURE — 97110 THERAPEUTIC EXERCISES: CPT

## 2023-08-23 PROCEDURE — 97140 MANUAL THERAPY 1/> REGIONS: CPT

## 2023-08-23 NOTE — PROGRESS NOTES
[facility]   Physical Therapy Treatment Note / updated POC     Name: Raine Medley  Clinic Number: [id number]    Therapy Diagnosis:   Encounter Diagnoses   Name Primary?    Decreased functional mobility and endurance Yes    Acute pain of right knee     Postoperative stiffness of total knee replacement, initial encounter        Physician: Sanford Calvert III, *    Visit Date: 8/23/2023    Physician Orders: PT Eval and Treat   Medical Diagnosis from Referral: M17.11 (ICD-10-CM) - Primary osteoarthritis of right knee   Evaluation Date: 6/7/2023  Authorization Period Expiration: 12/31/2023  Plan of Care Expiration: TBD by MD  Progress Note Due: TBD  Visit # / Visits authorized: 23 / 50 + eval     FOTO: 2/3     PTA Visit #: 0 / 5     Time In: 1305  Time Out: 1405  Total Treatment Time: 60 minutes  Total Billable Time: 60 minutes     Precautions: Standard     Subjective     Patient reports: Reports compliance with HEP x1 after last visit.    She was compliant with home exercise program.  Response to previous treatment: appropriate muscle response  Functional change: ongoing    Pain: NT/10, currently  Location: Right knee      Objective      Objective Measures updated at progress report unless specified.   Date of Surgery: 6/5/2023  *Patient is 11 weeks, 2 days s/p R TKA as of 8/23/2023.*    Date of Manipulation: 8/21/2023  *Patient is 2 days s/p manipulation as of 8/23/2023.*      6/8/20203: 1-0- 65 degrees; 80 degrees at end of session  6/12/2023: 0 -62 AROM  6/14/2023; 90 degrees Flexion in SEATED at EOM; 87 degrees AAROM with heel slides  6/16/2023; 87 degrees AAROM   6/19/2023; 80 degrees ACTIVE knee flexion ROM  6/21/2023; 86 degrees AROM Flexion; 89 degrees AAROM Flexion  6/23/2023; 0 - 100 degrees AAROM Flexion post bike and manual   Observation: Patient ambulates with RW. There is no heel strike at initial contact and minimal knee flexion during swing phase. Decreased stance  time on RLE observed. There is no visible redness along the Right LE. There is normal post op swelling along knee joint. Denies tenderness or pain at posterior calf. She has a Negative Chris's sign.  6/27/2023: 102 degrees post LLLD stretch   At end of session patient reported becoming dizzy with stomach pains; quick resolution of symptoms   BP: 122/70 mmHg; 77 bpm  6/28/2023: full revolutions around bike. Measured on table at 90 degrees flexion  6/30/2023; 102 degrees A/PROM post manual care; 105 degrees AAROM Flexion post heel slides; 107 degrees post session  7/3/2023: 96 degrees PROM  7/5/2023: 105 AAROM  7/7/2023: 0-110 flexion AAROM  7/10/2023; 100 degrees AAROM Flexion; medial knee pain noted, unable to push  7/13/2023; 102 degrees AAROM Flexion; posterior knee pain and hamstring pain reported with bending, unable to push   FOTO: 30% Limitation    7/17/2023:   0-100 AROM/AAROM  114/68 mmHg 79 bpm    7/28/2023:  TUG: 10 seconds  30 STS: 11x  ROM 0-100 AROM    8/7/2023:0-89 deg flexion at start of visit -> 0-95 deg at end of visit    8/22/2023: 0-106 AAROM  8/23/2023: 0-99 deg at start of visit -> 0-110 deg at end of visit     Treatment     Raine received the treatments listed below:      therapeutic exercises to develop ROM and flexibility for 20 minutes including:    Patient education:  - HEP compliance     Heel slides AAROM 2x30x5 sec  Half FR - Prone AAROM quad stretch 2x30x5 sec ea - KONRAD BEST      manual therapy techniques: Joint mobilizations, Myofacial release, and Soft tissue Mobilization were applied to the: Right knee for 25 minutes, including:    Assessment of joint play and knee ROM throughout visit  Scar tissue mobilization  AP Grade II-III in sitting with PROM into flexion  PFJ mobilizations all directions Grade III-IV holds and reps    neuromuscular re-education activities to improve: Proprioception, Posture, and motor control for 15 minutes.    Stool scoots x1 clinic lap  LAQ 3lbs 2x30x5  sec        Patient Education and Home Exercises       Education provided:   - see above    Written Home Exercises Provided: Patient instructed to cont prior HEP. Exercises were reviewed and Raine was able to demonstrate them prior to the end of the session.  Raine demonstrated good  understanding of the education provided. See EMR under Patient Instructions for exercises provided during therapy sessions    Assessment     Patient is ~ 11 weeks s/p R TKA and 2 days post manipulation. Regression of ROM at start of visit today compared to ending ROM at visit yesterday. Able to improve ROM past 106 deg to 110 deg vicki with prone AAROM quad stretching.     Cont to require PT daily during 1st week post manipulation to maintain and improve ROM.    Raine Is progressing well towards her goals.   Pt prognosis is Good.     Pt will continue to benefit from skilled outpatient physical therapy to address the deficits listed in the problem list box on initial evaluation, provide pt/family education and to maximize pt's level of independence in the home and community environment.   Pt's spiritual, cultural and educational needs considered and pt agreeable to plan of care and goals.     Anticipated barriers to physical therapy: none    Goals:   Short Term Goals (3 Weeks):   1. Patient will be independent with home exercise program to supplement physical therapy treatment in improving functional status. MET  2. Patient will improve R lower extremity strength to at least 75% of L lower extremity strength as measured via MicroFet handheld dynamometer to improve strength for closed chain tasks. Not MET  3. Patient will improve R knee active range of motion to 0-90 degrees to promote increased ease of sit<>stand transfers. MET  4. Patient will perform timed up and go with less restrictive assistive device in < 20 seconds to improve gait speed and safety with community ambulation. MET     Long Term Goals (6 Weeks):   1. Patient will improve R  lower extremity strength to at least 90% of L lower extremity strength as measured via MicroFet handheld dynamometer to improve strength for closed chain tasks. Not MET  2. Patient will improve the total FOTO Knee Survey Score to </= 20% limited to demonstrate increased perceived functional mobility. NOT MET  3. Patient will perform timed up and go with least restrictive assistive device in < 13.5  seconds to improve gait speed and safety with community ambulation. MET  4. Patient will perform at least 15 sit to stands in 30 seconds without UE support to demonstrate increased functional strength. NOT MET  5. Patient will improve R knee active range of motion to 0-120 degrees to promote higher level transfers and transitions without limitation. NOT MET    Plan     Plan of care Certification: 6/7/2023 to TBD by MD      See POC in treatment section for evaluation    HENRY PALMA, PT, DPT, OCS

## 2023-08-24 ENCOUNTER — CLINICAL SUPPORT (OUTPATIENT)
Dept: REHABILITATION | Facility: HOSPITAL | Age: 68
End: 2023-08-24
Payer: COMMERCIAL

## 2023-08-24 DIAGNOSIS — Z74.09 DECREASED FUNCTIONAL MOBILITY AND ENDURANCE: Primary | ICD-10-CM

## 2023-08-24 DIAGNOSIS — M25.561 ACUTE PAIN OF RIGHT KNEE: ICD-10-CM

## 2023-08-24 DIAGNOSIS — T84.89XA POSTOPERATIVE STIFFNESS OF TOTAL KNEE REPLACEMENT, INITIAL ENCOUNTER: ICD-10-CM

## 2023-08-24 DIAGNOSIS — Z96.659 POSTOPERATIVE STIFFNESS OF TOTAL KNEE REPLACEMENT, INITIAL ENCOUNTER: ICD-10-CM

## 2023-08-24 DIAGNOSIS — M25.669 POSTOPERATIVE STIFFNESS OF TOTAL KNEE REPLACEMENT, INITIAL ENCOUNTER: ICD-10-CM

## 2023-08-24 PROCEDURE — 97112 NEUROMUSCULAR REEDUCATION: CPT

## 2023-08-24 PROCEDURE — 97110 THERAPEUTIC EXERCISES: CPT

## 2023-08-24 PROCEDURE — 97530 THERAPEUTIC ACTIVITIES: CPT

## 2023-08-24 NOTE — PROGRESS NOTES
[facility]   Physical Therapy Treatment Note / updated POC     Name: Raine Medley  Clinic Number: [id number]    Therapy Diagnosis:   Encounter Diagnoses   Name Primary?    Decreased functional mobility and endurance Yes    Acute pain of right knee     Postoperative stiffness of total knee replacement, initial encounter      Physician: Sanford Calvert III, *    Visit Date: 8/24/2023    Physician Orders: PT Eval and Treat   Medical Diagnosis from Referral: M17.11 (ICD-10-CM) - Primary osteoarthritis of right knee   Evaluation Date: 6/7/2023  Authorization Period Expiration: 12/31/2023  Plan of Care Expiration: TBD by MD  Progress Note Due: TBD  Visit # / Visits authorized: 24 / 50 + eval     FOTO: 2/3     PTA Visit #: 0 / 5     Time In: 1300  Time Out: 1400  Total Treatment Time: 60 minutes  Total Billable Time: 60 minutes     Precautions: Standard     Subjective     Patient reports:  Reports compliance with HEP x1 yesterday after last visit; awoke with some medial knee pain.    She was compliant with home exercise program.  Response to previous treatment: appropriate muscle response  Functional change: ongoing    Pain: NT/10, currently  Location: Right knee      Objective      Objective Measures updated at progress report unless specified.   Date of Surgery: 6/5/2023  *Patient is 11 weeks, 3 days s/p R TKA as of 8/24/2023.*     Date of Manipulation: 8/21/2023  *Patient is 3 days s/p manipulation as of 8/24/2023.*       6/8/20203: 1-0- 65 degrees; 80 degrees at end of session  6/12/2023: 0 -62 AROM  6/14/2023; 90 degrees Flexion in SEATED at EOM; 87 degrees AAROM with heel slides  6/16/2023; 87 degrees AAROM   6/19/2023; 80 degrees ACTIVE knee flexion ROM  6/21/2023; 86 degrees AROM Flexion; 89 degrees AAROM Flexion  6/23/2023; 0 - 100 degrees AAROM Flexion post bike and manual   Observation: Patient ambulates with RW. There is no heel strike at initial contact and minimal knee  flexion during swing phase. Decreased stance time on RLE observed. There is no visible redness along the Right LE. There is normal post op swelling along knee joint. Denies tenderness or pain at posterior calf. She has a Negative Chris's sign.  6/27/2023: 102 degrees post LLLD stretch   At end of session patient reported becoming dizzy with stomach pains; quick resolution of symptoms   BP: 122/70 mmHg; 77 bpm  6/28/2023: full revolutions around bike. Measured on table at 90 degrees flexion  6/30/2023; 102 degrees A/PROM post manual care; 105 degrees AAROM Flexion post heel slides; 107 degrees post session  7/3/2023: 96 degrees PROM  7/5/2023: 105 AAROM  7/7/2023: 0-110 flexion AAROM  7/10/2023; 100 degrees AAROM Flexion; medial knee pain noted, unable to push  7/13/2023; 102 degrees AAROM Flexion; posterior knee pain and hamstring pain reported with bending, unable to push   FOTO: 30% Limitation    7/17/2023:   0-100 AROM/AAROM  114/68 mmHg 79 bpm    7/28/2023:  TUG: 10 seconds  30 STS: 11x  ROM 0-100 AROM    8/7/2023:0-89 deg flexion at start of visit -> 0-95 deg at end of visit    8/22/2023: 0-106 AAROM  8/23/2023: 0-99 deg at start of visit -> 0-110 deg at end of visit   8/24/2023: 0-95 deg at start of visit -> 0-99 deg at end of visit    Treatment     Raine received the treatments listed below:      therapeutic exercises to develop ROM and flexibility for 10 minutes including:    Patient education:  - HEP compliance     Heel slides AAROM 30x5 sec  Half FR - Prone AAROM quad stretch x7 min with 5 sec ea - KONRAD BEST HEP focus      manual therapy techniques: Joint mobilizations, Myofacial release, and Soft tissue Mobilization were applied to the: Right knee for 05 minutes, including:    Assessment of joint play and knee ROM throughout visit  PFJ mobilizations all directions Grade III-IV holds and reps    Held:  Scar tissue mobilization  AP Grade II-III in sitting with PROM into flexion    neuromuscular re-education  activities to improve: Proprioception, Posture, and motor control for 20 minutes.    Prone hip ext knee bent 3x10  SL hip abd 3x10  Stool scoots x5 clinic lap    Held:  LAQ 3lbs 2x30x5 sec    Raine participated in dynamic functional therapeutic activities to improve functional performance for 25  minutes, including:    GTB sidesteps at table (band at knees) x5 laps    Dieudonne taps:  Fwd/bwd R 2x20  Lateral R 2x20      Patient Education and Home Exercises       Education provided:   - see above    Written Home Exercises Provided: Patient instructed to cont prior HEP. Exercises were reviewed and Raine was able to demonstrate them prior to the end of the session.  Raine demonstrated good  understanding of the education provided. See EMR under Patient Instructions for exercises provided during therapy sessions    Assessment     Patient is ~ 11 weeks s/p R TKA and 3 days post manipulation. Again demonstrates regression of ROM at start of visit today compared to ending ROM at visit yesterday. Able to improve ROM from 95 deg to 99 deg vicki with prone AAROM quad stretching. Advised patient to focus primarily on this exercise 5x prior to visit tomorrow; verbal understanding provided.    Reduced medial knee pain following PFJ mobilization.    Cont to require PT daily during 1st week post manipulation to maintain and improve ROM.    Raine Is progressing well towards her goals.   Pt prognosis is Good.     Pt will continue to benefit from skilled outpatient physical therapy to address the deficits listed in the problem list box on initial evaluation, provide pt/family education and to maximize pt's level of independence in the home and community environment.   Pt's spiritual, cultural and educational needs considered and pt agreeable to plan of care and goals.     Anticipated barriers to physical therapy: none    Goals:   Short Term Goals (3 Weeks):   1. Patient will be independent with home exercise program to supplement physical  therapy treatment in improving functional status. MET  2. Patient will improve R lower extremity strength to at least 75% of L lower extremity strength as measured via MicroFet handheld dynamometer to improve strength for closed chain tasks. Not MET  3. Patient will improve R knee active range of motion to 0-90 degrees to promote increased ease of sit<>stand transfers. MET  4. Patient will perform timed up and go with less restrictive assistive device in < 20 seconds to improve gait speed and safety with community ambulation. MET     Long Term Goals (6 Weeks):   1. Patient will improve R lower extremity strength to at least 90% of L lower extremity strength as measured via MicroFet handheld dynamometer to improve strength for closed chain tasks. Not MET  2. Patient will improve the total FOTO Knee Survey Score to </= 20% limited to demonstrate increased perceived functional mobility. NOT MET  3. Patient will perform timed up and go with least restrictive assistive device in < 13.5  seconds to improve gait speed and safety with community ambulation. MET  4. Patient will perform at least 15 sit to stands in 30 seconds without UE support to demonstrate increased functional strength. NOT MET  5. Patient will improve R knee active range of motion to 0-120 degrees to promote higher level transfers and transitions without limitation. NOT MET    Plan     Plan of care Certification: 6/7/2023 to TBD by MD      See POC in treatment section for evaluation    HENRY PALMA, PT, DPT, OCS

## 2023-08-25 ENCOUNTER — OFFICE VISIT (OUTPATIENT)
Dept: FAMILY MEDICINE | Facility: CLINIC | Age: 68
End: 2023-08-25
Attending: FAMILY MEDICINE
Payer: COMMERCIAL

## 2023-08-25 ENCOUNTER — CLINICAL SUPPORT (OUTPATIENT)
Dept: REHABILITATION | Facility: HOSPITAL | Age: 68
End: 2023-08-25
Payer: COMMERCIAL

## 2023-08-25 VITALS
HEART RATE: 58 BPM | BODY MASS INDEX: 24.72 KG/M2 | RESPIRATION RATE: 17 BRPM | OXYGEN SATURATION: 99 % | WEIGHT: 130.94 LBS | HEIGHT: 61 IN | SYSTOLIC BLOOD PRESSURE: 126 MMHG | DIASTOLIC BLOOD PRESSURE: 78 MMHG

## 2023-08-25 DIAGNOSIS — E55.9 VITAMIN D DEFICIENCY: ICD-10-CM

## 2023-08-25 DIAGNOSIS — M25.669 POSTOPERATIVE STIFFNESS OF TOTAL KNEE REPLACEMENT, INITIAL ENCOUNTER: ICD-10-CM

## 2023-08-25 DIAGNOSIS — Z74.09 DECREASED FUNCTIONAL MOBILITY AND ENDURANCE: Primary | ICD-10-CM

## 2023-08-25 DIAGNOSIS — T84.89XA POSTOPERATIVE STIFFNESS OF TOTAL KNEE REPLACEMENT, INITIAL ENCOUNTER: ICD-10-CM

## 2023-08-25 DIAGNOSIS — M25.561 ACUTE PAIN OF RIGHT KNEE: ICD-10-CM

## 2023-08-25 DIAGNOSIS — K21.9 GASTROESOPHAGEAL REFLUX DISEASE WITHOUT ESOPHAGITIS: Primary | ICD-10-CM

## 2023-08-25 DIAGNOSIS — Z96.659 POSTOPERATIVE STIFFNESS OF TOTAL KNEE REPLACEMENT, INITIAL ENCOUNTER: ICD-10-CM

## 2023-08-25 DIAGNOSIS — M26.622 ARTHRALGIA OF LEFT TEMPOROMANDIBULAR JOINT: ICD-10-CM

## 2023-08-25 DIAGNOSIS — K92.2 GASTROINTESTINAL HEMORRHAGE, UNSPECIFIED GASTROINTESTINAL HEMORRHAGE TYPE: ICD-10-CM

## 2023-08-25 PROCEDURE — 3008F BODY MASS INDEX DOCD: CPT | Mod: CPTII,S$GLB,, | Performed by: FAMILY MEDICINE

## 2023-08-25 PROCEDURE — 3078F PR MOST RECENT DIASTOLIC BLOOD PRESSURE < 80 MM HG: ICD-10-PCS | Mod: CPTII,S$GLB,, | Performed by: FAMILY MEDICINE

## 2023-08-25 PROCEDURE — 97112 NEUROMUSCULAR REEDUCATION: CPT

## 2023-08-25 PROCEDURE — 97110 THERAPEUTIC EXERCISES: CPT

## 2023-08-25 PROCEDURE — 3288F PR FALLS RISK ASSESSMENT DOCUMENTED: ICD-10-PCS | Mod: CPTII,S$GLB,, | Performed by: FAMILY MEDICINE

## 2023-08-25 PROCEDURE — 1126F PR PAIN SEVERITY QUANTIFIED, NO PAIN PRESENT: ICD-10-PCS | Mod: CPTII,S$GLB,, | Performed by: FAMILY MEDICINE

## 2023-08-25 PROCEDURE — 3288F FALL RISK ASSESSMENT DOCD: CPT | Mod: CPTII,S$GLB,, | Performed by: FAMILY MEDICINE

## 2023-08-25 PROCEDURE — 3008F PR BODY MASS INDEX (BMI) DOCUMENTED: ICD-10-PCS | Mod: CPTII,S$GLB,, | Performed by: FAMILY MEDICINE

## 2023-08-25 PROCEDURE — 1160F PR REVIEW ALL MEDS BY PRESCRIBER/CLIN PHARMACIST DOCUMENTED: ICD-10-PCS | Mod: CPTII,S$GLB,, | Performed by: FAMILY MEDICINE

## 2023-08-25 PROCEDURE — 99214 OFFICE O/P EST MOD 30 MIN: CPT | Mod: S$GLB,,, | Performed by: FAMILY MEDICINE

## 2023-08-25 PROCEDURE — 99214 PR OFFICE/OUTPT VISIT, EST, LEVL IV, 30-39 MIN: ICD-10-PCS | Mod: S$GLB,,, | Performed by: FAMILY MEDICINE

## 2023-08-25 PROCEDURE — 3078F DIAST BP <80 MM HG: CPT | Mod: CPTII,S$GLB,, | Performed by: FAMILY MEDICINE

## 2023-08-25 PROCEDURE — 1101F PR PT FALLS ASSESS DOC 0-1 FALLS W/OUT INJ PAST YR: ICD-10-PCS | Mod: CPTII,S$GLB,, | Performed by: FAMILY MEDICINE

## 2023-08-25 PROCEDURE — 99999 PR PBB SHADOW E&M-EST. PATIENT-LVL IV: ICD-10-PCS | Mod: PBBFAC,,, | Performed by: FAMILY MEDICINE

## 2023-08-25 PROCEDURE — 1126F AMNT PAIN NOTED NONE PRSNT: CPT | Mod: CPTII,S$GLB,, | Performed by: FAMILY MEDICINE

## 2023-08-25 PROCEDURE — 1160F RVW MEDS BY RX/DR IN RCRD: CPT | Mod: CPTII,S$GLB,, | Performed by: FAMILY MEDICINE

## 2023-08-25 PROCEDURE — 97140 MANUAL THERAPY 1/> REGIONS: CPT

## 2023-08-25 PROCEDURE — 3074F PR MOST RECENT SYSTOLIC BLOOD PRESSURE < 130 MM HG: ICD-10-PCS | Mod: CPTII,S$GLB,, | Performed by: FAMILY MEDICINE

## 2023-08-25 PROCEDURE — 1159F MED LIST DOCD IN RCRD: CPT | Mod: CPTII,S$GLB,, | Performed by: FAMILY MEDICINE

## 2023-08-25 PROCEDURE — 3074F SYST BP LT 130 MM HG: CPT | Mod: CPTII,S$GLB,, | Performed by: FAMILY MEDICINE

## 2023-08-25 PROCEDURE — 1159F PR MEDICATION LIST DOCUMENTED IN MEDICAL RECORD: ICD-10-PCS | Mod: CPTII,S$GLB,, | Performed by: FAMILY MEDICINE

## 2023-08-25 PROCEDURE — 1101F PT FALLS ASSESS-DOCD LE1/YR: CPT | Mod: CPTII,S$GLB,, | Performed by: FAMILY MEDICINE

## 2023-08-25 PROCEDURE — 99999 PR PBB SHADOW E&M-EST. PATIENT-LVL IV: CPT | Mod: PBBFAC,,, | Performed by: FAMILY MEDICINE

## 2023-08-25 RX ORDER — PANTOPRAZOLE SODIUM 40 MG/1
40 TABLET, DELAYED RELEASE ORAL DAILY
Qty: 30 TABLET | Refills: 2 | Status: SHIPPED | OUTPATIENT
Start: 2023-08-25

## 2023-08-25 NOTE — PROGRESS NOTES
Subjective     Patient ID: Raine Medley is a 68 y.o. female.    Chief Complaint: Follow-up    68 yr old pleasant female with GERD, and other co morbidities presented today along with her daughter for post hospital discharge follow up. She was recently admitted for anemia and upper GI bleed. She was transfused two units and her H/H improving. She has no GI follow up until October. She is scheduled for EGD on September 23. She denies any symptoms or melena, hematemesis or pain in her abdomen. Details as follows -      GERD - controlled - takes PPI on regular basis    History as below - reviewed         Follow-up  Pertinent negatives include no anorexia, arthralgias, chest pain, congestion, diaphoresis, headaches, myalgias, nausea or sore throat.   Anemia  Presents for initial visit. The condition has lasted for 1 month. Symptoms include pallor. There has been no anorexia, bruising/bleeding easily or confusion. Signs of blood loss that are not present include hematochezia, melena, menorrhagia and vaginal bleeding. Past treatments include nothing. There is no history of alcohol abuse, chronic liver disease, clotting disorder, heart failure, HIV/AIDS, inflammatory bowel disease, malnutrition, recent illness or recent trauma.     Review of Systems   Constitutional: Negative.  Negative for activity change, diaphoresis and unexpected weight change.   HENT: Negative.  Negative for nasal congestion, ear discharge, hearing loss, rhinorrhea, sore throat and voice change.    Eyes: Negative.  Negative for pain, discharge and visual disturbance.   Respiratory: Negative.  Negative for chest tightness, shortness of breath and wheezing.    Cardiovascular: Negative.  Negative for chest pain.   Gastrointestinal: Negative.  Negative for abdominal distention, anal bleeding, anorexia, constipation, hematochezia, melena and nausea.   Endocrine: Negative.  Negative for cold intolerance, polydipsia and polyuria.   Genitourinary:  Negative.  Negative for decreased urine volume, difficulty urinating, dysuria, frequency, hematuria, menorrhagia, menstrual problem, vaginal bleeding and vaginal pain.   Musculoskeletal: Negative.  Negative for arthralgias, gait problem and myalgias.   Integumentary:  Positive for pallor. Negative for color change and wound.   Allergic/Immunologic: Negative.  Negative for environmental allergies and immunocompromised state.   Neurological: Negative.  Negative for dizziness, tremors, seizures, speech difficulty and headaches.   Hematological: Negative.  Negative for adenopathy. Does not bruise/bleed easily.   Psychiatric/Behavioral: Negative.  Negative for agitation, confusion, decreased concentration, hallucinations, self-injury and suicidal ideas. The patient is not nervous/anxious.        Past Medical History:   Diagnosis Date    Asthma     as a child    Gastroesophageal reflux disease without esophagitis 7/19/2023    Hyperlipidemia     Microcytic anemia 5/15/2023       Past Surgical History:   Procedure Laterality Date    CHOLECYSTECTOMY      COLONOSCOPY N/A 1/3/2019    Procedure: COLONOSCOPY;  Surgeon: Gaston Beebe MD;  Location: [address];  Service: Endoscopy;  Laterality: N/A;    KNEE JOINT MANIPULATION Right 8/21/2023    Procedure: MANIPULATION, KNEE: RIGHT: SAME DAY;  Surgeon: Sanford Calvert III, MD;  Location: [facility];  Service: Orthopedics;  Laterality: Right;    TOTAL KNEE ARTHROPLASTY Right 6/5/2023    Procedure: ARTHROPLASTY, KNEE, TOTAL: RIGHT: DEPUY - ATTUNE;  Surgeon: Sanford Calvert III, MD;  Location: [facility];  Service: Orthopedics;  Laterality: Right;       Family History   Problem Relation Age of Onset    No Known Problems Mother     No Known Problems Father     No Known Problems Sister     No Known Problems Daughter     No Known Problems Son     No Known Problems Son     No Known Problems Son     Melanoma Neg Hx     Lupus Neg Hx     Psoriasis Neg Hx     Colon cancer Neg Hx      Esophageal cancer Neg Hx     Stomach cancer Neg Hx        Social History     Socioeconomic History    Marital status:    Occupational History     Employer: [employer]   Tobacco Use    Smoking status: Never     Passive exposure: Never    Smokeless tobacco: Never   Substance and Sexual Activity    Alcohol use: No    Drug use: No   Social History Narrative    She lives in [place] with her 15-year-old son in [place]. 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 She is from [facility].        Current Outpatient Medications   Medication Sig Dispense Refill    acetaminophen (TYLENOL) 650 MG TbSR Take 1 tablet (650 mg total) by mouth every 8 (eight) hours. 120 tablet 0    methocarbamoL (ROBAXIN) 750 MG Tab Take 1 tablet (750 mg total) by mouth 4 (four) times daily as needed (muscle spasms). 40 tablet 0    methylPREDNISolone (MEDROL DOSEPACK) 4 mg tablet use as directed 21 tablet 0    oxyCODONE (ROXICODONE) 5 MG immediate release tablet Take 1-2 tablets by mouth every 4-6 hours as needed for pain 50 tablet 0    pregabalin (LYRICA) 75 MG capsule Take 1 capsule (75 mg total) by mouth every evening. 30 capsule 1    vitamin D (VITAMIN D3) 1000 units Tab Take 1,000 Units by mouth once daily.      pantoprazole (PROTONIX) 40 MG tablet Take 1 tablet (40 mg total) by mouth once daily. 30 tablet 2     No current facility-administered medications for this visit.       Review of patient's allergies indicates:  No Known Allergies       Objective   Vitals:    08/25/23 0842   BP: 126/78   Pulse: (!) 58   Resp: 17       Physical Exam  Constitutional:       General: She is not in acute distress.     Appearance: She is well-developed. She is not diaphoretic.   HENT:      Head: Normocephalic and atraumatic.      Right Ear: External ear normal.      Left Ear: External ear normal.      Nose: Nose normal.      Mouth/Throat:      Pharynx: No oropharyngeal exudate.   Eyes:       General: No scleral icterus.        Right eye: No discharge.         Left eye: No discharge.      Conjunctiva/sclera: Conjunctivae normal.      Pupils: Pupils are equal, round, and reactive to light.   Neck:      Thyroid: No thyromegaly.      Vascular: No JVD.      Trachea: No tracheal deviation.   Cardiovascular:      Rate and Rhythm: Normal rate and regular rhythm.      Heart sounds: Normal heart sounds. No murmur heard.     No friction rub. No gallop.   Pulmonary:      Effort: Pulmonary effort is normal.      Breath sounds: Normal breath sounds. No stridor. No wheezing or rales.   Chest:      Chest wall: No tenderness.   Abdominal:      General: Bowel sounds are normal. There is no distension.      Palpations: Abdomen is soft. There is no mass.      Tenderness: There is no abdominal tenderness. There is no guarding or rebound.      Hernia: No hernia is present.   Musculoskeletal:         General: No tenderness. Normal range of motion.      Cervical back: Normal range of motion and neck supple.   Lymphadenopathy:      Cervical: No cervical adenopathy.   Skin:     General: Skin is warm and dry.      Coloration: Skin is pale.      Findings: No erythema or rash.   Neurological:      Mental Status: She is alert and oriented to person, place, and time.      Cranial Nerves: No cranial nerve deficit.      Motor: No abnormal muscle tone.      Coordination: Coordination normal.      Deep Tendon Reflexes: Reflexes are normal and symmetric. Reflexes normal.   Psychiatric:         Behavior: Behavior normal.         Thought Content: Thought content normal.         Judgment: Judgment normal.            Assessment and Plan     1. Gastroesophageal reflux disease without esophagitis  -     pantoprazole (PROTONIX) 40 MG tablet; Take 1 tablet (40 mg total) by mouth once daily.  Dispense: 30 tablet; Refill: 2    2. Gastrointestinal hemorrhage, unspecified gastrointestinal hemorrhage type    3. Arthralgia of left temporomandibular  joint    4. Vitamin D deficiency        Raine was seen today for follow-up.    Diagnoses and all orders for this visit:    Gastroesophageal reflux disease without esophagitis  -     pantoprazole (PROTONIX) 40 MG tablet; Take 1 tablet (40 mg total) by mouth once daily.    Gastrointestinal hemorrhage, unspecified gastrointestinal hemorrhage type    Arthralgia of left temporomandibular joint    Vitamin D deficiency          Anemia  -H/H repeat  -iron rich diet    GERD/GI bleed  -stable now  -monitor    Spent adequate time in obtaining history and explaining differentials    30 minutes spent during this visit of which greater than 50% devoted to face-face counseling and coordination of care regarding diagnosis and management plan    Follow up in about 6 months (around 2/25/2024), or if symptoms worsen or fail to improve.           Follow up in about 6 months (around 2/25/2024), or if symptoms worsen or fail to improve.

## 2023-08-25 NOTE — PROGRESS NOTES
[facility]   Physical Therapy Treatment Note / updated POC     Name: Raine Medley  Clinic Number: [id number]    Therapy Diagnosis:   Encounter Diagnoses   Name Primary?    Decreased functional mobility and endurance Yes    Acute pain of right knee     Postoperative stiffness of total knee replacement, initial encounter        Physician: Sanford Calvert III, *    Visit Date: 8/25/2023    Physician Orders: PT Eval and Treat   Medical Diagnosis from Referral: M17.11 (ICD-10-CM) - Primary osteoarthritis of right knee   Evaluation Date: 6/7/2023  Authorization Period Expiration: 12/31/2023  Plan of Care Expiration: TBD by MD  Progress Note Due: TBD  Visit # / Visits authorized: 25 / 50 + eval     FOTO: 2/3     PTA Visit #: 0 / 5     Time In: 1255  Time Out: 1400   Total Treatment Time: 65 minutes  Total Billable Time: 65 minutes     Precautions: Standard     Subjective     Patient reports: Knee feeling increased stiffness today including into extension.    She was compliant with home exercise program.  Response to previous treatment: appropriate muscle response  Functional change: ongoing    Pain: NT/10, currently  Location: Right knee      Objective      Objective Measures updated at progress report unless specified.   Date of Surgery: 6/5/2023  *Patient is 11 weeks, 4 days s/p R TKA as of 8/25/2023.*     Date of Manipulation: 8/21/2023  *Patient is 4 days s/p manipulation as of 8/25/2023.*        6/8/20203: 1-0- 65 degrees; 80 degrees at end of session  6/12/2023: 0 -62 AROM  6/14/2023; 90 degrees Flexion in SEATED at EOM; 87 degrees AAROM with heel slides  6/16/2023; 87 degrees AAROM   6/19/2023; 80 degrees ACTIVE knee flexion ROM  6/21/2023; 86 degrees AROM Flexion; 89 degrees AAROM Flexion  6/23/2023; 0 - 100 degrees AAROM Flexion post bike and manual   Observation: Patient ambulates with RW. There is no heel strike at initial contact and minimal knee flexion during swing phase.  Decreased stance time on RLE observed. There is no visible redness along the Right LE. There is normal post op swelling along knee joint. Denies tenderness or pain at posterior calf. She has a Negative Chris's sign.  6/27/2023: 102 degrees post LLLD stretch   At end of session patient reported becoming dizzy with stomach pains; quick resolution of symptoms   BP: 122/70 mmHg; 77 bpm  6/28/2023: full revolutions around bike. Measured on table at 90 degrees flexion  6/30/2023; 102 degrees A/PROM post manual care; 105 degrees AAROM Flexion post heel slides; 107 degrees post session  7/3/2023: 96 degrees PROM  7/5/2023: 105 AAROM  7/7/2023: 0-110 flexion AAROM  7/10/2023; 100 degrees AAROM Flexion; medial knee pain noted, unable to push  7/13/2023; 102 degrees AAROM Flexion; posterior knee pain and hamstring pain reported with bending, unable to push   FOTO: 30% Limitation    7/17/2023:   0-100 AROM/AAROM  114/68 mmHg 79 bpm    7/28/2023:  TUG: 10 seconds  30 STS: 11x  ROM 0-100 AROM    8/7/2023: 0-89 deg flexion at start of visit -> 0-95 deg at end of visit    8/22/2023: 0-106 AAROM  8/23/2023: 0-99 deg at start of visit -> 0-110 deg at end of visit   8/24/2023: 0-95 deg at start of visit -> 0-99 deg at end of visit  8/25/2023: 0-3-90 deg at start of visit -> 0-99 deg at end of visit    Treatment     Raine received the treatments listed below:      therapeutic exercises to develop ROM and flexibility for 32 minutes including:    Patient education:  - HEP compliance   - mobility HEP every hour    LLLD heel prop 8lbs x8 min  Recumbent bike x15 min for ROM and joint mobilization  Heel slides AAROM 2x8 min    Hedl:  Half FR - Prone AAROM quad stretch x7 min with 5 sec ea - KONRAD BEST HEP focus      manual therapy techniques: Joint mobilizations, Myofacial release, and Soft tissue Mobilization were applied to the: Right knee for 25 minutes, including:    Assessment of joint play and knee ROM throughout visit  PFJ mobilizations  all directions Grade III-IV holds and reps  AP Grade II-III with PROM into flexion  AP extension  PFJ K-tape to help with scar and skin mobilization with ADLs    Held:  Scar tissue mobilization      neuromuscular re-education activities to improve: Proprioception, Posture, and motor control for 8 minutes.    LAQ 2x30 reps  SAQ 2x30 reps    Held:  Prone hip ext knee bent 3x10  SL hip abd 3x10  Stool scoots x5 clinic lap      Raine participated in dynamic functional therapeutic activities to improve functional performance for 00  minutes, including:    GTB sidesteps at table (band at knees) x5 laps  Dieudonne taps:  Fwd/bwd R 2x20  Lateral R 2x20      Patient Education and Home Exercises       Education provided:   - see above    Written Home Exercises Provided: Patient instructed to cont prior HEP. Exercises were reviewed and Raine was able to demonstrate them prior to the end of the session.  Raine demonstrated good  understanding of the education provided. See EMR under Patient Instructions for exercises provided during therapy sessions    Assessment     Patient is ~ 11.5 weeks s/p R TKA and 4 days post manipulation. Cont to have poor carryover of ROM. Able to improve ROM from 90 deg to 99 deg into flexion and reduce stiffness into extension to 0 deg. Advised patient in consistency with HEP every hour to prevent stiffness from occurring.    Cont to require PT intervention with high frequency weekly post manipulation to maintain and improve ROM.    Raine Is progressing well towards her goals.   Pt prognosis is Good.     Pt will continue to benefit from skilled outpatient physical therapy to address the deficits listed in the problem list box on initial evaluation, provide pt/family education and to maximize pt's level of independence in the home and community environment.   Pt's spiritual, cultural and educational needs considered and pt agreeable to plan of care and goals.     Anticipated barriers to physical therapy:  none    Goals:   Short Term Goals (3 Weeks):   1. Patient will be independent with home exercise program to supplement physical therapy treatment in improving functional status. MET  2. Patient will improve R lower extremity strength to at least 75% of L lower extremity strength as measured via MicroFet handheld dynamometer to improve strength for closed chain tasks. Not MET  3. Patient will improve R knee active range of motion to 0-90 degrees to promote increased ease of sit<>stand transfers. MET  4. Patient will perform timed up and go with less restrictive assistive device in < 20 seconds to improve gait speed and safety with community ambulation. MET     Long Term Goals (6 Weeks):   1. Patient will improve R lower extremity strength to at least 90% of L lower extremity strength as measured via MicroFet handheld dynamometer to improve strength for closed chain tasks. Not MET  2. Patient will improve the total FOTO Knee Survey Score to </= 20% limited to demonstrate increased perceived functional mobility. NOT MET  3. Patient will perform timed up and go with least restrictive assistive device in < 13.5  seconds to improve gait speed and safety with community ambulation. MET  4. Patient will perform at least 15 sit to stands in 30 seconds without UE support to demonstrate increased functional strength. NOT MET  5. Patient will improve R knee active range of motion to 0-120 degrees to promote higher level transfers and transitions without limitation. NOT MET    Plan     Plan of care Certification: 6/7/2023 to [facility] by MD      See POC in treatment section for evaluation    HENRY PALMA, PT, DPT, OCS

## 2023-08-29 ENCOUNTER — CLINICAL SUPPORT (OUTPATIENT)
Dept: REHABILITATION | Facility: HOSPITAL | Age: 68
End: 2023-08-29
Payer: COMMERCIAL

## 2023-08-29 DIAGNOSIS — T84.89XA POSTOPERATIVE STIFFNESS OF TOTAL KNEE REPLACEMENT, INITIAL ENCOUNTER: ICD-10-CM

## 2023-08-29 DIAGNOSIS — M25.669 POSTOPERATIVE STIFFNESS OF TOTAL KNEE REPLACEMENT, INITIAL ENCOUNTER: ICD-10-CM

## 2023-08-29 DIAGNOSIS — Z74.09 DECREASED FUNCTIONAL MOBILITY AND ENDURANCE: Primary | ICD-10-CM

## 2023-08-29 DIAGNOSIS — Z96.659 POSTOPERATIVE STIFFNESS OF TOTAL KNEE REPLACEMENT, INITIAL ENCOUNTER: ICD-10-CM

## 2023-08-29 DIAGNOSIS — M25.561 ACUTE PAIN OF RIGHT KNEE: ICD-10-CM

## 2023-08-29 PROCEDURE — 97110 THERAPEUTIC EXERCISES: CPT | Mod: CQ

## 2023-08-29 PROCEDURE — 97112 NEUROMUSCULAR REEDUCATION: CPT | Mod: CQ

## 2023-08-29 NOTE — PROGRESS NOTES
"[facility]   Physical Therapy Treatment Note     Name: Raine Medley  Clinic Number: [id number]    Therapy Diagnosis:   Encounter Diagnoses   Name Primary?    Decreased functional mobility and endurance Yes    Acute pain of right knee     Postoperative stiffness of total knee replacement, initial encounter      Physician: Sanford Calvert III, *    Visit Date: 8/29/2023    Physician Orders: PT Eval and Treat   Medical Diagnosis from Referral: M17.11 (ICD-10-CM) - Primary osteoarthritis of right knee   Evaluation Date: 6/7/2023  Authorization Period Expiration: 12/31/2023  Plan of Care Expiration: TBD by MD  Progress Note Due: TBD  Visit # / Visits authorized: 26 / 50 + eval     FOTO: 2/3     PTA Visit #: 1 / 5     Time In: 0837  Time Out: 1000  Total Treatment Time: 83 minutes  Total Billable Time: 83 minutes (1 NMR, 5 TE)    Precautions: Standard     Subjective     Patient reports: a little pain. "Knee is doing ok." Notes more stiffness in the back of her leg.   She was compliant with home exercise program. States she is doing her HEP 2x/day.   Response to previous treatment: appropriate muscle response  Functional change: ongoing    Pain: NT/10, currently  Location: Right knee      Objective      Objective Measures updated at progress report unless specified.   DOS 6/5/2023  *Patient is 12 weeks, 1 days s/p R TKA as of 8/29/2023.*     Date of Manipulation: 8/21/2023  *Patient is 1 week, 1 days s/p manipulation as of 8/29/2023.*        8/7/2023: 0-89 deg flexion at start of visit -> 0-95 deg at end of visit    ROM Post Manipulation 8/21/2023:  8/22/2023: 0-106 AAROM  8/23/2023: 0-99 deg at start of visit -> 0-110 deg at end of visit   8/24/2023: 0-95 deg at start of visit -> 0-99 deg at end of visit  8/25/2023: 0-3-90 deg at start of visit -> 0-99 deg at end of visit  8/29/2023: 90 degs Flexion AROM at start of visit -> 99 degs Flexion AROM at end of visit -> 101 degs Flexion " AA   Lacking 3 of active extension; 0 degrees PROM   Poor quad activation    Treatment     Raine received the treatments listed below:      therapeutic exercises to develop ROM and flexibility for 75 minutes including:  Patient education:  - importance of HEP compliance   - mobility HEP every hour    Assessment of knee flexion/extension ROM throughout session  Half FR - Prone AAROM quad stretch; 8 minutes with 10 second hold on RLE performed prior to bike to improve tissue extensibility  Recumbent Bike for 15 minutes for ROM and joint mobilization, seat 6 with cueing for DF of Right foot and to avoid lateral lean  AA knee flexion stretch on inclined mat + 5lb AW with slider; 10 second hold, 8 minutes for knee flexion ROM  Long sitting HS and gastroc stretch with heel prop; 30 second hold, 4 reps to improve tissue mobility for functional quad activation  LLLD heel prop; 8 lb (4 lb AW above and below knee joint) for 3 minutes x 2 trials to improve tissue mobility for functional quad activation  Seated knee flexion stretch with LLE overpressure + theraband roller; 5 minutes to improve tissue mobility  Heel slides AAROM; 10 second hold, 8 minutes    manual therapy techniques: Joint mobilizations, Myofacial release, and Soft tissue Mobilization were applied to the: Right knee for 00 minutes, including:  Held:  PFJ mobilizations all directions Grade III-IV holds and reps  AP Grade II-III with PROM into flexion  AP extension    neuromuscular re-education activities to improve: Proprioception, Posture, and motor control for 8 minutes.  Patient education:   - importance of quad activation for joint mobility and gait cycle    Short arc quads with 1/2 foam; 5 second hold, 5 minutes with strap to achieve TKE    Held:  LAQ 2x30 reps  Prone hip ext knee bent 3x10  SL hip abd 3x10  Stool scoots x5 clinic lap    therapeutic activities to improve functional performance for 00 minutes, including:  Held:  GTB sidesteps at table (band  at knees) x5 laps  Dieudonne taps:  Fwd/bwd R 2x20  Lateral R 2x20    Patient Education and Home Exercises       Education provided:   - See above    Written Home Exercises Provided: Patient instructed to cont prior HEP. Exercises were reviewed and Raine was able to demonstrate them prior to the end of the session.  Raine demonstrated good  understanding of the education provided. See EMR under Patient Instructions for exercises provided during therapy sessions    Assessment     Raine is 1 week, 1 day s/p R knee manipulation. Presents with 90 degrees of active knee flexion ROM; she is lacking 3 degrees of active knee extension. Able to complete full revolutions on recumbent bike; visible lateral sway and PF of Right foot to compensate for lack of active knee flexion ROM. Poor quad activation observed at this time with visible compensation through glute and hip musculature. Post session measured 99 degrees of active knee flexion; 101 degrees with strap for active assist. Continue per POC towards treatment goals with emphasis on ROM.   Raine Is progressing well towards her goals.   Pt prognosis is Good.     Pt will continue to benefit from skilled outpatient physical therapy to address the deficits listed in the problem list box on initial evaluation, provide pt/family education and to maximize pt's level of independence in the home and community environment.   Pt's spiritual, cultural and educational needs considered and pt agreeable to plan of care and goals.     Anticipated barriers to physical therapy: none    Goals:   Short Term Goals (3 Weeks):   1. Patient will be independent with home exercise program to supplement physical therapy treatment in improving functional status. MET  2. Patient will improve R lower extremity strength to at least 75% of L lower extremity strength as measured via MicroFet handheld dynamometer to improve strength for closed chain tasks. Not MET  3. Patient will improve R knee active range of  motion to 0-90 degrees to promote increased ease of sit<>stand transfers. MET  4. Patient will perform timed up and go with less restrictive assistive device in < 20 seconds to improve gait speed and safety with community ambulation. MET     Long Term Goals (6 Weeks):   1. Patient will improve R lower extremity strength to at least 90% of L lower extremity strength as measured via MicroFet handheld dynamometer to improve strength for closed chain tasks. Not MET  2. Patient will improve the total FOTO Knee Survey Score to </= 20% limited to demonstrate increased perceived functional mobility. NOT MET  3. Patient will perform timed up and go with least restrictive assistive device in < 13.5  seconds to improve gait speed and safety with community ambulation. MET  4. Patient will perform at least 15 sit to stands in 30 seconds without UE support to demonstrate increased functional strength. NOT MET  5. Patient will improve R knee active range of motion to 0-120 degrees to promote higher level transfers and transitions without limitation. NOT MET    Plan     Plan of care Certification: 6/7/2023 to [facility] by MD      See POC in treatment section for evaluation.    PT/PTA met face to face to discuss patient's treatment plan and progress towards established goals. Patient will be seen by physical therapist every sixth visit and minimally once per month.    Karis Clemens PTA

## 2023-08-29 NOTE — PLAN OF CARE
[facility]   Physical Therapy Treatment Note / updated POC     Name: Raine Medley  Clinic Number: [id number]    Therapy Diagnosis:   Encounter Diagnoses   Name Primary?    Decreased functional mobility and endurance Yes    Acute pain of right knee     Postoperative stiffness of total knee replacement, initial encounter        Physician: Sanford Calvert III, *    Visit Date: 8/22/2023    Physician Orders: PT Eval and Treat   Medical Diagnosis from Referral: M17.11 (ICD-10-CM) - Primary osteoarthritis of right knee   Evaluation Date: 6/7/2023  Authorization Period Expiration: 12/31/2023  Plan of Care Expiration: 8/11/2023   Progress Note Due: 6/29/2023  Visit # / Visits authorized: 22 / 50 + eval     FOTO: 2/3     PTA Visit #: 0 / 5     Time In: 1:00 pm  Time Out: 1:59 pm  Total Treatment Time: 59 minutes  Total Billable Time: 59 minutes     Precautions: Standard     Subjective     Patient reports: she has not bent her knee since yesterday. Patient reprts she has been resting with a pillow under her knee for the pain.     She was compliant with home exercise program.  Response to previous treatment: appropriate muscle response  Functional change: ongoing, able to get in and out of the car without assistance; ambulating long distances with RW; use of SPC at home     Pain: NT/10, currently  Location: Right knee      Objective      Objective Measures updated at progress report unless specified.   Date of Surgery: 6/5/2023  *Patient is 9 weeks, 3 days s/p R TKA as of 8/10/2023.*      6/8/20203: 1-0- 65 degrees; 80 degrees at end of session  6/12/2023: 0 -62 AROM  6/14/2023; 90 degrees Flexion in SEATED at EOM; 87 degrees AAROM with heel slides  6/16/2023; 87 degrees AAROM   6/19/2023; 80 degrees ACTIVE knee flexion ROM  6/21/2023; 86 degrees AROM Flexion; 89 degrees AAROM Flexion  6/23/2023; 0 - 100 degrees AAROM Flexion post bike and manual   Observation: Patient ambulates with RW.  There is no heel strike at initial contact and minimal knee flexion during swing phase. Decreased stance time on RLE observed. There is no visible redness along the Right LE. There is normal post op swelling along knee joint. Denies tenderness or pain at posterior calf. She has a Negative Chris's sign.  6/27/2023: 102 degrees post LLLD stretch   At end of session patient reported becoming dizzy with stomach pains; quick resolution of symptoms   BP: 122/70 mmHg; 77 bpm  6/28/2023: full revolutions around bike. Measured on table at 90 degrees flexion  6/30/2023; 102 degrees A/PROM post manual care; 105 degrees AAROM Flexion post heel slides; 107 degrees post session  7/3/2023: 96 degrees PROM  7/5/2023: 105 AAROM  7/7/2023: 0-110 flexion AAROM  7/10/2023; 100 degrees AAROM Flexion; medial knee pain noted, unable to push  7/13/2023; 102 degrees AAROM Flexion; posterior knee pain and hamstring pain reported with bending, unable to push   FOTO: 30% Limitation    7/17/2023:   0-100 AROM/AAROM  114/68 mmHg 79 bpm    7/28/2023:  TUG: 10 seconds  30 STS: 11x  ROM 0-100 AROM    8/7/2023:  0-89 deg flexion at start of visit -> 0-95 deg at end of visit    8/22/2023: 0-106 AAROM    Treatment     Raine received the treatments listed below:      therapeutic exercises to develop ROM and flexibility for 40 minutes including:  LLLD stretch in flexion with 5 lbs to improve flexion 5 minutes  Heel slides: 10 seconds, 10x + PT over pressure  Aerobic Activity to improve knee flexion ROM, tissue extensibility, and mobility;NuStep: 8 minutes; Recumbent Bike for 6 minutes at seat 8, FULL revolutions    Patient education:  - Extensive education on AMADEO procedure indications -> post op recovery  - importance of PT daily post AMADEO  - HEP moving forward see patient instructions  - NO pillows under knee    manual therapy techniques: Joint mobilizations, Myofacial release, and Soft tissue Mobilization were applied to the: Right knee for 10 minutes,  including:  Assessment of joint play and knee  Grade II-III PA tibiofemoral mobilizations    neuromuscular re-education activities to improve: Proprioception, Posture, and motor control for 9 minutes.  Bridges: 10 seconds, 10x        Patient Education and Home Exercises       Education provided:   - Compliance with HEP  - ROM expectations post TKA  - use of ice and elevation for swelling management  - importance of heel prop and heel slides to improve tissue extensibility and ROM    Written Home Exercises Provided: Patient instructed to cont prior HEP. Exercises were reviewed and Raine was able to demonstrate them prior to the end of the session.  Raine demonstrated good  understanding of the education provided. See EMR under Patient Instructions for exercises provided during therapy sessions    Assessment     Pateint presents 1 day post AMADEO of right TKA. Heavy education regarding post AMADEO care and importance of ROM. Patient demonstrated understanding. Patient demonstrates 0 -106 AAROM at this time.  Patient continues to progress towards short and long term lower extremity strength and endurance goals at his time. Base line function is good at this time, however motor control, strength and range of motion deficits remain limiting functional mobility when challenged. Raine would continue to benefit from skilled outpatient physical therapy to address remaining short and long term physical therapy goals.       Raine Is progressing well towards her goals.   Pt prognosis is Good.     Pt will continue to benefit from skilled outpatient physical therapy to address the deficits listed in the problem list box on initial evaluation, provide pt/family education and to maximize pt's level of independence in the home and community environment.   Pt's spiritual, cultural and educational needs considered and pt agreeable to plan of care and goals.     Anticipated barriers to physical therapy: none    Goals:   Short Term Goals (3  Weeks):   1. Patient will be independent with home exercise program to supplement physical therapy treatment in improving functional status. MET  2. Patient will improve R lower extremity strength to at least 75% of L lower extremity strength as measured via MicroFet handheld dynamometer to improve strength for closed chain tasks. Not MET  3. Patient will improve R knee active range of motion to 0-90 degrees to promote increased ease of sit<>stand transfers. MET  4. Patient will perform timed up and go with less restrictive assistive device in < 20 seconds to improve gait speed and safety with community ambulation. MET     Long Term Goals (6 Weeks):   1. Patient will improve R lower extremity strength to at least 90% of L lower extremity strength as measured via MicroFet handheld dynamometer to improve strength for closed chain tasks. Not MET  2. Patient will improve the total FOTO Knee Survey Score to </= 20% limited to demonstrate increased perceived functional mobility. NOT MET  3. Patient will perform timed up and go with least restrictive assistive device in < 13.5  seconds to improve gait speed and safety with community ambulation. MET  4. Patient will perform at least 15 sit to stands in 30 seconds without UE support to demonstrate increased functional strength. NOT MET  5. Patient will improve R knee active range of motion to 0-120 degrees to promote higher level transfers and transitions without limitation. NOT MET    Plan     Will require daily PT from 8/22-25 following AMADEO with likely continuation of PT 2-3x a week for the following weeks.    Plan of care Certification: 8/22/2023-10/6/2023     Outpatient Physical Therapy 2-4 times weekly for 6 weeks to include the following interventions: Electrical Stimulation of LE, Gait Training, Manual Therapy, Neuromuscular Re-ed, Patient Education, Self Care, Therapeutic Activities, and Therapeutic Exercise.     Jacque Pride, PT, DPT

## 2023-08-30 ENCOUNTER — CLINICAL SUPPORT (OUTPATIENT)
Dept: REHABILITATION | Facility: HOSPITAL | Age: 68
End: 2023-08-30
Payer: COMMERCIAL

## 2023-08-30 ENCOUNTER — TELEPHONE (OUTPATIENT)
Dept: ENDOSCOPY | Facility: HOSPITAL | Age: 68
End: 2023-08-30
Payer: COMMERCIAL

## 2023-08-30 DIAGNOSIS — M25.561 ACUTE PAIN OF RIGHT KNEE: ICD-10-CM

## 2023-08-30 DIAGNOSIS — M25.669 POSTOPERATIVE STIFFNESS OF TOTAL KNEE REPLACEMENT, INITIAL ENCOUNTER: ICD-10-CM

## 2023-08-30 DIAGNOSIS — Z96.659 POSTOPERATIVE STIFFNESS OF TOTAL KNEE REPLACEMENT, INITIAL ENCOUNTER: ICD-10-CM

## 2023-08-30 DIAGNOSIS — Z74.09 DECREASED FUNCTIONAL MOBILITY AND ENDURANCE: Primary | ICD-10-CM

## 2023-08-30 DIAGNOSIS — T84.89XA POSTOPERATIVE STIFFNESS OF TOTAL KNEE REPLACEMENT, INITIAL ENCOUNTER: ICD-10-CM

## 2023-08-30 PROCEDURE — 97110 THERAPEUTIC EXERCISES: CPT

## 2023-08-30 PROCEDURE — 97112 NEUROMUSCULAR REEDUCATION: CPT

## 2023-08-30 PROCEDURE — 97140 MANUAL THERAPY 1/> REGIONS: CPT

## 2023-08-30 NOTE — TELEPHONE ENCOUNTER
----- Message from Danitza Bell sent at 8/30/2023  4:02 PM CDT -----  Regarding: Reschedule 10/10/23 appt  Contact: [phone]  Pt is calling to speak with someone in the office to r/s appt that they are scheduled for on 10/10/23. No available appts in Epic. Pt is asking for a return call back to r/s for a better date and time. Thanks.

## 2023-08-30 NOTE — PROGRESS NOTES
[facility]   Physical Therapy Treatment Note     Name: Raine Medley  Clinic Number: [id number]    Therapy Diagnosis:   Encounter Diagnoses   Name Primary?    Decreased functional mobility and endurance Yes    Acute pain of right knee     Postoperative stiffness of total knee replacement, initial encounter        Physician: Sanford Calvert III, *    Visit Date: 8/30/2023    Physician Orders: PT Eval and Treat   Medical Diagnosis from Referral: M17.11 (ICD-10-CM) - Primary osteoarthritis of right knee   Evaluation Date: 6/7/2023  Authorization Period Expiration: 12/31/2023  Plan of Care Expiration: TBD by MD  Progress Note Due: TBD  Visit # / Visits authorized: 27 / 50 + eval     FOTO: 2/3     PTA Visit #: 1 / 5     Time In: 1310  Time Out: 1410  Total Treatment Time: 60 minutes   Total Billable Time: 60 minutes    Precautions: Standard     Subjective     Patient reports: Cont to report a little pain with more stiffness in the back of her leg.     She was compliant with home exercise program. States she is doing her HEP 2x/day.   Response to previous treatment: appropriate muscle response  Functional change: ongoing    Pain: NT/10, currently  Location: Right knee      Objective      Objective Measures updated at progress report unless specified.   DOS 6/5/2023  *Patient is 12 weeks, 2 days s/p R TKA as of 8/30/2023.*     Date of Manipulation: 8/21/2023  *Patient is 1 week, 2 days s/p manipulation as of 8/30/2023.*        8/7/2023: 0-89 deg flexion at start of visit -> 0-95 deg at end of visit    ROM Post Manipulation 8/21/2023:  8/22/2023: 0-106 AAROM  8/23/2023: 0-99 deg at start of visit -> 0-110 deg at end of visit   8/24/2023: 0-95 deg at start of visit -> 0-99 deg at end of visit  8/25/2023: 0-3-90 deg at start of visit -> 0-99 deg at end of visit  8/29/2023: 90 degs Flexion AROM at start of visit -> 99 degs Flexion AROM at end of visit -> 101 degs Flexion AA   Lacking 3 of  active extension; 0 degrees PROM   Poor quad activation  8/30/2023: 0-3-90 deg at start of visit -> 0-95 deg at end of visit    Treatment     Raine received the treatments listed below:      therapeutic exercises to develop ROM and flexibility for 20 minutes including:    Patient education:  - importance of HEP compliance   - mobility HEP every hour  - importance of not losing extension ROM    Recumbent bike x15 min for ROM and joint mobility    NEXT VISIT - LLLD extension     Held  Assessment of knee flexion/extension ROM throughout session  Half FR - Prone AAROM quad stretch; 8 minutes with 10 second hold on RLE performed prior to bike to improve tissue extensibility  Recumbent Bike for 15 minutes for ROM and joint mobilization, seat 6 with cueing for DF of Right foot and to avoid lateral lean  AA knee flexion stretch on inclined mat + 5lb AW with slider; 10 second hold, 8 minutes for knee flexion ROM  Long sitting HS and gastroc stretch with heel prop; 30 second hold, 4 reps to improve tissue mobility for functional quad activation  LLLD heel prop; 8 lb (4 lb AW above and below knee joint) for 3 minutes x 2 trials to improve tissue mobility for functional quad activation  Seated knee flexion stretch with LLE overpressure + theraband roller; 5 minutes to improve tissue mobility  Heel slides AAROM; 10 second hold, 8 minutes    manual therapy techniques: Joint mobilizations, Myofacial release, and Soft tissue Mobilization were applied to the: Right knee for 10 minutes, including:    PFJ mobilizations all directions Grade III-IV holds and reps  AP Grade II-III with PROM into flexion  AP extension  K-tape PFJ    neuromuscular re-education activities to improve: Proprioception, Posture, and motor control for 30 minutes.    Heel slide AAROM and IR bias x10 min  Alt SAQ x5 min  Alt LAQ x5 min  Alt LAQ adduction and IR bias x5 min  Alt LAQ abduction and IR bias x5 min      Patient Education and Home Exercises        Education provided:   - See above    Written Home Exercises Provided: Patient instructed to cont prior HEP. Exercises were reviewed and Raine was able to demonstrate them prior to the end of the session.  Raine demonstrated good  understanding of the education provided. See EMR under Patient Instructions for exercises provided during therapy sessions    Assessment     Improved mobility following interventions, but ROM continues to regress between visits. Concerning that knee extension ROM is regressing and that quad activation remains poor.     Emphasized need for extension ROM, quad activation, and HEP compliance.    Continue per POC towards treatment goals with emphasis on ROM.     Raine Is NOT progressing well towards her goals.   Pt prognosis is Good.     Pt will continue to benefit from skilled outpatient physical therapy to address the deficits listed in the problem list box on initial evaluation, provide pt/family education and to maximize pt's level of independence in the home and community environment.   Pt's spiritual, cultural and educational needs considered and pt agreeable to plan of care and goals.     Anticipated barriers to physical therapy: none    Goals:   Short Term Goals (3 Weeks):   1. Patient will be independent with home exercise program to supplement physical therapy treatment in improving functional status. MET  2. Patient will improve R lower extremity strength to at least 75% of L lower extremity strength as measured via MicroFet handheld dynamometer to improve strength for closed chain tasks. Not MET  3. Patient will improve R knee active range of motion to 0-90 degrees to promote increased ease of sit<>stand transfers. MET  4. Patient will perform timed up and go with less restrictive assistive device in < 20 seconds to improve gait speed and safety with community ambulation. MET     Long Term Goals (6 Weeks):   1. Patient will improve R lower extremity strength to at least 90% of L  lower extremity strength as measured via MicroFet handheld dynamometer to improve strength for closed chain tasks. Not MET  2. Patient will improve the total FOTO Knee Survey Score to </= 20% limited to demonstrate increased perceived functional mobility. NOT MET  3. Patient will perform timed up and go with least restrictive assistive device in < 13.5  seconds to improve gait speed and safety with community ambulation. MET  4. Patient will perform at least 15 sit to stands in 30 seconds without UE support to demonstrate increased functional strength. NOT MET  5. Patient will improve R knee active range of motion to 0-120 degrees to promote higher level transfers and transitions without limitation. NOT MET    Plan     Plan of care Certification: 6/7/2023 to TBD by MD      See POC in treatment section for evaluation.    PT/PTA met face to face to discuss patient's treatment plan and progress towards established goals. Patient will be seen by physical therapist every sixth visit and minimally once per month.    HENRY PALMA, PT, DPT, OCS

## 2023-09-01 ENCOUNTER — CLINICAL SUPPORT (OUTPATIENT)
Dept: REHABILITATION | Facility: HOSPITAL | Age: 68
End: 2023-09-01
Payer: COMMERCIAL

## 2023-09-01 DIAGNOSIS — Z74.09 DECREASED FUNCTIONAL MOBILITY AND ENDURANCE: Primary | ICD-10-CM

## 2023-09-01 DIAGNOSIS — M25.669 POSTOPERATIVE STIFFNESS OF TOTAL KNEE REPLACEMENT, INITIAL ENCOUNTER: ICD-10-CM

## 2023-09-01 DIAGNOSIS — T84.89XA POSTOPERATIVE STIFFNESS OF TOTAL KNEE REPLACEMENT, INITIAL ENCOUNTER: ICD-10-CM

## 2023-09-01 DIAGNOSIS — M25.561 ACUTE PAIN OF RIGHT KNEE: ICD-10-CM

## 2023-09-01 DIAGNOSIS — Z96.659 POSTOPERATIVE STIFFNESS OF TOTAL KNEE REPLACEMENT, INITIAL ENCOUNTER: ICD-10-CM

## 2023-09-01 PROCEDURE — 97112 NEUROMUSCULAR REEDUCATION: CPT

## 2023-09-01 PROCEDURE — 97140 MANUAL THERAPY 1/> REGIONS: CPT

## 2023-09-01 PROCEDURE — 97110 THERAPEUTIC EXERCISES: CPT

## 2023-09-01 NOTE — PROGRESS NOTES
[facility]   Physical Therapy Treatment Note     Name: Raine Medley  Clinic Number: [id number]    Therapy Diagnosis:   Encounter Diagnoses   Name Primary?    Decreased functional mobility and endurance Yes    Acute pain of right knee     Postoperative stiffness of total knee replacement, initial encounter      Physician: Sanford Calvert III, *    Visit Date: 9/1/2023    Physician Orders: PT Eval and Treat   Medical Diagnosis from Referral: M17.11 (ICD-10-CM) - Primary osteoarthritis of right knee   Evaluation Date: 6/7/2023  Authorization Period Expiration: 12/31/2023  Plan of Care Expiration: TBD by MD  Progress Note Due: TBD  Visit # / Visits authorized: 28 / 50 + eval     FOTO: 2/3     PTA Visit #: 1 / 5     Time In: 0855  Time Out: 1000  Total Treatment Time: 65 minutes    Total Billable Time: 65 minutes    Precautions: Standard     Subjective     Patient reports: Cont stiffness and pain.    She was compliant with home exercise program. States she is doing her HEP 2x/day.   Response to previous treatment: appropriate muscle response  Functional change: ongoing    Pain: NT/10, currently  Location: Right knee      Objective      Objective Measures updated at progress report unless specified.   DOS 6/5/2023  *Patient is 12 weeks, 4 days s/p R TKA as of 9/1/2023.*     Date of Manipulation: 8/21/2023  *Patient is 1 week, 4 days s/p manipulation as of 9/1/2023.*        8/7/2023: 0-89 deg flexion at start of visit -> 0-95 deg at end of visit    ROM Post Manipulation 8/21/2023:  8/22/2023: 0-106 AAROM  8/23/2023: 0-99 deg at start of visit -> 0-110 deg at end of visit   8/24/2023: 0-95 deg at start of visit -> 0-99 deg at end of visit  8/25/2023: 0-3-90 deg at start of visit -> 0-99 deg at end of visit  8/29/2023: 90 degs Flexion AROM at start of visit -> 99 degs Flexion AROM at end of visit -> 101 degs Flexion AA   Lacking 3 of active extension; 0 degrees PROM   Poor quad  activation  8/30/2023: 0-3-90 deg at start of visit -> 0-95 deg at end of visit  9/1/2023: 0-3-90 deg at start of visit -> 0-97 deg at end of visit    Treatment     Raine received the treatments listed below:      therapeutic exercises to develop ROM and flexibility for 25 minutes including:    Patient education:  - importance of HEP compliance   - mobility HEP every hour  - importance of not losing extension ROM    Recumbent bike x15 min for ROM and joint mobility  LLLD heel prop x8 min 8lbs      NEXT VISIT - Cont to maximize ROM      manual therapy techniques: Joint mobilizations, Myofacial release, and Soft tissue Mobilization were applied to the: Right knee for 25 minutes, including:    PFJ mobilizations all directions Grade III-IV holds and reps  AP Grade II-III with PROM into flexion  AP extension  K-tape PFJ    neuromuscular re-education activities to improve: Proprioception, Posture, and motor control for 15 minutes.    Heel slide AAROM and IR bias x8 min  LAQ with hip add x50 reps  LAQ with hip abd GTB x50 reps      Patient Education and Home Exercises       Education provided:   - See above    Written Home Exercises Provided: Patient instructed to cont prior HEP. Exercises were reviewed and Raine was able to demonstrate them prior to the end of the session.  Raine demonstrated good  understanding of the education provided. See EMR under Patient Instructions for exercises provided during therapy sessions    Assessment     Cont regression of ROM and losing extension ROM. Extensive manual performed to improve ROM vicki into extension to achieve neutral.     Adjusted HEP over the weekend for patient to help with maintaining extension ROM. MD notified of regression.    Continue per POC towards treatment goals with emphasis on ROM.     Raine Is NOT progressing well towards her goals.   Pt prognosis is Good.     Pt will continue to benefit from skilled outpatient physical therapy to address the deficits listed in the  problem list box on initial evaluation, provide pt/family education and to maximize pt's level of independence in the home and community environment.   Pt's spiritual, cultural and educational needs considered and pt agreeable to plan of care and goals.     Anticipated barriers to physical therapy: none    Goals:   Short Term Goals (3 Weeks):   1. Patient will be independent with home exercise program to supplement physical therapy treatment in improving functional status. MET  2. Patient will improve R lower extremity strength to at least 75% of L lower extremity strength as measured via MicroFet handheld dynamometer to improve strength for closed chain tasks. Not MET  3. Patient will improve R knee active range of motion to 0-90 degrees to promote increased ease of sit<>stand transfers. MET  4. Patient will perform timed up and go with less restrictive assistive device in < 20 seconds to improve gait speed and safety with community ambulation. MET     Long Term Goals (6 Weeks):   1. Patient will improve R lower extremity strength to at least 90% of L lower extremity strength as measured via MicroFet handheld dynamometer to improve strength for closed chain tasks. Not MET  2. Patient will improve the total FOTO Knee Survey Score to </= 20% limited to demonstrate increased perceived functional mobility. NOT MET  3. Patient will perform timed up and go with least restrictive assistive device in < 13.5  seconds to improve gait speed and safety with community ambulation. MET  4. Patient will perform at least 15 sit to stands in 30 seconds without UE support to demonstrate increased functional strength. NOT MET  5. Patient will improve R knee active range of motion to 0-120 degrees to promote higher level transfers and transitions without limitation. NOT MET    Plan     Plan of care Certification: 6/7/2023 to TBD by MD Latif POC in treatment section for evaluation.    PT/PTA met face to face to discuss patient's  treatment plan and progress towards established goals. Patient will be seen by physical therapist every sixth visit and minimally once per month.    HENRY PALMA, PT, DPT, OCS

## 2023-09-05 ENCOUNTER — OFFICE VISIT (OUTPATIENT)
Dept: ORTHOPEDICS | Facility: CLINIC | Age: 68
End: 2023-09-05
Payer: COMMERCIAL

## 2023-09-05 ENCOUNTER — TELEPHONE (OUTPATIENT)
Dept: ENDOSCOPY | Facility: HOSPITAL | Age: 68
End: 2023-09-05
Payer: COMMERCIAL

## 2023-09-05 VITALS — HEIGHT: 61 IN | BODY MASS INDEX: 25.39 KG/M2 | WEIGHT: 134.5 LBS

## 2023-09-05 DIAGNOSIS — Z96.651 STATUS POST RIGHT KNEE REPLACEMENT: ICD-10-CM

## 2023-09-05 DIAGNOSIS — M24.661 ARTHROFIBROSIS OF KNEE JOINT, RIGHT: Primary | ICD-10-CM

## 2023-09-05 PROCEDURE — 99024 POSTOP FOLLOW-UP VISIT: CPT | Mod: S$GLB,,, | Performed by: ORTHOPAEDIC SURGERY

## 2023-09-05 PROCEDURE — 1101F PR PT FALLS ASSESS DOC 0-1 FALLS W/OUT INJ PAST YR: ICD-10-PCS | Mod: CPTII,S$GLB,, | Performed by: ORTHOPAEDIC SURGERY

## 2023-09-05 PROCEDURE — 3008F PR BODY MASS INDEX (BMI) DOCUMENTED: ICD-10-PCS | Mod: CPTII,S$GLB,, | Performed by: ORTHOPAEDIC SURGERY

## 2023-09-05 PROCEDURE — 1159F MED LIST DOCD IN RCRD: CPT | Mod: CPTII,S$GLB,, | Performed by: ORTHOPAEDIC SURGERY

## 2023-09-05 PROCEDURE — 1159F PR MEDICATION LIST DOCUMENTED IN MEDICAL RECORD: ICD-10-PCS | Mod: CPTII,S$GLB,, | Performed by: ORTHOPAEDIC SURGERY

## 2023-09-05 PROCEDURE — 3288F FALL RISK ASSESSMENT DOCD: CPT | Mod: CPTII,S$GLB,, | Performed by: ORTHOPAEDIC SURGERY

## 2023-09-05 PROCEDURE — 3288F PR FALLS RISK ASSESSMENT DOCUMENTED: ICD-10-PCS | Mod: CPTII,S$GLB,, | Performed by: ORTHOPAEDIC SURGERY

## 2023-09-05 PROCEDURE — 1125F PR PAIN SEVERITY QUANTIFIED, PAIN PRESENT: ICD-10-PCS | Mod: CPTII,S$GLB,, | Performed by: ORTHOPAEDIC SURGERY

## 2023-09-05 PROCEDURE — 1101F PT FALLS ASSESS-DOCD LE1/YR: CPT | Mod: CPTII,S$GLB,, | Performed by: ORTHOPAEDIC SURGERY

## 2023-09-05 PROCEDURE — 99024 PR POST-OP FOLLOW-UP VISIT: ICD-10-PCS | Mod: S$GLB,,, | Performed by: ORTHOPAEDIC SURGERY

## 2023-09-05 PROCEDURE — 99999 PR PBB SHADOW E&M-EST. PATIENT-LVL III: CPT | Mod: PBBFAC,,, | Performed by: ORTHOPAEDIC SURGERY

## 2023-09-05 PROCEDURE — 99999 PR PBB SHADOW E&M-EST. PATIENT-LVL III: ICD-10-PCS | Mod: PBBFAC,,, | Performed by: ORTHOPAEDIC SURGERY

## 2023-09-05 PROCEDURE — 1125F AMNT PAIN NOTED PAIN PRSNT: CPT | Mod: CPTII,S$GLB,, | Performed by: ORTHOPAEDIC SURGERY

## 2023-09-05 PROCEDURE — 3008F BODY MASS INDEX DOCD: CPT | Mod: CPTII,S$GLB,, | Performed by: ORTHOPAEDIC SURGERY

## 2023-09-05 NOTE — TELEPHONE ENCOUNTER
----- Message from Haley Chamorro sent at 9/5/2023  1:13 PM CDT -----  Who Called:Pt    What is the request in detail: Requesting call back to discuss rescheduling 09/08 procedure. Pt requesting Nov. 22 or Dec 18-22 or Dec. 26-29. Please advise    Can the clinic reply by [facility]? No    Best Call Back Number: [phone]      Additional Information:

## 2023-09-05 NOTE — PROGRESS NOTES
"Subjective:     HPI:   Raine Medley is a 68 y.o. female who presents 12 weeks out from right TKA    Date of surgery: 6/5/23, AMADEO 8/21/23    Medications:   Tylenol only PRN a few times per week, not every day  Robaxin QID  Off oxycodone  Not taking lyrica  Did medrol dose pack post op  Cant do nsaids - UGI bleed 7/19/23    Assistive Devices: none, only 2 days after AMADEO    PT: ongoing multiple days/week, did 4 days last week    "I'm doing good"  Pain is less, less pain/tightness post knee  Stiff in the morning       Objective:   Body mass index is 25.41 kg/m².  Exam:    Gait: limp minimal /non-antalgic minimal - walking better  Slight TB but NTTP at GT and no pain IR/ER hip joint    Incision: healed    Stability:  Knee stable anterior-posterior varus and valgus stresses, no extensor lag    Extension: 5    Flexion: 90 supine and seated =- fires quad with flexion supine    Pre-op: 0-100  Pre-AMADEO 3-100  PT post AMADEO: 0-      Imaging:  None today    Showed pre and post AMADEO flexion pictures        Assessment:       ICD-10-CM ICD-9-CM   1. Arthrofibrosis of knee joint, right  M24.661 718.56   2. Status post right knee replacement  Z96.651 V43.65      Pain improved  ROM with PT = pre-op ROM     Plan:       Patient is doing very well with their total knee arthroplasty.  They will continue with their routine care of the knee replacement and see me back for their follow-up at the routine interval.  If there are problems in the interim they will see me back sooner. Prophylactic antibiotic protocol given and explained to patient.     ROM with PT = pre-op ROM, discussed pre-op ROM is best predictor of post op ROM    Would like it to be better, but have done the AMADEO which was very easy and got 130+ ROM - so dont think itsa mechanical joitn issue  Combination: pre-op limited ROM, arthrofibrosis/healing response, quad/HS tone imbalance    At this point out of non-op options  Discussed dynasplint: she will think about " this, call if wants to try  Discussed ATS debridement    If feeling better, we agree to stay the course    PT: start backing down:   2x/week for 2 weeks and then HEP    F/u 6 weeks   ?RTW end of october          No orders of the defined types were placed in this encounter.            Past Medical History:   Diagnosis Date    Asthma     as a child    Gastroesophageal reflux disease without esophagitis 7/19/2023    Hyperlipidemia     Microcytic anemia 5/15/2023       Past Surgical History:   Procedure Laterality Date    CHOLECYSTECTOMY      COLONOSCOPY N/A 1/3/2019    Procedure: COLONOSCOPY;  Surgeon: Gaston Beebe MD;  Location: [facility] ENDO [address]);  Service: Endoscopy;  Laterality: N/A;    KNEE JOINT MANIPULATION Right 8/21/2023    Procedure: MANIPULATION, KNEE: RIGHT: SAME DAY;  Surgeon: Sanford Calvert III, MD;  Location: [facility];  Service: Orthopedics;  Laterality: Right;    TOTAL KNEE ARTHROPLASTY Right 6/5/2023    Procedure: ARTHROPLASTY, KNEE, TOTAL: RIGHT: DEPUY - ATTUNE;  Surgeon: Sanford Calvert III, MD;  Location: [facility];  Service: Orthopedics;  Laterality: Right;       Family History   Problem Relation Age of Onset    No Known Problems Mother     No Known Problems Father     No Known Problems Sister     No Known Problems Daughter     No Known Problems Son     No Known Problems Son     No Known Problems Son     Melanoma Neg Hx     Lupus Neg Hx     Psoriasis Neg Hx     Colon cancer Neg Hx     Esophageal cancer Neg Hx     Stomach cancer Neg Hx        Social History     Socioeconomic History    Marital status:    Occupational History     Employer: [employer]   Tobacco Use    Smoking status: Never     Passive exposure: Never    Smokeless tobacco: Never   Substance and Sexual Activity    Alcohol use: No    Drug use: No   Social History Narrative    She lives in [place] with her 15-year-old son in [place]. 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 She is from [facility].

## 2023-09-06 ENCOUNTER — CLINICAL SUPPORT (OUTPATIENT)
Dept: REHABILITATION | Facility: HOSPITAL | Age: 68
End: 2023-09-06
Payer: COMMERCIAL

## 2023-09-06 DIAGNOSIS — M25.669 POSTOPERATIVE STIFFNESS OF TOTAL KNEE REPLACEMENT, INITIAL ENCOUNTER: ICD-10-CM

## 2023-09-06 DIAGNOSIS — T84.89XA POSTOPERATIVE STIFFNESS OF TOTAL KNEE REPLACEMENT, INITIAL ENCOUNTER: ICD-10-CM

## 2023-09-06 DIAGNOSIS — Z96.659 POSTOPERATIVE STIFFNESS OF TOTAL KNEE REPLACEMENT, INITIAL ENCOUNTER: ICD-10-CM

## 2023-09-06 DIAGNOSIS — M25.561 ACUTE PAIN OF RIGHT KNEE: ICD-10-CM

## 2023-09-06 DIAGNOSIS — Z74.09 DECREASED FUNCTIONAL MOBILITY AND ENDURANCE: Primary | ICD-10-CM

## 2023-09-06 PROCEDURE — 97110 THERAPEUTIC EXERCISES: CPT

## 2023-09-06 PROCEDURE — 97112 NEUROMUSCULAR REEDUCATION: CPT

## 2023-09-06 NOTE — PROGRESS NOTES
[facility]   Physical Therapy Treatment Note     Name: Raine Medley  Clinic Number: [id number]    Therapy Diagnosis:   Encounter Diagnoses   Name Primary?    Decreased functional mobility and endurance Yes    Acute pain of right knee     Postoperative stiffness of total knee replacement, initial encounter        Physician: Sanford Calvert III, *    Visit Date: 9/6/2023    Physician Orders: PT Eval and Treat   Medical Diagnosis from Referral: M17.11 (ICD-10-CM) - Primary osteoarthritis of right knee   Evaluation Date: 6/7/2023  Authorization Period Expiration: 12/31/2023  Plan of Care Expiration: TBD by MD  Progress Note Due: TBD  Visit # / Visits authorized: 29 / 50 + eval     FOTO: 2/3     PTA Visit #: 1 / 5     Time In: 1000  Time Out: 1100  Total Treatment Time: 60 minutes    Total Billable Time: 60 minutes    Precautions: Standard     Subjective     Patient reports: Cont stiffness and pain; had FU with MD who advised cont PT.    She was compliant with home exercise program. States she is doing her HEP 2x/day.   Response to previous treatment: appropriate muscle response  Functional change: ongoing    Pain: NT/10, currently  Location: Right knee      Objective      Objective Measures updated at progress report unless specified.   DOS 6/5/2023  *Patient is 13 weeks, 2 days s/p R TKA as of 9/6/2023.*      Date of Manipulation: 8/21/2023  *Patient is 2 week, 2 days s/p manipulation as of 9/6/2023.*        8/7/2023: 0-89 deg flexion at start of visit -> 0-95 deg at end of visit    ROM Post Manipulation 8/21/2023:  8/22/2023: 0-106 AAROM  8/23/2023: 0-99 deg at start of visit -> 0-110 deg at end of visit   8/24/2023: 0-95 deg at start of visit -> 0-99 deg at end of visit  8/25/2023: 0-3-90 deg at start of visit -> 0-99 deg at end of visit  8/29/2023: 90 degs Flexion AROM at start of visit -> 99 degs Flexion AROM at end of visit -> 101 degs Flexion AA   Lacking 3 of active  extension; 0 degrees PROM   Poor quad activation  8/30/2023: 0-3-90 deg at start of visit -> 0-95 deg at end of visit  9/1/2023: 0-3-90 deg at start of visit -> 0-97 deg at end of visit    Treatment     Raine received the treatments listed below:      therapeutic exercises to develop ROM and flexibility for 30 minutes including:    Patient education:  - importance of HEP compliance   - mobility HEP every hour  - importance of not losing extension ROM    Recumbent bike x15 min for ROM and joint mobility  Heel slide PROM 2x6 min  LLLD heel prop x8 min 8lbs - NT      NEXT VISIT - Cont bike -> LAQ / SAQ -> PROM stretching      manual therapy techniques: Joint mobilizations, Myofacial release, and Soft tissue Mobilization were applied to the: Right knee for 00 minutes, including:    PFJ mobilizations all directions Grade III-IV holds and reps  AP Grade II-III with PROM into flexion  AP extension  K-tape PFJ    neuromuscular re-education activities to improve: Proprioception, Posture, and motor control for 30 minutes.    Quad activation:  LAQ 5lbs 5x10  LAQ 3lbs 5x10  SAQ 5lbs 3x10  SAQ 3lbs 3x10      Patient Education and Home Exercises       Education provided:   - See above    Written Home Exercises Provided: Patient instructed to cont prior HEP. Exercises were reviewed and Raine was able to demonstrate them prior to the end of the session.  Raine demonstrated good  understanding of the education provided. See EMR under Patient Instructions for exercises provided during therapy sessions    Assessment     Reduced knee pain following quad activation series. No significant changes to ROM and patient cont to require focus on restoring full knee PROM on heel propping.    Continue per POC towards treatment goals with emphasis on ROM and quad activation.     Raine Is NOT progressing well towards her goals.   Pt prognosis is Good.     Pt will continue to benefit from skilled outpatient physical therapy to address the deficits  listed in the problem list box on initial evaluation, provide pt/family education and to maximize pt's level of independence in the home and community environment.   Pt's spiritual, cultural and educational needs considered and pt agreeable to plan of care and goals.     Anticipated barriers to physical therapy: none    Goals:   Short Term Goals (3 Weeks):   1. Patient will be independent with home exercise program to supplement physical therapy treatment in improving functional status. MET  2. Patient will improve R lower extremity strength to at least 75% of L lower extremity strength as measured via MicroFet handheld dynamometer to improve strength for closed chain tasks. Not MET  3. Patient will improve R knee active range of motion to 0-90 degrees to promote increased ease of sit<>stand transfers. MET  4. Patient will perform timed up and go with less restrictive assistive device in < 20 seconds to improve gait speed and safety with community ambulation. MET     Long Term Goals (6 Weeks):   1. Patient will improve R lower extremity strength to at least 90% of L lower extremity strength as measured via MicroFet handheld dynamometer to improve strength for closed chain tasks. Not MET  2. Patient will improve the total FOTO Knee Survey Score to </= 20% limited to demonstrate increased perceived functional mobility. NOT MET  3. Patient will perform timed up and go with least restrictive assistive device in < 13.5  seconds to improve gait speed and safety with community ambulation. MET  4. Patient will perform at least 15 sit to stands in 30 seconds without UE support to demonstrate increased functional strength. NOT MET  5. Patient will improve R knee active range of motion to 0-120 degrees to promote higher level transfers and transitions without limitation. NOT MET    Plan     Plan of care Certification: 6/7/2023 to JACKI by MD      See POC in treatment section for evaluation.    PT/PTA met face to face to discuss  patient's treatment plan and progress towards established goals. Patient will be seen by physical therapist every sixth visit and minimally once per month.    HENRY PALMA, PT, DPT, OCS

## 2023-09-07 ENCOUNTER — TELEPHONE (OUTPATIENT)
Dept: ENDOSCOPY | Facility: HOSPITAL | Age: 68
End: 2023-09-07
Payer: COMMERCIAL

## 2023-09-07 ENCOUNTER — TELEPHONE (OUTPATIENT)
Dept: ORTHOPEDICS | Facility: CLINIC | Age: 68
End: 2023-09-07
Payer: COMMERCIAL

## 2023-09-07 DIAGNOSIS — Z12.11 SPECIAL SCREENING FOR MALIGNANT NEOPLASMS, COLON: Primary | ICD-10-CM

## 2023-09-07 NOTE — TELEPHONE ENCOUNTER
I called the patient regarding the message below to let her know that her appointment has been rescheduled. The patient did not answer. I left a voicemail with a call back number.     ----- Message from Rashaad Walker sent at 9/7/2023 11:18 AM CDT -----  Regarding: FW: pt advice  Contact: [phone]  Good morning,     Please call and let the patient know that her appointment has been rescheduled.     Sincerely,   Porter Walker MS, OTC  OR & Clinical Assistant to Dr. Sanford Calvert III  Phone: [phone]  Fax: [phone]    ----- Message -----  From: Elinor Davis  Sent: 9/7/2023   9:27 AM CDT  To: [facility] Staff  Subject: pt advice                                        WENDI HUMPHREYILLON calling regarding Patient Advice (message) for #requesting a call back regarding she made a mistake canceling appt on 10.19 and would like to reschedule it for that day. Please call

## 2023-09-07 NOTE — TELEPHONE ENCOUNTER
Spoke to patient with  to schedule procedure(s) Colonoscopy/EGD       Physician to perform procedure(s) Dr. MIRACLE Shell  Date of Procedure (s) 12/20/23  Arrival Time 9:45 AM  Time of Procedure(s) 10:45 AM   Location of Procedure(s) [place] 4th Floor  Type of Rx Prep sent to patient: PEG  Instructions provided to patient via [facility]    Patient was informed on the following information and verbalized understanding. Screening questionnaire reviewed with patient and complete. If procedure requires anesthesia, a responsible adult needs to be present to accompany the patient home, patient cannot drive after receiving anesthesia. Appointment details are tentative, especially check-in time. Patient will receive a prep-op call 4 days prior to confirm check-in time for procedure. If applicable the patient should contact their pharmacy to verify Rx for procedure prep is ready for pick-up. Patient was advised to call the scheduling department at [phone] if pharmacy states no Rx is available. Patient was advised to call the endoscopy scheduling department if any questions or concerns arise.      [facility] Scheduling Department

## 2023-09-11 ENCOUNTER — CLINICAL SUPPORT (OUTPATIENT)
Dept: REHABILITATION | Facility: HOSPITAL | Age: 68
End: 2023-09-11
Payer: COMMERCIAL

## 2023-09-11 DIAGNOSIS — T84.89XA POSTOPERATIVE STIFFNESS OF TOTAL KNEE REPLACEMENT, INITIAL ENCOUNTER: ICD-10-CM

## 2023-09-11 DIAGNOSIS — Z96.659 POSTOPERATIVE STIFFNESS OF TOTAL KNEE REPLACEMENT, INITIAL ENCOUNTER: ICD-10-CM

## 2023-09-11 DIAGNOSIS — M25.561 ACUTE PAIN OF RIGHT KNEE: ICD-10-CM

## 2023-09-11 DIAGNOSIS — Z74.09 DECREASED FUNCTIONAL MOBILITY AND ENDURANCE: Primary | ICD-10-CM

## 2023-09-11 DIAGNOSIS — M25.669 POSTOPERATIVE STIFFNESS OF TOTAL KNEE REPLACEMENT, INITIAL ENCOUNTER: ICD-10-CM

## 2023-09-11 PROCEDURE — 97112 NEUROMUSCULAR REEDUCATION: CPT

## 2023-09-11 PROCEDURE — 97110 THERAPEUTIC EXERCISES: CPT

## 2023-09-11 NOTE — PROGRESS NOTES
[facility]   Physical Therapy Treatment Note     Name: Raine Medley  Clinic Number: [id number]    Therapy Diagnosis:   Encounter Diagnoses   Name Primary?    Decreased functional mobility and endurance Yes    Acute pain of right knee     Postoperative stiffness of total knee replacement, initial encounter      Physician: Verenice Daley NP    Visit Date: 9/11/2023    Physician Orders: PT Eval and Treat   Medical Diagnosis from Referral: M17.11 (ICD-10-CM) - Primary osteoarthritis of right knee   Evaluation Date: 6/7/2023  Authorization Period Expiration: 12/31/2023  Plan of Care Expiration: TBD by MD  Progress Note Due: TBD  Visit # / Visits authorized: 30 / 50 + eval     FOTO: 2/3     PTA Visit #: 1 / 5     Time In: 1000  Time Out: 1100   Total Treatment Time: 60 minutes    Total Billable Time: 60 minutes    Precautions: Standard     Subjective     Patient reports: no new complaints. Had pain last night, but has subsided today.    She was compliant with home exercise program. States she is doing her HEP 2x/day.   Response to previous treatment: appropriate muscle response  Functional change: ongoing    Pain: NT/10, currently  Location: Right knee      Objective      Objective Measures updated at progress report unless specified.   DOS 6/5/2023  *Patient is 14 weeks, 0 days s/p R TKA as of 9/11/2023.*      Date of Manipulation: 8/21/2023  *Patient is 3 week, 0 days s/p manipulation as of 9/11/2023.*        8/7/2023: 0-89 deg flexion at start of visit -> 0-95 deg at end of visit    ROM Post Manipulation 8/21/2023:  8/22/2023: 0-106 AAROM  8/23/2023: 0-99 deg at start of visit -> 0-110 deg at end of visit   8/24/2023: 0-95 deg at start of visit -> 0-99 deg at end of visit  8/25/2023: 0-3-90 deg at start of visit -> 0-99 deg at end of visit  8/29/2023: 90 degs Flexion AROM at start of visit -> 99 degs Flexion AROM at end of visit -> 101 degs Flexion AA   Lacking 3 of active  extension; 0 degrees PROM   Poor quad activation  8/30/2023: 0-3-90 deg at start of visit -> 0-95 deg at end of visit  9/1/2023: 0-3-90 deg at start of visit -> 0-97 deg at end of visit    Treatment     Raine received the treatments listed below:      therapeutic exercises to develop ROM and flexibility for 30 minutes including:    Patient education:  - importance of HEP compliance   - mobility HEP every hour  - importance of not losing extension ROM    LLLD heel prop x10 min 8lbs at start of visit  LLLD HS / calf stretch 3x3 min ea  Heel slide PROM 3x3 min  Recumbent bike x15 min for ROM and joint mobility -> NT    NEXT VISIT - Cont bike -> LAQ / SAQ -> PROM stretching      manual therapy techniques: Joint mobilizations, Myofacial release, and Soft tissue Mobilization were applied to the: Right knee for 00 minutes, including:    PFJ mobilizations all directions Grade III-IV holds and reps  AP Grade II-III with PROM into flexion  AP extension    neuromuscular re-education activities to improve: Proprioception, Posture, and motor control for 30 minutes.    K-tape PFJ    Quad activation:  LAQ 5lbs 5x10  LAQ 8lbs 5x10  SAQ 5lbs 5x10  SAQ 8lbs 5x10      Patient Education and Home Exercises       Education provided:   - See above    Written Home Exercises Provided: Patient instructed to cont prior HEP. Exercises were reviewed and Raine was able to demonstrate them prior to the end of the session.  Raine demonstrated good  understanding of the education provided. See EMR under Patient Instructions for exercises provided during therapy sessions    Assessment     Josefina all interventions well within visit. Improving NM activation of quads requiring increases in challenge during visit. Also josefina LLLD HS and calf stretching to help achieve full knee extension ROM with greater ease.    Continue per POC towards treatment goals with emphasis on ROM and quad activation.     Raine Is NOT progressing well towards her goals.   Pt prognosis  is Good.     Pt will continue to benefit from skilled outpatient physical therapy to address the deficits listed in the problem list box on initial evaluation, provide pt/family education and to maximize pt's level of independence in the home and community environment.   Pt's spiritual, cultural and educational needs considered and pt agreeable to plan of care and goals.     Anticipated barriers to physical therapy: none    Goals:   Short Term Goals (3 Weeks):   1. Patient will be independent with home exercise program to supplement physical therapy treatment in improving functional status. MET  2. Patient will improve R lower extremity strength to at least 75% of L lower extremity strength as measured via MicroFet handheld dynamometer to improve strength for closed chain tasks. Not MET  3. Patient will improve R knee active range of motion to 0-90 degrees to promote increased ease of sit<>stand transfers. MET  4. Patient will perform timed up and go with less restrictive assistive device in < 20 seconds to improve gait speed and safety with community ambulation. MET     Long Term Goals (6 Weeks):   1. Patient will improve R lower extremity strength to at least 90% of L lower extremity strength as measured via MicroFet handheld dynamometer to improve strength for closed chain tasks. Not MET  2. Patient will improve the total FOTO Knee Survey Score to </= 20% limited to demonstrate increased perceived functional mobility. NOT MET  3. Patient will perform timed up and go with least restrictive assistive device in < 13.5  seconds to improve gait speed and safety with community ambulation. MET  4. Patient will perform at least 15 sit to stands in 30 seconds without UE support to demonstrate increased functional strength. NOT MET  5. Patient will improve R knee active range of motion to 0-120 degrees to promote higher level transfers and transitions without limitation. NOT MET    Plan     Plan of care Certification:  6/7/2023 to TBD by MD      See POC in treatment section for evaluation.    PT/PTA met face to face to discuss patient's treatment plan and progress towards established goals. Patient will be seen by physical therapist every sixth visit and minimally once per month.    HENRY PALMA, PT, DPT, OCS

## 2023-09-13 ENCOUNTER — CLINICAL SUPPORT (OUTPATIENT)
Dept: REHABILITATION | Facility: HOSPITAL | Age: 68
End: 2023-09-13
Payer: COMMERCIAL

## 2023-09-13 DIAGNOSIS — Z96.659 POSTOPERATIVE STIFFNESS OF TOTAL KNEE REPLACEMENT, INITIAL ENCOUNTER: ICD-10-CM

## 2023-09-13 DIAGNOSIS — M25.669 POSTOPERATIVE STIFFNESS OF TOTAL KNEE REPLACEMENT, INITIAL ENCOUNTER: ICD-10-CM

## 2023-09-13 DIAGNOSIS — M25.561 ACUTE PAIN OF RIGHT KNEE: ICD-10-CM

## 2023-09-13 DIAGNOSIS — Z74.09 DECREASED FUNCTIONAL MOBILITY AND ENDURANCE: Primary | ICD-10-CM

## 2023-09-13 DIAGNOSIS — T84.89XA POSTOPERATIVE STIFFNESS OF TOTAL KNEE REPLACEMENT, INITIAL ENCOUNTER: ICD-10-CM

## 2023-09-13 PROCEDURE — 97112 NEUROMUSCULAR REEDUCATION: CPT

## 2023-09-13 PROCEDURE — 97110 THERAPEUTIC EXERCISES: CPT

## 2023-09-13 NOTE — PROGRESS NOTES
[facility]   Physical Therapy Treatment Note     Name: Raine Medley  Clinic Number: [id number]    Therapy Diagnosis:   Encounter Diagnoses   Name Primary?    Decreased functional mobility and endurance Yes    Acute pain of right knee     Postoperative stiffness of total knee replacement, initial encounter        Physician: Sanford Calvert III, *    Visit Date: 9/13/2023    Physician Orders: PT Eval and Treat   Medical Diagnosis from Referral: M17.11 (ICD-10-CM) - Primary osteoarthritis of right knee   Evaluation Date: 6/7/2023  Authorization Period Expiration: 12/31/2023  Plan of Care Expiration: TBD by MD  Progress Note Due: TBD  Visit # / Visits authorized: 31 / 50 + eval     FOTO: 2/3     PTA Visit #: 1 / 5     Time In: 1500  Time Out: 1555  Total Treatment Time: 55 minutes    Total Billable Time: 55 minutes    Precautions: Standard     Subjective     Patient reports: Having less pain in knee with consistent HEP x1 a day vicki quad activation; cont stiffness    She was compliant with home exercise program. States she is doing her HEP 1x/day.   Response to previous treatment: appropriate muscle response  Functional change: ongoing    Pain: NT/10, currently  Location: Right knee      Objective      Objective Measures updated at progress report unless specified.   DOS 6/5/2023  *Patient is 14 weeks, 0 days s/p R TKA as of 9/11/2023.*       Date of Manipulation: 8/21/2023  *Patient is 3 week, 0 days s/p manipulation as of 9/11/2023.*        8/7/2023: 0-89 deg flexion at start of visit -> 0-95 deg at end of visit    ROM Post Manipulation 8/21/2023:  8/22/2023: 0-106 AAROM  8/23/2023: 0-99 deg at start of visit -> 0-110 deg at end of visit   8/24/2023: 0-95 deg at start of visit -> 0-99 deg at end of visit  8/25/2023: 0-3-90 deg at start of visit -> 0-99 deg at end of visit  8/29/2023: 90 degs Flexion AROM at start of visit -> 99 degs Flexion AROM at end of visit -> 101 degs Flexion  AA   Lacking 3 of active extension; 0 degrees PROM   Poor quad activation  8/30/2023: 0-3-90 deg at start of visit -> 0-95 deg at end of visit  9/1/2023: 0-3-90 deg at start of visit -> 0-97 deg at end of visit    Treatment     Raine received the treatments listed below:      therapeutic exercises to develop ROM and flexibility for 30 minutes including:    Patient education:  - importance of HEP compliance x2 or more a day  - importance of not losing extension ROM    LLLD heel prop x8 min 8lbs at start of visit  LLLD HS / calf stretch 3x3 min ea  Heel slide PROM 3x3 min  Recumbent bike x15 min for ROM and joint mobility -> NT    NEXT VISIT - MATRIX      manual therapy techniques: Joint mobilizations, Myofacial release, and Soft tissue Mobilization were applied to the: Right knee for 00 minutes, including:    PFJ mobilizations all directions Grade III-IV holds and reps  AP Grade II-III with PROM into flexion  AP extension    neuromuscular re-education activities to improve: Proprioception, Posture, and motor control for 30 minutes.    K-tape PFJ - NT    Quad activation:  LAQ 8lbs 2x50  SAQ 8lbs 2x50      Patient Education and Home Exercises       Education provided:   - See above    Written Home Exercises Provided: Patient instructed to cont prior HEP. Exercises were reviewed and Raine was able to demonstrate them prior to the end of the session.  Raine demonstrated good  understanding of the education provided. See EMR under Patient Instructions for exercises provided during therapy sessions    Assessment     Josefina all interventions well within visit. Again demonstrates improving NM activation of quads requiring increases in challenge during visit. Also josefina LLLD HS and calf stretching to help achieve full knee extension ROM with greater ease.    Continue per POC towards treatment goals with emphasis on ROM and quad activation.     Raine Is NOT progressing well towards her goals.   Pt prognosis is Good.     Pt will  continue to benefit from skilled outpatient physical therapy to address the deficits listed in the problem list box on initial evaluation, provide pt/family education and to maximize pt's level of independence in the home and community environment.   Pt's spiritual, cultural and educational needs considered and pt agreeable to plan of care and goals.     Anticipated barriers to physical therapy: none    Goals:   Short Term Goals (3 Weeks):   1. Patient will be independent with home exercise program to supplement physical therapy treatment in improving functional status. MET  2. Patient will improve R lower extremity strength to at least 75% of L lower extremity strength as measured via MicroFet handheld dynamometer to improve strength for closed chain tasks. Not MET  3. Patient will improve R knee active range of motion to 0-90 degrees to promote increased ease of sit<>stand transfers. MET  4. Patient will perform timed up and go with less restrictive assistive device in < 20 seconds to improve gait speed and safety with community ambulation. MET     Long Term Goals (6 Weeks):   1. Patient will improve R lower extremity strength to at least 90% of L lower extremity strength as measured via MicroFet handheld dynamometer to improve strength for closed chain tasks. Not MET  2. Patient will improve the total FOTO Knee Survey Score to </= 20% limited to demonstrate increased perceived functional mobility. NOT MET  3. Patient will perform timed up and go with least restrictive assistive device in < 13.5  seconds to improve gait speed and safety with community ambulation. MET  4. Patient will perform at least 15 sit to stands in 30 seconds without UE support to demonstrate increased functional strength. NOT MET  5. Patient will improve R knee active range of motion to 0-120 degrees to promote higher level transfers and transitions without limitation. NOT MET    Plan     Plan of care Certification: 6/7/2023 to JACKI by MD       See POC in treatment section for evaluation.    PT/PTA met face to face to discuss patient's treatment plan and progress towards established goals. Patient will be seen by physical therapist every sixth visit and minimally once per month.    HENRY PALMA, PT, DPT, OCS

## 2023-09-18 ENCOUNTER — CLINICAL SUPPORT (OUTPATIENT)
Dept: REHABILITATION | Facility: HOSPITAL | Age: 68
End: 2023-09-18
Payer: COMMERCIAL

## 2023-09-18 DIAGNOSIS — Z96.659 POSTOPERATIVE STIFFNESS OF TOTAL KNEE REPLACEMENT, INITIAL ENCOUNTER: ICD-10-CM

## 2023-09-18 DIAGNOSIS — M25.561 ACUTE PAIN OF RIGHT KNEE: ICD-10-CM

## 2023-09-18 DIAGNOSIS — M25.669 POSTOPERATIVE STIFFNESS OF TOTAL KNEE REPLACEMENT, INITIAL ENCOUNTER: ICD-10-CM

## 2023-09-18 DIAGNOSIS — Z74.09 DECREASED FUNCTIONAL MOBILITY AND ENDURANCE: Primary | ICD-10-CM

## 2023-09-18 DIAGNOSIS — T84.89XA POSTOPERATIVE STIFFNESS OF TOTAL KNEE REPLACEMENT, INITIAL ENCOUNTER: ICD-10-CM

## 2023-09-18 PROCEDURE — 97112 NEUROMUSCULAR REEDUCATION: CPT

## 2023-09-18 PROCEDURE — 97110 THERAPEUTIC EXERCISES: CPT

## 2023-09-18 NOTE — PROGRESS NOTES
[facility]   Physical Therapy Treatment Note     Name: Raine Medley  Clinic Number: [id number]    Therapy Diagnosis:   Encounter Diagnoses   Name Primary?    Decreased functional mobility and endurance Yes    Acute pain of right knee     Postoperative stiffness of total knee replacement, initial encounter        Physician: Verenice Daley NP    Visit Date: 9/18/2023    Physician Orders: PT Eval and Treat   Medical Diagnosis from Referral: M17.11 (ICD-10-CM) - Primary osteoarthritis of right knee   Evaluation Date: 6/7/2023  Authorization Period Expiration: 12/31/2023  Plan of Care Expiration: TBD by MD  Progress Note Due: TBD  Visit # / Visits authorized: 32 / 50 + eval      FOTO: 2/3     PTA Visit #: 1 / 5     Time In: 1300  Time Out: 1400  Total Treatment Time: 60 minutes    Total Billable Time: 60 minutes    Precautions: Standard     Subjective     Patient reports: Has been having some pain in medial knee since Saturday. Awoke with this pain. HEP helping some. In general reports improvements in ability to amb w/o pain over time and since AMADEO.    She was compliant with home exercise program. States she is doing her HEP 1x/day.   Response to previous treatment: appropriate muscle response  Functional change: ongoing    Pain: NT/10, currently  Location: Right knee      Objective      Objective Measures updated at progress report unless specified.   DOS 6/5/2023  *Patient is 15 weeks, 0 days s/p R TKA as of 9/18/2023.*       Date of Manipulation: 8/21/2023  *Patient is 4 week, 0 days s/p manipulation as of 9/18/2023.*        ROM Post Manipulation 8/21/2023:  8/30/2023: 0-3-90 deg at start of visit -> 0-95 deg at end of visit  9/1/2023: 0-3-90 deg at start of visit -> 0-97 deg at end of visit  9/18/2023: 0-98 deg at start -> 0-107 deg at end    Treatment     Raine received the treatments listed below:      therapeutic exercises to develop ROM and flexibility for 25 minutes  including:    Patient education:  - importance of HEP compliance x2 or more a day  - importance of not losing extension ROM  - quad strength    LLLD HS / calf stretch 3x45 sec; 2 rounds  Heel slide PROM 2x8 - alt DF / PF    NEXT VISIT - CONT MATRIX    Held:  LLLD heel prop x8 min 8lbs at start of visit  Recumbent bike x15 min for ROM and joint mobility -> NT    manual therapy techniques: Joint mobilizations, Myofacial release, and Soft tissue Mobilization were applied to the: Right knee for 00 minutes, including:    PFJ mobilizations all directions Grade III-IV holds and reps  AP Grade II-III with PROM into flexion  AP extension    neuromuscular re-education activities to improve: Proprioception, Posture, and motor control for 35 minutes.    Quad activation:  Matrix leg extension DL 25lbs 3x10  Matrix leg extension DL 15lbs 3x15  Matrix leg flexion DL 15lbs 3x15  SAQ holds 3x20 sec ea gab  Quad set holds 5x20 sec ea gab    K-tape PFJ - NT    Held:  Quad activation:  LAQ 8lbs 2x50  SAQ 8lbs 2x50      Patient Education and Home Exercises       Education provided:   - See above    Written Home Exercises Provided: Patient instructed to cont prior HEP. Exercises were reviewed and Raine was able to demonstrate them prior to the end of the session.  Raine demonstrated good  understanding of the education provided. See EMR under Patient Instructions for exercises provided during therapy sessions    Assessment     Josefina all interventions well within visit.     Again demonstrates improving NM activation of quads requiring increases in challenge during visit and josefina Matrix extension well. ROM slowly improving from previous visits and today started at 0-98 deg and improved to 0-107 deg.    Continue per POC towards treatment goals with emphasis on ROM and quad activation.     Raine Is slowly progressing well towards her goals.   Pt prognosis is Good.     Pt will continue to benefit from skilled outpatient physical therapy to address  the deficits listed in the problem list box on initial evaluation, provide pt/family education and to maximize pt's level of independence in the home and community environment.   Pt's spiritual, cultural and educational needs considered and pt agreeable to plan of care and goals.     Anticipated barriers to physical therapy: none    Goals:   Short Term Goals (3 Weeks):   1. Patient will be independent with home exercise program to supplement physical therapy treatment in improving functional status. MET  2. Patient will improve R lower extremity strength to at least 75% of L lower extremity strength as measured via MicroFet handheld dynamometer to improve strength for closed chain tasks. Not MET  3. Patient will improve R knee active range of motion to 0-90 degrees to promote increased ease of sit<>stand transfers. MET  4. Patient will perform timed up and go with less restrictive assistive device in < 20 seconds to improve gait speed and safety with community ambulation. MET     Long Term Goals (6 Weeks):   1. Patient will improve R lower extremity strength to at least 90% of L lower extremity strength as measured via MicroFet handheld dynamometer to improve strength for closed chain tasks. Not MET  2. Patient will improve the total FOTO Knee Survey Score to </= 20% limited to demonstrate increased perceived functional mobility. NOT MET  3. Patient will perform timed up and go with least restrictive assistive device in < 13.5  seconds to improve gait speed and safety with community ambulation. MET  4. Patient will perform at least 15 sit to stands in 30 seconds without UE support to demonstrate increased functional strength. NOT MET  5. Patient will improve R knee active range of motion to 0-120 degrees to promote higher level transfers and transitions without limitation. NOT MET    Plan     Plan of care Certification: 6/7/2023 to JACKI by MD Latif POC in treatment section for evaluation.    PT/PTA met face to  face to discuss patient's treatment plan and progress towards established goals. Patient will be seen by physical therapist every sixth visit and minimally once per month.    HENRY PALMA, PT, DPT, OCS

## 2023-09-20 ENCOUNTER — PATIENT MESSAGE (OUTPATIENT)
Dept: ORTHOPEDICS | Facility: CLINIC | Age: 68
End: 2023-09-20
Payer: COMMERCIAL

## 2023-09-22 ENCOUNTER — CLINICAL SUPPORT (OUTPATIENT)
Dept: REHABILITATION | Facility: HOSPITAL | Age: 68
End: 2023-09-22
Payer: COMMERCIAL

## 2023-09-22 DIAGNOSIS — Z74.09 DECREASED FUNCTIONAL MOBILITY AND ENDURANCE: Primary | ICD-10-CM

## 2023-09-22 DIAGNOSIS — T84.89XA POSTOPERATIVE STIFFNESS OF TOTAL KNEE REPLACEMENT, INITIAL ENCOUNTER: ICD-10-CM

## 2023-09-22 DIAGNOSIS — M25.669 POSTOPERATIVE STIFFNESS OF TOTAL KNEE REPLACEMENT, INITIAL ENCOUNTER: ICD-10-CM

## 2023-09-22 DIAGNOSIS — M25.561 ACUTE PAIN OF RIGHT KNEE: ICD-10-CM

## 2023-09-22 DIAGNOSIS — Z96.659 POSTOPERATIVE STIFFNESS OF TOTAL KNEE REPLACEMENT, INITIAL ENCOUNTER: ICD-10-CM

## 2023-09-22 PROCEDURE — 97112 NEUROMUSCULAR REEDUCATION: CPT

## 2023-09-22 PROCEDURE — 97110 THERAPEUTIC EXERCISES: CPT

## 2023-09-22 PROCEDURE — 97140 MANUAL THERAPY 1/> REGIONS: CPT

## 2023-09-22 NOTE — PROGRESS NOTES
[facility]   Physical Therapy Treatment Note     Name: Raine Medley  Clinic Number: [id number]    Therapy Diagnosis:   Encounter Diagnoses   Name Primary?    Decreased functional mobility and endurance Yes    Acute pain of right knee     Postoperative stiffness of total knee replacement, initial encounter      Physician: Sanford Calvert III, *    Visit Date: 9/22/2023    Physician Orders: PT Eval and Treat   Medical Diagnosis from Referral: M17.11 (ICD-10-CM) - Primary osteoarthritis of right knee   Evaluation Date: 6/7/2023  Authorization Period Expiration: 12/31/2023  Plan of Care Expiration: TBD by MD  Progress Note Due: TBD  Visit # / Visits authorized: 33 / 50 + eval       FOTO: 2/3     PTA Visit #: 1 / 5     Time In: 0900  Time Out: 1000  Total Treatment Time: 60 minutes    Total Billable Time: 60 minutes    Precautions: Standard     Subjective     Patient reports: No complaints with regards to pain in R knee. L knee has been having pain more recently.    She was compliant with home exercise program. States she is doing her HEP 1x/day.   Response to previous treatment: appropriate muscle response  Functional change: ongoing    Pain: NT/10, currently  Location: Right knee      Objective      Objective Measures updated at progress report unless specified.   DOS 6/5/2023  *Patient is 15 weeks, 0 days s/p R TKA as of 9/18/2023.*       Date of Manipulation: 8/21/2023  *Patient is 4 week, 0 days s/p manipulation as of 9/18/2023.*        ROM Post Manipulation 8/21/2023:  8/30/2023: 0-3-90 deg at start of visit -> 0-95 deg at end of visit  9/1/2023: 0-3-90 deg at start of visit -> 0-97 deg at end of visit  9/18/2023: 0-98 deg at start -> 0-107 deg at end  9/22/2023: 0-96 deg at start -> 0-106 deg at end    Treatment     Raine received the treatments listed below:      therapeutic exercises to develop ROM and flexibility for 10 minutes including:    Patient education:  - importance  of HEP compliance x2 or more a day  - importance of not losing extension ROM  - quad strength    LLLD HS / calf stretch 3x45 sec  Heel slide PROM x8 - alt DF / PF    NEXT VISIT - CONT to progress MATRIX    Held:  LLLD heel prop x8 min 8lbs at start of visit  Recumbent bike x15 min for ROM and joint mobility -> NT    manual therapy techniques: Joint mobilizations, Myofacial release, and Soft tissue Mobilization were applied to the: Right knee for 20 minutes, including:    PFJ mobilizations all directions Grade III-IV holds and reps  Quad STM  PROM flexion / extension    neuromuscular re-education activities to improve: Proprioception, Posture, and motor control for 30 minutes.    Quad activation:  Matrix leg extension DL 25lbs 4x12  Matrix leg flexion DL 25lbs 4x12  Matrix leg extension DL 15lbs 4x burn    K-tape PFJ - NT    Held:  SAQ holds 3x20 sec ea gab  Quad set holds 5x20 sec ea gab  Quad activation:  LAQ 8lbs 2x50  SAQ 8lbs 2x50      Patient Education and Home Exercises       Education provided:   - See above    Written Home Exercises Provided: Patient instructed to cont prior HEP. Exercises were reviewed and Raine was able to demonstrate them prior to the end of the session.  Raine demonstrated good  understanding of the education provided. See EMR under Patient Instructions for exercises provided during therapy sessions    Assessment     Raj all interventions well within visit.     Again demonstrates improving NM activation of quads requiring increases in challenge during visit. ROM about the same as last visit.     Cont to require PT intervention 2x a week prior to FU with MD to maximize function, ROM, and quad activation / strength.    Raine Is slowly progressing well towards her goals.   Pt prognosis is Good.     Pt will continue to benefit from skilled outpatient physical therapy to address the deficits listed in the problem list box on initial evaluation, provide pt/family education and to maximize pt's  level of independence in the home and community environment.   Pt's spiritual, cultural and educational needs considered and pt agreeable to plan of care and goals.     Anticipated barriers to physical therapy: none    Goals:   Short Term Goals (3 Weeks):   1. Patient will be independent with home exercise program to supplement physical therapy treatment in improving functional status. MET  2. Patient will improve R lower extremity strength to at least 75% of L lower extremity strength as measured via MicroFet handheld dynamometer to improve strength for closed chain tasks. Not MET  3. Patient will improve R knee active range of motion to 0-90 degrees to promote increased ease of sit<>stand transfers. MET  4. Patient will perform timed up and go with less restrictive assistive device in < 20 seconds to improve gait speed and safety with community ambulation. MET     Long Term Goals (6 Weeks):   1. Patient will improve R lower extremity strength to at least 90% of L lower extremity strength as measured via MicroFet handheld dynamometer to improve strength for closed chain tasks. Not MET  2. Patient will improve the total FOTO Knee Survey Score to </= 20% limited to demonstrate increased perceived functional mobility. NOT MET  3. Patient will perform timed up and go with least restrictive assistive device in < 13.5  seconds to improve gait speed and safety with community ambulation. MET  4. Patient will perform at least 15 sit to stands in 30 seconds without UE support to demonstrate increased functional strength. NOT MET  5. Patient will improve R knee active range of motion to 0-120 degrees to promote higher level transfers and transitions without limitation. NOT MET    Plan     Plan of care Certification: 6/7/2023 to TBD by MD Clark 2x a week prior to FU with MD in mid October    HENRY PALMA, PT, DPT, OCS

## 2023-09-22 NOTE — PLAN OF CARE
[facility]   Physical Therapy Treatment Note     Name: Raine Medley  Clinic Number: [id number]    Therapy Diagnosis:   Encounter Diagnoses   Name Primary?    Decreased functional mobility and endurance Yes    Acute pain of right knee     Postoperative stiffness of total knee replacement, initial encounter      Physician: Sanford Calvert III, *    Visit Date: 9/22/2023    Physician Orders: PT Eval and Treat   Medical Diagnosis from Referral: M17.11 (ICD-10-CM) - Primary osteoarthritis of right knee   Evaluation Date: 6/7/2023  Authorization Period Expiration: 12/31/2023  Plan of Care Expiration: TBD by MD  Progress Note Due: TBD  Visit # / Visits authorized: 33 / 50 + eval       FOTO: 2/3     PTA Visit #: 1 / 5     Time In: 0900  Time Out: 1000  Total Treatment Time: 60 minutes    Total Billable Time: 60 minutes    Precautions: Standard     Subjective     Patient reports: No complaints with regards to pain in R knee. L knee has been having pain more recently.    She was compliant with home exercise program. States she is doing her HEP 1x/day.   Response to previous treatment: appropriate muscle response  Functional change: ongoing    Pain: NT/10, currently  Location: Right knee      Objective      Objective Measures updated at progress report unless specified.   DOS 6/5/2023  *Patient is 15 weeks, 0 days s/p R TKA as of 9/18/2023.*       Date of Manipulation: 8/21/2023  *Patient is 4 week, 0 days s/p manipulation as of 9/18/2023.*        ROM Post Manipulation 8/21/2023:  8/30/2023: 0-3-90 deg at start of visit -> 0-95 deg at end of visit  9/1/2023: 0-3-90 deg at start of visit -> 0-97 deg at end of visit  9/18/2023: 0-98 deg at start -> 0-107 deg at end  9/22/2023: 0-96 deg at start -> 0-106 deg at end    Treatment     Raine received the treatments listed below:      therapeutic exercises to develop ROM and flexibility for 10 minutes including:    Patient education:  - importance  of HEP compliance x2 or more a day  - importance of not losing extension ROM  - quad strength    LLLD HS / calf stretch 3x45 sec  Heel slide PROM x8 - alt DF / PF    NEXT VISIT - CONT to progress MATRIX    Held:  LLLD heel prop x8 min 8lbs at start of visit  Recumbent bike x15 min for ROM and joint mobility -> NT    manual therapy techniques: Joint mobilizations, Myofacial release, and Soft tissue Mobilization were applied to the: Right knee for 20 minutes, including:    PFJ mobilizations all directions Grade III-IV holds and reps  Quad STM  PROM flexion / extension    neuromuscular re-education activities to improve: Proprioception, Posture, and motor control for 30 minutes.    Quad activation:  Matrix leg extension DL 25lbs 4x12  Matrix leg flexion DL 25lbs 4x12  Matrix leg extension DL 15lbs 4x burn    K-tape PFJ - NT    Held:  SAQ holds 3x20 sec ea gab  Quad set holds 5x20 sec ea gab  Quad activation:  LAQ 8lbs 2x50  SAQ 8lbs 2x50      Patient Education and Home Exercises       Education provided:   - See above    Written Home Exercises Provided: Patient instructed to cont prior HEP. Exercises were reviewed and Raine was able to demonstrate them prior to the end of the session.  Raine demonstrated good  understanding of the education provided. See EMR under Patient Instructions for exercises provided during therapy sessions    Assessment     Raj all interventions well within visit.     Again demonstrates improving NM activation of quads requiring increases in challenge during visit. ROM about the same as last visit.     Cont to require PT intervention 2x a week prior to FU with MD to maximize function, ROM, and quad activation / strength.    Raine Is slowly progressing well towards her goals.   Pt prognosis is Good.     Pt will continue to benefit from skilled outpatient physical therapy to address the deficits listed in the problem list box on initial evaluation, provide pt/family education and to maximize pt's  level of independence in the home and community environment.   Pt's spiritual, cultural and educational needs considered and pt agreeable to plan of care and goals.     Anticipated barriers to physical therapy: none    Goals:   Short Term Goals (3 Weeks):   1. Patient will be independent with home exercise program to supplement physical therapy treatment in improving functional status. MET  2. Patient will improve R lower extremity strength to at least 75% of L lower extremity strength as measured via MicroFet handheld dynamometer to improve strength for closed chain tasks. Not MET  3. Patient will improve R knee active range of motion to 0-90 degrees to promote increased ease of sit<>stand transfers. MET  4. Patient will perform timed up and go with less restrictive assistive device in < 20 seconds to improve gait speed and safety with community ambulation. MET     Long Term Goals (6 Weeks):   1. Patient will improve R lower extremity strength to at least 90% of L lower extremity strength as measured via MicroFet handheld dynamometer to improve strength for closed chain tasks. Not MET  2. Patient will improve the total FOTO Knee Survey Score to </= 20% limited to demonstrate increased perceived functional mobility. NOT MET  3. Patient will perform timed up and go with least restrictive assistive device in < 13.5  seconds to improve gait speed and safety with community ambulation. MET  4. Patient will perform at least 15 sit to stands in 30 seconds without UE support to demonstrate increased functional strength. NOT MET  5. Patient will improve R knee active range of motion to 0-120 degrees to promote higher level transfers and transitions without limitation. NOT MET    Plan     Plan of care Certification: 6/7/2023 to TBD by MD Clark 2x a week prior to FU with MD in mid October    HENRY PALMA, PT, DPT, OCS

## 2023-09-25 ENCOUNTER — CLINICAL SUPPORT (OUTPATIENT)
Dept: REHABILITATION | Facility: HOSPITAL | Age: 68
End: 2023-09-25
Payer: COMMERCIAL

## 2023-09-25 ENCOUNTER — PATIENT MESSAGE (OUTPATIENT)
Dept: ORTHOPEDICS | Facility: CLINIC | Age: 68
End: 2023-09-25
Payer: COMMERCIAL

## 2023-09-25 DIAGNOSIS — Z96.659 POSTOPERATIVE STIFFNESS OF TOTAL KNEE REPLACEMENT, INITIAL ENCOUNTER: ICD-10-CM

## 2023-09-25 DIAGNOSIS — Z74.09 DECREASED FUNCTIONAL MOBILITY AND ENDURANCE: Primary | ICD-10-CM

## 2023-09-25 DIAGNOSIS — M25.669 POSTOPERATIVE STIFFNESS OF TOTAL KNEE REPLACEMENT, INITIAL ENCOUNTER: ICD-10-CM

## 2023-09-25 DIAGNOSIS — T84.89XA POSTOPERATIVE STIFFNESS OF TOTAL KNEE REPLACEMENT, INITIAL ENCOUNTER: ICD-10-CM

## 2023-09-25 DIAGNOSIS — M25.561 ACUTE PAIN OF RIGHT KNEE: ICD-10-CM

## 2023-09-25 PROCEDURE — 97140 MANUAL THERAPY 1/> REGIONS: CPT

## 2023-09-25 PROCEDURE — 97112 NEUROMUSCULAR REEDUCATION: CPT

## 2023-09-25 PROCEDURE — 97110 THERAPEUTIC EXERCISES: CPT

## 2023-09-25 NOTE — PROGRESS NOTES
[facility]   Physical Therapy Treatment Note     Name: Raine Medley  Clinic Number: [id number]    Therapy Diagnosis:   Encounter Diagnoses   Name Primary?    Decreased functional mobility and endurance Yes    Acute pain of right knee     Postoperative stiffness of total knee replacement, initial encounter        Physician: Sanford Calvert III, *    Visit Date: 9/25/2023    Physician Orders: PT Eval and Treat   Medical Diagnosis from Referral: M17.11 (ICD-10-CM) - Primary osteoarthritis of right knee   Evaluation Date: 6/7/2023  Authorization Period Expiration: 12/31/2023  Plan of Care Expiration: TBD by MD  Progress Note Due: TBD  Visit # / Visits authorized: 34 / 50 + eval       FOTO: 2/3     PTA Visit #: 1 / 5     Time In: 1000  Time Out: 1100  Total Treatment Time: 60 minutes    Total Billable Time: 60 minutes    Precautions: Standard     Subjective     Patient reports: Able to drive to clinic today. Not having as much L and R knee pain today. Consistent with quad exercises outside of clinic    She was compliant with home exercise program. States she is doing her HEP 1x/day.   Response to previous treatment: appropriate muscle response  Functional change: ongoing    Pain: NT/10, currently  Location: Right knee      Objective      Objective Measures updated at progress report unless specified.   DOS 6/5/2023  *Patient is 16 weeks, 0 days s/p R TKA as of 9/25/2023.*       Date of Manipulation: 8/21/2023  *Patient is 5 week, 0 days s/p manipulation as of 9/25/2023.*        ROM Post Manipulation 8/21/2023:  8/30/2023: 0-3-90 deg at start of visit -> 0-95 deg at end of visit  9/1/2023: 0-3-90 deg at start of visit -> 0-97 deg at end of visit  9/18/2023: 0-98 deg at start -> 0-107 deg at end  9/22/2023: 0-96 deg at start -> 0-106 deg at end  9/22/2023: 0-92 deg at start -> 0-103 deg at end    Treatment     Raine received the treatments listed below:      therapeutic exercises to  develop ROM and flexibility for 10 minutes including:    Patient education:  - importance of HEP compliance x2 or more a day  - importance of not losing extension ROM  - quad strength    LLLD HS / calf stretch 3x2 min  Modified piriformis stretch 3x30 sec  Heel slide PROM x8 - alt DF / PF - NT    NEXT VISIT - CONT to progress MATRIX    Held:  LLLD heel prop x8 min 8lbs at start of visit  Recumbent bike x15 min for ROM and joint mobility -> NT    manual therapy techniques: Joint mobilizations, Myofacial release, and Soft tissue Mobilization were applied to the: Right knee for 15 minutes, including:    PFJ mobilizations all directions Grade III-IV holds and reps  PROM flexion / extension  Passive hip stretching    neuromuscular re-education activities to improve: Proprioception, Posture, and motor control for 35 minutes.    Quad activation:  Matrix leg extension DL 25lbs 4x15  Matrix leg flexion DL 25lbs 4x15  Matrix leg extension DL 15lbs 4x burn    K-tape PFJ - NT    Held:  SAQ holds 3x20 sec ea gab  Quad set holds 5x20 sec ea gab  Quad activation:  LAQ 8lbs 2x50  SAQ 8lbs 2x50      Patient Education and Home Exercises       Education provided:   - See above    Written Home Exercises Provided: Patient instructed to cont prior HEP. Exercises were reviewed and Raine was able to demonstrate them prior to the end of the session.  Raine demonstrated good  understanding of the education provided. See EMR under Patient Instructions for exercises provided during therapy sessions    Assessment     Raj all interventions well within visit.     Cont to demonstrate improving NM activation of quads requiring increases in challenge during visit. Improving quad NM activation leading to less complaints of knee pain gab.     ROM cont to be around the same values as last week. Patient able to improve flexion ROM the best with modified piriformis stretching. Advised to add this into HEP for this week.    Cont to require PT intervention  2x a week prior to FU with MD to maximize function, ROM, and quad activation / strength.    Raine Is slowly progressing well towards her goals.   Pt prognosis is Good.     Pt will continue to benefit from skilled outpatient physical therapy to address the deficits listed in the problem list box on initial evaluation, provide pt/family education and to maximize pt's level of independence in the home and community environment.   Pt's spiritual, cultural and educational needs considered and pt agreeable to plan of care and goals.     Anticipated barriers to physical therapy: none    Goals:   Short Term Goals (3 Weeks):   1. Patient will be independent with home exercise program to supplement physical therapy treatment in improving functional status. MET  2. Patient will improve R lower extremity strength to at least 75% of L lower extremity strength as measured via MicroFet handheld dynamometer to improve strength for closed chain tasks. Not MET  3. Patient will improve R knee active range of motion to 0-90 degrees to promote increased ease of sit<>stand transfers. MET  4. Patient will perform timed up and go with less restrictive assistive device in < 20 seconds to improve gait speed and safety with community ambulation. MET     Long Term Goals (6 Weeks):   1. Patient will improve R lower extremity strength to at least 90% of L lower extremity strength as measured via MicroFet handheld dynamometer to improve strength for closed chain tasks. Not MET  2. Patient will improve the total FOTO Knee Survey Score to </= 20% limited to demonstrate increased perceived functional mobility. NOT MET  3. Patient will perform timed up and go with least restrictive assistive device in < 13.5  seconds to improve gait speed and safety with community ambulation. MET  4. Patient will perform at least 15 sit to stands in 30 seconds without UE support to demonstrate increased functional strength. NOT MET  5. Patient will improve R knee  active range of motion to 0-120 degrees to promote higher level transfers and transitions without limitation. NOT MET    Plan     Plan of care Certification: 6/7/2023 to TBD by MD Clark 2x a week prior to FU with MD in mid October    HENRY PALMA, PT, DPT, OCS

## 2023-09-26 ENCOUNTER — TELEPHONE (OUTPATIENT)
Dept: ORTHOPEDICS | Facility: CLINIC | Age: 68
End: 2023-09-26
Payer: COMMERCIAL

## 2023-09-26 DIAGNOSIS — M17.12 PRIMARY OSTEOARTHRITIS OF LEFT KNEE: Primary | ICD-10-CM

## 2023-09-26 NOTE — TELEPHONE ENCOUNTER
The patient had bilateral knee iaCSI 2013 and it only helped for 3 weeks. B synvisc one injections 7/22 helped.     MEDICAL NECESSITY FOR VISCOSUPPLEMETNATION: After thorough evaluation of the patient, I have determined that visco-supplementation is medically necessary  Patient meets criteria for viscosupplementation with hyaluronic acid: insurance preferred single shot brand  -Patient has osteoarthritis with radiographs showing >= KL grade 2 disease and symptoms including pain and functional limitations described in my clinical notes  -patient has failed conservative therapy with activity/lifestyle modification and formal and/or self directed home exercises physical therapy rehabilitation program  -patient has failed intra-articular corticosteroid injections with efficacy less than 8 weeks  -patient has had inadequate response, intolerance, or contraindication to acetaminophen and oral and/or topical nsaids

## 2023-09-29 ENCOUNTER — CLINICAL SUPPORT (OUTPATIENT)
Dept: REHABILITATION | Facility: HOSPITAL | Age: 68
End: 2023-09-29
Payer: COMMERCIAL

## 2023-09-29 DIAGNOSIS — M25.561 ACUTE PAIN OF RIGHT KNEE: ICD-10-CM

## 2023-09-29 DIAGNOSIS — Z74.09 DECREASED FUNCTIONAL MOBILITY AND ENDURANCE: Primary | ICD-10-CM

## 2023-09-29 DIAGNOSIS — T84.89XA POSTOPERATIVE STIFFNESS OF TOTAL KNEE REPLACEMENT, INITIAL ENCOUNTER: ICD-10-CM

## 2023-09-29 DIAGNOSIS — Z96.659 POSTOPERATIVE STIFFNESS OF TOTAL KNEE REPLACEMENT, INITIAL ENCOUNTER: ICD-10-CM

## 2023-09-29 DIAGNOSIS — M25.669 POSTOPERATIVE STIFFNESS OF TOTAL KNEE REPLACEMENT, INITIAL ENCOUNTER: ICD-10-CM

## 2023-09-29 PROCEDURE — 97112 NEUROMUSCULAR REEDUCATION: CPT

## 2023-09-29 PROCEDURE — 97530 THERAPEUTIC ACTIVITIES: CPT

## 2023-09-29 PROCEDURE — 97110 THERAPEUTIC EXERCISES: CPT

## 2023-09-29 NOTE — PROGRESS NOTES
[facility]   Physical Therapy Treatment Note     Name: Raine Medley  Clinic Number: [id number]    Therapy Diagnosis:   Encounter Diagnoses   Name Primary?    Decreased functional mobility and endurance Yes    Acute pain of right knee     Postoperative stiffness of total knee replacement, initial encounter      Physician: Sanford Calvert III, *    Visit Date: 9/29/2023    Physician Orders: PT Eval and Treat   Medical Diagnosis from Referral: M17.11 (ICD-10-CM) - Primary osteoarthritis of right knee   Evaluation Date: 6/7/2023  Authorization Period Expiration: 12/31/2023  Plan of Care Expiration: TBD by MD  Progress Note Due: TBD  Visit # / Visits authorized: 35 / 50 + eval       FOTO: 2/3     PTA Visit #: 1 / 5      Time In: 1000   Time Out: 1055  Total Treatment Time: 55 minutes    Total Billable Time: 55 minutes    Precautions: Standard     Subjective     Patient reports: Cont consistency with HEP. R knee is feeling better than L knee today.    She was compliant with home exercise program. States she is doing her HEP 1x/day.   Response to previous treatment: appropriate muscle response  Functional change: ongoing    Pain: NT/10, currently  Location: Right knee      Objective      Objective Measures updated at progress report unless specified.   DOS 6/5/2023  *Patient is 16 weeks, 0 days s/p R TKA as of 9/25/2023.*       Date of Manipulation: 8/21/2023  *Patient is 5 week, 0 days s/p manipulation as of 9/25/2023.*        ROM Post Manipulation 8/21/2023:  8/30/2023: 0-3-90 deg at start of visit -> 0-95 deg at end of visit  9/1/2023: 0-3-90 deg at start of visit -> 0-97 deg at end of visit  9/18/2023: 0-98 deg at start -> 0-107 deg at end  9/22/2023: 0-96 deg at start -> 0-106 deg at end  9/25/2023: 0-92 deg at start -> 0-103 deg at end  9/29/2023: 0-95 deg at start -> 0-106 deg at end      Treatment     Raine received the treatments listed below:      therapeutic exercises to  develop ROM and flexibility for 25 minutes including:    Patient education:  - cont HEP compliance  - cont modified piriformis stretch for knee flexion    Modified piriformis stretch 5x30 sec ea gab  Knee to chest stretch 5x30 sec ea gab  SB flexion fwd, R, L 30x5 sec ea direction - neural mobilization      NEXT VISIT - CONT to progress MATRIX      Held:  LLLD HS / calf stretch 3x2 min  Heel slide PROM x8 - alt DF / PF - NT  LLLD heel prop x8 min 8lbs at start of visit  Recumbent bike x15 min for ROM and joint mobility -> NT    manual therapy techniques: Joint mobilizations, Myofacial release, and Soft tissue Mobilization were applied to the: Right knee for 00 minutes, including:    Held:  PFJ mobilizations all directions Grade III-IV holds and reps  PROM flexion / extension  Passive hip stretching    neuromuscular re-education activities to improve: Proprioception, Posture, and motor control for 10 minutes.    Quad activation:  LAQ 7.5 lbs 4x burn    Held:  SAQ holds 3x20 sec ea gab  Quad set holds 5x20 sec ea gab  Matrix leg extension DL 15lbs 4x burn  Matrix leg extension DL 25lbs 4x15   Matrix leg flexion DL 25lbs 4x15  K-tape PFJ    Raine participated in dynamic functional therapeutic activities to improve functional performance for 20  minutes, including:    Shuttle DL 2x10 3B  Shuttle DL YTB 2x10 1B1R  Shuttle SL 2x10 1B ea gab      Patient Education and Home Exercises       Education provided:   - See above    Written Home Exercises Provided: Patient instructed to cont prior HEP. Exercises were reviewed and Raine was able to demonstrate them prior to the end of the session.  Raine demonstrated good  understanding of the education provided. See EMR under Patient Instructions for exercises provided during therapy sessions    Assessment     Raj all interventions well within visit.     Demonstrates best improvement in knee flexion ROM with across body modified piriformis stretching, possibly secondary to reduced  quad guarding and myofascial tissue stretching of lateral thigh. Advised to cont with consistency with this along with quad NM activation / strengthening.    Cont to require PT intervention 2x a week prior to FU with MD to maximize function, ROM, and quad activation / strength.    Raine Is slowly progressing well towards her goals.   Pt prognosis is Good.     Pt will continue to benefit from skilled outpatient physical therapy to address the deficits listed in the problem list box on initial evaluation, provide pt/family education and to maximize pt's level of independence in the home and community environment.   Pt's spiritual, cultural and educational needs considered and pt agreeable to plan of care and goals.     Anticipated barriers to physical therapy: none    Goals:   Short Term Goals (3 Weeks):   1. Patient will be independent with home exercise program to supplement physical therapy treatment in improving functional status. MET  2. Patient will improve R lower extremity strength to at least 75% of L lower extremity strength as measured via MicroFet handheld dynamometer to improve strength for closed chain tasks. Not MET  3. Patient will improve R knee active range of motion to 0-90 degrees to promote increased ease of sit<>stand transfers. MET  4. Patient will perform timed up and go with less restrictive assistive device in < 20 seconds to improve gait speed and safety with community ambulation. MET     Long Term Goals (6 Weeks):   1. Patient will improve R lower extremity strength to at least 90% of L lower extremity strength as measured via MicroFet handheld dynamometer to improve strength for closed chain tasks. Not MET  2. Patient will improve the total FOTO Knee Survey Score to </= 20% limited to demonstrate increased perceived functional mobility. NOT MET  3. Patient will perform timed up and go with least restrictive assistive device in < 13.5  seconds to improve gait speed and safety with community  ambulation. MET  4. Patient will perform at least 15 sit to stands in 30 seconds without UE support to demonstrate increased functional strength. NOT MET  5. Patient will improve R knee active range of motion to 0-120 degrees to promote higher level transfers and transitions without limitation. NOT MET    Plan     Plan of care Certification: 6/7/2023 to TBD by MD Clark 2x a week prior to FU with MD in mid October    HENRY PALMA, PT, DPT, OCS

## 2023-10-02 ENCOUNTER — CLINICAL SUPPORT (OUTPATIENT)
Dept: REHABILITATION | Facility: HOSPITAL | Age: 68
End: 2023-10-02
Payer: COMMERCIAL

## 2023-10-02 DIAGNOSIS — T84.89XA POSTOPERATIVE STIFFNESS OF TOTAL KNEE REPLACEMENT, INITIAL ENCOUNTER: ICD-10-CM

## 2023-10-02 DIAGNOSIS — Z74.09 DECREASED FUNCTIONAL MOBILITY AND ENDURANCE: Primary | ICD-10-CM

## 2023-10-02 DIAGNOSIS — M25.669 POSTOPERATIVE STIFFNESS OF TOTAL KNEE REPLACEMENT, INITIAL ENCOUNTER: ICD-10-CM

## 2023-10-02 DIAGNOSIS — Z96.659 POSTOPERATIVE STIFFNESS OF TOTAL KNEE REPLACEMENT, INITIAL ENCOUNTER: ICD-10-CM

## 2023-10-02 DIAGNOSIS — M25.561 ACUTE PAIN OF RIGHT KNEE: ICD-10-CM

## 2023-10-02 PROCEDURE — 97112 NEUROMUSCULAR REEDUCATION: CPT

## 2023-10-02 PROCEDURE — 97110 THERAPEUTIC EXERCISES: CPT

## 2023-10-02 NOTE — PROGRESS NOTES
[facility]   Physical Therapy Treatment Note     Name: Raine Medley  Clinic Number: [id number]    Therapy Diagnosis:   Encounter Diagnoses   Name Primary?    Decreased functional mobility and endurance Yes    Acute pain of right knee     Postoperative stiffness of total knee replacement, initial encounter        Physician: Sanford Calvert III, *    Visit Date: 10/2/2023    Physician Orders: PT Eval and Treat   Medical Diagnosis from Referral: M17.11 (ICD-10-CM) - Primary osteoarthritis of right knee   Evaluation Date: 6/7/2023  Authorization Period Expiration: 12/31/2023  Plan of Care Expiration: TBD by MD  Progress Note Due: TBD  Visit # / Visits authorized: 36 / 50 + eval        FOTO: 2/3     PTA Visit #: 1 / 5      Time In: 1005  Time Out: 1105  Total Treatment Time: 60 minutes    Total Billable Time: 60 minutes    Precautions: Standard     Subjective     Patient reports: Cont consistency with HEP. L knee and thigh feels very sore today.    She was compliant with home exercise program. States she is doing her HEP 1x/day.   Response to previous treatment: appropriate muscle response  Functional change: ongoing    Pain: NT/10, currently  Location: Right knee      Objective      Objective Measures updated at progress report unless specified.   DOS 6/5/2023  *Patient is 17 weeks, 0 days s/p R TKA as of 10/2/2023.*       Date of Manipulation: 8/21/2023  *Patient is 6 week, 0 days s/p manipulation as of 10/2/2023.*       ROM Post Manipulation 8/21/2023:  8/30/2023: 0-3-90 deg at start of visit -> 0-95 deg at end of visit  9/1/2023: 0-3-90 deg at start of visit -> 0-97 deg at end of visit  9/18/2023: 0-98 deg at start -> 0-107 deg at end  9/22/2023: 0-96 deg at start -> 0-106 deg at end  9/25/2023: 0-92 deg at start -> 0-103 deg at end  9/29/2023: 0-95 deg at start -> 0-106 deg at end      Treatment     Raine received the treatments listed below:      therapeutic exercises to develop  ROM and flexibility for 20 minutes including:    Patient education:  - cont HEP compliance  - cont modified piriformis or adductor stretch for knee flexion    Prone quad stretch 5x30 sec ea gab  Modified piriformis stretch 3x30 sec ea gab  Adductor butterfly stretch in supine 2x3 min    NEXT VISIT - CONT to progress MATRIX      Held:  LLLD HS / calf stretch 3x2 min  Knee to chest stretch 5x30 sec ea gab  SB flexion fwd, R, L 30x5 sec ea direction - neural mobilization  Heel slide PROM x8 - alt DF / PF - NT  LLLD heel prop x8 min 8lbs at start of visit  Recumbent bike x15 min for ROM and joint mobility -> NT    manual therapy techniques: Joint mobilizations, Myofacial release, and Soft tissue Mobilization were applied to the: Right knee for 00 minutes, including:    Held:  PFJ mobilizations all directions Grade III-IV holds and reps  PROM flexion / extension  Passive hip stretching    neuromuscular re-education activities to improve: Proprioception, Posture, and motor control for 40 minutes.    Matrix leg flexion DL 35lbs 3x10  Matrix leg flexion SL 15lbs 1x burn ea gab    Matrix leg extension DL 35lbs 3x10  Matrix leg extension SL 15lbs 1x burn ea gab    Bridge ER [id number] sec hold ea     Held:  SAQ holds 3x20 sec ea gab  Quad set holds 5x20 sec ea gab  LAQ 7.5 lbs 4x burn  K-tape PFJ    Raine participated in dynamic functional therapeutic activities to improve functional performance for 00  minutes, including:    Held:  Shuttle DL 2x10 3B  Shuttle DL YTB 2x10 1B1R  Shuttle SL 2x10 1B ea gab      Patient Education and Home Exercises       Education provided:   - See above    Written Home Exercises Provided: Patient instructed to cont prior HEP. Exercises were reviewed and Raine was able to demonstrate them prior to the end of the session.  Raine demonstrated good  understanding of the education provided. See EMR under Patient Instructions for exercises provided during therapy sessions    Assessment     Raj all  interventions well within visit. Increasing NM activation and strength requiring increased challenge within visit. Min changes to ROM, but patient cont to report less R LE pain affecting function.     Cont to require PT intervention 2x a week prior to FU with MD to maximize function, ROM, and quad activation / strength.    Raine Is slowly progressing well towards her goals.   Pt prognosis is Good.     Pt will continue to benefit from skilled outpatient physical therapy to address the deficits listed in the problem list box on initial evaluation, provide pt/family education and to maximize pt's level of independence in the home and community environment.   Pt's spiritual, cultural and educational needs considered and pt agreeable to plan of care and goals.     Anticipated barriers to physical therapy: none    Goals:   Short Term Goals (3 Weeks):   1. Patient will be independent with home exercise program to supplement physical therapy treatment in improving functional status. MET  2. Patient will improve R lower extremity strength to at least 75% of L lower extremity strength as measured via MicroFet handheld dynamometer to improve strength for closed chain tasks. Not MET  3. Patient will improve R knee active range of motion to 0-90 degrees to promote increased ease of sit<>stand transfers. MET  4. Patient will perform timed up and go with less restrictive assistive device in < 20 seconds to improve gait speed and safety with community ambulation. MET     Long Term Goals (6 Weeks):   1. Patient will improve R lower extremity strength to at least 90% of L lower extremity strength as measured via MicroFet handheld dynamometer to improve strength for closed chain tasks. Not MET  2. Patient will improve the total FOTO Knee Survey Score to </= 20% limited to demonstrate increased perceived functional mobility. NOT MET  3. Patient will perform timed up and go with least restrictive assistive device in < 13.5  seconds to  improve gait speed and safety with community ambulation. MET  4. Patient will perform at least 15 sit to stands in 30 seconds without UE support to demonstrate increased functional strength. NOT MET  5. Patient will improve R knee active range of motion to 0-120 degrees to promote higher level transfers and transitions without limitation. NOT MET    Plan     Plan of care Certification: 6/7/2023 to TBD by MD Clark 2x a week prior to FU with MD in mid October    HENRY PALMA, PT, DPT, OCS

## 2023-10-03 DIAGNOSIS — M17.12 PRIMARY OSTEOARTHRITIS OF LEFT KNEE: Primary | ICD-10-CM

## 2023-10-03 NOTE — ASSESSMENT & PLAN NOTE
Patient denies and not treated with medication     Where Do You Want The Question To Include Opioid Counseling Located?: Case Summary Tab

## 2023-10-06 ENCOUNTER — CLINICAL SUPPORT (OUTPATIENT)
Dept: REHABILITATION | Facility: HOSPITAL | Age: 68
End: 2023-10-06
Payer: COMMERCIAL

## 2023-10-06 DIAGNOSIS — T84.89XA POSTOPERATIVE STIFFNESS OF TOTAL KNEE REPLACEMENT, INITIAL ENCOUNTER: ICD-10-CM

## 2023-10-06 DIAGNOSIS — M25.561 ACUTE PAIN OF RIGHT KNEE: ICD-10-CM

## 2023-10-06 DIAGNOSIS — Z96.659 POSTOPERATIVE STIFFNESS OF TOTAL KNEE REPLACEMENT, INITIAL ENCOUNTER: ICD-10-CM

## 2023-10-06 DIAGNOSIS — M25.669 POSTOPERATIVE STIFFNESS OF TOTAL KNEE REPLACEMENT, INITIAL ENCOUNTER: ICD-10-CM

## 2023-10-06 DIAGNOSIS — Z74.09 DECREASED FUNCTIONAL MOBILITY AND ENDURANCE: Primary | ICD-10-CM

## 2023-10-06 PROCEDURE — 97112 NEUROMUSCULAR REEDUCATION: CPT

## 2023-10-06 PROCEDURE — 97110 THERAPEUTIC EXERCISES: CPT

## 2023-10-06 NOTE — PROGRESS NOTES
[facility]   Physical Therapy Treatment Note     Name: Raine Medley  Clinic Number: [id number]    Therapy Diagnosis:   Encounter Diagnoses   Name Primary?    Decreased functional mobility and endurance Yes    Acute pain of right knee     Postoperative stiffness of total knee replacement, initial encounter      Physician: Sanford Calvert III, *    Visit Date: 10/6/2023    Physician Orders: PT Eval and Treat   Medical Diagnosis from Referral: M17.11 (ICD-10-CM) - Primary osteoarthritis of right knee   Evaluation Date: 6/7/2023  Authorization Period Expiration: 12/31/2023  Plan of Care Expiration: TBD by MD  Progress Note Due: TBD  Visit # / Visits authorized: 37 / 50 + eval        FOTO: 2/3     PTA Visit #: 1 / 5      Time In: 0905  Time Out:  1000  Total Treatment Time: 55 minutes    Total Billable Time: 55 minutes    Precautions: Standard     Subjective     Patient reports: L knee and hip are very sore today. L knee is swollen. Patient reports she did a lot of workaround the house and swelled up at night.    She was compliant with home exercise program. States she is doing her HEP 1x/day.   Response to previous treatment: appropriate muscle response  Functional change: ongoing    Pain: NT/10, currently  Location: Right knee      Objective      Objective Measures updated at progress report unless specified.   DOS 6/5/2023  *Patient is 17 weeks, 0 days s/p R TKA as of 10/2/2023.*       Date of Manipulation: 8/21/2023  *Patient is 6 week, 0 days s/p manipulation as of 10/2/2023.*       ROM Post Manipulation 8/21/2023:  8/30/2023: 0-3-90 deg at start of visit -> 0-95 deg at end of visit  9/1/2023: 0-3-90 deg at start of visit -> 0-97 deg at end of visit  9/18/2023: 0-98 deg at start -> 0-107 deg at end  9/22/2023: 0-96 deg at start -> 0-106 deg at end  9/25/2023: 0-92 deg at start -> 0-103 deg at end  9/29/2023: 0-95 deg at start -> 0-106 deg at end      Treatment     Raine mariola  the treatments listed below:      therapeutic exercises to develop ROM and flexibility for 15 minutes including:    Patient education:  - cont HEP compliance  - cont modified piriformis or adductor stretch for knee flexion    Prone quad stretch 5x30 sec ea gab  Adductor butterfly stretch in supine x5 min  Modified piriformis stretch 3x30 sec ea gab -NT      NEXT VISIT - CONT to progress MATRIX      Held:  LLLD HS / calf stretch 3x2 min  Knee to chest stretch 5x30 sec ea gab  SB flexion fwd, R, L 30x5 sec ea direction - neural mobilization  Heel slide PROM x8 - alt DF / PF - NT  LLLD heel prop x8 min 8lbs at start of visit  Recumbent bike x15 min for ROM and joint mobility -> NT    manual therapy techniques: Joint mobilizations, Myofacial release, and Soft tissue Mobilization were applied to the: Right knee for 00 minutes, including:    Held:  PFJ mobilizations all directions Grade III-IV holds and reps  PROM flexion / extension  Passive hip stretching    neuromuscular re-education activities to improve: Proprioception, Posture, and motor control for 40 minutes.    Bridge ER [id number] sec hold ea  LAQ 10lbs 3x burn - R only  HS curl prone 10lbs 3x10 - R only  SL hip abd 3x10 ea gab    Held because of availability:  Matrix leg flexion DL 35lbs 3x10  Matrix leg flexion SL 15lbs 1x burn ea gab  Matrix leg extension DL 35lbs 3x10  Matrix leg extension SL 15lbs 1x burn ea gab     Held:  SAQ holds 3x20 sec ea gab  Quad set holds 5x20 sec ea gab  LAQ 7.5 lbs 4x burn  K-tape PFJ    Raine participated in dynamic functional therapeutic activities to improve functional performance for 00  minutes, including:    Held:  Shuttle DL 2x10 3B  Shuttle DL YTB 2x10 1B1R  Shuttle SL 2x10 1B ea gab      Patient Education and Home Exercises       Education provided:   - See above    Written Home Exercises Provided: Patient instructed to cont prior HEP. Exercises were reviewed and Raine was able to demonstrate them prior to the end of the  session.  Raine demonstrated good  understanding of the education provided. See EMR under Patient Instructions for exercises provided during therapy sessions    Assessment     Raj all interventions well. Cont min to no changes to ROM, but patient cont to report less R LE pain affecting function. L knee pain continues to worsen vicki based on activity levels.     Cont to require PT intervention 2x a week prior to FU with MD to maximize function, ROM, and quad activation / strength.    Raine Is slowly progressing well towards her goals.   Pt prognosis is Good.     Pt will continue to benefit from skilled outpatient physical therapy to address the deficits listed in the problem list box on initial evaluation, provide pt/family education and to maximize pt's level of independence in the home and community environment.   Pt's spiritual, cultural and educational needs considered and pt agreeable to plan of care and goals.     Anticipated barriers to physical therapy: none    Goals:   Short Term Goals (3 Weeks):   1. Patient will be independent with home exercise program to supplement physical therapy treatment in improving functional status. MET  2. Patient will improve R lower extremity strength to at least 75% of L lower extremity strength as measured via MicroFet handheld dynamometer to improve strength for closed chain tasks. Not MET  3. Patient will improve R knee active range of motion to 0-90 degrees to promote increased ease of sit<>stand transfers. MET  4. Patient will perform timed up and go with less restrictive assistive device in < 20 seconds to improve gait speed and safety with community ambulation. MET     Long Term Goals (6 Weeks):   1. Patient will improve R lower extremity strength to at least 90% of L lower extremity strength as measured via MicroFet handheld dynamometer to improve strength for closed chain tasks. Not MET  2. Patient will improve the total FOTO Knee Survey Score to </= 20% limited to  demonstrate increased perceived functional mobility. NOT MET  3. Patient will perform timed up and go with least restrictive assistive device in < 13.5  seconds to improve gait speed and safety with community ambulation. MET  4. Patient will perform at least 15 sit to stands in 30 seconds without UE support to demonstrate increased functional strength. NOT MET  5. Patient will improve R knee active range of motion to 0-120 degrees to promote higher level transfers and transitions without limitation. NOT MET    Plan     Plan of care Certification: 6/7/2023 to TBD by MD Clark 2x a week prior to FU with MD in mid October    HENRY PALMA, PT, DPT, OCS

## 2023-10-11 ENCOUNTER — CLINICAL SUPPORT (OUTPATIENT)
Dept: REHABILITATION | Facility: HOSPITAL | Age: 68
End: 2023-10-11
Payer: COMMERCIAL

## 2023-10-11 DIAGNOSIS — M25.561 ACUTE PAIN OF RIGHT KNEE: ICD-10-CM

## 2023-10-11 DIAGNOSIS — Z96.659 POSTOPERATIVE STIFFNESS OF TOTAL KNEE REPLACEMENT, INITIAL ENCOUNTER: ICD-10-CM

## 2023-10-11 DIAGNOSIS — M25.669 POSTOPERATIVE STIFFNESS OF TOTAL KNEE REPLACEMENT, INITIAL ENCOUNTER: ICD-10-CM

## 2023-10-11 DIAGNOSIS — T84.89XA POSTOPERATIVE STIFFNESS OF TOTAL KNEE REPLACEMENT, INITIAL ENCOUNTER: ICD-10-CM

## 2023-10-11 DIAGNOSIS — Z74.09 DECREASED FUNCTIONAL MOBILITY AND ENDURANCE: Primary | ICD-10-CM

## 2023-10-11 PROCEDURE — 97140 MANUAL THERAPY 1/> REGIONS: CPT

## 2023-10-11 PROCEDURE — 97112 NEUROMUSCULAR REEDUCATION: CPT

## 2023-10-11 PROCEDURE — 97530 THERAPEUTIC ACTIVITIES: CPT

## 2023-10-11 PROCEDURE — 97110 THERAPEUTIC EXERCISES: CPT

## 2023-10-11 NOTE — PROGRESS NOTES
[facility]   Physical Therapy Treatment Note     Name: Raine Medley  Clinic Number: [id number]    Therapy Diagnosis:   Encounter Diagnoses   Name Primary?    Decreased functional mobility and endurance Yes    Acute pain of right knee     Postoperative stiffness of total knee replacement, initial encounter        Physician: Sanford Calvert III, *    Visit Date: 10/11/2023    Physician Orders: PT Eval and Treat   Medical Diagnosis from Referral: M17.11 (ICD-10-CM) - Primary osteoarthritis of right knee   Evaluation Date: 6/7/2023  Authorization Period Expiration: 12/31/2023  Plan of Care Expiration: TBD by MD  Progress Note Due: TBD  Visit # / Visits authorized: 38 / 50 + eval         FOTO: 2/3     PTA Visit #: 1 / 5      Time In: 1400  Time Out: 1500  Total Treatment Time: 60 minutes    Total Billable Time: 60 minutes    Precautions: Standard     Subjective     Patient reports: Monday she went to [employer] and felt like R leg went numb and L knee was painful. Subsided with rest at home.    She was compliant with home exercise program. States she is doing her HEP 1x/day.   Response to previous treatment: appropriate muscle response  Functional change: ongoing    Pain: NT/10, currently  Location: Right knee      Objective      Objective Measures updated at progress report unless specified.   DOS 6/5/2023  *Patient is 17 weeks, 0 days s/p R TKA as of 10/2/2023.*       Date of Manipulation: 8/21/2023  *Patient is 6 week, 0 days s/p manipulation as of 10/2/2023.*       ROM Post Manipulation 8/21/2023:  8/30/2023: 0-3-90 deg at start of visit -> 0-95 deg at end of visit  9/1/2023: 0-3-90 deg at start of visit -> 0-97 deg at end of visit  9/18/2023: 0-98 deg at start -> 0-107 deg at end  9/22/2023: 0-96 deg at start -> 0-106 deg at end  9/25/2023: 0-92 deg at start -> 0-103 deg at end  9/29/2023: 0-95 deg at start -> 0-106 deg at end  10/11/2023: 0-100 deg at start -> 0-106 deg at  end      Treatment     Raine received the treatments listed below:      therapeutic exercises to develop ROM and flexibility for 10 minutes including:    Patient education:  - cont HEP compliance  - cont modified piriformis or adductor stretch for knee flexion    DKTC stretch 3x30 sex  Adductor butterfly stretch in supine 3x30 sec  Modified piriformis stretch 3x30 sec      NEXT VISIT - CONT to progress MATRIX      Held:  Prone quad stretch 5x30 sec ea gab  LLLD HS / calf stretch 3x2 min  SB flexion fwd, R, L 30x5 sec ea direction - neural mobilization  Heel slide PROM x8 - alt DF / PF - NT    manual therapy techniques: Joint mobilizations, Myofacial release, and Soft tissue Mobilization were applied to the: Right knee for 10 minutes, including:    Assessment  Traction with AP  PFJ mobilizations  Fat pad mobilizations  PROM knee flexion and hip stretching - tensioned most with hip flexion, abd, ankle DF    neuromuscular re-education activities to improve: Proprioception, Posture, and motor control for 25 minutes.    Matrix leg flexion DL 45lbs 3x10  Matrix leg extension DL 35lbs 3x10  Matrix leg extension SL 15lbs 1x burn ea gab       Held:  Bridge ER [id number] sec hold ea  LAQ 10lbs 3x burn - R only  HS curl prone 10lbs 3x10 - R only  SL hip abd 3x10 ea gab  SAQ holds 3x20 sec ea gab  Quad set holds 5x20 sec ea gab  LAQ 7.5 lbs 4x burn  K-tape PFJ    Raine participated in dynamic functional therapeutic activities to improve functional performance for 15 minutes, including:    Progressive increased Knee Flexion Leg press 4x20 ~40lbs      Held:  Shuttle DL 2x10 3B  Shuttle DL YTB 2x10 1B1R  Shuttle SL 2x10 1B ea gab      Patient Education and Home Exercises       Education provided:   - See above    Written Home Exercises Provided: Patient instructed to cont prior HEP. Exercises were reviewed and Raine was able to demonstrate them prior to the end of the session.  Raine demonstrated good  understanding of the education  provided. See EMR under Patient Instructions for exercises provided during therapy sessions    Assessment     Raj all interventions well. Improved flexion ROM at start of visit, but continues to max out around 105 deg. Possible neural tension component to continued symptoms vicki following N/T event in R LE recently at [employer].    L knee pain continues to worsen vicki based on activity levels.     Cont to require PT intervention 2x a week prior to FU with MD to maximize function, ROM, and quad activation / strength.    Raine Is slowly progressing well towards her goals.   Pt prognosis is Good.     Pt will continue to benefit from skilled outpatient physical therapy to address the deficits listed in the problem list box on initial evaluation, provide pt/family education and to maximize pt's level of independence in the home and community environment.   Pt's spiritual, cultural and educational needs considered and pt agreeable to plan of care and goals.     Anticipated barriers to physical therapy: none    Goals:   Short Term Goals (3 Weeks):   1. Patient will be independent with home exercise program to supplement physical therapy treatment in improving functional status. MET  2. Patient will improve R lower extremity strength to at least 75% of L lower extremity strength as measured via MicroFet handheld dynamometer to improve strength for closed chain tasks. Not MET  3. Patient will improve R knee active range of motion to 0-90 degrees to promote increased ease of sit<>stand transfers. MET  4. Patient will perform timed up and go with less restrictive assistive device in < 20 seconds to improve gait speed and safety with community ambulation. MET     Long Term Goals (6 Weeks):   1. Patient will improve R lower extremity strength to at least 90% of L lower extremity strength as measured via MicroFet handheld dynamometer to improve strength for closed chain tasks. Not MET  2. Patient will improve the total FOTO Knee  Survey Score to </= 20% limited to demonstrate increased perceived functional mobility. NOT MET  3. Patient will perform timed up and go with least restrictive assistive device in < 13.5  seconds to improve gait speed and safety with community ambulation. MET  4. Patient will perform at least 15 sit to stands in 30 seconds without UE support to demonstrate increased functional strength. NOT MET  5. Patient will improve R knee active range of motion to 0-120 degrees to promote higher level transfers and transitions without limitation. NOT MET    Plan     Plan of care Certification: 6/7/2023 to TBD by MD Clark 2x a week prior to FU with MD in mid October    HENRY PALMA, PT, DPT, OCS

## 2023-10-13 ENCOUNTER — CLINICAL SUPPORT (OUTPATIENT)
Dept: REHABILITATION | Facility: HOSPITAL | Age: 68
End: 2023-10-13
Payer: COMMERCIAL

## 2023-10-13 DIAGNOSIS — Z74.09 DECREASED FUNCTIONAL MOBILITY AND ENDURANCE: Primary | ICD-10-CM

## 2023-10-13 DIAGNOSIS — Z96.659 POSTOPERATIVE STIFFNESS OF TOTAL KNEE REPLACEMENT, INITIAL ENCOUNTER: ICD-10-CM

## 2023-10-13 DIAGNOSIS — M25.561 ACUTE PAIN OF RIGHT KNEE: ICD-10-CM

## 2023-10-13 DIAGNOSIS — M25.669 POSTOPERATIVE STIFFNESS OF TOTAL KNEE REPLACEMENT, INITIAL ENCOUNTER: ICD-10-CM

## 2023-10-13 DIAGNOSIS — T84.89XA POSTOPERATIVE STIFFNESS OF TOTAL KNEE REPLACEMENT, INITIAL ENCOUNTER: ICD-10-CM

## 2023-10-13 PROCEDURE — 97110 THERAPEUTIC EXERCISES: CPT

## 2023-10-13 PROCEDURE — 97112 NEUROMUSCULAR REEDUCATION: CPT

## 2023-10-13 NOTE — PROGRESS NOTES
[facility]   Physical Therapy Treatment Note     Name: Raine Medley  Clinic Number: [id number]    Therapy Diagnosis:   Encounter Diagnoses   Name Primary?    Decreased functional mobility and endurance Yes    Acute pain of right knee     Postoperative stiffness of total knee replacement, initial encounter        Physician: Sanford Calvert III, *    Visit Date: 10/13/2023    Physician Orders: PT Eval and Treat   Medical Diagnosis from Referral: M17.11 (ICD-10-CM) - Primary osteoarthritis of right knee   Evaluation Date: 6/7/2023  Authorization Period Expiration: 12/31/2023  Plan of Care Expiration: TBD by MD  Progress Note Due: TBD  Visit # / Visits authorized: 39 / 50 + eval         FOTO: 2/3     PTA Visit #: 1 / 5      Time In: 1100  Time Out: 1155  Total Treatment Time: 55 minutes    Total Billable Time: 55 minutes    Precautions: Standard     Subjective     Patient reports: R knee cont to feel good. Mild L knee pain. MD follow up next week.    She was compliant with home exercise program. States she is doing her HEP 1x/day.   Response to previous treatment: appropriate muscle response  Functional change: ongoing    Pain: NT/10, currently  Location: Right knee      Objective      Objective Measures updated at progress report unless specified.   DOS 6/5/2023  *Patient is 17 weeks, 0 days s/p R TKA as of 10/2/2023.*       Date of Manipulation: 8/21/2023  *Patient is 6 week, 0 days s/p manipulation as of 10/2/2023.*       ROM Post Manipulation 8/21/2023:  8/30/2023: 0-3-90 deg at start of visit -> 0-95 deg at end of visit  9/1/2023: 0-3-90 deg at start of visit -> 0-97 deg at end of visit  9/18/2023: 0-98 deg at start -> 0-107 deg at end  9/22/2023: 0-96 deg at start -> 0-106 deg at end  9/25/2023: 0-92 deg at start -> 0-103 deg at end  9/29/2023: 0-95 deg at start -> 0-106 deg at end  10/11/2023: 0-100 deg at start -> 0-106 deg at end  10/11/2023: 0-109 deg at end    Treatment      Raine received the treatments listed below:      therapeutic exercises to develop ROM and flexibility for 30 minutes including:    Patient education:  - cont HEP compliance  - cont modified piriformis or adductor stretch for knee flexion    SB flexion fwd 30x5 sec ea direction - neural mobilization  LTR 30x5 sec ea  Book opener 30x5 sec ea  LLLD heel slide supine x8 min 3lbs      Held:  DKTC stretch 3x30 sex  Adductor butterfly stretch in supine 3x30 sec  Modified piriformis stretch 3x30 sec  Prone quad stretch 5x30 sec ea gab  LLLD HS / calf stretch 3x2 min  Heel slide PROM x8 - alt DF / PF - NT    manual therapy techniques: Joint mobilizations, Myofacial release, and Soft tissue Mobilization were applied to the: Right knee for 00 minutes, including:    Assessment  Traction with AP  PFJ mobilizations  Fat pad mobilizations  PROM knee flexion and hip stretching - tensioned most with hip flexion, abd, ankle DF    neuromuscular re-education activities to improve: Proprioception, Posture, and motor control for 25 minutes.    Matrix leg flexion DL 45lbs 3x10  Matrix leg extension DL 35lbs 3x10  Matrix leg extension SL 15lbs 2x burn ea gab      Held:  Bridge ER [id number] sec hold ea  LAQ 10lbs 3x burn - R only  HS curl prone 10lbs 3x10 - R only  SL hip abd 3x10 ea gab  SAQ holds 3x20 sec ea gab  Quad set holds 5x20 sec ea gab  LAQ 7.5 lbs 4x burn  K-tape PFJ    Raine participated in dynamic functional therapeutic activities to improve functional performance for 00 minutes, including:    Held:  Progressive increased Knee Flexion Leg press 4x20 ~40lbs  Shuttle DL 2x10 3B  Shuttle DL YTB 2x10 1B1R  Shuttle SL 2x10 1B ea gab      Patient Education and Home Exercises       Education provided:   - See above    Written Home Exercises Provided: Patient instructed to cont prior HEP. Exercises were reviewed and Raine was able to demonstrate them prior to the end of the session.  Raine demonstrated good  understanding of the  education provided. See EMR under Patient Instructions for exercises provided during therapy sessions    Assessment     Raj all interventions well. Small improvement in flexion with some focus on neural mobilization prior to passive LLLD stretching.    L knee pain continues to worsen vicki based on activity levels.     Cont to require PT intervention 2x a week prior to FU with MD to maximize function, ROM, and quad activation / strength.    Raine Is slowly progressing well towards her goals.   Pt prognosis is Good.     Pt will continue to benefit from skilled outpatient physical therapy to address the deficits listed in the problem list box on initial evaluation, provide pt/family education and to maximize pt's level of independence in the home and community environment.   Pt's spiritual, cultural and educational needs considered and pt agreeable to plan of care and goals.     Anticipated barriers to physical therapy: none    Goals:   Short Term Goals (3 Weeks):   1. Patient will be independent with home exercise program to supplement physical therapy treatment in improving functional status. MET  2. Patient will improve R lower extremity strength to at least 75% of L lower extremity strength as measured via MicroFet handheld dynamometer to improve strength for closed chain tasks. Not MET  3. Patient will improve R knee active range of motion to 0-90 degrees to promote increased ease of sit<>stand transfers. MET  4. Patient will perform timed up and go with less restrictive assistive device in < 20 seconds to improve gait speed and safety with community ambulation. MET     Long Term Goals (6 Weeks):   1. Patient will improve R lower extremity strength to at least 90% of L lower extremity strength as measured via MicroFet handheld dynamometer to improve strength for closed chain tasks. Not MET  2. Patient will improve the total FOTO Knee Survey Score to </= 20% limited to demonstrate increased perceived functional  mobility. NOT MET  3. Patient will perform timed up and go with least restrictive assistive device in < 13.5  seconds to improve gait speed and safety with community ambulation. MET  4. Patient will perform at least 15 sit to stands in 30 seconds without UE support to demonstrate increased functional strength. NOT MET  5. Patient will improve R knee active range of motion to 0-120 degrees to promote higher level transfers and transitions without limitation. NOT MET    Plan     Plan of care Certification: 6/7/2023 to TBD by MD Clark 2x a week prior to FU with MD in mid October    HENRY PALMA, PT, DPT, OCS

## 2023-10-17 ENCOUNTER — PATIENT MESSAGE (OUTPATIENT)
Dept: ORTHOPEDICS | Facility: CLINIC | Age: 68
End: 2023-10-17
Payer: COMMERCIAL

## 2023-10-18 ENCOUNTER — CLINICAL SUPPORT (OUTPATIENT)
Dept: REHABILITATION | Facility: HOSPITAL | Age: 68
End: 2023-10-18
Payer: COMMERCIAL

## 2023-10-18 DIAGNOSIS — T84.89XA POSTOPERATIVE STIFFNESS OF TOTAL KNEE REPLACEMENT, INITIAL ENCOUNTER: ICD-10-CM

## 2023-10-18 DIAGNOSIS — M25.561 ACUTE PAIN OF RIGHT KNEE: ICD-10-CM

## 2023-10-18 DIAGNOSIS — M25.669 POSTOPERATIVE STIFFNESS OF TOTAL KNEE REPLACEMENT, INITIAL ENCOUNTER: ICD-10-CM

## 2023-10-18 DIAGNOSIS — Z74.09 DECREASED FUNCTIONAL MOBILITY AND ENDURANCE: Primary | ICD-10-CM

## 2023-10-18 DIAGNOSIS — Z96.659 POSTOPERATIVE STIFFNESS OF TOTAL KNEE REPLACEMENT, INITIAL ENCOUNTER: ICD-10-CM

## 2023-10-18 PROCEDURE — 97110 THERAPEUTIC EXERCISES: CPT

## 2023-10-18 PROCEDURE — 97112 NEUROMUSCULAR REEDUCATION: CPT

## 2023-10-18 PROCEDURE — 97530 THERAPEUTIC ACTIVITIES: CPT

## 2023-10-18 NOTE — PROGRESS NOTES
[facility]   Physical Therapy Treatment Note     Name: Raine Medley  Clinic Number: [id number]    Therapy Diagnosis:   Encounter Diagnoses   Name Primary?    Decreased functional mobility and endurance Yes    Acute pain of right knee     Postoperative stiffness of total knee replacement, initial encounter      Physician: Sanford Calvert III, *    Visit Date: 10/18/2023    Physician Orders: PT Eval and Treat   Medical Diagnosis from Referral: M17.11 (ICD-10-CM) - Primary osteoarthritis of right knee   Evaluation Date: 6/7/2023  Authorization Period Expiration: 12/31/2023  Plan of Care Expiration: TBD by MD  Progress Note Due: TBD  Visit # / Visits authorized: 40 / 50 + eval         FOTO: 2/3     PTA Visit #: 1 / 5      Time In: 1400  Time Out:  1455  Total Treatment Time: 55 minutes    Total Billable Time: 55 minutes    Precautions: Standard     Subjective     Patient reports: FU with MD tomorrow. Is unsure about stairs for return to work.    She was compliant with home exercise program. States she is doing her HEP 1x/day.   Response to previous treatment: appropriate muscle response  Functional change: ongoing    Pain: NT/10, currently  Location: Right knee      Objective      Objective Measures updated at progress report unless specified.   DOS 6/5/2023  *Patient is 19 weeks, 2 days s/p R TKA as of 10/18/2023.*       Date of Manipulation: 8/21/2023  *Patient is 8 week, 2 days s/p manipulation as of 10/18/2023.*       ROM Post Manipulation 8/21/2023:  8/30/2023: 0-3-90 deg at start of visit -> 0-95 deg at end of visit  9/1/2023: 0-3-90 deg at start of visit -> 0-97 deg at end of visit  9/18/2023: 0-98 deg at start -> 0-107 deg at end  9/22/2023: 0-96 deg at start -> 0-106 deg at end  9/25/2023: 0-92 deg at start -> 0-103 deg at end  9/29/2023: 0-95 deg at start -> 0-106 deg at end  10/11/2023: 0-100 deg at start -> 0-106 deg at end  10/11/2023: 0-109 deg at end    Treatment      Raine received the treatments listed below:      therapeutic exercises to develop ROM and flexibility for 15 minutes including:    Patient education:  - cont HEP compliance  - cont modified piriformis or adductor stretch for knee flexion    LTR 30x5 sec ea  - neural mobilization  SB flexion fwd 30x5 sec ea direction - neural mobilization      Held:  Book opener 30x5 sec ea  LLLD heel slide supine x8 min 3lbs  DKTC stretch 3x30 sex  Adductor butterfly stretch in supine 3x30 sec  Modified piriformis stretch 3x30 sec  Prone quad stretch 5x30 sec ea gab  LLLD HS / calf stretch 3x2 min  Heel slide PROM x8 - alt DF / PF - NT    manual therapy techniques: Joint mobilizations, Myofacial release, and Soft tissue Mobilization were applied to the: Right knee for 00 minutes, including:    Assessment  Traction with AP  PFJ mobilizations  Fat pad mobilizations  PROM knee flexion and hip stretching - tensioned most with hip flexion, abd, ankle DF    neuromuscular re-education activities to improve: Proprioception, Posture, and motor control for 30 minutes.    Patient education:  - cont focus on     Matrix leg flexion DL 45lbs 3x10  Matrix leg extension DL 35lbs 3x10  LAQ w/ 5lbs 1x fail ea gab  LAQ w/ 10 lbs 1x fail ea gab      Held:  Matrix leg extension SL 15lbs 2x burn ea gab  Bridge ER [id number] sec hold ea  LAQ 10lbs 3x burn - R only  HS curl prone 10lbs 3x10 - R only  SL hip abd 3x10 ea gab  SAQ holds 3x20 sec ea gab  Quad set holds 5x20 sec ea gab  LAQ 7.5 lbs 4x burn  K-tape PFJ    Raine participated in dynamic functional therapeutic activities to improve functional performance for 10 minutes, including:    Stair assessment: ascent WNL; descent inadequate knee flexion on R and inadequate eccentric control on L    2x25 steps ascent / descent    Held:  Progressive increased Knee Flexion Leg press 4x20 ~40lbs  Shuttle DL 2x10 3B  Shuttle DL YTB 2x10 1B1R  Shuttle SL 2x10 1B ea gab      Patient Education and Home Exercises        Education provided:   - See above    Written Home Exercises Provided: Patient instructed to cont prior HEP. Exercises were reviewed and Raine was able to demonstrate them prior to the end of the session.  Raine demonstrated good  understanding of the education provided. See EMR under Patient Instructions for exercises provided during therapy sessions    Assessment     Patient demonstrates minimal carryover and changes to flexion ROM following AMADEO with patient ~ deg at start of visit, which improves to ~110 deg at best. Functionally R knee is josefina activities well including walking, sit to stand, and stairs.     She does continue to endorse worsening L knee pain to be further assessed by MD at visit tomorrow. May benefit from PT to focus on L knee vicki with return to work upcoming.    Await changes in POC per MD follow up.    Raine Is slowly progressing well towards her goals.   Pt prognosis is Good.     Pt will continue to benefit from skilled outpatient physical therapy to address the deficits listed in the problem list box on initial evaluation, provide pt/family education and to maximize pt's level of independence in the home and community environment.   Pt's spiritual, cultural and educational needs considered and pt agreeable to plan of care and goals.     Anticipated barriers to physical therapy: none    Goals:   Short Term Goals (3 Weeks):   1. Patient will be independent with home exercise program to supplement physical therapy treatment in improving functional status. MET  2. Patient will improve R lower extremity strength to at least 75% of L lower extremity strength as measured via MicroFet handheld dynamometer to improve strength for closed chain tasks. Not MET  3. Patient will improve R knee active range of motion to 0-90 degrees to promote increased ease of sit<>stand transfers. MET  4. Patient will perform timed up and go with less restrictive assistive device in < 20 seconds to improve gait speed  and safety with community ambulation. MET     Long Term Goals (6 Weeks):   1. Patient will improve R lower extremity strength to at least 90% of L lower extremity strength as measured via MicroFet handheld dynamometer to improve strength for closed chain tasks. Not MET  2. Patient will improve the total FOTO Knee Survey Score to </= 20% limited to demonstrate increased perceived functional mobility. NOT MET  3. Patient will perform timed up and go with least restrictive assistive device in < 13.5  seconds to improve gait speed and safety with community ambulation. MET  4. Patient will perform at least 15 sit to stands in 30 seconds without UE support to demonstrate increased functional strength. NOT MET  5. Patient will improve R knee active range of motion to 0-120 degrees to promote higher level transfers and transitions without limitation. NOT MET    Plan     Plan of care Certification: 6/7/2023 to TBD by MD Clark 2x a week prior to FU with MD in mid October    HENRY PALMA, PT, DPT, OCS

## 2023-10-19 ENCOUNTER — HOSPITAL ENCOUNTER (OUTPATIENT)
Dept: RADIOLOGY | Facility: HOSPITAL | Age: 68
Discharge: HOME | End: 2023-10-19
Attending: ORTHOPAEDIC SURGERY
Payer: COMMERCIAL

## 2023-10-19 ENCOUNTER — PATIENT MESSAGE (OUTPATIENT)
Dept: REHABILITATION | Facility: HOSPITAL | Age: 68
End: 2023-10-19
Payer: COMMERCIAL

## 2023-10-19 ENCOUNTER — OFFICE VISIT (OUTPATIENT)
Dept: ORTHOPEDICS | Facility: CLINIC | Age: 68
End: 2023-10-19
Payer: COMMERCIAL

## 2023-10-19 VITALS — BODY MASS INDEX: 25.32 KG/M2 | HEIGHT: 61 IN | WEIGHT: 134.13 LBS

## 2023-10-19 DIAGNOSIS — M17.12 PRIMARY OSTEOARTHRITIS OF LEFT KNEE: Primary | ICD-10-CM

## 2023-10-19 DIAGNOSIS — M17.12 PRIMARY OSTEOARTHRITIS OF LEFT KNEE: ICD-10-CM

## 2023-10-19 DIAGNOSIS — Z96.651 STATUS POST RIGHT KNEE REPLACEMENT: ICD-10-CM

## 2023-10-19 PROCEDURE — 73562 X-RAY EXAM OF KNEE 3: CPT | Mod: 26,RT,, | Performed by: RADIOLOGY

## 2023-10-19 PROCEDURE — 73562 XR KNEE ORTHO LEFT WITH FLEXION: ICD-10-PCS | Mod: 26,RT,, | Performed by: RADIOLOGY

## 2023-10-19 PROCEDURE — 99999 PR PBB SHADOW E&M-EST. PATIENT-LVL III: CPT | Mod: PBBFAC,,, | Performed by: ORTHOPAEDIC SURGERY

## 2023-10-19 PROCEDURE — 99499 NO LOS: ICD-10-PCS | Mod: S$GLB,,, | Performed by: ORTHOPAEDIC SURGERY

## 2023-10-19 PROCEDURE — 20610 LARGE JOINT ASPIRATION/INJECTION: L KNEE: ICD-10-PCS | Mod: LT,S$GLB,, | Performed by: ORTHOPAEDIC SURGERY

## 2023-10-19 PROCEDURE — 20610 DRAIN/INJ JOINT/BURSA W/O US: CPT | Mod: LT,S$GLB,, | Performed by: ORTHOPAEDIC SURGERY

## 2023-10-19 PROCEDURE — 99999 PR PBB SHADOW E&M-EST. PATIENT-LVL III: ICD-10-PCS | Mod: PBBFAC,,, | Performed by: ORTHOPAEDIC SURGERY

## 2023-10-19 PROCEDURE — 73564 X-RAY EXAM KNEE 4 OR MORE: CPT | Mod: 26,LT,, | Performed by: RADIOLOGY

## 2023-10-19 PROCEDURE — 99499 UNLISTED E&M SERVICE: CPT | Mod: S$GLB,,, | Performed by: ORTHOPAEDIC SURGERY

## 2023-10-19 PROCEDURE — 73562 X-RAY EXAM OF KNEE 3: CPT | Mod: TC,RT

## 2023-10-19 PROCEDURE — 73564 XR KNEE ORTHO LEFT WITH FLEXION: ICD-10-PCS | Mod: 26,LT,, | Performed by: RADIOLOGY

## 2023-10-19 NOTE — PROGRESS NOTES
"  Subjective:     HPI:   Raine Medley is a 68 y.o. female who presents for rpt eval R TKA and L knee synvisc injection     R TKA doing much better, no pain for past 3 weeks "doing well" she is much happier with her progress    L knee 3/10 pain     Objective:   Body mass index is 25.35 kg/m².  Exam:  R knee walking well, no limp/antalgia  6-100 ROM      Imaging:  L knee Klg4 varus arthritis, severe bone on bone      Assessment:       ICD-10-CM ICD-9-CM   1. Primary osteoarthritis of left knee  M17.12 715.16   2. 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    F/u 3 months L knee CSI  no XR    Orders Placed This Encounter   Procedures    Ambulatory referral/consult to Physical/Occupational Therapy     Standing Status:   Future     Standing Expiration Date:   11/19/2024     Referral Priority:   Routine     Referral Type:   Physical Medicine     Referral Reason:   Specialty Services Required     Number of Visits Requested:   1             Past Medical History:   Diagnosis Date    Asthma     as a child    Gastroesophageal reflux disease without esophagitis 7/19/2023    Hyperlipidemia     Microcytic anemia 5/15/2023       Past Surgical History:   Procedure Laterality Date    CHOLECYSTECTOMY      COLONOSCOPY N/A 1/3/2019    Procedure: COLONOSCOPY;  Surgeon: Gaston Beebe MD;  Location: [address];  Service: Endoscopy;  Laterality: N/A;    KNEE JOINT MANIPULATION Right 8/21/2023    Procedure: MANIPULATION, KNEE: RIGHT: SAME DAY;  Surgeon: Sanford Calvert III, MD;  Location: [facility];  Service: Orthopedics;  Laterality: Right;    TOTAL KNEE ARTHROPLASTY Right 6/5/2023    Procedure: ARTHROPLASTY, KNEE, TOTAL: RIGHT: DEPUY - ATTUNE;  Surgeon: Sanford Calvert III, MD;  Location: [facility];  Service: Orthopedics;  Laterality: Right;       Family History   Problem " Relation Age of Onset    No Known Problems Mother     No Known Problems Father     No Known Problems Sister     No Known Problems Daughter     No Known Problems Son     No Known Problems Son     No Known Problems Son     Melanoma Neg Hx     Lupus Neg Hx     Psoriasis Neg Hx     Colon cancer Neg Hx     Esophageal cancer Neg Hx     Stomach cancer Neg Hx        Social History     Socioeconomic History    Marital status:    Occupational History     Employer: [employer]   Tobacco Use    Smoking status: Never     Passive exposure: Never    Smokeless tobacco: Never   Substance and Sexual Activity    Alcohol use: No    Drug use: No   Social History Narrative    She lives in [place] with her 15-year-old son in [place]. 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 She is from [facility] [place] [place].

## 2023-10-30 NOTE — PROCEDURES
"Raine Medley is a 68 y.o. female patient.    Weight: 60.9 kg (134 lb 2.4 oz) (10/19/23 1538)  Height: 5' 1" (154.9 cm) (10/19/23 1538)       Large Joint Aspiration/Injection: L knee    Date/Time: 10/19/2023 3:40 PM    Performed by: Sanford Calvert III, MD  Authorized by: Sanford Calvert III, MD    Consent Done?:  Yes (Verbal)  Indications:  Pain  Timeout: prior to procedure the correct patient, procedure, and site was verified    Prep: patient was prepped and draped in usual sterile fashion      Details:  Needle Size:  18 G  Location:  Knee  Site:  L knee  Medications:  48 mg hylan g-f 20 48 mg/6 mL  Aspirate:  Clear and yellow  Patient tolerance:  Patient tolerated the procedure well with no immediate complications      10/30/2023    "

## 2023-11-02 ENCOUNTER — TELEPHONE (OUTPATIENT)
Dept: ENDOSCOPY | Facility: HOSPITAL | Age: 68
End: 2023-11-02
Payer: COMMERCIAL

## 2023-11-02 NOTE — TELEPHONE ENCOUNTER
Pt contacted to reschedule EGD/colonoscopy due to change in Dr. Shell's scoping schedule on 12/20/23. Spoke to Pt's daughter Maryam. Pt's daughter satted that the Pt did not want to rescghedule in 2023, she would like to reschedule in February 2024 due to no time off available with the Pt's work schedule due to recent orthopedic surgery.   Spoke to Pt's daughter Maryam to reschedule procedure(s) Colonoscopy/EGD       Physician to perform procedure(s) Dr. MIRACLE Shell  Date of Procedure (s) 2/6/24  Arrival Time 12:15 PM  Time of Procedure(s) 1:15 PM   Location of Procedure(s) [address]  Type of Rx Prep sent to patient: PEG  Instructions provided to patient via [facility]    Patient was informed on the following information and verbalized understanding. Screening questionnaire reviewed with patient and complete. If procedure requires anesthesia, a responsible adult needs to be present to accompany the patient home, patient cannot drive after receiving anesthesia. Appointment details are tentative, especially check-in time. Patient will receive a prep-op call 4 days prior to confirm check-in time for procedure. If applicable the patient should contact their pharmacy to verify Rx for procedure prep is ready for pick-up. Patient was advised to call the scheduling department at [phone] if pharmacy states no Rx is available. Patient was advised to call the endoscopy scheduling department if any questions or concerns arise.       Endoscopy Scheduling Department

## 2023-11-03 ENCOUNTER — CLINICAL SUPPORT (OUTPATIENT)
Dept: REHABILITATION | Facility: HOSPITAL | Age: 68
End: 2023-11-03
Payer: COMMERCIAL

## 2023-11-03 DIAGNOSIS — Z96.659 POSTOPERATIVE STIFFNESS OF TOTAL KNEE REPLACEMENT, INITIAL ENCOUNTER: ICD-10-CM

## 2023-11-03 DIAGNOSIS — Z74.09 DECREASED FUNCTIONAL MOBILITY AND ENDURANCE: Primary | ICD-10-CM

## 2023-11-03 DIAGNOSIS — T84.89XA POSTOPERATIVE STIFFNESS OF TOTAL KNEE REPLACEMENT, INITIAL ENCOUNTER: ICD-10-CM

## 2023-11-03 DIAGNOSIS — M25.561 ACUTE PAIN OF RIGHT KNEE: ICD-10-CM

## 2023-11-03 DIAGNOSIS — M25.669 POSTOPERATIVE STIFFNESS OF TOTAL KNEE REPLACEMENT, INITIAL ENCOUNTER: ICD-10-CM

## 2023-11-03 PROCEDURE — 97530 THERAPEUTIC ACTIVITIES: CPT

## 2023-11-03 PROCEDURE — 97112 NEUROMUSCULAR REEDUCATION: CPT

## 2023-11-03 NOTE — PROGRESS NOTES
[facility]   Physical Therapy Treatment Note     Name: Raine Medley  Clinic Number: [id number]    Therapy Diagnosis:   Encounter Diagnoses   Name Primary?    Decreased functional mobility and endurance Yes    Acute pain of right knee     Postoperative stiffness of total knee replacement, initial encounter        Physician: Sanford Calvert III, *    Visit Date: 11/3/2023    Physician Orders: PT Eval and Treat   Medical Diagnosis from Referral: M17.11 (ICD-10-CM) - Primary osteoarthritis of right knee   Evaluation Date: 6/7/2023  Authorization Period Expiration: 12/31/2023  Plan of Care Expiration: 12/31/2023  Progress Note Due: 12/15/2023  Visit # / Visits authorized: 41 / 50 + eval      FOTO: 2/3     PTA Visit #: 1 / 5      Time In: 1600  Time Out: 1700  Total Treatment Time: 60 minutes    Total Billable Time: 60 minutes    Precautions: Standard     Subjective     Patient reports: Michael knees (L > R) have been sore and painful the last few days. Increased stairs at work and weather change feel like they are contributing to increased discomfort. No changes to L knee reported post injection.    Per MD cont PT x6 weeks with increased focus on L LE.    She was compliant with home exercise program. States she is doing her HEP 1x/day.   Response to previous treatment: appropriate muscle response  Functional change: ongoing    Pain: NT/10, currently  Location: Right knee      Objective      Objective Measures updated at progress report unless specified.   DOS 6/5/2023  *Patient is 19 weeks, 2 days s/p R TKA as of 10/18/2023.*       Date of Manipulation: 8/21/2023  *Patient is 8 week, 2 days s/p manipulation as of 10/18/2023.*       ROM Post Manipulation 8/21/2023:  8/30/2023: 0-3-90 deg at start of visit -> 0-95 deg at end of visit  9/1/2023: 0-3-90 deg at start of visit -> 0-97 deg at end of visit  9/18/2023: 0-98 deg at start -> 0-107 deg at end  9/22/2023: 0-96 deg at start -> 0-106  deg at end  9/25/2023: 0-92 deg at start -> 0-103 deg at end  9/29/2023: 0-95 deg at start -> 0-106 deg at end  10/11/2023: 0-100 deg at start -> 0-106 deg at end  10/11/2023: 0-109 deg at end    Treatment     Raine received the treatments listed below:      neuromuscular re-education activities to improve: Proprioception, Posture, and motor control for 25 minutes.    Patient education:  - cont focus on LAQ at home  - add sidesteps  - add focus to knee position with sit to stand / stairs    Bridge adduction 3x10  Clam x20 ea gab  Clam YTB x20 ea gab    Next visit - shuttle / knee posture    Held:  Matrix leg flexion DL 45lbs 3x10  Matrix leg extension DL 35lbs 3x10  LAQ w/ 5lbs 1x fail ea gab  LAQ w/ 10 lbs 1x fail ea gab      Raine participated in dynamic functional therapeutic activities to improve functional performance for 35 minutes, including:    Shuttle DL YTB 5x10 2B  Shuttle SL 5x10 ea gab 1B  Sit to stand YTB 2x10  Sit to stand 2x10  Sidesteps YTB at knees x3 laps      Held:  Stair assessment: ascent WNL; descent inadequate knee flexion on R and inadequate eccentric control on L  2x25 steps ascent / descent      Patient Education and Home Exercises       Education provided:   - See above    Written Home Exercises Provided: Patient instructed to cont prior HEP. Exercises were reviewed and Raine was able to demonstrate them prior to the end of the session.  Raine demonstrated good  understanding of the education provided. See EMR under Patient Instructions for exercises provided during therapy sessions    Assessment     Patient cont to have off and on knee pain with return to work vicki stair activities required for work duties secondary to continued functional strength deficits. Per MD cont PT x6 weeks with increased focus on L LE as this has become more limiting that surgical side.     Raine Is slowly progressing well towards her goals.   Pt prognosis is Good.     Pt will continue to benefit from skilled  outpatient physical therapy to address the deficits listed in the problem list box on initial evaluation, provide pt/family education and to maximize pt's level of independence in the home and community environment.   Pt's spiritual, cultural and educational needs considered and pt agreeable to plan of care and goals.     Anticipated barriers to physical therapy: none    Goals:   Short Term Goals (3 Weeks):   1. Patient will be independent with home exercise program to supplement physical therapy treatment in improving functional status. MET  2. Patient will improve R lower extremity strength to at least 75% of L lower extremity strength as measured via MicroFet handheld dynamometer to improve strength for closed chain tasks. Not MET  3. Patient will improve R knee active range of motion to 0-90 degrees to promote increased ease of sit<>stand transfers. MET  4. Patient will perform timed up and go with less restrictive assistive device in < 20 seconds to improve gait speed and safety with community ambulation. MET     Long Term Goals (6 Weeks):   1. Patient will improve R lower extremity strength to at least 90% of L lower extremity strength as measured via MicroFet handheld dynamometer to improve strength for closed chain tasks. Not MET  2. Patient will improve the total FOTO Knee Survey Score to </= 20% limited to demonstrate increased perceived functional mobility. NOT MET  3. Patient will perform timed up and go with least restrictive assistive device in < 13.5  seconds to improve gait speed and safety with community ambulation. MET  4. Patient will perform at least 15 sit to stands in 30 seconds without UE support to demonstrate increased functional strength. NOT MET  5. Patient will improve R knee active range of motion to 0-120 degrees to promote higher level transfers and transitions without limitation. NOT MET    Plan     Plan of care Certification: 6/7/2023 to 12/31/2023      Cont PT 1x6 weeks per MD  referral    HENRY PALMA, PT, DPT, OCS

## 2023-11-04 NOTE — PLAN OF CARE
[facility]   Physical Therapy Treatment Note     Name: Raine Medley  Clinic Number: [id number]    Therapy Diagnosis:   Encounter Diagnoses   Name Primary?    Decreased functional mobility and endurance Yes    Acute pain of right knee     Postoperative stiffness of total knee replacement, initial encounter        Physician: Sanford Calvert III, *    Visit Date: 11/3/2023    Physician Orders: PT Eval and Treat   Medical Diagnosis from Referral: M17.11 (ICD-10-CM) - Primary osteoarthritis of right knee   Evaluation Date: 6/7/2023  Authorization Period Expiration: 12/31/2023  Plan of Care Expiration: 12/31/2023  Progress Note Due: 12/15/2023  Visit # / Visits authorized: 41 / 50 + eval      FOTO: 2/3     PTA Visit #: 1 / 5      Time In: 1600  Time Out: 1700  Total Treatment Time: 60 minutes    Total Billable Time: 60 minutes    Precautions: Standard     Subjective     Patient reports: Michael knees (L > R) have been sore and painful the last few days. Increased stairs at work and weather change feel like they are contributing to increased discomfort. No changes to L knee reported post injection.    Per MD cont PT x6 weeks with increased focus on L LE.    She was compliant with home exercise program. States she is doing her HEP 1x/day.   Response to previous treatment: appropriate muscle response  Functional change: ongoing    Pain: NT/10, currently  Location: Right knee      Objective      Objective Measures updated at progress report unless specified.   DOS 6/5/2023  *Patient is 19 weeks, 2 days s/p R TKA as of 10/18/2023.*       Date of Manipulation: 8/21/2023  *Patient is 8 week, 2 days s/p manipulation as of 10/18/2023.*       ROM Post Manipulation 8/21/2023:  8/30/2023: 0-3-90 deg at start of visit -> 0-95 deg at end of visit  9/1/2023: 0-3-90 deg at start of visit -> 0-97 deg at end of visit  9/18/2023: 0-98 deg at start -> 0-107 deg at end  9/22/2023: 0-96 deg at start -> 0-106  deg at end  9/25/2023: 0-92 deg at start -> 0-103 deg at end  9/29/2023: 0-95 deg at start -> 0-106 deg at end  10/11/2023: 0-100 deg at start -> 0-106 deg at end  10/11/2023: 0-109 deg at end    Treatment     Raine received the treatments listed below:      neuromuscular re-education activities to improve: Proprioception, Posture, and motor control for 25 minutes.    Patient education:  - cont focus on LAQ at home  - add sidesteps  - add focus to knee position with sit to stand / stairs    Bridge adduction 3x10  Clam x20 ea gab  Clam YTB x20 ea gab    Next visit - shuttle / knee posture    Held:  Matrix leg flexion DL 45lbs 3x10  Matrix leg extension DL 35lbs 3x10  LAQ w/ 5lbs 1x fail ea gab  LAQ w/ 10 lbs 1x fail ea gab      Raine participated in dynamic functional therapeutic activities to improve functional performance for 35 minutes, including:    Shuttle DL YTB 5x10 2B  Shuttle SL 5x10 ea gab 1B  Sit to stand YTB 2x10  Sit to stand 2x10  Sidesteps YTB at knees x3 laps      Held:  Stair assessment: ascent WNL; descent inadequate knee flexion on R and inadequate eccentric control on L  2x25 steps ascent / descent      Patient Education and Home Exercises       Education provided:   - See above    Written Home Exercises Provided: Patient instructed to cont prior HEP. Exercises were reviewed and Raine was able to demonstrate them prior to the end of the session.  Raine demonstrated good  understanding of the education provided. See EMR under Patient Instructions for exercises provided during therapy sessions    Assessment     Patient cont to have off and on knee pain with return to work vicki stair activities required for work duties secondary to continued functional strength deficits. Per MD cont PT x6 weeks with increased focus on L LE as this has become more limiting that surgical side.     Raine Is slowly progressing well towards her goals.   Pt prognosis is Good.     Pt will continue to benefit from skilled  outpatient physical therapy to address the deficits listed in the problem list box on initial evaluation, provide pt/family education and to maximize pt's level of independence in the home and community environment.   Pt's spiritual, cultural and educational needs considered and pt agreeable to plan of care and goals.     Anticipated barriers to physical therapy: none    Goals:   Short Term Goals (3 Weeks):   1. Patient will be independent with home exercise program to supplement physical therapy treatment in improving functional status. MET  2. Patient will improve R lower extremity strength to at least 75% of L lower extremity strength as measured via MicroFet handheld dynamometer to improve strength for closed chain tasks. Not MET  3. Patient will improve R knee active range of motion to 0-90 degrees to promote increased ease of sit<>stand transfers. MET  4. Patient will perform timed up and go with less restrictive assistive device in < 20 seconds to improve gait speed and safety with community ambulation. MET     Long Term Goals (6 Weeks):   1. Patient will improve R lower extremity strength to at least 90% of L lower extremity strength as measured via MicroFet handheld dynamometer to improve strength for closed chain tasks. Not MET  2. Patient will improve the total FOTO Knee Survey Score to </= 20% limited to demonstrate increased perceived functional mobility. NOT MET  3. Patient will perform timed up and go with least restrictive assistive device in < 13.5  seconds to improve gait speed and safety with community ambulation. MET  4. Patient will perform at least 15 sit to stands in 30 seconds without UE support to demonstrate increased functional strength. NOT MET  5. Patient will improve R knee active range of motion to 0-120 degrees to promote higher level transfers and transitions without limitation. NOT MET    Plan     Plan of care Certification: 6/7/2023 to 12/31/2023      Cont PT 1x6 weeks per MD  referral    HENRY PALMA, PT, DPT, OCS

## 2023-11-10 ENCOUNTER — CLINICAL SUPPORT (OUTPATIENT)
Dept: REHABILITATION | Facility: HOSPITAL | Age: 68
End: 2023-11-10
Payer: COMMERCIAL

## 2023-11-10 DIAGNOSIS — M25.669 POSTOPERATIVE STIFFNESS OF TOTAL KNEE REPLACEMENT, INITIAL ENCOUNTER: ICD-10-CM

## 2023-11-10 DIAGNOSIS — M25.561 ACUTE PAIN OF RIGHT KNEE: ICD-10-CM

## 2023-11-10 DIAGNOSIS — Z74.09 DECREASED FUNCTIONAL MOBILITY AND ENDURANCE: Primary | ICD-10-CM

## 2023-11-10 DIAGNOSIS — Z96.659 POSTOPERATIVE STIFFNESS OF TOTAL KNEE REPLACEMENT, INITIAL ENCOUNTER: ICD-10-CM

## 2023-11-10 DIAGNOSIS — T84.89XA POSTOPERATIVE STIFFNESS OF TOTAL KNEE REPLACEMENT, INITIAL ENCOUNTER: ICD-10-CM

## 2023-11-10 PROCEDURE — 97112 NEUROMUSCULAR REEDUCATION: CPT

## 2023-11-10 PROCEDURE — 97530 THERAPEUTIC ACTIVITIES: CPT

## 2023-11-10 PROCEDURE — 97110 THERAPEUTIC EXERCISES: CPT

## 2023-11-10 NOTE — PROGRESS NOTES
[facility]   Physical Therapy Treatment Note     Name: Raine Medley  Clinic Number: [id number]    Therapy Diagnosis:   Encounter Diagnoses   Name Primary?    Decreased functional mobility and endurance Yes    Acute pain of right knee     Postoperative stiffness of total knee replacement, initial encounter      Physician: Sanford Calvert III, *    Visit Date: 11/10/2023    Physician Orders: PT Eval and Treat   Medical Diagnosis from Referral: M17.11 (ICD-10-CM) - Primary osteoarthritis of right knee   Evaluation Date: 6/7/2023  Authorization Period Expiration: 12/31/2023  Plan of Care Expiration: 12/31/2023  Progress Note Due: 12/15/2023  Visit # / Visits authorized: 42 / 50 + eval     FOTO: 2/3     PTA Visit #: 1 / 5      Time In: 1545  Time Out: 1645  Total Treatment Time: 60 minutes    Total Billable Time: 60 minutes    Precautions: Standard     Subjective     Patient reports: Knees feeling better since last visit; R knee has no pain, L knee is ~4/10. Has been consistent with HEP involving clam, bridge, sidesteps, LAQ, and sit to stands w/ band    Per MD cont PT x6 weeks with increased focus on L LE.    She was compliant with home exercise program. States she is doing her HEP 1x/day.   Response to previous treatment: appropriate muscle response  Functional change: ongoing    Pain: NT/10, currently  Location: Right knee      Objective      Objective Measures updated at progress report unless specified.   DOS 6/5/2023  *Patient is 19 weeks, 2 days s/p R TKA as of 10/18/2023.*       Date of Manipulation: 8/21/2023  *Patient is 8 week, 2 days s/p manipulation as of 10/18/2023.*       ROM Post Manipulation 8/21/2023:  8/30/2023: 0-3-90 deg at start of visit -> 0-95 deg at end of visit  9/1/2023: 0-3-90 deg at start of visit -> 0-97 deg at end of visit  9/18/2023: 0-98 deg at start -> 0-107 deg at end  9/22/2023: 0-96 deg at start -> 0-106 deg at end  9/25/2023: 0-92 deg at start ->  0-103 deg at end  9/29/2023: 0-95 deg at start -> 0-106 deg at end  10/11/2023: 0-100 deg at start -> 0-106 deg at end  10/11/2023: 0-109 deg at end    Treatment     Raine received the treatments listed below:      Raine received therapeutic exercises to develop strength, endurance, ROM, flexibility, posture, and core stabilization for 12 minutes including:    Bike x12 min Lvl 4 for joint mobility, tissue extensibility and light endurance training.    neuromuscular re-education activities to improve: Proprioception, Posture, and motor control for 13 minutes.    Patient education:  - cont focus on LAQ at home  - add sidesteps  - add focus to knee position with sit to stand / stairs    Matrix leg flexion DL 25lbs 3x10  Matrix leg extension DL 25lbs 3x10      Next visit - cont to progress SL shuttle / knee posture    Held:  Bridge adduction 3x10  Clam x20 ea gab  Clam YTB x20 ea gab      Raine participated in dynamic functional therapeutic activities to improve functional performance for 35 minutes, including:    Shuttle DL hover YTB 3x30 2B  Shuttle DL YTB 5x10 2B  Shuttle SL 5x10 ea gab 1B  Shuttle sidelying SL 5x10 ea gab 1B    Held:  Sit to stand YTB 2x10  Sit to stand 2x10  Sidesteps YTB at knees x3 laps  Stair assessment: ascent WNL; descent inadequate knee flexion on R and inadequate eccentric control on L  2x25 steps ascent / descent      Patient Education and Home Exercises       Education provided:   - See above    Written Home Exercises Provided: Patient instructed to cont prior HEP. Exercises were reviewed and Raine was able to demonstrate them prior to the end of the session.  Raine demonstrated good  understanding of the education provided. See EMR under Patient Instructions for exercises provided during therapy sessions    Assessment     Patient cont to have off and on knee pain secondary to continued functional strength deficits, but improving symptoms with improved NM activation of glutes and quads vicki with  sit to stand and shuttle.     Raj all interventions well within visit and with good challenge.    Per MD cont PT x6 weeks with increased focus on L LE as this has become more limiting that surgical side.     Raine Is slowly progressing well towards her goals.   Pt prognosis is Good.     Pt will continue to benefit from skilled outpatient physical therapy to address the deficits listed in the problem list box on initial evaluation, provide pt/family education and to maximize pt's level of independence in the home and community environment.   Pt's spiritual, cultural and educational needs considered and pt agreeable to plan of care and goals.     Anticipated barriers to physical therapy: none    Goals:   Short Term Goals (3 Weeks):   1. Patient will be independent with home exercise program to supplement physical therapy treatment in improving functional status. MET  2. Patient will improve R lower extremity strength to at least 75% of L lower extremity strength as measured via MicroFet handheld dynamometer to improve strength for closed chain tasks. Not MET  3. Patient will improve R knee active range of motion to 0-90 degrees to promote increased ease of sit<>stand transfers. MET  4. Patient will perform timed up and go with less restrictive assistive device in < 20 seconds to improve gait speed and safety with community ambulation. MET     Long Term Goals (6 Weeks):   1. Patient will improve R lower extremity strength to at least 90% of L lower extremity strength as measured via MicroFet handheld dynamometer to improve strength for closed chain tasks. Not MET  2. Patient will improve the total FOTO Knee Survey Score to </= 20% limited to demonstrate increased perceived functional mobility. NOT MET  3. Patient will perform timed up and go with least restrictive assistive device in < 13.5  seconds to improve gait speed and safety with community ambulation. MET  4. Patient will perform at least 15 sit to stands in 30  seconds without UE support to demonstrate increased functional strength. NOT MET  5. Patient will improve R knee active range of motion to 0-120 degrees to promote higher level transfers and transitions without limitation. NOT MET    Plan     Plan of care Certification: 6/7/2023 to 12/31/2023      Cont PT 1x6 weeks per MD referral    HENRY PALMA, PT, DPT, OCS

## 2023-11-17 ENCOUNTER — CLINICAL SUPPORT (OUTPATIENT)
Dept: REHABILITATION | Facility: HOSPITAL | Age: 68
End: 2023-11-17
Payer: COMMERCIAL

## 2023-11-17 DIAGNOSIS — M25.669 POSTOPERATIVE STIFFNESS OF TOTAL KNEE REPLACEMENT, INITIAL ENCOUNTER: ICD-10-CM

## 2023-11-17 DIAGNOSIS — T84.89XA POSTOPERATIVE STIFFNESS OF TOTAL KNEE REPLACEMENT, INITIAL ENCOUNTER: ICD-10-CM

## 2023-11-17 DIAGNOSIS — M25.561 ACUTE PAIN OF RIGHT KNEE: ICD-10-CM

## 2023-11-17 DIAGNOSIS — Z74.09 DECREASED FUNCTIONAL MOBILITY AND ENDURANCE: Primary | ICD-10-CM

## 2023-11-17 DIAGNOSIS — Z96.659 POSTOPERATIVE STIFFNESS OF TOTAL KNEE REPLACEMENT, INITIAL ENCOUNTER: ICD-10-CM

## 2023-11-17 PROCEDURE — 97112 NEUROMUSCULAR REEDUCATION: CPT

## 2023-11-17 PROCEDURE — 97530 THERAPEUTIC ACTIVITIES: CPT

## 2023-11-17 NOTE — PROGRESS NOTES
[facility]   Physical Therapy Treatment Note     Name: Raine Medley  Clinic Number: [id number]    Therapy Diagnosis:   Encounter Diagnoses   Name Primary?    Decreased functional mobility and endurance Yes    Acute pain of right knee     Postoperative stiffness of total knee replacement, initial encounter        Physician: Sanford Calvert III, *    Visit Date: 11/17/2023    Physician Orders: PT Eval and Treat   Medical Diagnosis from Referral: M17.11 (ICD-10-CM) - Primary osteoarthritis of right knee   Evaluation Date: 6/7/2023  Authorization Period Expiration: 12/31/2023  Plan of Care Expiration: 12/31/2023  Progress Note Due: 12/15/2023  Visit # / Visits authorized: 43 / 50 + eval     FOTO: 2/3     PTA Visit #: 1 / 5      Time In: 1550  Time Out: 1645  Total Treatment Time: 55 minutes    Total Billable Time: 55 minutes    Precautions: Standard     Subjective     Patient reports: L knee is painful this week. Cont consistent with HEP involving clam, bridge, sidesteps, LAQ, and sit to stands w/ band.    Per MD cont PT x6 weeks with increased focus on L LE.    She was compliant with home exercise program. States she is doing her HEP 1x/day.   Response to previous treatment: appropriate muscle response  Functional change: ongoing    Pain: NT/10, currently  Location: Right knee      Objective      Objective Measures updated at progress report unless specified.   DOS 6/5/2023  *Patient is 19 weeks, 2 days s/p R TKA as of 10/18/2023.*       Date of Manipulation: 8/21/2023  *Patient is 8 week, 2 days s/p manipulation as of 10/18/2023.*       ROM Post Manipulation 8/21/2023:  8/30/2023: 0-3-90 deg at start of visit -> 0-95 deg at end of visit  9/1/2023: 0-3-90 deg at start of visit -> 0-97 deg at end of visit  9/18/2023: 0-98 deg at start -> 0-107 deg at end  9/22/2023: 0-96 deg at start -> 0-106 deg at end  9/25/2023: 0-92 deg at start -> 0-103 deg at end  9/29/2023: 0-95 deg at start  -> 0-106 deg at end  10/11/2023: 0-100 deg at start -> 0-106 deg at end  10/11/2023: 0-109 deg at end    Treatment     Raine received the treatments listed below:      Raine received therapeutic exercises to develop strength, endurance, ROM, flexibility, posture, and core stabilization for 00 minutes including:    Bike x12 min Lvl 4 for joint mobility, tissue extensibility and light endurance training.    neuromuscular re-education activities to improve: Proprioception, Posture, and motor control for 30 minutes.    Patient education:  - cont focus on LAQ, sidesteps, and sit to stand with band at home    Bridge adduction 3x10  Bridge abduction YTB 3x10  Matrix leg flexion DL 35lbs 3x10  Matrix leg extension DL 25lbs 3x10    Review:  Clam x20 ea gab  Clam YTB x20 ea gab    Next visit - Stairs      Raine participated in dynamic functional therapeutic activities to improve functional performance for 25 minutes, including:    Shuttle bwd kick hold 3x30 sec (standing on R only)  Shuttle sidelying SL hold 30x10 + sec ea gab 1B      Review:  Shuttle DL hover YTB 3x30 2B  Shuttle DL YTB 5x10 2B  Shuttle SL 5x10 ea gab 1B  Shuttle sidelying SL 3x10 ea agb 1B  Sit to stand YTB 2x10  Sit to stand 2x10  Sidesteps YTB at knees x3 laps  Stair assessment: ascent WNL; descent inadequate knee flexion on R and inadequate eccentric control on L  2x25 steps ascent / descent      Patient Education and Home Exercises       Education provided:   - See above    Written Home Exercises Provided: Patient instructed to cont prior HEP. Exercises were reviewed and Raine was able to demonstrate them prior to the end of the session.  Raine demonstrated good  understanding of the education provided. See EMR under Patient Instructions for exercises provided during therapy sessions    Assessment     Patient cont to have off and on L > R knee pain secondary to functional strength deficits along with weightbearing work duties, but symptoms have improved with  focus on functional NM activation of glutes and quads. Challenged well within visit today.    Per MD cont PT x6 weeks with increased focus on L LE as this has become more limiting that surgical side.     Raine Is slowly progressing well towards her goals.   Pt prognosis is Good.     Pt will continue to benefit from skilled outpatient physical therapy to address the deficits listed in the problem list box on initial evaluation, provide pt/family education and to maximize pt's level of independence in the home and community environment.   Pt's spiritual, cultural and educational needs considered and pt agreeable to plan of care and goals.     Anticipated barriers to physical therapy: none    Goals:   Short Term Goals (3 Weeks):   1. Patient will be independent with home exercise program to supplement physical therapy treatment in improving functional status. MET  2. Patient will improve R lower extremity strength to at least 75% of L lower extremity strength as measured via MicroFet handheld dynamometer to improve strength for closed chain tasks. Not MET  3. Patient will improve R knee active range of motion to 0-90 degrees to promote increased ease of sit<>stand transfers. MET  4. Patient will perform timed up and go with less restrictive assistive device in < 20 seconds to improve gait speed and safety with community ambulation. MET     Long Term Goals (6 Weeks):   1. Patient will improve R lower extremity strength to at least 90% of L lower extremity strength as measured via MicroFet handheld dynamometer to improve strength for closed chain tasks. Not MET  2. Patient will improve the total FOTO Knee Survey Score to </= 20% limited to demonstrate increased perceived functional mobility. NOT MET  3. Patient will perform timed up and go with least restrictive assistive device in < 13.5  seconds to improve gait speed and safety with community ambulation. MET  4. Patient will perform at least 15 sit to stands in 30  seconds without UE support to demonstrate increased functional strength. NOT MET  5. Patient will improve R knee active range of motion to 0-120 degrees to promote higher level transfers and transitions without limitation. NOT MET    Plan     Plan of care Certification: 6/7/2023 to 12/31/2023      Cont PT 1x6 weeks per MD referral    HENRY PALMA, PT, DPT, OCS

## 2023-11-27 ENCOUNTER — CLINICAL SUPPORT (OUTPATIENT)
Dept: REHABILITATION | Facility: HOSPITAL | Age: 68
End: 2023-11-27
Payer: COMMERCIAL

## 2023-11-27 DIAGNOSIS — Z96.659 POSTOPERATIVE STIFFNESS OF TOTAL KNEE REPLACEMENT, INITIAL ENCOUNTER: ICD-10-CM

## 2023-11-27 DIAGNOSIS — M25.669 POSTOPERATIVE STIFFNESS OF TOTAL KNEE REPLACEMENT, INITIAL ENCOUNTER: ICD-10-CM

## 2023-11-27 DIAGNOSIS — T84.89XA POSTOPERATIVE STIFFNESS OF TOTAL KNEE REPLACEMENT, INITIAL ENCOUNTER: ICD-10-CM

## 2023-11-27 DIAGNOSIS — M25.561 ACUTE PAIN OF RIGHT KNEE: ICD-10-CM

## 2023-11-27 DIAGNOSIS — Z74.09 DECREASED FUNCTIONAL MOBILITY AND ENDURANCE: Primary | ICD-10-CM

## 2023-11-27 PROCEDURE — 97112 NEUROMUSCULAR REEDUCATION: CPT

## 2023-11-27 PROCEDURE — 97530 THERAPEUTIC ACTIVITIES: CPT

## 2023-11-27 NOTE — PROGRESS NOTES
[facility]   Physical Therapy Treatment Note     Name: Raine Medley  Clinic Number: [id number]    Therapy Diagnosis:   Encounter Diagnoses   Name Primary?    Decreased functional mobility and endurance Yes    Acute pain of right knee     Postoperative stiffness of total knee replacement, initial encounter      Physician: Sanford Calvert III, *    Visit Date: 11/27/2023    Physician Orders: PT Eval and Treat   Medical Diagnosis from Referral: M17.11 (ICD-10-CM) - Primary osteoarthritis of right knee   Evaluation Date: 6/7/2023  Authorization Period Expiration: 12/31/2023  Plan of Care Expiration: 12/31/2023  Progress Note Due: 12/15/2023  Visit # / Visits authorized: 44 / 50 + eval     FOTO: 2/3     PTA Visit #: 1 / 5      Time In: 1605  Time Out: 1700  Total Treatment Time: 55 minutes    Total Billable Time: 55 minutes    Precautions: Standard     Subjective     Patient reports: L knee pain today, but felt good over the long weekend. Re knee is feeling good and she has been consistent with HEP, which she feels is helping.    She was compliant with home exercise program. States she is doing her HEP 1x/day.   Response to previous treatment: appropriate muscle response  Functional change: ongoing    Pain: NT/10, currently  Location: Right knee      Objective      Objective Measures updated at progress report unless specified.   DOS 6/5/2023  *Patient is 19 weeks, 2 days s/p R TKA as of 10/18/2023.*       Date of Manipulation: 8/21/2023  *Patient is 8 week, 2 days s/p manipulation as of 10/18/2023.*       ROM Post Manipulation 8/21/2023:  8/30/2023: 0-3-90 deg at start of visit -> 0-95 deg at end of visit  9/1/2023: 0-3-90 deg at start of visit -> 0-97 deg at end of visit  9/18/2023: 0-98 deg at start -> 0-107 deg at end  9/22/2023: 0-96 deg at start -> 0-106 deg at end  9/25/2023: 0-92 deg at start -> 0-103 deg at end  9/29/2023: 0-95 deg at start -> 0-106 deg at end  10/11/2023:  0-100 deg at start -> 0-106 deg at end  10/11/2023: 0-109 deg at end    Treatment     Raine received the treatments listed below:      Raine received therapeutic exercises to develop strength, endurance, ROM, flexibility, posture, and core stabilization for 00 minutes including:    Bike x12 min Lvl 4 for joint mobility, tissue extensibility and light endurance training.    neuromuscular re-education activities to improve: Proprioception, Posture, and motor control for 25 minutes.    Patient education:  - cont focus on LAQ, sidesteps, and sit to stand with band at home  - focus on ECCENTRIC  - add x2 reps / weight to HEP when needed    Bridge adduction 5x10  Bridge abduction YTB 5x10    Matrix leg extension DL 35 lbs, 25 lbs, 15 lbs 2x10 ea    Review:  Matrix leg flexion DL 35lbs 3x10  Clam x20 ea gab  Clam YTB x20 ea gab    Next visit - Eccentric quad control; Stairs      Raine participated in dynamic functional therapeutic activities to improve functional performance for 30 minutes, including:    Shuttle DL hover YTB 5x30 sec 2B1R  Shuttle DL YTB 5x10 2B1R    Stair assessment: - pain and weakness at 4 / 6 in steps eccentrically on L (most josefina with lateral eccentric at 4 in step)  Lateral step up/down 4 in (x2 steps ea) 2x10 ea gab      Review:  Shuttle SL 5x10 ea gab 1B  Shuttle sidelying SL 3x10 ea gab 1B  Shuttle bwd kick hold 3x30 sec (standing on R only)  Shuttle sidelying SL hold 30x10 + sec ea gab 1B  Sit to stand YTB 2x10  Sit to stand 2x10  Sidesteps YTB at knees x3 laps  Stair assessment: ascent WNL; descent inadequate knee flexion on R and inadequate eccentric control on L  2x25 steps ascent / descent      Patient Education and Home Exercises       Education provided:   - See above    Written Home Exercises Provided: Patient instructed to cont prior HEP. Exercises were reviewed and Raine was able to demonstrate them prior to the end of the session.  Raine demonstrated good  understanding of the education  provided. See EMR under Patient Instructions for exercises provided during therapy sessions    Assessment     Today increased pain in L knee possibly secondary to change in weather or secondary to return to work duties.    Patient cont to have off and on L > R knee pain secondary to functional strength deficits along with weightbearing work duties, but symptoms have improved with focus on functional NM activation of glutes and quads. Lacks adequate L quad strength for eccentric control on stair descent, but josefina lateral step down best within visit.     Per MD cont PT x6 weeks with increased focus on L LE as this has become more limiting that surgical side.     Raine Is slowly progressing well towards her goals.   Pt prognosis is Good.     Pt will continue to benefit from skilled outpatient physical therapy to address the deficits listed in the problem list box on initial evaluation, provide pt/family education and to maximize pt's level of independence in the home and community environment.   Pt's spiritual, cultural and educational needs considered and pt agreeable to plan of care and goals.     Anticipated barriers to physical therapy: none    Goals:   Short Term Goals (3 Weeks):   1. Patient will be independent with home exercise program to supplement physical therapy treatment in improving functional status. MET  2. Patient will improve R lower extremity strength to at least 75% of L lower extremity strength as measured via MicroFet handheld dynamometer to improve strength for closed chain tasks. Not MET  3. Patient will improve R knee active range of motion to 0-90 degrees to promote increased ease of sit<>stand transfers. MET  4. Patient will perform timed up and go with less restrictive assistive device in < 20 seconds to improve gait speed and safety with community ambulation. MET     Long Term Goals (6 Weeks):   1. Patient will improve R lower extremity strength to at least 90% of L lower extremity strength  as measured via MicroFet handheld dynamometer to improve strength for closed chain tasks. Not MET  2. Patient will improve the total FOTO Knee Survey Score to </= 20% limited to demonstrate increased perceived functional mobility. NOT MET  3. Patient will perform timed up and go with least restrictive assistive device in < 13.5  seconds to improve gait speed and safety with community ambulation. MET  4. Patient will perform at least 15 sit to stands in 30 seconds without UE support to demonstrate increased functional strength. NOT MET  5. Patient will improve R knee active range of motion to 0-120 degrees to promote higher level transfers and transitions without limitation. NOT MET    Plan     Plan of care Certification: 6/7/2023 to 12/31/2023      Cont PT 1x6 weeks per MD referral    HENRY PALMA, PT, DPT, OCS

## 2023-12-01 ENCOUNTER — CLINICAL SUPPORT (OUTPATIENT)
Dept: REHABILITATION | Facility: HOSPITAL | Age: 68
End: 2023-12-01
Payer: COMMERCIAL

## 2023-12-01 DIAGNOSIS — T84.89XA POSTOPERATIVE STIFFNESS OF TOTAL KNEE REPLACEMENT, INITIAL ENCOUNTER: ICD-10-CM

## 2023-12-01 DIAGNOSIS — M25.561 ACUTE PAIN OF RIGHT KNEE: ICD-10-CM

## 2023-12-01 DIAGNOSIS — Z74.09 DECREASED FUNCTIONAL MOBILITY AND ENDURANCE: Primary | ICD-10-CM

## 2023-12-01 DIAGNOSIS — Z96.659 POSTOPERATIVE STIFFNESS OF TOTAL KNEE REPLACEMENT, INITIAL ENCOUNTER: ICD-10-CM

## 2023-12-01 DIAGNOSIS — M25.669 POSTOPERATIVE STIFFNESS OF TOTAL KNEE REPLACEMENT, INITIAL ENCOUNTER: ICD-10-CM

## 2023-12-01 PROCEDURE — 97112 NEUROMUSCULAR REEDUCATION: CPT

## 2023-12-01 PROCEDURE — 97530 THERAPEUTIC ACTIVITIES: CPT

## 2023-12-01 NOTE — PROGRESS NOTES
[facility]   Physical Therapy Treatment Note     Name: Raine Medley  Clinic Number: [id number]    Therapy Diagnosis:   Encounter Diagnoses   Name Primary?    Decreased functional mobility and endurance Yes    Acute pain of right knee     Postoperative stiffness of total knee replacement, initial encounter        Physician: Sanford Calvert III, *    Visit Date: 12/1/2023    Physician Orders: PT Eval and Treat   Medical Diagnosis from Referral: M17.11 (ICD-10-CM) - Primary osteoarthritis of right knee   Evaluation Date: 6/7/2023  Authorization Period Expiration: 12/31/2023  Plan of Care Expiration: 12/31/2023  Progress Note Due: 12/15/2023  Visit # / Visits authorized: 45 / 50 + eval     FOTO: 2/3     PTA Visit #: 1 / 5      Time In: 1540  Time Out: 1630  Total Treatment Time: 50 minutes    Total Billable Time: 50 minutes    Precautions: Standard     Subjective     Patient reports: L knee has been feeling pretty good, but irritated yesterday and today possibly due to weather changes. HEP is getting easier over time.    She was compliant with home exercise program. States she is doing her HEP 1x/day.   Response to previous treatment: appropriate muscle response  Functional change: ongoing    Pain: NT/10, currently  Location: Right knee      Objective      Objective Measures updated at progress report unless specified.   DOS 6/5/2023  *Patient is 19 weeks, 2 days s/p R TKA as of 10/18/2023.*       Date of Manipulation: 8/21/2023  *Patient is 8 week, 2 days s/p manipulation as of 10/18/2023.*       ROM Post Manipulation 8/21/2023:  8/30/2023: 0-3-90 deg at start of visit -> 0-95 deg at end of visit  9/1/2023: 0-3-90 deg at start of visit -> 0-97 deg at end of visit  9/18/2023: 0-98 deg at start -> 0-107 deg at end  9/22/2023: 0-96 deg at start -> 0-106 deg at end  9/25/2023: 0-92 deg at start -> 0-103 deg at end  9/29/2023: 0-95 deg at start -> 0-106 deg at end  10/11/2023: 0-100 deg  at start -> 0-106 deg at end  10/11/2023: 0-109 deg at end    Treatment     Raine received the treatments listed below:      Raine received therapeutic exercises to develop strength, endurance, ROM, flexibility, posture, and core stabilization for 00 minutes including:    Bike x12 min Lvl 4 for joint mobility, tissue extensibility and light endurance training.    neuromuscular re-education activities to improve: Proprioception, Posture, and motor control for 40 minutes.    Patient education:  - cont focus on LAQ, sidesteps, and sit to stand with band at home  - focus on ECCENTRIC  - add x2 reps / weight to HEP when needed    Bridge adduction 5x10  Bridge abduction YTB 5x10    ECCENTRIC:  Matrix leg extension DL 25# 3x10  Matrix leg extension SL 15# 2-3x10 ea gab       Review:  Matrix leg flexion DL 35lbs 3x10  Clam x20 ea gab  Clam YTB x20 ea gab    Next visit - Eccentric quad control; HEP for DC      Raine participated in dynamic functional therapeutic activities to improve functional performance for 10 minutes, including:    ECCENTRIC:  Shuttle DL -> SL 1B1R 3x10 ea gab    Review:  Stair assessment: - pain and weakness at 4 / 6 in steps eccentrically on L (most josefina with lateral eccentric at 4 in step)  Lateral step up/down 4 in (x2 steps ea) 2x10 ea gab  Shuttle DL hover YTB 5x30 sec 2B1R  Shuttle DL YTB 5x10 2B1R  Shuttle SL 5x10 ea gab 1B  Shuttle sidelying SL 3x10 ea gab 1B  Shuttle bwd kick hold 3x30 sec (standing on R only)  Shuttle sidelying SL hold 30x10 + sec ea gab 1B  Sit to stand YTB 2x10  Sit to stand 2x10  Sidesteps YTB at knees x3 laps  Stair assessment: ascent WNL; descent inadequate knee flexion on R and inadequate eccentric control on L  2x25 steps ascent / descent      Patient Education and Home Exercises       Education provided:   - See above    Written Home Exercises Provided: Patient instructed to cont prior HEP. Exercises were reviewed and Raine was able to demonstrate them prior to the end of  the session.  Raine demonstrated good  understanding of the education provided. See EMR under Patient Instructions for exercises provided during therapy sessions    Assessment     Cont off and on L knee pain possibly secondary to change in weather. Cont to require focus on L quad strengthening vicki eccentrically for functional strength. Good josefina to eccentric strengthening within visit and patient understanding HEP well.    Possible DC with HEP at next visit, prior to possible surgical intervention in 2024.    Raine Is slowly progressing well towards her goals.   Pt prognosis is Good.     Pt will continue to benefit from skilled outpatient physical therapy to address the deficits listed in the problem list box on initial evaluation, provide pt/family education and to maximize pt's level of independence in the home and community environment.   Pt's spiritual, cultural and educational needs considered and pt agreeable to plan of care and goals.     Anticipated barriers to physical therapy: none    Goals:   Short Term Goals (3 Weeks):   1. Patient will be independent with home exercise program to supplement physical therapy treatment in improving functional status. MET  2. Patient will improve R lower extremity strength to at least 75% of L lower extremity strength as measured via MicroFet handheld dynamometer to improve strength for closed chain tasks. Not MET  3. Patient will improve R knee active range of motion to 0-90 degrees to promote increased ease of sit<>stand transfers. MET  4. Patient will perform timed up and go with less restrictive assistive device in < 20 seconds to improve gait speed and safety with community ambulation. MET     Long Term Goals (6 Weeks):   1. Patient will improve R lower extremity strength to at least 90% of L lower extremity strength as measured via MicroFet handheld dynamometer to improve strength for closed chain tasks. Not MET  2. Patient will improve the total FOTO Knee Survey Score  to </= 20% limited to demonstrate increased perceived functional mobility. NOT MET  3. Patient will perform timed up and go with least restrictive assistive device in < 13.5  seconds to improve gait speed and safety with community ambulation. MET  4. Patient will perform at least 15 sit to stands in 30 seconds without UE support to demonstrate increased functional strength. NOT MET  5. Patient will improve R knee active range of motion to 0-120 degrees to promote higher level transfers and transitions without limitation. NOT MET    Plan     Plan of care Certification: 6/7/2023 to 12/31/2023      Cont PT 1x6 weeks per MD referral    HENRY PALMA, PT, DPT, OCS

## 2023-12-08 ENCOUNTER — CLINICAL SUPPORT (OUTPATIENT)
Dept: REHABILITATION | Facility: HOSPITAL | Age: 68
End: 2023-12-08
Payer: COMMERCIAL

## 2023-12-08 DIAGNOSIS — M25.561 ACUTE PAIN OF RIGHT KNEE: ICD-10-CM

## 2023-12-08 DIAGNOSIS — Z74.09 DECREASED FUNCTIONAL MOBILITY AND ENDURANCE: Primary | ICD-10-CM

## 2023-12-08 DIAGNOSIS — Z96.659 POSTOPERATIVE STIFFNESS OF TOTAL KNEE REPLACEMENT, INITIAL ENCOUNTER: ICD-10-CM

## 2023-12-08 DIAGNOSIS — T84.89XA POSTOPERATIVE STIFFNESS OF TOTAL KNEE REPLACEMENT, INITIAL ENCOUNTER: ICD-10-CM

## 2023-12-08 DIAGNOSIS — M25.669 POSTOPERATIVE STIFFNESS OF TOTAL KNEE REPLACEMENT, INITIAL ENCOUNTER: ICD-10-CM

## 2023-12-08 PROCEDURE — 97530 THERAPEUTIC ACTIVITIES: CPT

## 2023-12-08 PROCEDURE — 97112 NEUROMUSCULAR REEDUCATION: CPT

## 2023-12-08 NOTE — PROGRESS NOTES
[facility]   Physical Therapy Treatment Note     Name: Raine Medley  Clinic Number: [id number]    Therapy Diagnosis:   Encounter Diagnoses   Name Primary?    Decreased functional mobility and endurance Yes    Acute pain of right knee     Postoperative stiffness of total knee replacement, initial encounter      Physician: Sanford Calvert III, *    Visit Date: 12/8/2023    Physician Orders: PT Eval and Treat   Medical Diagnosis from Referral: M17.11 (ICD-10-CM) - Primary osteoarthritis of right knee   Evaluation Date: 6/7/2023  Authorization Period Expiration: 12/31/2023  Plan of Care Expiration: 12/31/2023  Progress Note Due: 12/15/2023  Visit # / Visits authorized: 46 / 50 + eval     FOTO: 2/3     PTA Visit #: 1 / 5      Time In: 1550  Time Out: 1700   Total Treatment Time: 70 minutes    Total Billable Time: 70 minutes    Precautions: Standard     Subjective     Patient reports: L knee feeling better today. Attributes increase and decrease in pain to weather changes less so than activity levels currently.  Feels like she has a good handle on HEP, which she feels like is getting easier over time and is non aggravating to knees.    She was compliant with home exercise program.   Response to previous treatment: appropriate muscle response  Functional change: ongoing    Pain: NT/10, currently  Location: Right knee      Objective      Objective Measures updated at progress report unless specified.   DOS 6/5/2023  *Patient is 19 weeks, 2 days s/p R TKA as of 10/18/2023.*       Date of Manipulation: 8/21/2023  *Patient is 8 week, 2 days s/p manipulation as of 10/18/2023.*       ROM Post Manipulation 8/21/2023:  8/30/2023: 0-3-90 deg at start of visit -> 0-95 deg at end of visit  9/1/2023: 0-3-90 deg at start of visit -> 0-97 deg at end of visit  9/18/2023: 0-98 deg at start -> 0-107 deg at end  9/22/2023: 0-96 deg at start -> 0-106 deg at end  9/25/2023: 0-92 deg at start -> 0-103 deg  at end  9/29/2023: 0-95 deg at start -> 0-106 deg at end  10/11/2023: 0-100 deg at start -> 0-106 deg at end  10/11/2023: 0-109 deg at end    12/8/23:  Quad LSI assessment (3 trials ea)  R = 28.2, 29.4, 28.4 = 28.66  L = 29.1, 27.7, 28.5 = 28.43    LSI w/in 99% gab    Treatment     Raine received the treatments listed below:      neuromuscular re-education activities to improve: Proprioception, Posture, and motor control for 30 minutes.    Patient education:  - HEP   - progression of bands  - progression to sit to stand taps    Bridge adduction 5x10  Bridge abduction YTB 5x10    ECCENTRIC:  Matrix leg extension DL 35# 3x10-15  Matrix leg extension SL 15# 3x10-15 ea gab     Review:  Matrix leg flexion DL 35lbs 3x10  Clam x20 ea gab  Clam YTB x20 ea gab      Raine participated in dynamic functional therapeutic activities to improve functional performance for 40 minutes, including:    Sidesteps GTB at knees x4 laps  Sidesteps BTB at knees x2 laps  Sit to stand BTB 2x10  Sit to stand taps BTB 2x10  Shuttle SL 1B1R 3x10 ea gab - eccentric focus      Patient Education and Home Exercises       Education provided:   - See above    Written Home Exercises Provided: Patient instructed to cont prior HEP. Exercises were reviewed and Raine was able to demonstrate them prior to the end of the session.  Raine demonstrated good  understanding of the education provided. See EMR under Patient Instructions for exercises provided during therapy sessions    Assessment     Patient has cont on and off L pain that she attributes to weather change primarily rather than activity level. L knee to be assessed further for potential surgical intervention in the new year.    She reports and demonstrates improved functional amb and mobility of R knee as well. Upon objective assessment her quad strength LSI is ~99% of one another. She demonstrates good compliance and josefina to HEP.    Secondary to progress and diligence with HEP patient is DC with HEP  going forward, but advised to reach out if symptoms worsen.    Raine Is slowly progressing well towards her goals.   Pt prognosis is Good.     Pt will continue to benefit from skilled outpatient physical therapy to address the deficits listed in the problem list box on initial evaluation, provide pt/family education and to maximize pt's level of independence in the home and community environment.   Pt's spiritual, cultural and educational needs considered and pt agreeable to plan of care and goals.     Anticipated barriers to physical therapy: none    Goals:   Short Term Goals (3 Weeks):   1. Patient will be independent with home exercise program to supplement physical therapy treatment in improving functional status. MET  2. Patient will improve R lower extremity strength to at least 75% of L lower extremity strength as measured via MicroFet handheld dynamometer to improve strength for closed chain tasks. Not MET  3. Patient will improve R knee active range of motion to 0-90 degrees to promote increased ease of sit<>stand transfers. MET  4. Patient will perform timed up and go with less restrictive assistive device in < 20 seconds to improve gait speed and safety with community ambulation. MET     Long Term Goals (6 Weeks):   1. Patient will improve R lower extremity strength to at least 90% of L lower extremity strength as measured via MicroFet handheld dynamometer to improve strength for closed chain tasks. Not MET  2. Patient will improve the total FOTO Knee Survey Score to </= 20% limited to demonstrate increased perceived functional mobility. MET  3. Patient will perform timed up and go with least restrictive assistive device in < 13.5  seconds to improve gait speed and safety with community ambulation. MET  4. Patient will perform at least 15 sit to stands in 30 seconds without UE support to demonstrate increased functional strength. MET  5. Patient will improve R knee active range of motion to 0-120 degrees  to promote higher level transfers and transitions without limitation. NOT MET    Plan     Plan of care Certification: 6/7/2023 to 12/31/2023      DC with HEP    HENRY PALMA, PT, DPT, OCS

## 2023-12-11 ENCOUNTER — PATIENT MESSAGE (OUTPATIENT)
Dept: ORTHOPEDICS | Facility: CLINIC | Age: 68
End: 2023-12-11
Payer: COMMERCIAL

## 2023-12-21 ENCOUNTER — TELEPHONE (OUTPATIENT)
Dept: ENDOSCOPY | Facility: HOSPITAL | Age: 68
End: 2023-12-21
Payer: COMMERCIAL

## 2023-12-21 NOTE — TELEPHONE ENCOUNTER
----- Message from Nicolle Zhou sent at 12/11/2023  7:15 AM CST -----    ----- Message -----  From: Jennie Cook  Sent: 12/8/2023   3:07 PM CST  To: [facility] Schedul[facility]    Raine Sabillon calling regarding Appointment Access for wanting wanting to be rescheduled for the RGD and is available on the dates of 02/09/24- 02/12/, 02/13, and 02/14/24, call back to inform if these dates  are available  [phone]

## 2024-01-08 ENCOUNTER — OFFICE VISIT (OUTPATIENT)
Dept: URGENT CARE | Facility: CLINIC | Age: 69
End: 2024-01-08
Payer: COMMERCIAL

## 2024-01-08 VITALS
RESPIRATION RATE: 18 BRPM | HEIGHT: 61 IN | HEART RATE: 66 BPM | WEIGHT: 134 LBS | SYSTOLIC BLOOD PRESSURE: 140 MMHG | BODY MASS INDEX: 25.3 KG/M2 | DIASTOLIC BLOOD PRESSURE: 89 MMHG | TEMPERATURE: 99 F | OXYGEN SATURATION: 97 %

## 2024-01-08 DIAGNOSIS — J01.90 ACUTE BACTERIAL SINUSITIS: Primary | ICD-10-CM

## 2024-01-08 DIAGNOSIS — B96.89 ACUTE BACTERIAL SINUSITIS: Primary | ICD-10-CM

## 2024-01-08 DIAGNOSIS — R05.9 COUGH, UNSPECIFIED TYPE: ICD-10-CM

## 2024-01-08 LAB
CTP QC/QA: YES
SARS-COV-2 AG RESP QL IA.RAPID: NEGATIVE

## 2024-01-08 PROCEDURE — 87811 SARS-COV-2 COVID19 W/OPTIC: CPT | Mod: QW,S$GLB,, | Performed by: NURSE PRACTITIONER

## 2024-01-08 PROCEDURE — 99214 OFFICE O/P EST MOD 30 MIN: CPT | Mod: S$GLB,,, | Performed by: NURSE PRACTITIONER

## 2024-01-08 RX ORDER — AMOXICILLIN AND CLAVULANATE POTASSIUM 875; 125 MG/1; MG/1
1 TABLET, FILM COATED ORAL 2 TIMES DAILY
Qty: 14 TABLET | Refills: 0 | Status: SHIPPED | OUTPATIENT
Start: 2024-01-08 | End: 2024-01-15

## 2024-01-08 NOTE — PROGRESS NOTES
"Subjective:      Patient ID: Raine Medley is a 68 y.o. female.    Vitals:  height is 5' 1" (1.549 m) and weight is 60.8 kg (134 lb). Her oral temperature is 98.5 °F (36.9 °C). Her blood pressure is 140/89 (abnormal) and her pulse is 66. Her respiration is 18 and oxygen saturation is 97%.     Chief Complaint: Cough    Pt is a 69 yo w/ c/o nasal congestion, headache, rhinorrhea, cough for 2 weeks. She reports that she feels dizzy occasionally. She has been taking tylenol for her symptoms with no relief. Denies known sick contacts. She is vaccinated for covid    Cough  This is a new problem. The current episode started 1 to 4 weeks ago (2 weeks ago). The problem has been waxing and waning. The cough is Non-productive. Associated symptoms include ear congestion (left ear muffled hearing), nasal congestion and rhinorrhea. Pertinent negatives include no chest pain, chills, ear pain, fever, headaches, heartburn, hemoptysis, myalgias, postnasal drip, rash, sore throat, shortness of breath, sweats, weight loss or wheezing. Nothing aggravates the symptoms. Treatments tried: Lemon and honey tea. The treatment provided mild relief. Her past medical history is significant for asthma (child age asthma). There is no history of bronchiectasis, bronchitis, COPD, emphysema, environmental allergies or pneumonia.   Dizziness:   Chronicity:  New  Onset:  In the past 7 days (Saturday)  Progression since onset:  Waxing and waning  Frequency:  Every few days  Pain Scale:  0/10  Severity:  Mild  Duration:  1 minute   Associated symptoms: ear congestion (left ear muffled hearing) and light-headedness.no hearing loss, no ear pain, no fever, no headaches, no tinnitus, no nausea, no vomiting, no diaphoresis, no aural fullness, no weakness, no visual disturbances, no syncope, no palpitations, no panic, no facial weakness, no slurred speech, no numbness in extremities and no chest pain.  Aggravated by:  Lying down and position " changes  Treatments tried:  Nothing  Improvements on treatment:  No reliefno strokes, no cardiac surgery, no neurologic disease, no head trauma, no balance testing, no ear trauma, no ear surgery, no head trauma, no ear infections, no anxiety, no ear tubes, no environmental allergies, no MRI head and no CT head.      Constitution: Negative for chills, sweating and fever.   HENT:  Negative for ear pain, tinnitus, hearing loss, postnasal drip and sore throat.    Cardiovascular:  Negative for chest pain, palpitations and passing out.   Respiratory:  Positive for cough. Negative for bloody sputum, shortness of breath and wheezing.    Gastrointestinal:  Negative for nausea, vomiting and heartburn.   Musculoskeletal:  Negative for muscle ache.   Skin:  Negative for rash.   Allergic/Immunologic: Negative for environmental allergies.   Neurological:  Positive for dizziness and light-headedness. Negative for headaches.      Objective:     Physical Exam   Constitutional: She is oriented to person, place, and time. She appears well-developed. She is cooperative.  Non-toxic appearance. She does not appear ill. No distress.   HENT:   Head: Normocephalic and atraumatic.   Ears:   Right Ear: Hearing, tympanic membrane, external ear and ear canal normal.   Left Ear: Hearing, tympanic membrane, external ear and ear canal normal.   Nose: No mucosal edema, rhinorrhea or nasal deformity. No epistaxis. Right sinus exhibits no maxillary sinus tenderness and no frontal sinus tenderness. Left sinus exhibits maxillary sinus tenderness. Left sinus exhibits no frontal sinus tenderness.   Mouth/Throat: Uvula is midline, oropharynx is clear and moist and mucous membranes are normal. No trismus in the jaw. Normal dentition. No uvula swelling. No oropharyngeal exudate, posterior oropharyngeal edema or posterior oropharyngeal erythema.   Eyes: Conjunctivae and lids are normal. No scleral icterus.   Neck: Trachea normal and phonation normal. Neck  supple. No edema present. No erythema present. No neck rigidity present.   Cardiovascular: Normal rate, regular rhythm, normal heart sounds and normal pulses.   Pulmonary/Chest: Effort normal and breath sounds normal. No respiratory distress. She has no decreased breath sounds. She has no rhonchi.   Abdominal: Normal appearance.   Musculoskeletal: Normal range of motion.         General: No deformity. Normal range of motion.   Neurological: She is alert and oriented to person, place, and time. She exhibits normal muscle tone. Coordination normal.   Skin: Skin is warm, dry, intact, not diaphoretic and not pale.   Psychiatric: Her speech is normal and behavior is normal. Judgment and thought content normal.   Nursing note and vitals reviewed.    Results for orders placed or performed in visit on 01/08/24   SARS Coronavirus 2 Antigen, POCT Manual Read   Result Value Ref Range    SARS Coronavirus 2 Antigen Negative Negative     Acceptable Yes      Assessment:     1. Acute bacterial sinusitis    2. Cough, unspecified type        Plan:       Acute bacterial sinusitis  -     amoxicillin-clavulanate 875-125mg (AUGMENTIN) 875-125 mg per tablet; Take 1 tablet by mouth 2 (two) times daily. for 7 days  Dispense: 14 tablet; Refill: 0    Cough, unspecified type  -     SARS Coronavirus 2 Antigen, POCT Manual Read      Patient Instructions   - You must understand that you have received an Urgent Care treatment only and that you may be released before all of your medical problems are known or treated.   - You, the patient, will arrange for follow up care as instructed.   - If your condition worsens or fails to improve we recommend that you receive another evaluation at the ER immediately or contact your PCP to discuss your concerns.   - You can call [phone] or [phone] to help schedule an appointment with the appropriate provider.    Drink plenty of fluids   Get lots of rest  Tylenol or ibuprofen for  pain/fever  Mucinex DM for cough  Saline nasal rinses to irrigate sinus cavities  Warm salt water gargles for sore throat

## 2024-01-08 NOTE — PATIENT INSTRUCTIONS
- You must understand that you have received an Urgent Care treatment only and that you may be released before all of your medical problems are known or treated.   - You, the patient, will arrange for follow up care as instructed.   - If your condition worsens or fails to improve we recommend that you receive another evaluation at the ER immediately or contact your PCP to discuss your concerns.   - You can call [phone] or [phone] to help schedule an appointment with the appropriate provider.    Drink plenty of fluids   Get lots of rest  Tylenol or ibuprofen for pain/fever  Mucinex DM for cough  Saline nasal rinses to irrigate sinus cavities  Warm salt water gargles for sore throat

## 2024-01-22 ENCOUNTER — TELEPHONE (OUTPATIENT)
Dept: GASTROENTEROLOGY | Facility: CLINIC | Age: 69
End: 2024-01-22
Payer: COMMERCIAL

## 2024-01-22 NOTE — TELEPHONE ENCOUNTER
----- Message from Carin Ch sent at 1/22/2024  4:11 PM CST -----  Regarding: R/S Procedure  Contact: Pt @[phone]  Pt is calling to speak to someone in the office to reschedule procedure. Pt is asking for a return call soon.         Type of Procedure:  EGD (ESOPHAGOGASTRODUODENOSCOPY) [572]  COLONOSCOPY [2323]             Additional Information: Pt would like to r/s the beginning of April or the end of June or the month of July.

## 2024-01-23 NOTE — TELEPHONE ENCOUNTER
Contacted ([place]) to reach out to patient to inform her about rescheduling procedure  could not reach pt but left a voice mail to return call

## 2024-01-26 ENCOUNTER — PATIENT MESSAGE (OUTPATIENT)
Dept: ADMINISTRATIVE | Facility: HOSPITAL | Age: 69
End: 2024-01-26
Payer: COMMERCIAL

## 2024-01-26 ENCOUNTER — PATIENT OUTREACH (OUTPATIENT)
Dept: ADMINISTRATIVE | Facility: HOSPITAL | Age: 69
End: 2024-01-26
Payer: COMMERCIAL

## 2024-01-26 ENCOUNTER — OFFICE VISIT (OUTPATIENT)
Dept: ORTHOPEDICS | Facility: CLINIC | Age: 69
End: 2024-01-26
Payer: COMMERCIAL

## 2024-01-26 ENCOUNTER — OFFICE VISIT (OUTPATIENT)
Dept: SURGERY | Facility: CLINIC | Age: 69
End: 2024-01-26
Attending: FAMILY MEDICINE
Payer: COMMERCIAL

## 2024-01-26 ENCOUNTER — OFFICE VISIT (OUTPATIENT)
Dept: FAMILY MEDICINE | Facility: CLINIC | Age: 69
End: 2024-01-26
Attending: FAMILY MEDICINE
Payer: COMMERCIAL

## 2024-01-26 VITALS — BODY MASS INDEX: 24.92 KG/M2 | HEIGHT: 61 IN | WEIGHT: 132 LBS

## 2024-01-26 VITALS
SYSTOLIC BLOOD PRESSURE: 171 MMHG | SYSTOLIC BLOOD PRESSURE: 129 MMHG | HEIGHT: 61 IN | HEART RATE: 70 BPM | HEART RATE: 70 BPM | OXYGEN SATURATION: 98 % | DIASTOLIC BLOOD PRESSURE: 84 MMHG | HEIGHT: 61 IN | BODY MASS INDEX: 25.12 KG/M2 | BODY MASS INDEX: 24.97 KG/M2 | WEIGHT: 133.06 LBS | DIASTOLIC BLOOD PRESSURE: 79 MMHG | WEIGHT: 132.25 LBS | TEMPERATURE: 98 F

## 2024-01-26 DIAGNOSIS — D64.9 ACUTE ANEMIA: ICD-10-CM

## 2024-01-26 DIAGNOSIS — K64.9 HEMORRHOIDS, UNSPECIFIED HEMORRHOID TYPE: ICD-10-CM

## 2024-01-26 DIAGNOSIS — E55.9 VITAMIN D DEFICIENCY: ICD-10-CM

## 2024-01-26 DIAGNOSIS — M17.12 PRIMARY OSTEOARTHRITIS OF LEFT KNEE: Primary | ICD-10-CM

## 2024-01-26 DIAGNOSIS — K21.9 GASTROESOPHAGEAL REFLUX DISEASE WITHOUT ESOPHAGITIS: Primary | ICD-10-CM

## 2024-01-26 DIAGNOSIS — K59.00 CONSTIPATION, UNSPECIFIED CONSTIPATION TYPE: ICD-10-CM

## 2024-01-26 DIAGNOSIS — Z96.651 PRESENCE OF RIGHT ARTIFICIAL KNEE JOINT: ICD-10-CM

## 2024-01-26 PROCEDURE — 3008F BODY MASS INDEX DOCD: CPT | Mod: CPTII,S$GLB,, | Performed by: FAMILY MEDICINE

## 2024-01-26 PROCEDURE — 1101F PT FALLS ASSESS-DOCD LE1/YR: CPT | Mod: CPTII,S$GLB,, | Performed by: ORTHOPAEDIC SURGERY

## 2024-01-26 PROCEDURE — 99999 PR PBB SHADOW E&M-EST. PATIENT-LVL III: CPT | Mod: PBBFAC,,, | Performed by: SURGERY

## 2024-01-26 PROCEDURE — 3044F HG A1C LEVEL LT 7.0%: CPT | Mod: CPTII,S$GLB,, | Performed by: ORTHOPAEDIC SURGERY

## 2024-01-26 PROCEDURE — 99203 OFFICE O/P NEW LOW 30 MIN: CPT | Mod: S$GLB,,, | Performed by: SURGERY

## 2024-01-26 PROCEDURE — 1160F RVW MEDS BY RX/DR IN RCRD: CPT | Mod: CPTII,S$GLB,, | Performed by: FAMILY MEDICINE

## 2024-01-26 PROCEDURE — 3079F DIAST BP 80-89 MM HG: CPT | Mod: CPTII,S$GLB,, | Performed by: SURGERY

## 2024-01-26 PROCEDURE — 1159F MED LIST DOCD IN RCRD: CPT | Mod: CPTII,S$GLB,, | Performed by: ORTHOPAEDIC SURGERY

## 2024-01-26 PROCEDURE — 3288F FALL RISK ASSESSMENT DOCD: CPT | Mod: CPTII,S$GLB,, | Performed by: ORTHOPAEDIC SURGERY

## 2024-01-26 PROCEDURE — 1126F AMNT PAIN NOTED NONE PRSNT: CPT | Mod: CPTII,S$GLB,, | Performed by: ORTHOPAEDIC SURGERY

## 2024-01-26 PROCEDURE — 1159F MED LIST DOCD IN RCRD: CPT | Mod: CPTII,S$GLB,, | Performed by: SURGERY

## 2024-01-26 PROCEDURE — 3008F BODY MASS INDEX DOCD: CPT | Mod: CPTII,S$GLB,, | Performed by: SURGERY

## 2024-01-26 PROCEDURE — 3008F BODY MASS INDEX DOCD: CPT | Mod: CPTII,S$GLB,, | Performed by: ORTHOPAEDIC SURGERY

## 2024-01-26 PROCEDURE — 3078F DIAST BP <80 MM HG: CPT | Mod: CPTII,S$GLB,, | Performed by: FAMILY MEDICINE

## 2024-01-26 PROCEDURE — 3074F SYST BP LT 130 MM HG: CPT | Mod: CPTII,S$GLB,, | Performed by: FAMILY MEDICINE

## 2024-01-26 PROCEDURE — 99999 PR PBB SHADOW E&M-EST. PATIENT-LVL III: CPT | Mod: PBBFAC,,, | Performed by: ORTHOPAEDIC SURGERY

## 2024-01-26 PROCEDURE — 99999 PR PBB SHADOW E&M-EST. PATIENT-LVL IV: CPT | Mod: PBBFAC,,, | Performed by: FAMILY MEDICINE

## 2024-01-26 PROCEDURE — 99213 OFFICE O/P EST LOW 20 MIN: CPT | Mod: S$GLB,,, | Performed by: ORTHOPAEDIC SURGERY

## 2024-01-26 PROCEDURE — 1101F PT FALLS ASSESS-DOCD LE1/YR: CPT | Mod: CPTII,S$GLB,, | Performed by: SURGERY

## 2024-01-26 PROCEDURE — 3077F SYST BP >= 140 MM HG: CPT | Mod: CPTII,S$GLB,, | Performed by: SURGERY

## 2024-01-26 PROCEDURE — 1160F RVW MEDS BY RX/DR IN RCRD: CPT | Mod: CPTII,S$GLB,, | Performed by: SURGERY

## 2024-01-26 PROCEDURE — 99214 OFFICE O/P EST MOD 30 MIN: CPT | Mod: S$GLB,,, | Performed by: FAMILY MEDICINE

## 2024-01-26 PROCEDURE — 1126F AMNT PAIN NOTED NONE PRSNT: CPT | Mod: CPTII,S$GLB,, | Performed by: SURGERY

## 2024-01-26 PROCEDURE — 3288F FALL RISK ASSESSMENT DOCD: CPT | Mod: CPTII,S$GLB,, | Performed by: SURGERY

## 2024-01-26 PROCEDURE — 1159F MED LIST DOCD IN RCRD: CPT | Mod: CPTII,S$GLB,, | Performed by: FAMILY MEDICINE

## 2024-01-26 NOTE — PROGRESS NOTES
[facility]  OUTPATIENT H&P    REASON FOR VISIT/CC: Hemorrhoids    HPI: Raine Medley is a 68 y.o. female with long standing hemorrhoids.  Some occasional bleeding with BM.  No pain currently.    I have reviewed the patient's chart including prior progress notes, procedures and testing.     ROS:   Review of Systems   All other systems reviewed and are negative.      PROBLEM LIST:  Patient Active Problem List   Diagnosis    UTI (lower urinary tract infection)    Flank pain    Cervical pain    Palpitations    Atypical pneumonia    Encounter for screening colonoscopy    Decreased strength of lower extremity    Impaired range of motion of knee    Primary osteoarthritis of right knee    Snoring    Epigastric abdominal tenderness    Microcytic anemia    Decreased functional mobility and endurance    Right knee pain    Postoperative stiffness of total knee replacement    Acute anemia    Gastroesophageal reflux disease without esophagitis    Vitamin D deficiency    Arthrofibrosis of knee joint, right    Status post right knee replacement         HISTORY  Past Medical History:   Diagnosis Date    Asthma     as a child    Gastroesophageal reflux disease without esophagitis 7/19/2023    Hyperlipidemia     Microcytic anemia 5/15/2023       Past Surgical History:   Procedure Laterality Date    CHOLECYSTECTOMY      COLONOSCOPY N/A 1/3/2019    Procedure: COLONOSCOPY;  Surgeon: Gaston Beebe MD;  Location: [address];  Service: Endoscopy;  Laterality: N/A;    KNEE JOINT MANIPULATION Right 8/21/2023    Procedure: MANIPULATION, KNEE: RIGHT: SAME DAY;  Surgeon: Sanford Calvert III, MD;  Location: [facility];  Service: Orthopedics;  Laterality: Right;    TOTAL KNEE ARTHROPLASTY Right 6/5/2023    Procedure: ARTHROPLASTY, KNEE, TOTAL: RIGHT: DEPUY - ATTUNE;  Surgeon: Sanford Calvert III, MD;  Location: [facility];  Service: Orthopedics;  Laterality: Right;       Social History     Tobacco Use    Smoking  status: Never     Passive exposure: Never    Smokeless tobacco: Never   Substance Use Topics    Alcohol use: No    Drug use: No       Family History   Problem Relation Age of Onset    No Known Problems Mother     No Known Problems Father     No Known Problems Sister     No Known Problems Daughter     No Known Problems Son     No Known Problems Son     No Known Problems Son     Melanoma Neg Hx     Lupus Neg Hx     Psoriasis Neg Hx     Colon cancer Neg Hx     Esophageal cancer Neg Hx     Stomach cancer Neg Hx          MEDS:  Current Outpatient Medications on File Prior to Visit   Medication Sig Dispense Refill    cyanocobalamin, vitamin B-12, (VITAMIN B-12 ORAL) Take by mouth. Take 1 a day      vitamin D (VITAMIN D3) 1000 units Tab Take 1,000 Units by mouth once daily.      acetaminophen (TYLENOL) 650 MG TbSR Take 1 tablet (650 mg total) by mouth every 8 (eight) hours. (Patient not taking: Reported on 1/26/2024) 120 tablet 0    oxyCODONE (ROXICODONE) 5 MG immediate release tablet Take 1-2 tablets by mouth every 4-6 hours as needed for pain (Patient not taking: Reported on 10/19/2023) 50 tablet 0    pantoprazole (PROTONIX) 40 MG tablet Take 1 tablet (40 mg total) by mouth once daily. (Patient not taking: Reported on 10/19/2023) 30 tablet 2    pregabalin (LYRICA) 75 MG capsule Take 1 capsule (75 mg total) by mouth every evening. (Patient not taking: Reported on 10/19/2023) 30 capsule 1     Current Facility-Administered Medications on File Prior to Visit   Medication Dose Route Frequency Provider Last Rate Last Admin    hylan g-f 20 (SYNVISC ONE) 48 mg/6 mL injection 48 mg  48 mg Intra-articular 1 time in Clinic/HOD Sanford Calvert III, MD        hylan g-f 20 (SYNVISC ONE) 48 mg/6 mL injection 48 mg  48 mg Intra-articular  Sanford Calvert III, MD   48 mg at 10/19/23 1540       ALLERGIES:  Review of patient's allergies indicates:   Allergen Reactions    Aspirin          VITALS:  Vitals:    01/26/24 1022   BP: (!)  171/84   Pulse: 70         PHYSICAL EXAM:  Physical Exam  Constitutional:       Appearance: Normal appearance.   HENT:      Head: Normocephalic and atraumatic.   Pulmonary:      Effort: Pulmonary effort is normal.   Genitourinary:     Comments: Skin tags from prior external hemorrhoids, no active           LABS:  Lab Results   Component Value Date    WBC 10.68 07/26/2023    RBC 4.35 07/26/2023    HGB 9.9 (L) 07/26/2023    HCT 33.8 (L) 07/26/2023     07/26/2023     Lab Results   Component Value Date     (H) 07/26/2023     07/26/2023    K 3.9 07/26/2023     07/26/2023    CO2 25 07/26/2023    BUN 16 07/26/2023    CREATININE 0.8 07/26/2023    CALCIUM 9.2 07/26/2023     Lab Results   Component Value Date    ALT 12 07/26/2023    AST 10 07/26/2023    ALKPHOS 94 07/26/2023    BILITOT 0.3 07/26/2023     Lab Results   Component Value Date    MG 2.1 07/20/2023    PHOS 4.1 07/20/2023       STUDIES:  N/A images and reports were personally reviewed.        ASSESSMENT & PLAN:  68 y.o. female, recommend increasing water intake and fiber to control hemorrhoids, if pt interested in skin tag removal I can refer to Colorectal surgery.      Sagar Brito M.D., F.A.C.S.  Pprcxy-[facility] and General Surgery  [facility]

## 2024-01-26 NOTE — PROGRESS NOTES
"  Subjective:     HPI:   Raine Medley is a 68 y.o. female who presents for 6 month follow up right TKA and L knee    Date of surgery:   R TKA 6/5/23  UGI bleed: 2u PRBC 7/19/23, outpatient GI follow up  AMADEO 8/21/23    Medications: none for right knee  Meds for L knee- tylenol PRN    Assistive Devices: none for right    Limitations: none for right knee    R TKA doing well, no pain, feeling weather - feels tight posteiorly with cold weather  Doing whatever she wants on it  No medications for pain  "Oh yes, happy"    Progressive L knee pain  HA synvisc one injection 10/19/23 - no improvement (The patient had bilateral knee iaCSI 2013 and it only helped for 3 weeks. B synvisc one injections 7/22 helped.  )  Has done PT, finished in December           Objective:   Body mass index is 24.94 kg/m².  Exam:    Gait: limp/antalgic none    Incision: healed    Stability:  Knee stable anterior-posterior varus and valgus stresses, no extensor lag    Extension: 5    Flexion: 95    Valgus angle: 5    L knee 0-115      Imaging:  Indication:  Exam status post right total knee arthroplasty  Exam Ordered: Radiographs of the right knee include a standing anteroposterior view, a lateral view, and a sunrise view  Details of Examination: Todays exam show a well fixed, well positioned total knee arthroplasty with no evidence of wear, osteolysis, or loosening.  Impression:  Status post right total knee arthroplasty, implant in good position with no abnormality         Assessment:       ICD-10-CM ICD-9-CM   1. Primary osteoarthritis of left knee  M17.12 715.16   2. Presence of right artificial knee joint  Z96.651 V43.65      R TKA arthrofibrosis - UGI post op, AMADEO  L knee varus OA     Plan:       Patient is doing very well with their total knee arthroplasty.  They will continue with their routine care of the knee replacement and see me back for their follow-up at the routine interval.  If there are problems in the interim they will " see me back sooner.    L knee CSI today    F/u  3 months L knee CSI    Thinking November/December 2024 for L TKA - winter break from school      No orders of the defined types were placed in this encounter.            Past Medical History:   Diagnosis Date    Asthma     as a child    Gastroesophageal reflux disease without esophagitis 7/19/2023    Hyperlipidemia     Microcytic anemia 5/15/2023       Past Surgical History:   Procedure Laterality Date    CHOLECYSTECTOMY      COLONOSCOPY N/A 1/3/2019    Procedure: COLONOSCOPY;  Surgeon: Gaston Beebe MD;  Location: [facility] ENDO [address];  Service: Endoscopy;  Laterality: N/A;    KNEE JOINT MANIPULATION Right 8/21/2023    Procedure: MANIPULATION, KNEE: RIGHT: SAME DAY;  Surgeon: Sanford Calvert III, MD;  Location: [facility];  Service: Orthopedics;  Laterality: Right;    TOTAL KNEE ARTHROPLASTY Right 6/5/2023    Procedure: ARTHROPLASTY, KNEE, TOTAL: RIGHT: DEPUY - ATTUNE;  Surgeon: Sanford Calvert III, MD;  Location: [facility];  Service: Orthopedics;  Laterality: Right;       Family History   Problem Relation Age of Onset    No Known Problems Mother     No Known Problems Father     No Known Problems Sister     No Known Problems Daughter     No Known Problems Son     No Known Problems Son     No Known Problems Son     Melanoma Neg Hx     Lupus Neg Hx     Psoriasis Neg Hx     Colon cancer Neg Hx     Esophageal cancer Neg Hx     Stomach cancer Neg Hx        Social History     Socioeconomic History    Marital status:    Occupational History     Employer: [employer]   Tobacco Use    Smoking status: Never     Passive exposure: Never    Smokeless tobacco: Never   Substance and Sexual Activity    Alcohol use: No    Drug use: No   Social History Narrative    She lives in [place] with her 15-year-old son in [place]. 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 She is from [facility].

## 2024-01-26 NOTE — PROGRESS NOTES
Updates were requested from care everywhere.  Health Maintenance has been updated.  LINKS immunization registry triggered.  Immunizations were reconciled.  Per ROMERO in basket message, pt declined flu vaccine 01/26/2024.

## 2024-01-26 NOTE — PROGRESS NOTES
Subjective     Patient ID: Raine Medley is a 68 y.o. female.    Chief Complaint: Follow-up    68 yr old pleasant female with GERD, and other co morbidities presented today along with her daughter for follow up. She is c/o hemorrhoids and need general surgery.    She was recently admitted for anemia and upper GI bleed. She was transfused two units and her H/H improving. She has no GI follow up until October. She is scheduled for EGD. She denies any symptoms or melena, hematemesis or pain in her abdomen. Details as follows -      GERD - controlled - takes PPI on regular basis    History as below - reviewed         Follow-up  Pertinent negatives include no anorexia, arthralgias, chest pain, congestion, diaphoresis, headaches, myalgias, nausea or sore throat.   Anemia  Presents for initial visit. The condition has lasted for 1 month. Symptoms include pallor. There has been no anorexia, bruising/bleeding easily or confusion. Signs of blood loss that are not present include hematochezia, melena, menorrhagia and vaginal bleeding. Past treatments include nothing. There is no history of alcohol abuse, chronic liver disease, clotting disorder, heart failure, HIV/AIDS, inflammatory bowel disease, malnutrition, recent illness or recent trauma.     Review of Systems   Constitutional: Negative.  Negative for activity change, diaphoresis and unexpected weight change.   HENT: Negative.  Negative for nasal congestion, ear discharge, hearing loss, rhinorrhea, sore throat and voice change.    Eyes: Negative.  Negative for pain, discharge and visual disturbance.   Respiratory: Negative.  Negative for chest tightness, shortness of breath and wheezing.    Cardiovascular: Negative.  Negative for chest pain.   Gastrointestinal: Negative.  Negative for abdominal distention, anal bleeding, anorexia, constipation, hematochezia, melena and nausea.   Endocrine: Negative.  Negative for cold intolerance, polydipsia and polyuria.    Genitourinary: Negative.  Negative for decreased urine volume, difficulty urinating, dysuria, frequency, hematuria, menorrhagia, menstrual problem, vaginal bleeding and vaginal pain.   Musculoskeletal: Negative.  Negative for arthralgias, gait problem and myalgias.   Integumentary:  Positive for pallor. Negative for color change and wound.   Allergic/Immunologic: Negative.  Negative for environmental allergies and immunocompromised state.   Neurological: Negative.  Negative for dizziness, tremors, seizures, speech difficulty and headaches.   Hematological: Negative.  Negative for adenopathy. Does not bruise/bleed easily.   Psychiatric/Behavioral: Negative.  Negative for agitation, confusion, decreased concentration, hallucinations, self-injury and suicidal ideas. The patient is not nervous/anxious.        Past Medical History:   Diagnosis Date    Asthma     as a child    Gastroesophageal reflux disease without esophagitis 7/19/2023    Hyperlipidemia     Microcytic anemia 5/15/2023       Past Surgical History:   Procedure Laterality Date    CHOLECYSTECTOMY      COLONOSCOPY N/A 1/3/2019    Procedure: COLONOSCOPY;  Surgeon: Gaston Beebe MD;  Location: [address];  Service: Endoscopy;  Laterality: N/A;    KNEE JOINT MANIPULATION Right 8/21/2023    Procedure: MANIPULATION, KNEE: RIGHT: SAME DAY;  Surgeon: Sanford Calvert III, MD;  Location: [facility];  Service: Orthopedics;  Laterality: Right;    TOTAL KNEE ARTHROPLASTY Right 6/5/2023    Procedure: ARTHROPLASTY, KNEE, TOTAL: RIGHT: DEPUY - ATTUNE;  Surgeon: Sanford Calvert III, MD;  Location: [facility];  Service: Orthopedics;  Laterality: Right;       Family History   Problem Relation Age of Onset    No Known Problems Mother     No Known Problems Father     No Known Problems Sister     No Known Problems Daughter     No Known Problems Son     No Known Problems Son     No Known Problems Son     Melanoma Neg Hx     Lupus Neg Hx     Psoriasis Neg Hx     Colon  cancer Neg Hx     Esophageal cancer Neg Hx     Stomach cancer Neg Hx        Social History     Socioeconomic History    Marital status:    Occupational History     Employer: [employer]   Tobacco Use    Smoking status: Never     Passive exposure: Never    Smokeless tobacco: Never   Substance and Sexual Activity    Alcohol use: No    Drug use: No   Social History Narrative    She lives in [place] with her 15-year-old son in [place]. 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 She is from [facility].        Current Outpatient Medications   Medication Sig Dispense Refill    acetaminophen (TYLENOL) 650 MG TbSR Take 1 tablet (650 mg total) by mouth every 8 (eight) hours. (Patient not taking: Reported on 1/26/2024) 120 tablet 0    cyanocobalamin, vitamin B-12, (VITAMIN B-12 ORAL) Take by mouth. Take 1 a day      oxyCODONE (ROXICODONE) 5 MG immediate release tablet Take 1-2 tablets by mouth every 4-6 hours as needed for pain (Patient not taking: Reported on 10/19/2023) 50 tablet 0    pantoprazole (PROTONIX) 40 MG tablet Take 1 tablet (40 mg total) by mouth once daily. (Patient not taking: Reported on 10/19/2023) 30 tablet 2    pregabalin (LYRICA) 75 MG capsule Take 1 capsule (75 mg total) by mouth every evening. (Patient not taking: Reported on 10/19/2023) 30 capsule 1    vitamin D (VITAMIN D3) 1000 units Tab Take 1,000 Units by mouth once daily.       Current Facility-Administered Medications   Medication Dose Route Frequency Provider Last Rate Last Admin    hylan g-f 20 (SYNVISC ONE) 48 mg/6 mL injection 48 mg  48 mg Intra-articular 1 time in Clinic/HOD Sanford Calvert III, MD         Facility-Administered Medications Ordered in Other Visits   Medication Dose Route Frequency Provider Last Rate Last Admin    hylan g-f 20 (SYNVISC ONE) 48 mg/6 mL injection 48 mg  48 mg Intra-articular  Sanford Calvert III, MD   48 mg at 10/19/23 1540       Review of  patient's allergies indicates:   Allergen Reactions    Aspirin           Objective   Vitals:    01/26/24 0958   BP: 129/79   Pulse: 70   Temp: 98 °F (36.7 °C)       Physical Exam  Constitutional:       General: She is not in acute distress.     Appearance: She is well-developed. She is not diaphoretic.   HENT:      Head: Normocephalic and atraumatic.      Right Ear: External ear normal.      Left Ear: External ear normal.      Nose: Nose normal.      Mouth/Throat:      Pharynx: No oropharyngeal exudate.   Eyes:      General: No scleral icterus.        Right eye: No discharge.         Left eye: No discharge.      Conjunctiva/sclera: Conjunctivae normal.      Pupils: Pupils are equal, round, and reactive to light.   Neck:      Thyroid: No thyromegaly.      Vascular: No JVD.      Trachea: No tracheal deviation.   Cardiovascular:      Rate and Rhythm: Normal rate and regular rhythm.      Heart sounds: Normal heart sounds. No murmur heard.     No friction rub. No gallop.   Pulmonary:      Effort: Pulmonary effort is normal.      Breath sounds: Normal breath sounds. No stridor. No wheezing or rales.   Chest:      Chest wall: No tenderness.   Abdominal:      General: Bowel sounds are normal. There is no distension.      Palpations: Abdomen is soft. There is no mass.      Tenderness: There is no abdominal tenderness. There is no guarding or rebound.      Hernia: No hernia is present.   Musculoskeletal:         General: No tenderness. Normal range of motion.      Cervical back: Normal range of motion and neck supple.   Lymphadenopathy:      Cervical: No cervical adenopathy.   Skin:     General: Skin is warm and dry.      Coloration: Skin is pale.      Findings: No erythema or rash.   Neurological:      Mental Status: She is alert and oriented to person, place, and time.      Cranial Nerves: No cranial nerve deficit.      Motor: No abnormal muscle tone.      Coordination: Coordination normal.      Deep Tendon Reflexes:  Reflexes are normal and symmetric. Reflexes normal.   Psychiatric:         Behavior: Behavior normal.         Thought Content: Thought content normal.         Judgment: Judgment normal.            Assessment and Plan     1. Gastroesophageal reflux disease without esophagitis  -     CBC Auto Differential; Future; Expected date: 01/26/2024  -     Comprehensive Metabolic Panel; Future; Expected date: 01/26/2024  -     Lipid Panel; Future; Expected date: 01/26/2024  -     Vitamin D; Future; Expected date: 01/26/2024  -     Hemoglobin A1C; Future; Expected date: 01/26/2024    2. Acute anemia  -     CBC Auto Differential; Future; Expected date: 01/26/2024  -     Comprehensive Metabolic Panel; Future; Expected date: 01/26/2024  -     IRON AND TIBC; Future; Expected date: 01/26/2024  -     FERRITIN; Future; Expected date: 01/26/2024  -     Lipid Panel; Future; Expected date: 01/26/2024  -     Vitamin D; Future; Expected date: 01/26/2024  -     Hemoglobin A1C; Future; Expected date: 01/26/2024    3. Vitamin D deficiency  -     CBC Auto Differential; Future; Expected date: 01/26/2024  -     Comprehensive Metabolic Panel; Future; Expected date: 01/26/2024  -     Lipid Panel; Future; Expected date: 01/26/2024  -     Vitamin D; Future; Expected date: 01/26/2024  -     Hemoglobin A1C; Future; Expected date: 01/26/2024    4. Hemorrhoids, unspecified hemorrhoid type  -     Ambulatory referral/consult to General Surgery; Future; Expected date: 02/02/2024    5. Constipation, unspecified constipation type  -     CBC Auto Differential; Future; Expected date: 01/26/2024  -     Comprehensive Metabolic Panel; Future; Expected date: 01/26/2024  -     Ambulatory referral/consult to General Surgery; Future; Expected date: 02/02/2024        Raine was seen today for follow-up.    Diagnoses and all orders for this visit:    Gastroesophageal reflux disease without esophagitis  -     CBC Auto Differential; Future  -     Comprehensive Metabolic  Panel; Future  -     Lipid Panel; Future  -     Vitamin D; Future  -     Hemoglobin A1C; Future    Acute anemia  -     CBC Auto Differential; Future  -     Comprehensive Metabolic Panel; Future  -     IRON AND TIBC; Future  -     FERRITIN; Future  -     Lipid Panel; Future  -     Vitamin D; Future  -     Hemoglobin A1C; Future    Vitamin D deficiency  -     CBC Auto Differential; Future  -     Comprehensive Metabolic Panel; Future  -     Lipid Panel; Future  -     Vitamin D; Future  -     Hemoglobin A1C; Future    Hemorrhoids, unspecified hemorrhoid type  -     Ambulatory referral/consult to General Surgery; Future    Constipation, unspecified constipation type  -     CBC Auto Differential; Future  -     Comprehensive Metabolic Panel; Future  -     Ambulatory referral/consult to General Surgery; Future      Hemorrhoids  -refer surgery    Anemia  -H/H repeat  -iron rich diet    GERD/GI bleed  -stable now  -monitor    Spent adequate time in obtaining history and explaining differentials    30 minutes spent during this visit of which greater than 50% devoted to face-face counseling and coordination of care regarding diagnosis and management plan    Follow up in about 3 months (around 4/26/2024), or if symptoms worsen or fail to improve.           Follow up in about 3 months (around 4/26/2024), or if symptoms worsen or fail to improve.

## 2024-01-27 ENCOUNTER — LAB VISIT (OUTPATIENT)
Dept: LAB | Facility: HOSPITAL | Age: 69
End: 2024-01-27
Attending: FAMILY MEDICINE
Payer: COMMERCIAL

## 2024-01-27 DIAGNOSIS — K21.9 GASTROESOPHAGEAL REFLUX DISEASE WITHOUT ESOPHAGITIS: ICD-10-CM

## 2024-01-27 DIAGNOSIS — D64.9 ACUTE ANEMIA: ICD-10-CM

## 2024-01-27 DIAGNOSIS — E55.9 VITAMIN D DEFICIENCY: ICD-10-CM

## 2024-01-27 DIAGNOSIS — K59.00 CONSTIPATION, UNSPECIFIED CONSTIPATION TYPE: ICD-10-CM

## 2024-01-27 LAB
25(OH)D3+25(OH)D2 SERPL-MCNC: 49 NG/ML (ref 30–96)
ALBUMIN SERPL BCP-MCNC: 4.2 G/DL (ref 3.5–5.2)
ALP SERPL-CCNC: 89 U/L (ref 55–135)
ALT SERPL W/O P-5'-P-CCNC: 12 U/L (ref 10–44)
ANION GAP SERPL CALC-SCNC: 11 MMOL/L (ref 8–16)
AST SERPL-CCNC: 15 U/L (ref 10–40)
BASOPHILS # BLD AUTO: 0.01 K/UL (ref 0–0.2)
BASOPHILS NFR BLD: 0.1 % (ref 0–1.9)
BILIRUB SERPL-MCNC: 0.3 MG/DL (ref 0.1–1)
BUN SERPL-MCNC: 14 MG/DL (ref 8–23)
CALCIUM SERPL-MCNC: 10.3 MG/DL (ref 8.7–10.5)
CHLORIDE SERPL-SCNC: 104 MMOL/L (ref 95–110)
CHOLEST SERPL-MCNC: 223 MG/DL (ref 120–199)
CHOLEST/HDLC SERPL: 4.6 {RATIO} (ref 2–5)
CO2 SERPL-SCNC: 26 MMOL/L (ref 23–29)
CREAT SERPL-MCNC: 0.8 MG/DL (ref 0.5–1.4)
DIFFERENTIAL METHOD BLD: ABNORMAL
EOSINOPHIL # BLD AUTO: 0 K/UL (ref 0–0.5)
EOSINOPHIL NFR BLD: 0 % (ref 0–8)
ERYTHROCYTE [DISTWIDTH] IN BLOOD BY AUTOMATED COUNT: 14.6 % (ref 11.5–14.5)
EST. GFR  (NO RACE VARIABLE): >60 ML/MIN/1.73 M^2
ESTIMATED AVG GLUCOSE: 108 MG/DL (ref 68–131)
FERRITIN SERPL-MCNC: 17 NG/ML (ref 20–300)
GLUCOSE SERPL-MCNC: 133 MG/DL (ref 70–110)
HBA1C MFR BLD: 5.4 % (ref 4–5.6)
HCT VFR BLD AUTO: 40 % (ref 37–48.5)
HDLC SERPL-MCNC: 48 MG/DL (ref 40–75)
HDLC SERPL: 21.5 % (ref 20–50)
HGB BLD-MCNC: 13.3 G/DL (ref 12–16)
IMM GRANULOCYTES # BLD AUTO: 0.07 K/UL (ref 0–0.04)
IMM GRANULOCYTES NFR BLD AUTO: 0.8 % (ref 0–0.5)
IRON SERPL-MCNC: 56 UG/DL (ref 30–160)
LDLC SERPL CALC-MCNC: 164.4 MG/DL (ref 63–159)
LYMPHOCYTES # BLD AUTO: 1.2 K/UL (ref 1–4.8)
LYMPHOCYTES NFR BLD: 13.4 % (ref 18–48)
MCH RBC QN AUTO: 26.2 PG (ref 27–31)
MCHC RBC AUTO-ENTMCNC: 33.3 G/DL (ref 32–36)
MCV RBC AUTO: 79 FL (ref 82–98)
MONOCYTES # BLD AUTO: 0.2 K/UL (ref 0.3–1)
MONOCYTES NFR BLD: 1.7 % (ref 4–15)
NEUTROPHILS # BLD AUTO: 7.5 K/UL (ref 1.8–7.7)
NEUTROPHILS NFR BLD: 84 % (ref 38–73)
NONHDLC SERPL-MCNC: 175 MG/DL
NRBC BLD-RTO: 0 /100 WBC
PLATELET # BLD AUTO: 265 K/UL (ref 150–450)
PMV BLD AUTO: 12.1 FL (ref 9.2–12.9)
POTASSIUM SERPL-SCNC: 4.2 MMOL/L (ref 3.5–5.1)
PROT SERPL-MCNC: 7.8 G/DL (ref 6–8.4)
RBC # BLD AUTO: 5.07 M/UL (ref 4–5.4)
SATURATED IRON: 11 % (ref 20–50)
SODIUM SERPL-SCNC: 141 MMOL/L (ref 136–145)
TOTAL IRON BINDING CAPACITY: 528 UG/DL (ref 250–450)
TRANSFERRIN SERPL-MCNC: 357 MG/DL (ref 200–375)
TRIGL SERPL-MCNC: 53 MG/DL (ref 30–150)
WBC # BLD AUTO: 8.88 K/UL (ref 3.9–12.7)

## 2024-01-27 PROCEDURE — 80053 COMPREHEN METABOLIC PANEL: CPT | Performed by: FAMILY MEDICINE

## 2024-01-27 PROCEDURE — 83540 ASSAY OF IRON: CPT | Performed by: FAMILY MEDICINE

## 2024-01-27 PROCEDURE — 85025 COMPLETE CBC W/AUTO DIFF WBC: CPT | Performed by: FAMILY MEDICINE

## 2024-01-27 PROCEDURE — 82306 VITAMIN D 25 HYDROXY: CPT | Performed by: FAMILY MEDICINE

## 2024-01-27 PROCEDURE — 82728 ASSAY OF FERRITIN: CPT | Performed by: FAMILY MEDICINE

## 2024-01-27 PROCEDURE — 83036 HEMOGLOBIN GLYCOSYLATED A1C: CPT | Performed by: FAMILY MEDICINE

## 2024-01-27 PROCEDURE — 36415 COLL VENOUS BLD VENIPUNCTURE: CPT | Performed by: FAMILY MEDICINE

## 2024-01-27 PROCEDURE — 80061 LIPID PANEL: CPT | Performed by: FAMILY MEDICINE

## 2024-03-19 DIAGNOSIS — Z96.659 STATUS POST KNEE REPLACEMENT, UNSPECIFIED LATERALITY: Primary | ICD-10-CM

## 2024-04-18 ENCOUNTER — OFFICE VISIT (OUTPATIENT)
Dept: ORTHOPEDICS | Facility: CLINIC | Age: 69
End: 2024-04-18
Payer: COMMERCIAL

## 2024-04-18 VITALS — BODY MASS INDEX: 23.6 KG/M2 | HEIGHT: 61 IN | WEIGHT: 125 LBS

## 2024-04-18 DIAGNOSIS — M17.12 PRIMARY OSTEOARTHRITIS OF LEFT KNEE: Primary | ICD-10-CM

## 2024-04-18 PROCEDURE — 20610 DRAIN/INJ JOINT/BURSA W/O US: CPT | Mod: LT,S$GLB,, | Performed by: ORTHOPAEDIC SURGERY

## 2024-04-18 PROCEDURE — 99999 PR PBB SHADOW E&M-EST. PATIENT-LVL III: CPT | Mod: PBBFAC,,, | Performed by: ORTHOPAEDIC SURGERY

## 2024-04-18 PROCEDURE — 99499 UNLISTED E&M SERVICE: CPT | Mod: S$GLB,,, | Performed by: ORTHOPAEDIC SURGERY

## 2024-04-18 RX ORDER — TRIAMCINOLONE ACETONIDE 40 MG/ML
40 INJECTION, SUSPENSION INTRA-ARTICULAR; INTRAMUSCULAR
Status: DISCONTINUED | OUTPATIENT
Start: 2024-04-18 | End: 2024-04-18 | Stop reason: HOSPADM

## 2024-04-18 RX ADMIN — TRIAMCINOLONE ACETONIDE 40 MG: 40 INJECTION, SUSPENSION INTRA-ARTICULAR; INTRAMUSCULAR at 02:04

## 2024-04-18 NOTE — PROGRESS NOTES
Injection Information     Triamcinolone Acetonide Injectable Suspension (40mg/mL)  Lot Number: [id number]  Expiration Date: 11/30/2025

## 2024-04-18 NOTE — PROGRESS NOTES
R TKA doing well, no pain today    L knee OA, requests CSI     Going home in June, f/u 3 months rpt CSI    Trying to get to summer 2025 for L TKA

## 2024-04-18 NOTE — PROCEDURES
Large Joint Aspiration/Injection: L knee    Date/Time: 4/18/2024 2:30 PM    Performed by: Sanford Calvert III, MD  Authorized by: Sanford Calvert III, MD    Consent Done?:  Yes (Verbal)  Indications:  Pain  Timeout: prior to procedure the correct patient, procedure, and site was verified    Prep: patient was prepped and draped in usual sterile fashion      Local anesthesia used?: Yes    Local anesthetic:  Lidocaine 1% without epinephrine  Anesthetic total (ml):  5      Details:  Needle Size:  21 G  Location:  Knee  Site:  L knee  Medications:  40 mg triamcinolone acetonide 40 mg/mL  Patient tolerance:  Patient tolerated the procedure well with no immediate complications

## 2024-05-15 ENCOUNTER — OFFICE VISIT (OUTPATIENT)
Dept: FAMILY MEDICINE | Facility: CLINIC | Age: 69
End: 2024-05-15
Attending: FAMILY MEDICINE
Payer: COMMERCIAL

## 2024-05-15 VITALS
HEIGHT: 61 IN | OXYGEN SATURATION: 96 % | BODY MASS INDEX: 25.02 KG/M2 | DIASTOLIC BLOOD PRESSURE: 82 MMHG | WEIGHT: 132.5 LBS | HEART RATE: 64 BPM | SYSTOLIC BLOOD PRESSURE: 124 MMHG

## 2024-05-15 DIAGNOSIS — K21.9 GASTROESOPHAGEAL REFLUX DISEASE WITHOUT ESOPHAGITIS: Primary | ICD-10-CM

## 2024-05-15 DIAGNOSIS — M26.622 ARTHRALGIA OF LEFT TEMPOROMANDIBULAR JOINT: ICD-10-CM

## 2024-05-15 DIAGNOSIS — Z12.31 ENCOUNTER FOR SCREENING MAMMOGRAM FOR BREAST CANCER: ICD-10-CM

## 2024-05-15 DIAGNOSIS — K92.2 GASTROINTESTINAL HEMORRHAGE, UNSPECIFIED GASTROINTESTINAL HEMORRHAGE TYPE: ICD-10-CM

## 2024-05-15 DIAGNOSIS — E55.9 VITAMIN D DEFICIENCY: ICD-10-CM

## 2024-05-15 DIAGNOSIS — K59.00 CONSTIPATION, UNSPECIFIED CONSTIPATION TYPE: ICD-10-CM

## 2024-05-15 PROCEDURE — 99214 OFFICE O/P EST MOD 30 MIN: CPT | Mod: S$GLB,,, | Performed by: FAMILY MEDICINE

## 2024-05-15 PROCEDURE — 1101F PT FALLS ASSESS-DOCD LE1/YR: CPT | Mod: CPTII,S$GLB,, | Performed by: FAMILY MEDICINE

## 2024-05-15 PROCEDURE — 1126F AMNT PAIN NOTED NONE PRSNT: CPT | Mod: CPTII,S$GLB,, | Performed by: FAMILY MEDICINE

## 2024-05-15 PROCEDURE — 99999 PR PBB SHADOW E&M-EST. PATIENT-LVL IV: CPT | Mod: PBBFAC,,, | Performed by: FAMILY MEDICINE

## 2024-05-15 PROCEDURE — 3008F BODY MASS INDEX DOCD: CPT | Mod: CPTII,S$GLB,, | Performed by: FAMILY MEDICINE

## 2024-05-15 PROCEDURE — 3074F SYST BP LT 130 MM HG: CPT | Mod: CPTII,S$GLB,, | Performed by: FAMILY MEDICINE

## 2024-05-15 PROCEDURE — 1159F MED LIST DOCD IN RCRD: CPT | Mod: CPTII,S$GLB,, | Performed by: FAMILY MEDICINE

## 2024-05-15 PROCEDURE — 3044F HG A1C LEVEL LT 7.0%: CPT | Mod: CPTII,S$GLB,, | Performed by: FAMILY MEDICINE

## 2024-05-15 PROCEDURE — 1160F RVW MEDS BY RX/DR IN RCRD: CPT | Mod: CPTII,S$GLB,, | Performed by: FAMILY MEDICINE

## 2024-05-15 PROCEDURE — 3288F FALL RISK ASSESSMENT DOCD: CPT | Mod: CPTII,S$GLB,, | Performed by: FAMILY MEDICINE

## 2024-05-15 PROCEDURE — 3079F DIAST BP 80-89 MM HG: CPT | Mod: CPTII,S$GLB,, | Performed by: FAMILY MEDICINE

## 2024-05-15 NOTE — PROGRESS NOTES
Subjective     Patient ID: Raine Medley is a 69 y.o. female.    Chief Complaint: Follow-up    69 yr old pleasant female with GERD, and other co morbidities presented today along with her follow up. Doingw ell and no issues.        GERD - controlled - takes PPI on regular basis    History as below - reviewed         Follow-up  Pertinent negatives include no anorexia, arthralgias, chest pain, congestion, diaphoresis, headaches, myalgias, nausea or sore throat.   Anemia  Presents for initial visit. The condition has lasted for 1 month. Symptoms include pallor. There has been no anorexia, bruising/bleeding easily or confusion. Signs of blood loss that are not present include hematochezia, melena, menorrhagia and vaginal bleeding. Past treatments include nothing. There is no history of alcohol abuse, chronic liver disease, clotting disorder, heart failure, HIV/AIDS, inflammatory bowel disease, malnutrition, recent illness or recent trauma.     Review of Systems   Constitutional: Negative.  Negative for activity change, diaphoresis and unexpected weight change.   HENT: Negative.  Negative for nasal congestion, ear discharge, hearing loss, rhinorrhea, sore throat and voice change.    Eyes: Negative.  Negative for pain, discharge and visual disturbance.   Respiratory: Negative.  Negative for chest tightness, shortness of breath and wheezing.    Cardiovascular: Negative.  Negative for chest pain.   Gastrointestinal: Negative.  Negative for abdominal distention, anal bleeding, anorexia, constipation, hematochezia, melena and nausea.   Endocrine: Negative.  Negative for cold intolerance, polydipsia and polyuria.   Genitourinary: Negative.  Negative for decreased urine volume, difficulty urinating, dysuria, frequency, hematuria, menorrhagia, menstrual problem, vaginal bleeding and vaginal pain.   Musculoskeletal: Negative.  Negative for arthralgias, gait problem and myalgias.   Integumentary:  Positive for pallor.  Negative for color change and wound.   Allergic/Immunologic: Negative.  Negative for environmental allergies and immunocompromised state.   Neurological: Negative.  Negative for dizziness, tremors, seizures, speech difficulty and headaches.   Hematological: Negative.  Negative for adenopathy. Does not bruise/bleed easily.   Psychiatric/Behavioral: Negative.  Negative for agitation, confusion, decreased concentration, hallucinations, self-injury and suicidal ideas. The patient is not nervous/anxious.        Past Medical History:   Diagnosis Date    Asthma     as a child    Gastroesophageal reflux disease without esophagitis 7/19/2023    Hyperlipidemia     Microcytic anemia 5/15/2023       Past Surgical History:   Procedure Laterality Date    CHOLECYSTECTOMY      COLONOSCOPY N/A 1/3/2019    Procedure: COLONOSCOPY;  Surgeon: Gaston Beebe MD;  Location: [address];  Service: Endoscopy;  Laterality: N/A;    KNEE JOINT MANIPULATION Right 8/21/2023    Procedure: MANIPULATION, KNEE: RIGHT: SAME DAY;  Surgeon: Sanford Calvert III, MD;  Location: [facility];  Service: Orthopedics;  Laterality: Right;    TOTAL KNEE ARTHROPLASTY Right 6/5/2023    Procedure: ARTHROPLASTY, KNEE, TOTAL: RIGHT: DEPUY - ATTUNE;  Surgeon: Sanford Calvert III, MD;  Location: [facility];  Service: Orthopedics;  Laterality: Right;       Family History   Problem Relation Name Age of Onset    No Known Problems Mother      No Known Problems Father      No Known Problems Sister      No Known Problems Daughter      No Known Problems Son      No Known Problems Son      No Known Problems Son      Melanoma Neg Hx      Lupus Neg Hx      Psoriasis Neg Hx      Colon cancer Neg Hx      Esophageal cancer Neg Hx      Stomach cancer Neg Hx         Social History     Socioeconomic History    Marital status:    Occupational History     Employer: [employer]   Tobacco Use    Smoking status: Never     Passive exposure: Never     Smokeless tobacco: Never   Substance and Sexual Activity    Alcohol use: No    Drug use: No   Social History Narrative    She lives in [place] with her 15-year-old son in [place]. 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 She is from [facility].        Current Outpatient Medications   Medication Sig Dispense Refill    acetaminophen (TYLENOL) 650 MG TbSR Take 1 tablet (650 mg total) by mouth every 8 (eight) hours. 120 tablet 0    cyanocobalamin, vitamin B-12, (VITAMIN B-12 ORAL) Take by mouth. Take 1 a day      pantoprazole (PROTONIX) 40 MG tablet Take 1 tablet (40 mg total) by mouth once daily. 30 tablet 2    vitamin D (VITAMIN D3) 1000 units Tab Take 1,000 Units by mouth once daily.       Current Facility-Administered Medications   Medication Dose Route Frequency Provider Last Rate Last Admin    hylan g-f 20 (SYNVISC ONE) 48 mg/6 mL injection 48 mg  48 mg Intra-articular 1 time in Clinic/HOD Sanford Calvert III, MD         Facility-Administered Medications Ordered in Other Visits   Medication Dose Route Frequency Provider Last Rate Last Admin    hylan g-f 20 (SYNVISC ONE) 48 mg/6 mL injection 48 mg  48 mg Intra-articular  Sanford Calvert III, MD   48 mg at 10/19/23 1540       Review of patient's allergies indicates:   Allergen Reactions    Aspirin           Objective   Vitals:    05/15/24 1536   BP: 124/82   Pulse: 64       Physical Exam  Constitutional:       General: She is not in acute distress.     Appearance: She is well-developed. She is not diaphoretic.   HENT:      Head: Normocephalic and atraumatic.      Right Ear: External ear normal.      Left Ear: External ear normal.      Nose: Nose normal.      Mouth/Throat:      Pharynx: No oropharyngeal exudate.   Eyes:      General: No scleral icterus.        Right eye: No discharge.         Left eye: No discharge.      Conjunctiva/sclera: Conjunctivae normal.      Pupils: Pupils are equal, round, and reactive to light.    Neck:      Thyroid: No thyromegaly.      Vascular: No JVD.      Trachea: No tracheal deviation.   Cardiovascular:      Rate and Rhythm: Normal rate and regular rhythm.      Heart sounds: Normal heart sounds. No murmur heard.     No friction rub. No gallop.   Pulmonary:      Effort: Pulmonary effort is normal.      Breath sounds: Normal breath sounds. No stridor. No wheezing or rales.   Chest:      Chest wall: No tenderness.   Abdominal:      General: Bowel sounds are normal. There is no distension.      Palpations: Abdomen is soft. There is no mass.      Tenderness: There is no abdominal tenderness. There is no guarding or rebound.      Hernia: No hernia is present.   Musculoskeletal:         General: No tenderness. Normal range of motion.      Cervical back: Normal range of motion and neck supple.   Lymphadenopathy:      Cervical: No cervical adenopathy.   Skin:     General: Skin is warm and dry.      Coloration: Skin is pale.      Findings: No erythema or rash.   Neurological:      Mental Status: She is alert and oriented to person, place, and time.      Cranial Nerves: No cranial nerve deficit.      Motor: No abnormal muscle tone.      Coordination: Coordination normal.      Deep Tendon Reflexes: Reflexes are normal and symmetric. Reflexes normal.   Psychiatric:         Behavior: Behavior normal.         Thought Content: Thought content normal.         Judgment: Judgment normal.            Assessment and Plan     1. Gastroesophageal reflux disease without esophagitis    2. Vitamin D deficiency    3. Constipation, unspecified constipation type    4. Gastrointestinal hemorrhage, unspecified gastrointestinal hemorrhage type    5. Arthralgia of left temporomandibular joint    6. Encounter for screening mammogram for breast cancer  -     Mammo Digital Screening Bilat w/ Toilio; Future; Expected date: 05/15/2024        Raine was seen today for follow-up.    Diagnoses and all orders for this visit:    Gastroesophageal  reflux disease without esophagitis    Vitamin D deficiency    Constipation, unspecified constipation type    Gastrointestinal hemorrhage, unspecified gastrointestinal hemorrhage type    Arthralgia of left temporomandibular joint    Encounter for screening mammogram for breast cancer  -     Mammo Digital Screening Bilat w/ Otilio; Future          Anemia  -stable    GERD/GI bleed  -stable now  -monitor    Spent adequate time in obtaining history and explaining differentials    30 minutes spent during this visit of which greater than 50% devoted to face-face counseling and coordination of care regarding diagnosis and management plan    Follow up in about 6 months (around 11/15/2024), or if symptoms worsen or fail to improve.           Follow up in about 6 months (around 11/15/2024), or if symptoms worsen or fail to improve.

## 2024-07-08 ENCOUNTER — TELEPHONE (OUTPATIENT)
Dept: DERMATOLOGY | Facility: CLINIC | Age: 69
End: 2024-07-08
Payer: COMMERCIAL

## 2024-07-11 ENCOUNTER — PATIENT MESSAGE (OUTPATIENT)
Dept: ADMINISTRATIVE | Facility: OTHER | Age: 69
End: 2024-07-11
Payer: COMMERCIAL

## 2024-07-11 ENCOUNTER — OFFICE VISIT (OUTPATIENT)
Dept: ORTHOPEDICS | Facility: CLINIC | Age: 69
End: 2024-07-11
Payer: COMMERCIAL

## 2024-07-11 VITALS — BODY MASS INDEX: 25.2 KG/M2 | HEIGHT: 61 IN | WEIGHT: 133.5 LBS

## 2024-07-11 DIAGNOSIS — Z96.651 PRESENCE OF RIGHT ARTIFICIAL KNEE JOINT: ICD-10-CM

## 2024-07-11 DIAGNOSIS — M17.12 PRIMARY OSTEOARTHRITIS OF LEFT KNEE: Primary | ICD-10-CM

## 2024-07-11 PROCEDURE — 99999 PR PBB SHADOW E&M-EST. PATIENT-LVL III: CPT | Mod: PBBFAC,,, | Performed by: ORTHOPAEDIC SURGERY

## 2024-07-11 PROCEDURE — 3044F HG A1C LEVEL LT 7.0%: CPT | Mod: CPTII,S$GLB,, | Performed by: ORTHOPAEDIC SURGERY

## 2024-07-11 PROCEDURE — 99212 OFFICE O/P EST SF 10 MIN: CPT | Mod: S$GLB,,, | Performed by: ORTHOPAEDIC SURGERY

## 2024-07-11 PROCEDURE — 3008F BODY MASS INDEX DOCD: CPT | Mod: CPTII,S$GLB,, | Performed by: ORTHOPAEDIC SURGERY

## 2024-07-11 PROCEDURE — 1159F MED LIST DOCD IN RCRD: CPT | Mod: CPTII,S$GLB,, | Performed by: ORTHOPAEDIC SURGERY

## 2024-07-11 PROCEDURE — 1125F AMNT PAIN NOTED PAIN PRSNT: CPT | Mod: CPTII,S$GLB,, | Performed by: ORTHOPAEDIC SURGERY

## 2024-07-11 PROCEDURE — 3288F FALL RISK ASSESSMENT DOCD: CPT | Mod: CPTII,S$GLB,, | Performed by: ORTHOPAEDIC SURGERY

## 2024-07-11 PROCEDURE — 1101F PT FALLS ASSESS-DOCD LE1/YR: CPT | Mod: CPTII,S$GLB,, | Performed by: ORTHOPAEDIC SURGERY

## 2024-07-11 NOTE — PROGRESS NOTES
Subjective:     HPI:   Raine Medley is a 69 y.o. female who presents for repeat eval L knee, 3 months follow up     Says R knee doing well, occ medial soreness but does not take pain meds for R knee    Trying to get to summer 2025 for L TKA   Has seen Verenice Elise in the past     S/p CSI 4/18/24 helped but has worn off  Interested in Gel shots      History of Present Illness         Objective:   Body mass index is 25.22 kg/m².  Exam:    Physical Exam      Unchanged     Imaging:    Results      None today  Previous Klg4 varus L knee OA      Assessment/Plan:       ICD-10-CM ICD-9-CM   1. Primary osteoarthritis of left knee  M17.12 715.16   2. Presence of right artificial knee joint  Z96.651 V43.65            Assessment/Plan:     Assessment & Plan      Still trying to get to next summer 2025 for TKA  Doesn't want any more steroids  Will Refer back  to Verenice Elise for US guided HA injections v zilretta v toradol    Had monovisc in 2022, will put orders in for monovisc again and send message to Dr Elise and her team     Gave velys handout today for L TKA    Contact us to schedule L TKA when ready, discussed no injections w/in 90 days of surgery date      No orders of the defined types were placed in this encounter.      This note was generated with the assistance of ambient listening technology. Verbal consent was obtained by the patient and accompanying visitor(s) for the recording of patient appointment to facilitate this note. I attest to having reviewed and edited the generated note for accuracy, though some syntax or spelling errors may persist. Please contact the author of this note for any clarification.            Past Medical History:   Diagnosis Date    Asthma     as a child    Gastroesophageal reflux disease without esophagitis 7/19/2023    Hyperlipidemia     Microcytic anemia 5/15/2023       Past Surgical History:   Procedure Laterality Date    CHOLECYSTECTOMY      COLONOSCOPY N/A 1/3/2019     Procedure: COLONOSCOPY;  Surgeon: Gaston Beebe MD;  Location[facility]  Service: Endoscopy;  Laterality: N/A;    KNEE JOINT MANIPULATION Right 8/21/2023    Procedure: MANIPULATION, KNEE: RIGHT: SAME DAY;  Surgeon: Sanford Calvert III, MD;  Location: [facility];  Service: Orthopedics;  Laterality: Right;    TOTAL KNEE ARTHROPLASTY Right 6/5/2023    Procedure: ARTHROPLASTY, KNEE, TOTAL: RIGHT: DEPUY - ATTUNE;  Surgeon: Sanford Calvert III, MD;  Location: [facility];  Service: Orthopedics;  Laterality: Right;       Family History   Problem Relation Name Age of Onset    No Known Problems Mother      No Known Problems Father      No Known Problems Sister      No Known Problems Daughter      No Known Problems Son      No Known Problems Son      No Known Problems Son      Melanoma Neg Hx      Lupus Neg Hx      Psoriasis Neg Hx      Colon cancer Neg Hx      Esophageal cancer Neg Hx      Stomach cancer Neg Hx         Social History     Socioeconomic History    Marital status:    Occupational History     Employer: [employer]   Tobacco Use    Smoking status: Never     Passive exposure: Never    Smokeless tobacco: Never   Substance and Sexual Activity    Alcohol use: No    Drug use: No   Social History Narrative    She lives in [place] with 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 She is from [facility].

## 2024-07-15 ENCOUNTER — TELEPHONE (OUTPATIENT)
Dept: DERMATOLOGY | Facility: CLINIC | Age: 69
End: 2024-07-15
Payer: COMMERCIAL

## 2024-07-15 NOTE — PROGRESS NOTES
Telemedicine/Virtual Visit Documentation:     The patient location is: home    The chief complaint leading to consultation is: see HPI    VISIT TYPE X   Virtual visit with synchronous audio and video X   Telephone E/M service      Total time spent with patient: see X mary on chart below.   More than half of the time was spent counseling or coordinating care including prognosis, differential diagnosis, risks and benefits of treatment, instructions, compliance risk reductions     EST MINUTES X   [id number] 5    [id number] 10    80856 15    97062 25 X   99215 40    NEW     96305 10    27836 20    15252 30    34936 45    45614 60    PHONE      5-10    61548 11-20    90250 21-30      H&P  Orthopaedics      SUBJECTIVE:     History of Present Illness:  Patient is a 69 y.o. female with left knee pain here to discuss conservative treatment options (hyaluronic acid vs. Zilretta vs. Toradol injections).  Patient had a Monovisc injection in July 20, 2022 with no relief.  Patient has little relief to traditional corticosteroid.  Patient is not interested in steroid injections as she feel uncomfortable with the long-term side effects.  Pain today is a 7-8/10.    Patient referred to my clinic by Dr. Calvert.    Review of patient's allergies indicates:   Allergen Reactions    Aspirin        Past Medical History:   Diagnosis Date    Asthma     as a child    Gastroesophageal reflux disease without esophagitis 7/19/2023    Hyperlipidemia     Microcytic anemia 5/15/2023     Past Surgical History:   Procedure Laterality Date    CHOLECYSTECTOMY      COLONOSCOPY N/A 1/3/2019    Procedure: COLONOSCOPY;  Surgeon: Gaston Beebe MD;  Location: [address];  Service: Endoscopy;  Laterality: N/A;    KNEE JOINT MANIPULATION Right 8/21/2023    Procedure: MANIPULATION, KNEE: RIGHT: SAME DAY;  Surgeon: Sanford Calvert III, MD;  Location: [facility];  Service: Orthopedics;  Laterality: Right;    TOTAL KNEE ARTHROPLASTY Right 6/5/2023     Procedure: ARTHROPLASTY, KNEE, TOTAL: RIGHT: DEPUY - ATTUNE;  Surgeon: Sanford Calvert III, MD;  Location: [facility];  Service: Orthopedics;  Laterality: Right;     Family History   Problem Relation Name Age of Onset    No Known Problems Mother      No Known Problems Father      No Known Problems Sister      No Known Problems Daughter      No Known Problems Son      No Known Problems Son      No Known Problems Son      Melanoma Neg Hx      Lupus Neg Hx      Psoriasis Neg Hx      Colon cancer Neg Hx      Esophageal cancer Neg Hx      Stomach cancer Neg Hx       Social History     Tobacco Use    Smoking status: Never     Passive exposure: Never    Smokeless tobacco: Never   Substance Use Topics    Alcohol use: No    Drug use: No        Review of Systems:  Patient denies constitutional symptoms, cardiac symptoms, respiratory symptoms, GI symptoms.  The remainder of the musculoskeletal ROS is included in the HPI.      OBJECTIVE:     Physical Exam:  Physical exam limited as this was a virtual visit, but it is apparent that the individual is in no acute distress, well groomed, well kept.  Speech is normal.  Patient is alert and oriented x3.  Mood and affect appear normal.       ASSESSMENT/PLAN:     A/P: Raine Medley is a 69 y.o. with Kellgren Orestes grade 4 osteoarthritis of the left knee in need of a total knee arthroplasty, which they are waiting till next summer to perform.  Patient previously with no relief with Monovisc injections and would like to avoid steroid.  We discussed other options such as trying a different brand of hyaluronic acid injection.  We agreed to move for with the Euflexxa injections left knee.

## 2024-07-16 ENCOUNTER — OFFICE VISIT (OUTPATIENT)
Dept: SPORTS MEDICINE | Facility: CLINIC | Age: 69
End: 2024-07-16
Payer: COMMERCIAL

## 2024-07-16 ENCOUNTER — TELEPHONE (OUTPATIENT)
Dept: SPORTS MEDICINE | Facility: CLINIC | Age: 69
End: 2024-07-16

## 2024-07-16 DIAGNOSIS — G89.29 CHRONIC PAIN OF LEFT KNEE: Primary | ICD-10-CM

## 2024-07-16 DIAGNOSIS — M25.562 CHRONIC PAIN OF LEFT KNEE: Primary | ICD-10-CM

## 2024-07-16 DIAGNOSIS — M17.12 PRIMARY OSTEOARTHRITIS OF LEFT KNEE: ICD-10-CM

## 2024-07-16 PROCEDURE — 99214 OFFICE O/P EST MOD 30 MIN: CPT | Mod: 95,,, | Performed by: STUDENT IN AN ORGANIZED HEALTH CARE EDUCATION/TRAINING PROGRAM

## 2024-07-16 PROCEDURE — 1159F MED LIST DOCD IN RCRD: CPT | Mod: CPTII,95,, | Performed by: STUDENT IN AN ORGANIZED HEALTH CARE EDUCATION/TRAINING PROGRAM

## 2024-07-16 PROCEDURE — 3044F HG A1C LEVEL LT 7.0%: CPT | Mod: CPTII,95,, | Performed by: STUDENT IN AN ORGANIZED HEALTH CARE EDUCATION/TRAINING PROGRAM

## 2024-07-16 PROCEDURE — 1160F RVW MEDS BY RX/DR IN RCRD: CPT | Mod: CPTII,95,, | Performed by: STUDENT IN AN ORGANIZED HEALTH CARE EDUCATION/TRAINING PROGRAM

## 2024-07-16 NOTE — TELEPHONE ENCOUNTER
Called pt, pt's daughter accepted appts on 8/2, 8/10, and 8/16.    Nicole Beavers, MS, OTC  Clinical Assistant to Dr. Verenice Elise      ----- Message from Verenice Elise MD sent at 7/16/2024  7:49 AM CDT -----  I put in an order for Euflexxa.  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  She works 7:30 a.m. to 4:00 p.m..

## 2024-07-16 NOTE — Clinical Note
I put in an order for Euflexxa.  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  She works 7:30 a.m. to 4:00 p.m..

## 2024-07-19 ENCOUNTER — HOSPITAL ENCOUNTER (OUTPATIENT)
Dept: RADIOLOGY | Facility: HOSPITAL | Age: 69
Discharge: HOME | End: 2024-07-19
Attending: FAMILY MEDICINE
Payer: COMMERCIAL

## 2024-07-19 DIAGNOSIS — Z12.31 ENCOUNTER FOR SCREENING MAMMOGRAM FOR BREAST CANCER: ICD-10-CM

## 2024-07-19 PROCEDURE — 77067 SCR MAMMO BI INCL CAD: CPT | Mod: 26,,, | Performed by: RADIOLOGY

## 2024-07-19 PROCEDURE — 77063 BREAST TOMOSYNTHESIS BI: CPT | Mod: 26,,, | Performed by: RADIOLOGY

## 2024-07-19 PROCEDURE — 77067 SCR MAMMO BI INCL CAD: CPT | Mod: TC

## 2024-07-24 NOTE — PROGRESS NOTES
CC: left knee pain    69 y.o. Female presents today for her 1st Euflexxa injection of her left knee.      Attempted treatments:  Pain score:  History of trauma/injury:  Affecting ADLs:     REVIEW OF SYSTEMS:   Constitution: Patient denies fever or chills.  Eyes: Patient denies eye pain or vision changes.  HEENT: Patient denies ear pain, sore throat, or nasal discharge.  CVS: Patient denies chest pain.  Lungs: Patient denies shortness of breath or cough.  Skin: Patient denies skin rash or itching.    Musculoskeletal: Patient denies recent falls. See HPI.  Psych: Patient denies any current anxiety or nervousness.    PAST MEDICAL HISTORY:   Past Medical History:   Diagnosis Date    Asthma     as a child    Gastroesophageal reflux disease without esophagitis 7/19/2023    Hyperlipidemia     Microcytic anemia 5/15/2023       MEDICATIONS:     Current Outpatient Medications:     acetaminophen (TYLENOL) 650 MG TbSR, Take 1 tablet (650 mg total) by mouth every 8 (eight) hours., Disp: 120 tablet, Rfl: 0    cyanocobalamin, vitamin B-12, (VITAMIN B-12 ORAL), Take by mouth. Take 1 a day, Disp: , Rfl:     pantoprazole (PROTONIX) 40 MG tablet, Take 1 tablet (40 mg total) by mouth once daily., Disp: 30 tablet, Rfl: 2    vitamin D (VITAMIN D3) 1000 units Tab, Take 1,000 Units by mouth once daily. (Patient not taking: Reported on 7/11/2024), Disp: , Rfl:     Current Facility-Administered Medications:     hyaluronate sodium, stabilized (MONOVISC) Syrg, , Intra-articular, 1 time in Clinic/HOD,     hylan g-f 20 (SYNVISC ONE) 48 mg/6 mL injection 48 mg, 48 mg, Intra-articular, 1 time in Clinic/HOD, Sanford Calvert III, MD    Facility-Administered Medications Ordered in Other Visits:     hylan g-f 20 (SYNVISC ONE) 48 mg/6 mL injection 48 mg, 48 mg, Intra-articular, , Sanford Calvert III, MD, 48 mg at 10/19/23 1540    ALLERGIES:   Review of patient's allergies indicates:   Allergen Reactions    Aspirin         PHYSICAL  EXAMINATION:  There were no vitals taken for this visit.  There are no signs of infection at the injection site, including no rubor, calor, or skin lesions.  Gen: NAD.  Psych: Affect & judgment nl.  Neuro: Grossly CNI. CHÁVEZ.  HEENT: -Trach dev. -Eye d/c. -Rhinorrhea.  CV: Color nl. -E/C/C. WWPx4.  Pulm: -Dyspnea. -Cough.  Lymph: -Edema.  Int: -Rash/lesion noted. Skin is warm and dry    ASSESSMENT:    No diagnosis found.      PLAN:  Ultrasound-guided injection of the left knee with Euflexxa performed at visit today.    Future planning includes - Continue exercise program    Risks and benefits were discussed with patient prior to receiving injection.  Depending on injection type, risks include the possibility of infection, pain, disruptions in blood pressure and blood sugar, and cosmetic deformity at site of injection.    All questions were answered to the best of my ability and all concerns were addressed at this time.    Follow up in *** week(s) for above, or sooner if needed.      This note is dictated using the M*Modal Fluency Direct word recognition program. There are word recognition mistakes that are occasionally missed on review.

## 2024-08-02 ENCOUNTER — OFFICE VISIT (OUTPATIENT)
Dept: SPORTS MEDICINE | Facility: CLINIC | Age: 69
End: 2024-08-02
Payer: COMMERCIAL

## 2024-08-02 VITALS
SYSTOLIC BLOOD PRESSURE: 156 MMHG | DIASTOLIC BLOOD PRESSURE: 83 MMHG | BODY MASS INDEX: 25.64 KG/M2 | HEIGHT: 61 IN | WEIGHT: 135.81 LBS | HEART RATE: 67 BPM

## 2024-08-02 DIAGNOSIS — M25.562 CHRONIC PAIN OF LEFT KNEE: Primary | ICD-10-CM

## 2024-08-02 DIAGNOSIS — M17.12 PRIMARY OSTEOARTHRITIS OF LEFT KNEE: ICD-10-CM

## 2024-08-02 DIAGNOSIS — G89.29 CHRONIC PAIN OF LEFT KNEE: Primary | ICD-10-CM

## 2024-08-02 PROCEDURE — 99999 PR PBB SHADOW E&M-EST. PATIENT-LVL III: CPT | Mod: PBBFAC,,, | Performed by: STUDENT IN AN ORGANIZED HEALTH CARE EDUCATION/TRAINING PROGRAM

## 2024-08-02 NOTE — PROCEDURES
Large Joint Aspiration/Injection: L knee    Date/Time: 8/2/2024 4:30 PM    Performed by: Verenice Elise MD  Authorized by: Verenice Elise MD    Consent Done?:  Yes (Verbal)  Indications:  Arthritis and pain  Site marked: the procedure site was marked    Timeout: prior to procedure the correct patient, procedure, and site was verified    Prep: patient was prepped and draped in usual sterile fashion      Local anesthesia used?: Yes    Anesthesia:  Local infiltration  Local anesthetic:  Co-phenylcaine spray    Details:  Needle Size:  22 G  Ultrasonic Guidance for needle placement?: Yes (Ultrasound guidance used to avoid neurovascular injury and/or to improve accuracy given body habitus.)    Images are saved and documented.  Approach: Superolateral.  Location:  Knee  Site:  L knee  Medications:  20 mg sodium hyaluronate (EUFLEXXA) 10 mg/mL(mw 2.4 -3.6 million)  Medications comment:  Ropivacaine 0.2% 2mL  Patient tolerance:  Patient tolerated the procedure well with no immediate complications     TECHNIQUE: Real time ultrasound examination of the left knee was performed with myfab5 Edge 2, 9-L MHz linear probe(s). Ultrasound guidance was used for needle localization. Images were saved and stored for documentation. Dynamic visualization of the needle was continuous throughout the procedures and maintained in good position.

## 2024-08-02 NOTE — PROGRESS NOTES
CC: left knee pain    69 y.o. Female presents today for her 1st Euflexxa injection of her left knee.     Attempted treatments: icy hot this morning  Pain score: 5/10  History of trauma/injury: no  Affecting ADLs: yes     REVIEW OF SYSTEMS:   Constitution: Patient denies fever or chills.  Eyes: Patient denies eye pain or vision changes.  HEENT: Patient denies ear pain, sore throat, or nasal discharge.  CVS: Patient denies chest pain.  Lungs: Patient denies shortness of breath or cough.  Skin: Patient denies skin rash or itching.    Musculoskeletal: Patient denies recent falls. See HPI.  Psych: Patient denies any current anxiety or nervousness.    PAST MEDICAL HISTORY:   Past Medical History:   Diagnosis Date    Asthma     as a child    Gastroesophageal reflux disease without esophagitis 7/19/2023    Hyperlipidemia     Microcytic anemia 5/15/2023       MEDICATIONS:     Current Outpatient Medications:     acetaminophen (TYLENOL) 650 MG TbSR, Take 1 tablet (650 mg total) by mouth every 8 (eight) hours., Disp: 120 tablet, Rfl: 0    amoxicillin (AMOXIL) 500 MG Tab, Take 4 tablets by mouth one hour before dental appointment., Disp: 12 tablet, Rfl: 0    cyanocobalamin, vitamin B-12, (VITAMIN B-12 ORAL), Take by mouth. Take 1 a day, Disp: , Rfl:     pantoprazole (PROTONIX) 40 MG tablet, Take 1 tablet (40 mg total) by mouth once daily., Disp: 30 tablet, Rfl: 2    vitamin D (VITAMIN D3) 1000 units Tab, Take 1,000 Units by mouth once daily. (Patient not taking: Reported on 7/11/2024), Disp: , Rfl:     Current Facility-Administered Medications:     hyaluronate sodium, stabilized (MONOVISC) Syrg, , Intra-articular, 1 time in Clinic/HOD,     hylan g-f 20 (SYNVISC ONE) 48 mg/6 mL injection 48 mg, 48 mg, Intra-articular, 1 time in Clinic/HOD, Sanford Calvert III, MD    Facility-Administered Medications Ordered in Other Visits:     hylan g-f 20 (SYNVISC ONE) 48 mg/6 mL injection 48 mg, 48 mg, Intra-articular, , Sanford Calvert  "III, MD, 48 mg at 10/19/23 1540    ALLERGIES:   Review of patient's allergies indicates:   Allergen Reactions    Aspirin         PHYSICAL EXAMINATION:  BP (!) 156/83   Pulse 67   Ht 5' 1" (1.549 m)   Wt 61.6 kg (135 lb 12.9 oz)   BMI 25.66 kg/m²   There are no signs of infection at the injection site, including no rubor, calor, or skin lesions.  Gen: NAD.  Psych: Affect & judgment nl.  Neuro: Grossly CNI. CHÁVEZ.  HEENT: -Trach dev. -Eye d/c. -Rhinorrhea.  CV: Color nl. -E/C/C. WWPx4.  Pulm: -Dyspnea. -Cough.  Lymph: -Edema.  Int: -Rash/lesion noted. Skin is warm and dry    ASSESSMENT:      ICD-10-CM ICD-9-CM   1. Chronic pain of left knee  M25.562 719.46    G89.29 338.29   2. Primary osteoarthritis of left knee  M17.12 715.16         PLAN:  Ultrasound-guided injection of the left knee with Euflexxa performed at visit today.    Future planning includes - Continue exercise program    Risks and benefits were discussed with patient prior to receiving injection.  Depending on injection type, risks include the possibility of infection, pain, disruptions in blood pressure and blood sugar, and cosmetic deformity at site of injection.    All questions were answered to the best of my ability and all concerns were addressed at this time.    Follow up in 1 week(s) for above, or sooner if needed.      This note is dictated using the M*Modal Fluency Direct word recognition program. There are word recognition mistakes that are occasionally missed on review.        "

## 2024-08-10 ENCOUNTER — OFFICE VISIT (OUTPATIENT)
Dept: SPORTS MEDICINE | Facility: CLINIC | Age: 69
End: 2024-08-10
Payer: COMMERCIAL

## 2024-08-10 VITALS — HEART RATE: 55 BPM | DIASTOLIC BLOOD PRESSURE: 80 MMHG | SYSTOLIC BLOOD PRESSURE: 172 MMHG

## 2024-08-10 DIAGNOSIS — M17.12 PRIMARY OSTEOARTHRITIS OF LEFT KNEE: ICD-10-CM

## 2024-08-10 DIAGNOSIS — M25.562 CHRONIC PAIN OF LEFT KNEE: Primary | ICD-10-CM

## 2024-08-10 DIAGNOSIS — G89.29 CHRONIC PAIN OF LEFT KNEE: Primary | ICD-10-CM

## 2024-08-10 PROCEDURE — 20611 DRAIN/INJ JOINT/BURSA W/US: CPT | Mod: LT,S$GLB,, | Performed by: STUDENT IN AN ORGANIZED HEALTH CARE EDUCATION/TRAINING PROGRAM

## 2024-08-10 PROCEDURE — 99499 UNLISTED E&M SERVICE: CPT | Mod: S$GLB,,, | Performed by: STUDENT IN AN ORGANIZED HEALTH CARE EDUCATION/TRAINING PROGRAM

## 2024-08-10 PROCEDURE — 99999 PR PBB SHADOW E&M-EST. PATIENT-LVL III: CPT | Mod: PBBFAC,,, | Performed by: STUDENT IN AN ORGANIZED HEALTH CARE EDUCATION/TRAINING PROGRAM

## 2024-08-10 PROCEDURE — 1125F AMNT PAIN NOTED PAIN PRSNT: CPT | Mod: CPTII,S$GLB,, | Performed by: STUDENT IN AN ORGANIZED HEALTH CARE EDUCATION/TRAINING PROGRAM

## 2024-08-10 NOTE — PROCEDURES
Large Joint Aspiration/Injection: L knee    Date/Time: 8/10/2024 11:15 AM    Performed by: Verenice Elise MD  Authorized by: Verenice Elise MD    Consent Done?:  Yes (Verbal)  Indications:  Arthritis and pain  Site marked: the procedure site was marked    Timeout: prior to procedure the correct patient, procedure, and site was verified    Prep: patient was prepped and draped in usual sterile fashion      Local anesthesia used?: Yes    Anesthesia:  Local infiltration  Local anesthetic:  Co-phenylcaine spray    Details:  Needle Size:  22 G  Ultrasonic Guidance for needle placement?: Yes (Ultrasound guidance used to avoid neurovascular injury and/or to improve accuracy given body habitus.)    Images are saved and documented.  Approach: Superolateral.  Location:  Knee  Site:  L knee  Medications:  20 mg sodium hyaluronate (EUFLEXXA) 10 mg/mL(mw 2.4 -3.6 million)  Medications comment:  Ropivacaine 0.2% 2mL  Patient tolerance:  Patient tolerated the procedure well with no immediate complications     TECHNIQUE: Real time ultrasound examination of the left knee was performed with [email or web address] Edge 2, 9-L MHz linear probe(s). Ultrasound guidance was used for needle localization. Images were saved and stored for documentation. Dynamic visualization of the needle was continuous throughout the procedures and maintained in good position.

## 2024-08-10 NOTE — PROGRESS NOTES
CC: left knee pain    69 y.o. Female presents today for her 2nd Euflexxa injection of her left knee. Pt reports pain worsened after first injection; pt denies fevers, chills or vomiting. Pt reports intermittent swelling in the knee.     Attempted treatments: none since last visit  Pain score: 8-9/10  History of trauma/injury: none since last visit  Affecting ADLs: yes      REVIEW OF SYSTEMS:   Constitution: Patient denies fever or chills.  Eyes: Patient denies eye pain or vision changes.  HEENT: Patient denies ear pain, sore throat, or nasal discharge.  CVS: Patient denies chest pain.  Lungs: Patient denies shortness of breath or cough.  Skin: Patient denies skin rash or itching.    Musculoskeletal: Patient denies recent falls. See HPI.  Psych: Patient denies any current anxiety or nervousness.    PAST MEDICAL HISTORY:   Past Medical History:   Diagnosis Date    Asthma     as a child    Gastroesophageal reflux disease without esophagitis 7/19/2023    Hyperlipidemia     Microcytic anemia 5/15/2023       MEDICATIONS:     Current Outpatient Medications:     acetaminophen (TYLENOL) 650 MG TbSR, Take 1 tablet (650 mg total) by mouth every 8 (eight) hours. (Patient not taking: Reported on 8/10/2024), Disp: 120 tablet, Rfl: 0    amoxicillin (AMOXIL) 500 MG Tab, Take 4 tablets by mouth one hour before dental appointment. (Patient not taking: Reported on 8/10/2024), Disp: 12 tablet, Rfl: 0    cyanocobalamin, vitamin B-12, (VITAMIN B-12 ORAL), Take by mouth. Take 1 a day (Patient not taking: Reported on 8/10/2024), Disp: , Rfl:     pantoprazole (PROTONIX) 40 MG tablet, Take 1 tablet (40 mg total) by mouth once daily. (Patient not taking: Reported on 8/10/2024), Disp: 30 tablet, Rfl: 2    vitamin D (VITAMIN D3) 1000 units Tab, Take 1,000 Units by mouth once daily. (Patient not taking: Reported on 7/11/2024), Disp: , Rfl:     Current Facility-Administered Medications:     hyaluronate sodium, stabilized (MONOVISC) Syrg, ,  Intra-articular, 1 time in Clinic/HOD,     hylan g-f 20 (SYNVISC ONE) 48 mg/6 mL injection 48 mg, 48 mg, Intra-articular, 1 time in Clinic/HOD, Sanford Calvert III, MD    Facility-Administered Medications Ordered in Other Visits:     hylan g-f 20 (SYNVISC ONE) 48 mg/6 mL injection 48 mg, 48 mg, Intra-articular, , Sanford Calvert III, MD, 48 mg at 10/19/23 1540    ALLERGIES:   Review of patient's allergies indicates:   Allergen Reactions    Aspirin         PHYSICAL EXAMINATION:  BP (!) 172/80   Pulse (!) 55   There are no signs of infection at the injection site, including no rubor, calor, or skin lesions.  Gen: NAD.  Psych: Affect & judgment nl.  Neuro: Grossly CNI. CHÁVEZ.  HEENT: -Trach dev. -Eye d/c. -Rhinorrhea.  CV: Color nl. -E/C/C. WWPx4.  Pulm: -Dyspnea. -Cough.  Lymph: -Edema.  Int: -Rash/lesion noted. Skin is warm and dry    ASSESSMENT:      ICD-10-CM ICD-9-CM   1. Chronic pain of left knee  M25.562 719.46    G89.29 338.29   2. Primary osteoarthritis of left knee  M17.12 715.16         PLAN:  Ultrasound-guided injection of the left knee with Euflexxa performed at visit today.    Future planning includes - Continue exercise program    Risks and benefits were discussed with patient prior to receiving injection.  Depending on injection type, risks include the possibility of infection, pain, disruptions in blood pressure and blood sugar, and cosmetic deformity at site of injection.    All questions were answered to the best of my ability and all concerns were addressed at this time.    Follow up in 1 week(s) for above, or sooner if needed.      This note is dictated using the M*Modal Fluency Direct word recognition program. There are word recognition mistakes that are occasionally missed on review.

## 2024-08-16 ENCOUNTER — OFFICE VISIT (OUTPATIENT)
Dept: SPORTS MEDICINE | Facility: CLINIC | Age: 69
End: 2024-08-16
Payer: COMMERCIAL

## 2024-08-16 VITALS
BODY MASS INDEX: 25.14 KG/M2 | DIASTOLIC BLOOD PRESSURE: 84 MMHG | WEIGHT: 133.19 LBS | HEART RATE: 65 BPM | SYSTOLIC BLOOD PRESSURE: 133 MMHG | HEIGHT: 61 IN

## 2024-08-16 DIAGNOSIS — M17.12 PRIMARY OSTEOARTHRITIS OF LEFT KNEE: Primary | ICD-10-CM

## 2024-08-16 PROCEDURE — 99999 PR PBB SHADOW E&M-EST. PATIENT-LVL III: CPT | Mod: PBBFAC,,, | Performed by: STUDENT IN AN ORGANIZED HEALTH CARE EDUCATION/TRAINING PROGRAM

## 2024-08-16 NOTE — PROCEDURES
Large Joint Aspiration/Injection: L knee    Date/Time: 8/16/2024 4:30 PM    Performed by: Verenice Elise MD  Authorized by: Verenice Elise MD    Consent Done?:  Yes (Verbal)  Indications:  Arthritis and pain  Site marked: the procedure site was marked    Timeout: prior to procedure the correct patient, procedure, and site was verified    Prep: patient was prepped and draped in usual sterile fashion      Local anesthesia used?: Yes    Anesthesia:  Local infiltration  Local anesthetic:  Co-phenylcaine spray    Details:  Needle Size:  22 G  Ultrasonic Guidance for needle placement?: Yes (Ultrasound guidance used to avoid neurovascular injury and/or to improve accuracy given body habitus.)    Images are saved and documented.  Approach: Superolateral.  Location:  Knee  Site:  L knee  Medications:  20 mg sodium hyaluronate (EUFLEXXA) 10 mg/mL(mw 2.4 -3.6 million)  Medications comment:  Ropivacaine 0.2% 2mL  Patient tolerance:  Patient tolerated the procedure well with no immediate complications     TECHNIQUE: Real time ultrasound examination of the left knee was performed with [employer] Edge 2, 9-L MHz linear probe(s). Ultrasound guidance was used for needle localization. Images were saved and stored for documentation. Dynamic visualization of the needle was continuous throughout the procedures and maintained in good position.

## 2024-08-16 NOTE — PROGRESS NOTES
CC: left knee pain    69 y.o. Female presents today for her 3rd Euflexxa injection of her left knee.     Attempted treatments: tylenol prn, voltaren  Pain score: 7/10  History of trauma/injury: none since last visit  Affecting ADLs: yes     REVIEW OF SYSTEMS:   Constitution: Patient denies fever or chills.  Eyes: Patient denies eye pain or vision changes.  HEENT: Patient denies ear pain, sore throat, or nasal discharge.  CVS: Patient denies chest pain.  Lungs: Patient denies shortness of breath or cough.  Skin: Patient denies skin rash or itching.    Musculoskeletal: Patient denies recent falls. See HPI.  Psych: Patient denies any current anxiety or nervousness.    PAST MEDICAL HISTORY:   Past Medical History:   Diagnosis Date    Asthma     as a child    Gastroesophageal reflux disease without esophagitis 7/19/2023    Hyperlipidemia     Microcytic anemia 5/15/2023       MEDICATIONS:     Current Outpatient Medications:     acetaminophen (TYLENOL) 650 MG TbSR, Take 1 tablet (650 mg total) by mouth every 8 (eight) hours. (Patient not taking: Reported on 8/10/2024), Disp: 120 tablet, Rfl: 0    amoxicillin (AMOXIL) 500 MG Tab, Take 4 tablets by mouth one hour before dental appointment. (Patient not taking: Reported on 8/10/2024), Disp: 12 tablet, Rfl: 0    cyanocobalamin, vitamin B-12, (VITAMIN B-12 ORAL), Take by mouth. Take 1 a day (Patient not taking: Reported on 8/10/2024), Disp: , Rfl:     pantoprazole (PROTONIX) 40 MG tablet, Take 1 tablet (40 mg total) by mouth once daily. (Patient not taking: Reported on 8/10/2024), Disp: 30 tablet, Rfl: 2    vitamin D (VITAMIN D3) 1000 units Tab, Take 1,000 Units by mouth once daily. (Patient not taking: Reported on 7/11/2024), Disp: , Rfl:     Current Facility-Administered Medications:     hyaluronate sodium, stabilized (MONOVISC) Syrg, , Intra-articular, 1 time in Clinic/HOD,     hylan g-f 20 (SYNVISC ONE) 48 mg/6 mL injection 48 mg, 48 mg, Intra-articular, 1 time in  "Clinic/HOD, Sanford Calvert III, MD    Facility-Administered Medications Ordered in Other Visits:     hylan g-f 20 (SYNVISC ONE) 48 mg/6 mL injection 48 mg, 48 mg, Intra-articular, , Sanford Calvert III, MD, 48 mg at 10/19/23 1540    ALLERGIES:   Review of patient's allergies indicates:   Allergen Reactions    Aspirin         PHYSICAL EXAMINATION:  /84   Pulse 65   Ht 5' 1" (1.549 m)   Wt 60.4 kg (133 lb 2.5 oz)   BMI 25.16 kg/m²   There are no signs of infection at the injection site, including no rubor, calor, or skin lesions.  Gen: NAD.  Psych: Affect & judgment nl.  Neuro: Grossly CNI. CHÁVEZ.  HEENT: -Trach dev. -Eye d/c. -Rhinorrhea.  CV: Color nl. -E/C/C. WWPx4.  Pulm: -Dyspnea. -Cough.  Lymph: -Edema.  Int: -Rash/lesion noted. Skin is warm and dry    ASSESSMENT:      ICD-10-CM ICD-9-CM   1. Primary osteoarthritis of left knee  M17.12 715.16         PLAN:  Ultrasound-guided injection of the left knee with Euflexxa performed at visit today.    Future planning includes - Continue exercise program    Risks and benefits were discussed with patient prior to receiving injection.  Depending on injection type, risks include the possibility of infection, pain, disruptions in blood pressure and blood sugar, and cosmetic deformity at site of injection.    All questions were answered to the best of my ability and all concerns were addressed at this time.    Follow up in 6 week(s) for above, or sooner if needed.      This note is dictated using the M*Modal Fluency Direct word recognition program. There are word recognition mistakes that are occasionally missed on review.        "

## 2024-09-09 ENCOUNTER — OFFICE VISIT (OUTPATIENT)
Dept: DERMATOLOGY | Facility: CLINIC | Age: 69
End: 2024-09-09
Payer: COMMERCIAL

## 2024-09-09 DIAGNOSIS — L82.0 SEBORRHEIC KERATOSES, INFLAMED: Primary | ICD-10-CM

## 2024-09-09 PROCEDURE — 99999 PR PBB SHADOW E&M-EST. PATIENT-LVL III: CPT | Mod: PBBFAC,,, | Performed by: STUDENT IN AN ORGANIZED HEALTH CARE EDUCATION/TRAINING PROGRAM

## 2024-09-09 PROCEDURE — 17110 DESTRUCTION B9 LES UP TO 14: CPT | Mod: S$GLB,,, | Performed by: STUDENT IN AN ORGANIZED HEALTH CARE EDUCATION/TRAINING PROGRAM

## 2024-09-09 PROCEDURE — 99499 UNLISTED E&M SERVICE: CPT | Mod: S$GLB,,, | Performed by: STUDENT IN AN ORGANIZED HEALTH CARE EDUCATION/TRAINING PROGRAM

## 2024-09-09 NOTE — PATIENT INSTRUCTIONS

## 2024-09-09 NOTE — PROGRESS NOTES
Subjective:      Patient ID:  Raine Medley is a 69 y.o. female who presents for   Chief Complaint   Patient presents with    Lesion     L flank     Pt has no h/o of NMSC or MM.     Lesion - Initial  Affected locations: left axilla  Duration: years.  Signs / symptoms: oozing and itching  Aggravated by: nothing  Relieving factors/Treatments tried: nothing      Review of Systems   Skin:  Positive for daily sunscreen use and activity-related sunscreen use. Negative for recent sunburn and wears hat.   Hematologic/Lymphatic: Does not bruise/bleed easily.       Objective:   Physical Exam   Constitutional: She appears well-developed and well-nourished. No distress.   Neurological: She is alert and oriented to person, place, and time. She is not disoriented.   Psychiatric: She has a normal mood and affect.   Skin:   Areas Examined (abnormalities noted in diagram):   Chest / Axilla Inspection Performed           Diagram Legend     Erythematous scaling macule/papule c/w actinic keratosis       Vascular papule c/w angioma      Pigmented verrucoid papule/plaque c/w seborrheic keratosis      Yellow umbilicated papule c/w sebaceous hyperplasia      Irregularly shaped tan macule c/w lentigo     1-2 mm smooth white papules consistent with Milia      Movable subcutaneous cyst with punctum c/w epidermal inclusion cyst      Subcutaneous movable cyst c/w pilar cyst      Firm pink to brown papule c/w dermatofibroma      Pedunculated fleshy papule(s) c/w skin tag(s)      Evenly pigmented macule c/w junctional nevus     Mildly variegated pigmented, slightly irregular-bordered macule c/w mildly atypical nevus      Flesh colored to evenly pigmented papule c/w intradermal nevus       Pink pearly papule/plaque c/w basal cell carcinoma      Erythematous hyperkeratotic cursted plaque c/w SCC      Surgical scar with no sign of skin cancer recurrence      Open and closed comedones      Inflammatory papules and pustules      Verrucoid  papule consistent consistent with wart     Erythematous eczematous patches and plaques     Dystrophic onycholytic nail with subungual debris c/w onychomycosis     Umbilicated papule    Erythematous-base heme-crusted tan verrucoid plaque consistent with inflamed seborrheic keratosis     Erythematous Silvery Scaling Plaque c/w Psoriasis     See annotation      Assessment / Plan:        Seborrheic keratoses, inflamed--left flank    - Lesion was itchy      Procedure note for destruction via hyfrecation and curettage:    Verbal consent obtained. Risks of recurrence and scarring discussed.   1 lesions cleaned with alcohol and anesthetized with 1% lidocaine with epinephrine. Areas then lightly hyfrecated and curetted to remove gross lesion. Hemostasis achieved with aluminum chloride application. No complications.   Areas dressed with Vaseline jelly and bandage. Wound care discussed.    Pt tolerated well    F/u prn

## 2024-09-13 ENCOUNTER — TELEPHONE (OUTPATIENT)
Dept: FAMILY MEDICINE | Facility: CLINIC | Age: 69
End: 2024-09-13
Payer: COMMERCIAL

## 2024-09-13 DIAGNOSIS — R10.826 EPIGASTRIC ABDOMINAL TENDERNESS WITH REBOUND TENDERNESS: Primary | ICD-10-CM

## 2024-09-13 NOTE — TELEPHONE ENCOUNTER
----- Message from Karis Mejía sent at 9/13/2024  1:51 PM CDT -----  Type:  Needs Medical Advice    Who Called: pt  Symptoms (please be specific): pt needs to get her orders her colonoscopy would like to be scheduled the week she is off Oct  14-18 or the days of thanksgiving Nov 27-29 please call to let her know    Would the patient rather a call back or a response via [facility]? call  Best Call Back Number: [phone]  Additional Information:

## 2024-09-13 NOTE — TELEPHONE ENCOUNTER
Colonoscopy was done in 2019 and that provider noted next colonoscopy in 10 years       Pt states she is aware does not need screening however she states having stomach pain again just as last time   Wants  another colonoscopy     Pleae advise

## 2024-09-16 ENCOUNTER — TELEPHONE (OUTPATIENT)
Dept: GASTROENTEROLOGY | Facility: CLINIC | Age: 69
End: 2024-09-16
Payer: COMMERCIAL

## 2024-09-25 ENCOUNTER — OFFICE VISIT (OUTPATIENT)
Dept: URGENT CARE | Facility: CLINIC | Age: 69
End: 2024-09-25
Payer: COMMERCIAL

## 2024-09-25 VITALS
TEMPERATURE: 99 F | DIASTOLIC BLOOD PRESSURE: 76 MMHG | SYSTOLIC BLOOD PRESSURE: 151 MMHG | BODY MASS INDEX: 25.14 KG/M2 | HEIGHT: 61 IN | RESPIRATION RATE: 18 BRPM | HEART RATE: 86 BPM | WEIGHT: 133.19 LBS | OXYGEN SATURATION: 99 %

## 2024-09-25 DIAGNOSIS — T14.8XXA WOUND OF SKIN: Primary | ICD-10-CM

## 2024-09-25 PROCEDURE — 99213 OFFICE O/P EST LOW 20 MIN: CPT | Mod: S$GLB,,,

## 2024-09-25 PROCEDURE — 87070 CULTURE OTHR SPECIMN AEROBIC: CPT

## 2024-09-25 RX ORDER — MUPIROCIN 20 MG/G
OINTMENT TOPICAL 3 TIMES DAILY
Qty: 2 G | Refills: 1 | Status: SHIPPED | OUTPATIENT
Start: 2024-09-25 | End: 2024-09-28

## 2024-09-25 RX ORDER — CEPHALEXIN 500 MG/1
500 CAPSULE ORAL 4 TIMES DAILY
Qty: 20 CAPSULE | Refills: 0 | Status: SHIPPED | OUTPATIENT
Start: 2024-09-25 | End: 2024-09-30

## 2024-09-25 NOTE — PROGRESS NOTES
"Subjective:      Patient ID: Raine Medley is a 69 y.o. female.    Vitals:  height is 5' 1" (1.549 m) and weight is 60.4 kg (133 lb 2.5 oz). Her temperature is 98.5 °F (36.9 °C). Her blood pressure is 151/76 (abnormal) and her pulse is 86. Her respiration is 18 and oxygen saturation is 99%.     Chief Complaint: Wound Infection    Black mole removed about 3 weeks ago. Pt states that that the pain under her arm started about 4 days ago. Pt belives that her under arm is infected after the mole removal.     Provider note starts here:     68 yo female with PMH of anemia, palpitations, vitamin d def. Primary concerns for today's visit is left underarm pain. Patient states that she had a mole removed. Patient states that they also reports redness, pain, drainage (white). Patient states that touch worsens symptoms and peroxide does not alleviate symptoms. Patient denies fever, chills, cp, sob, nausea, vomiting, abdominal pain.    Abscess  Chronicity:  New  Onset:  3-5 days ago    Associated Symptoms: no fever, no chills  Characteristics: painful and redness    Pain Scale:  10/10  Treatments Tried:  Cold compresses      Constitution: Negative for chills and fever.   Cardiovascular:  Negative for chest pain and sob on exertion.   Musculoskeletal:  Positive for pain.   Skin:  Positive for erythema.      Objective:     Physical Exam   Skin: erythema         Comments: There is a wound appreciated on the lateral aspect of the left breast. There is circulating erythema and TTP. There is no fluctuance or induration appreciated. No drainage on exam. There is crusting appreciated. Please refer to attached image.         Assessment:     1. Wound of skin      Plan:   Previous notes reviewed.  Vital signs reviewed.  Labs ordered.  Discussed wound infection of the breast, home care, tx options, and given follow up precautions.  Patient was briefed on my thought process and diagnosis.   Patient involved with the treatment plan and " agreed to the plan.  Patient informed on warning signs, patient understood warning signs and to go to urgent care or ER if warning signs appear.  Please excuse grammatical/spelling errors appreciated throughout this visit encounter for a remote dictation device was used during this encounter.    Patient Instructions   Please return here or go to the Emergency Department for any concerns or worsening of condition.    Please take CEPHALEXIN for SKIN INFECTION. Please complete the full course of this antibiotics. Please take this antibiotic with food and a full glass of water.    Please apply MUPIROCIN to the affected area.    Tylenol:  Regular strength can take 650 mg every 4-6 hours as needed, do not exceed 3,250 mg within a day.  Extra strength can take 1,000 mg every 6 hours as needed, do not exceed 3,000 mg in one day.    Someone will contact you with your results from your wound culture results and any potential changes to your treatment plan.    Please follow up with your primary care doctor or specialist as needed.    If you  smoke, please stop smoking.    Wound of skin  -     CULTURE, AEROBIC  (SPECIFY SOURCE)  -     mupirocin (BACTROBAN) 2 % ointment; Apply topically 3 (three) times daily. for 3 days  Dispense: 2 g; Refill: 1  -     cephALEXin (KEFLEX) 500 MG capsule; Take 1 capsule (500 mg total) by mouth 4 (four) times daily. for 5 days  Dispense: 20 capsule; Refill: 0      Coleman Flores PA-C

## 2024-09-26 NOTE — PATIENT INSTRUCTIONS
Please return here or go to the Emergency Department for any concerns or worsening of condition.    Please take CEPHALEXIN for SKIN INFECTION. Please complete the full course of this antibiotics. Please take this antibiotic with food and a full glass of water.    Please apply MUPIROCIN to the affected area.    Tylenol:  Regular strength can take 650 mg every 4-6 hours as needed, do not exceed 3,250 mg within a day.  Extra strength can take 1,000 mg every 6 hours as needed, do not exceed 3,000 mg in one day.    Someone will contact you with your results from your wound culture results and any potential changes to your treatment plan.    Please follow up with your primary care doctor or specialist as needed.    If you  smoke, please stop smoking.

## 2024-09-26 NOTE — PROGRESS NOTES
Telemedicine/Virtual Visit Documentation:     The patient location is: home    The chief complaint leading to consultation is: see HPI    VISIT TYPE X   Virtual visit with synchronous audio and video X   Telephone E/M service      Total time spent with patient: see X mary on chart below.   More than half of the time was spent counseling or coordinating care including prognosis, differential diagnosis, risks and benefits of treatment, instructions, compliance risk reductions     EST MINUTES X   [id number] 5    [id number] 10    [id number] 15    [id number] 25 X   [id number] 40    NEW     46359 10    96596 20    62466 30    96573 45    20157 60    PHONE      5-10    88979 11-20    61397 21-30      H&P  Orthopaedics      SUBJECTIVE:     History of Present Illness:  Patient is a 69 y.o. female with left knee pain following up after Euflexxa series.  She reports no relief from pain symptoms and would like to hold off on surgery until June of next year.      Review of patient's allergies indicates:   Allergen Reactions    Aspirin        Past Medical History:   Diagnosis Date    Asthma     as a child    Gastroesophageal reflux disease without esophagitis 7/19/2023    Hyperlipidemia     Microcytic anemia 5/15/2023     Past Surgical History:   Procedure Laterality Date    CHOLECYSTECTOMY      COLONOSCOPY N/A 1/3/2019    Procedure: COLONOSCOPY;  Surgeon: Gaston Beebe MD;  Location: [address];  Service: Endoscopy;  Laterality: N/A;    KNEE JOINT MANIPULATION Right 8/21/2023    Procedure: MANIPULATION, KNEE: RIGHT: SAME DAY;  Surgeon: Sanford Calvert III, MD;  Location: [facility];  Service: Orthopedics;  Laterality: Right;    TOTAL KNEE ARTHROPLASTY Right 6/5/2023    Procedure: ARTHROPLASTY, KNEE, TOTAL: RIGHT: DEPUY - ATTUNE;  Surgeon: Sanford Calvert III, MD;  Location: [facility];  Service: Orthopedics;  Laterality: Right;     Family History   Problem Relation Name Age of Onset    No Known Problems Mother      No Known Problems  Father      No Known Problems Sister      No Known Problems Daughter      No Known Problems Son      No Known Problems Son      No Known Problems Son      Melanoma Neg Hx      Lupus Neg Hx      Psoriasis Neg Hx      Colon cancer Neg Hx      Esophageal cancer Neg Hx      Stomach cancer Neg Hx       Social History     Tobacco Use    Smoking status: Never     Passive exposure: Never    Smokeless tobacco: Never   Substance Use Topics    Alcohol use: No    Drug use: No        Review of Systems:  Patient denies constitutional symptoms, cardiac symptoms, respiratory symptoms, GI symptoms.  The remainder of the musculoskeletal ROS is included in the HPI.      OBJECTIVE:     Physical Exam:  Physical exam limited as this was a virtual visit, but it is apparent that the individual is in no acute distress, well groomed, well kept.  Speech is normal.  Patient is alert and oriented x3.  Mood and affect appear normal.       ASSESSMENT/PLAN:     A/P: Raine Medley is a 69 y.o. patient has tried triamcinolone and dexamethasone with less than 8 weeks worth of relief.  Patient has also tried hyaluronic acid injections, over-the-counter NSAIDs and Tylenol, weight reduction, and exercise with minimal reduction in knee pain.  At this time we will seek prior authorization to try long-acting Zilretta for patient's knee pain.

## 2024-09-27 ENCOUNTER — OFFICE VISIT (OUTPATIENT)
Dept: SPORTS MEDICINE | Facility: CLINIC | Age: 69
End: 2024-09-27
Payer: COMMERCIAL

## 2024-09-27 ENCOUNTER — TELEPHONE (OUTPATIENT)
Dept: SPORTS MEDICINE | Facility: CLINIC | Age: 69
End: 2024-09-27

## 2024-09-27 DIAGNOSIS — M25.562 CHRONIC PAIN OF LEFT KNEE: Primary | ICD-10-CM

## 2024-09-27 DIAGNOSIS — M17.12 PRIMARY OSTEOARTHRITIS OF LEFT KNEE: ICD-10-CM

## 2024-09-27 DIAGNOSIS — G89.29 CHRONIC PAIN OF LEFT KNEE: Primary | ICD-10-CM

## 2024-09-28 LAB — BACTERIA SPEC AEROBE CULT: NORMAL

## 2024-10-15 ENCOUNTER — PATIENT MESSAGE (OUTPATIENT)
Dept: RESEARCH | Facility: HOSPITAL | Age: 69
End: 2024-10-15
Payer: COMMERCIAL

## 2024-11-06 ENCOUNTER — PATIENT MESSAGE (OUTPATIENT)
Dept: FAMILY MEDICINE | Facility: CLINIC | Age: 69
End: 2024-11-06
Payer: COMMERCIAL

## 2024-11-11 DIAGNOSIS — D50.9 MICROCYTIC ANEMIA: Primary | ICD-10-CM

## 2024-11-12 ENCOUNTER — HOSPITAL ENCOUNTER (OUTPATIENT)
Dept: CARDIOLOGY | Facility: HOSPITAL | Age: 69
Discharge: HOME | End: 2024-11-12
Attending: STUDENT IN AN ORGANIZED HEALTH CARE EDUCATION/TRAINING PROGRAM
Payer: COMMERCIAL

## 2024-11-12 ENCOUNTER — HOSPITAL ENCOUNTER (OUTPATIENT)
Dept: RADIOLOGY | Facility: HOSPITAL | Age: 69
Discharge: HOME | End: 2024-11-12
Attending: STUDENT IN AN ORGANIZED HEALTH CARE EDUCATION/TRAINING PROGRAM
Payer: COMMERCIAL

## 2024-11-12 ENCOUNTER — HOSPITAL ENCOUNTER (OUTPATIENT)
Dept: CARDIOLOGY | Facility: CLINIC | Age: 69
Discharge: HOME | End: 2024-11-12
Payer: COMMERCIAL

## 2024-11-12 ENCOUNTER — OFFICE VISIT (OUTPATIENT)
Dept: INTERNAL MEDICINE | Facility: CLINIC | Age: 69
End: 2024-11-12
Payer: COMMERCIAL

## 2024-11-12 VITALS
SYSTOLIC BLOOD PRESSURE: 150 MMHG | HEART RATE: 62 BPM | WEIGHT: 132 LBS | HEIGHT: 61 IN | DIASTOLIC BLOOD PRESSURE: 73 MMHG | BODY MASS INDEX: 24.92 KG/M2

## 2024-11-12 VITALS
SYSTOLIC BLOOD PRESSURE: 170 MMHG | DIASTOLIC BLOOD PRESSURE: 74 MMHG | BODY MASS INDEX: 25.02 KG/M2 | HEIGHT: 61 IN | WEIGHT: 132.5 LBS

## 2024-11-12 DIAGNOSIS — R01.1 HEART MURMUR: ICD-10-CM

## 2024-11-12 DIAGNOSIS — M25.562 CHRONIC PAIN OF LEFT KNEE: ICD-10-CM

## 2024-11-12 DIAGNOSIS — G89.29 CHRONIC PAIN OF LEFT KNEE: ICD-10-CM

## 2024-11-12 DIAGNOSIS — Z01.419 WELL WOMAN EXAM: ICD-10-CM

## 2024-11-12 DIAGNOSIS — I10 PRIMARY HYPERTENSION: ICD-10-CM

## 2024-11-12 DIAGNOSIS — R03.0 ELEVATED BLOOD PRESSURE READING: ICD-10-CM

## 2024-11-12 DIAGNOSIS — N64.52 DISCHARGE FROM BREAST: ICD-10-CM

## 2024-11-12 DIAGNOSIS — Z00.00 ENCOUNTER FOR MEDICAL EXAMINATION TO ESTABLISH CARE: Primary | ICD-10-CM

## 2024-11-12 DIAGNOSIS — Z00.00 HEALTHCARE MAINTENANCE: ICD-10-CM

## 2024-11-12 PROBLEM — R06.83 SNORING: Status: RESOLVED | Noted: 2023-05-12 | Resolved: 2024-11-12

## 2024-11-12 PROBLEM — D50.9 MICROCYTIC ANEMIA: Status: RESOLVED | Noted: 2023-05-15 | Resolved: 2024-11-12

## 2024-11-12 PROBLEM — Z12.11 ENCOUNTER FOR SCREENING COLONOSCOPY: Status: RESOLVED | Noted: 2019-01-03 | Resolved: 2024-11-12

## 2024-11-12 PROBLEM — Z74.09 DECREASED FUNCTIONAL MOBILITY AND ENDURANCE: Status: RESOLVED | Noted: 2023-06-08 | Resolved: 2024-11-12

## 2024-11-12 PROBLEM — R29.898 DECREASED STRENGTH OF LOWER EXTREMITY: Status: RESOLVED | Noted: 2021-05-04 | Resolved: 2024-11-12

## 2024-11-12 PROBLEM — E55.9 VITAMIN D DEFICIENCY: Status: RESOLVED | Noted: 2023-07-19 | Resolved: 2024-11-12

## 2024-11-12 PROBLEM — Z96.659 POSTOPERATIVE STIFFNESS OF TOTAL KNEE REPLACEMENT: Status: RESOLVED | Noted: 2023-06-08 | Resolved: 2024-11-12

## 2024-11-12 PROBLEM — Z96.651 STATUS POST RIGHT KNEE REPLACEMENT: Status: RESOLVED | Noted: 2023-08-21 | Resolved: 2024-11-12

## 2024-11-12 PROBLEM — R10.816 EPIGASTRIC ABDOMINAL TENDERNESS: Status: RESOLVED | Noted: 2023-05-12 | Resolved: 2024-11-12

## 2024-11-12 PROBLEM — M25.669 IMPAIRED RANGE OF MOTION OF KNEE: Status: RESOLVED | Noted: 2021-05-04 | Resolved: 2024-11-12

## 2024-11-12 PROBLEM — K21.9 GASTROESOPHAGEAL REFLUX DISEASE WITHOUT ESOPHAGITIS: Status: RESOLVED | Noted: 2023-07-19 | Resolved: 2024-11-12

## 2024-11-12 PROBLEM — T84.89XA POSTOPERATIVE STIFFNESS OF TOTAL KNEE REPLACEMENT: Status: RESOLVED | Noted: 2023-06-08 | Resolved: 2024-11-12

## 2024-11-12 PROBLEM — M25.669 POSTOPERATIVE STIFFNESS OF TOTAL KNEE REPLACEMENT: Status: RESOLVED | Noted: 2023-06-08 | Resolved: 2024-11-12

## 2024-11-12 PROBLEM — D64.9 ACUTE ANEMIA: Status: RESOLVED | Noted: 2023-07-19 | Resolved: 2024-11-12

## 2024-11-12 PROBLEM — M17.11 PRIMARY OSTEOARTHRITIS OF RIGHT KNEE: Status: RESOLVED | Noted: 2023-05-11 | Resolved: 2024-11-12

## 2024-11-12 PROBLEM — M24.661 ARTHROFIBROSIS OF KNEE JOINT, RIGHT: Status: RESOLVED | Noted: 2023-08-21 | Resolved: 2024-11-12

## 2024-11-12 PROBLEM — M25.561 RIGHT KNEE PAIN: Status: RESOLVED | Noted: 2023-06-08 | Resolved: 2024-11-12

## 2024-11-12 LAB
ASCENDING AORTA: 2.85 CM
AV AREA BY CONTINUOUS VTI: 1.4 CM2
AV INDEX (PROSTH): 0.81
AV LVOT MEAN GRADIENT: 3 MMHG
AV LVOT PEAK GRADIENT: 4 MMHG
AV MEAN GRADIENT: 5 MMHG
AV PEAK GRADIENT: 9 MMHG
AV VALVE AREA BY VELOCITY RATIO: 1.2 CM²
AV VALVE AREA: 1.4 CM2
AV VELOCITY RATIO: 0.67
BSA FOR ECHO PROCEDURE: 1.61 M2
CV ECHO LV RWT: 0.36 CM
DOP CALC AO PEAK VEL: 1.5 M/S
DOP CALC AO VTI: 29.2 CM
DOP CALC LVOT AREA: 1.8 CM2
DOP CALC LVOT DIAMETER: 1.5 CM
DOP CALC LVOT PEAK VEL: 1 M/S
DOP CALC LVOT STROKE VOLUME: 41.7 CM3
DOP CALCLVOT PEAK VEL VTI: 23.6 CM
E WAVE DECELERATION TIME: 214.13 MS
E/A RATIO: 1.04
E/E' RATIO: 8.42 M/S
ECHO EF ESTIMATED: 74 %
ECHO LV POSTERIOR WALL: 0.7 CM (ref 0.6–1.1)
FRACTIONAL SHORTENING: 43.6 % (ref 28–44)
INTERVENTRICULAR SEPTUM: 0.8 CM (ref 0.6–1.1)
LA MAJOR: 5 CM
LA MINOR: 4.43 CM
LA WIDTH: 4.31 CM
LEFT ATRIUM SIZE: 2.76 CM
LEFT ATRIUM VOLUME INDEX MOD: 31.4 ML/M2
LEFT ATRIUM VOLUME INDEX: 30.1 ML/M2
LEFT ATRIUM VOLUME MOD: 49.6 ML
LEFT ATRIUM VOLUME: 47.5 CM3
LEFT INTERNAL DIMENSION IN SYSTOLE: 2.2 CM (ref 2.1–4)
LEFT VENTRICLE DIASTOLIC VOLUME INDEX: 40.56 ML/M2
LEFT VENTRICLE DIASTOLIC VOLUME: 64.09 ML
LEFT VENTRICLE MASS INDEX: 52.1 G/M2
LEFT VENTRICLE SYSTOLIC VOLUME INDEX: 10.5 ML/M2
LEFT VENTRICLE SYSTOLIC VOLUME: 16.52 ML
LEFT VENTRICULAR INTERNAL DIMENSION IN DIASTOLE: 3.9 CM (ref 3.5–6)
LEFT VENTRICULAR MASS: 82.3 G
LV LATERAL E/E' RATIO: 8
LV SEPTAL E/E' RATIO: 8.89
MV PEAK A VEL: 0.77 M/S
MV PEAK E VEL: 0.8 M/S
OHS CV RV/LV RATIO: 1.13 CM
OHS QRS DURATION: 72 MS
OHS QTC CALCULATION: 426 MS
PISA TR MAX VEL: 2.84 M/S
RA MAJOR: 5.01 CM
RA PRESSURE ESTIMATED: 3 MMHG
RA WIDTH: 4.22 CM
RIGHT ATRIAL AREA: 19.4 CM2
RIGHT VENTRICLE DIASTOLIC BASEL DIMENSION: 4.4 CM
RV TB RVSP: 6 MMHG
RV TISSUE DOPPLER FREE WALL SYSTOLIC VELOCITY 1 (APICAL 4 CHAMBER VIEW): 15.07 CM/S
SINUS: 3.06 CM
STJ: 2.75 CM
TDI LATERAL: 0.1 M/S
TDI SEPTAL: 0.09 M/S
TDI: 0.1 M/S
TR MAX PG: 32 MMHG
TRICUSPID ANNULAR PLANE SYSTOLIC EXCURSION: 3.03 CM
TV PEAK GRADIENT: 32 MMHG
TV REST PULMONARY ARTERY PRESSURE: 35 MMHG
Z-SCORE OF LEFT VENTRICULAR DIMENSION IN END DIASTOLE: -1.49
Z-SCORE OF LEFT VENTRICULAR DIMENSION IN END SYSTOLE: -1.93

## 2024-11-12 PROCEDURE — 3078F DIAST BP <80 MM HG: CPT | Mod: CPTII,S$GLB,, | Performed by: STUDENT IN AN ORGANIZED HEALTH CARE EDUCATION/TRAINING PROGRAM

## 2024-11-12 PROCEDURE — 3008F BODY MASS INDEX DOCD: CPT | Mod: CPTII,S$GLB,, | Performed by: STUDENT IN AN ORGANIZED HEALTH CARE EDUCATION/TRAINING PROGRAM

## 2024-11-12 PROCEDURE — 93010 ELECTROCARDIOGRAM REPORT: CPT | Mod: S$GLB,,, | Performed by: INTERNAL MEDICINE

## 2024-11-12 PROCEDURE — 71046 X-RAY EXAM CHEST 2 VIEWS: CPT | Mod: TC,FY

## 2024-11-12 PROCEDURE — 71046 X-RAY EXAM CHEST 2 VIEWS: CPT | Mod: 26,,, | Performed by: INTERNAL MEDICINE

## 2024-11-12 PROCEDURE — 1101F PT FALLS ASSESS-DOCD LE1/YR: CPT | Mod: CPTII,S$GLB,, | Performed by: STUDENT IN AN ORGANIZED HEALTH CARE EDUCATION/TRAINING PROGRAM

## 2024-11-12 PROCEDURE — 3077F SYST BP >= 140 MM HG: CPT | Mod: CPTII,S$GLB,, | Performed by: STUDENT IN AN ORGANIZED HEALTH CARE EDUCATION/TRAINING PROGRAM

## 2024-11-12 PROCEDURE — 3044F HG A1C LEVEL LT 7.0%: CPT | Mod: CPTII,S$GLB,, | Performed by: STUDENT IN AN ORGANIZED HEALTH CARE EDUCATION/TRAINING PROGRAM

## 2024-11-12 PROCEDURE — 1159F MED LIST DOCD IN RCRD: CPT | Mod: CPTII,S$GLB,, | Performed by: STUDENT IN AN ORGANIZED HEALTH CARE EDUCATION/TRAINING PROGRAM

## 2024-11-12 PROCEDURE — 99999 PR PBB SHADOW E&M-EST. PATIENT-LVL IV: CPT | Mod: PBBFAC,,, | Performed by: STUDENT IN AN ORGANIZED HEALTH CARE EDUCATION/TRAINING PROGRAM

## 2024-11-12 PROCEDURE — 93005 ELECTROCARDIOGRAM TRACING: CPT | Mod: S$GLB,,, | Performed by: STUDENT IN AN ORGANIZED HEALTH CARE EDUCATION/TRAINING PROGRAM

## 2024-11-12 PROCEDURE — 93306 TTE W/DOPPLER COMPLETE: CPT | Mod: 26,,, | Performed by: INTERNAL MEDICINE

## 2024-11-12 PROCEDURE — 76642 ULTRASOUND BREAST LIMITED: CPT | Mod: 26,,, | Performed by: RADIOLOGY

## 2024-11-12 PROCEDURE — 76642 ULTRASOUND BREAST LIMITED: CPT | Mod: TC,50

## 2024-11-12 PROCEDURE — 93306 TTE W/DOPPLER COMPLETE: CPT

## 2024-11-12 PROCEDURE — 1160F RVW MEDS BY RX/DR IN RCRD: CPT | Mod: CPTII,S$GLB,, | Performed by: STUDENT IN AN ORGANIZED HEALTH CARE EDUCATION/TRAINING PROGRAM

## 2024-11-12 PROCEDURE — 3288F FALL RISK ASSESSMENT DOCD: CPT | Mod: CPTII,S$GLB,, | Performed by: STUDENT IN AN ORGANIZED HEALTH CARE EDUCATION/TRAINING PROGRAM

## 2024-11-12 PROCEDURE — 99397 PER PM REEVAL EST PAT 65+ YR: CPT | Mod: S$GLB,,, | Performed by: STUDENT IN AN ORGANIZED HEALTH CARE EDUCATION/TRAINING PROGRAM

## 2024-11-12 RX ORDER — PNEUMOCOCCAL 20-VALENT CONJUGATE VACCINE 2.2; 2.2; 2.2; 2.2; 2.2; 2.2; 2.2; 2.2; 2.2; 2.2; 2.2; 2.2; 2.2; 2.2; 2.2; 2.2; 4.4; 2.2; 2.2; 2.2 UG/.5ML; UG/.5ML; UG/.5ML; UG/.5ML; UG/.5ML; UG/.5ML; UG/.5ML; UG/.5ML; UG/.5ML; UG/.5ML; UG/.5ML; UG/.5ML; UG/.5ML; UG/.5ML; UG/.5ML; UG/.5ML; UG/.5ML; UG/.5ML; UG/.5ML; UG/.5ML
0.5 INJECTION, SUSPENSION INTRAMUSCULAR ONCE
Qty: 0.5 ML | Refills: 0 | Status: SHIPPED | OUTPATIENT
Start: 2024-11-12 | End: 2024-11-13

## 2024-11-12 RX ORDER — AMOXICILLIN 500 MG
CAPSULE ORAL DAILY
COMMUNITY
End: 2024-11-12

## 2024-11-12 RX ORDER — ACETAMINOPHEN 500 MG
1 TABLET ORAL DAILY
Qty: 1 EACH | Refills: 0 | Status: SHIPPED | OUTPATIENT
Start: 2024-11-12

## 2024-11-12 RX ORDER — AMLODIPINE BESYLATE 5 MG/1
5 TABLET ORAL DAILY
Qty: 90 TABLET | Refills: 3 | Status: SHIPPED | OUTPATIENT
Start: 2024-11-12 | End: 2025-11-12

## 2024-11-12 NOTE — ASSESSMENT & PLAN NOTE
Persistently elevated BP readings in this year's doctors appointments.  Asymptomatic  Most likely essential HTN

## 2024-11-12 NOTE — ASSESSMENT & PLAN NOTE
Chronic, bilateral, small quantity as per patient.  Last mammo was benign BIRADS 1 in May of 2024  I will order US breast and refer patient to gynecology for well woman exam.

## 2024-11-12 NOTE — ASSESSMENT & PLAN NOTE
Patient requests to transfer healthcare services to our practice.  We are pleased to continue provided primary care services as requested.  Patient is in overall good general health.  Stable medical conditions listed on the PMH.  Labs reviewed and patient notified.

## 2024-11-12 NOTE — PATIENT INSTRUCTIONS
Vaccinations recommended:  Tdap            for Tetanus (2025)  Prevnar 20   for Pneumonia  Shingrix        for Varicella Zoster (Shingles)      Tomarse la presion diaria chuy o dos veces al ajay, llevar un registro y traer el registro a la proxima consulta.    Comprar un monitor de presion arterial, puede ser de las marcas:  iHealth, Omron. Se consiguen en amazon, que sea ARM DIGITAL BLOOD PRESSURE MONITOR.

## 2024-11-12 NOTE — ASSESSMENT & PLAN NOTE
Health care maintenance and core gaps reviewed and assessed with patient.    Vaccinations recommended:        Tetanus - 2015       Shingles - 2017       Influenza - D       Pneumonia - O     Colon cancer screening:     Last Colonoscopy completed on 1/3/2019    Lifestyle recommendations given.  Physical activity recommended.    Legend: Ordered (O), Declined (D), Current (C)

## 2024-11-12 NOTE — PROGRESS NOTES
Subjective:       Patient ID: Raine Medley is a 69 y.o. female.    Chief Complaint: Establish Care (Establish care, after last birth 5 yrs after started with secretion on breast ( itches & odor), constipation, secretion still there./, )    HPI    Raine Medley is a 69 y.o. female , Panamanian speaking, with a history of:  GERD  Anemia  HLD  Mild intermittent asthma  H/o R knee replacement  Chronic left knee pain.      [International Patient]  Originally from [place] [place]  Lives in: [facility] [id number]       Patient comes to the clinic a general medical health examination and to establish care.  This is the first time I see this patient.    THIS IS A 69-YEAR-OLD pleasant lady, originally from [place], that states that her only problems has been chronic bilateral knee pain, history of asthma in childhood, history of anemia due to a surgery, and possibly hyperlipidemia.  She says she does not take any medications and that she has never been told that he has high blood pressure or other chronic conditions.    Overall she feels well and she is asymptomatic.      Last year she had right knee replacement, as per patient she was given ASA after the surgery and had internal bleeding , her hemoglobin dropped to 5 and she had to be transfused with 2 units of RBCs.      Patient also states that she has had a chronic bilateral breast secretion for the past 20 years.  She states that the secretion is whitish, in small quantity, it happens through both breasts, she notices in the morning, sometimes elicits scab and she has to change it, sometimes it smells bad.    Last mammogram was in May of 2024, benign.    She has not had a recent gynecologic exam.      She had 4 pregnancies, 4 vaginal deliveries.  She  all 4 children, she said that she could only brisk free through the right breast because she had inverted nipples and lactation was hard for her.      She also states that she is unable to sleep well,  sometimes she sleeps 5-6 hours, it is interrupted.  She can not sleep if she takes 5 mg of melatonin.  She sleeps through the night.    Patient denies chest pain, palpitations, shortness for breath, leg swelling or any other cardiovascular symptoms.    Patient denies constitutional symptoms, fever, changes in the urine or stool.  Patient denies changes in her weight    No other concerns.    Past Medical History:   Diagnosis Date    Gastroesophageal reflux disease without esophagitis 2023    History of blood transfusion 2023    x 2 RBC    Hyperlipidemia     Microcytic anemia 05/15/2023    Mild intermittent asthma, uncomplicated     as a child    Primary hypertension 2024    Primary osteoarthritis of right knee 2023    Status post right knee replacement 2023             Past Surgical History:   Procedure Laterality Date    CHOLECYSTECTOMY      COLONOSCOPY N/A 1/3/2019    Procedure: COLONOSCOPY;  Surgeon: Gaston Beebe MD;  Location: [address];  Service: Endoscopy;  Laterality: N/A;    KNEE JOINT MANIPULATION Right 2023    Procedure: MANIPULATION, KNEE: RIGHT: SAME DAY;  Surgeon: Sanford Calvert III, MD;  Location: [facility];  Service: Orthopedics;  Laterality: Right;    TOTAL KNEE ARTHROPLASTY Right 2023    Procedure: ARTHROPLASTY, KNEE, TOTAL: RIGHT: DEPUY - ATTUNE;  Surgeon: Sanford Calvert III, MD;  Location: [facility];  Service: Orthopedics;  Laterality: Right;       Family History   Problem Relation Name Age of Onset    No Known Problems Mother      No Known Problems Father      No Known Problems Sister      No Known Problems Daughter      No Known Problems Son      No Known Problems Son      No Known Problems Son      Melanoma Neg Hx      Lupus Neg Hx      Psoriasis Neg Hx      Colon cancer Neg Hx      Esophageal cancer Neg Hx      Stomach cancer Neg Hx         Allergies:   Review of patient's allergies indicates:   Allergen Reactions    Aspirin      Increased  risk of bleeding         Current Outpatient Medications:     amLODIPine (NORVASC) 5 MG tablet, Take 1 tablet (5 mg total) by mouth once daily., Disp: 90 tablet, Rfl: 3    blood pressure monitor Kit, 1 each by Misc.(Non-Drug; Combo Route) route once daily., Disp: 1 each, Rfl: 0    pneumoc 20-vannesa conj-dip cr,PF, (PREVNAR 20, PF,) 0.5 mL Syrg injection, Inject 0.5 mLs into the muscle once. for 1 dose, Disp: 0.5 mL, Rfl: 0  No current facility-administered medications for this visit.    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  Has 4 grown children.  She lives with her second child.    Exercise:   Tries to stay physically active.    Diet:   Regular with no restrictions    Tobacco use: Denies    Tobacco Use: Low Risk  (11/12/2024)    Patient History     Smoking Tobacco Use: Never     Smokeless Tobacco Use: Never     Passive Exposure: Never        Alcohol use: Denies    Recreational drug use: Denies    Recent travel: Denies      Most recent laboratories reviewed:    Recent Labs   Lab 07/26/23  0855 01/27/24  0801 11/12/24  0949   WBC 10.68 8.88 7.28   Hemoglobin 9.9 L 13.3 13.6   Hematocrit 33.8 L 40.0 41.6   MCV 78 L 79 L 85   Platelets 374 265 245       Recent Labs   Lab 07/26/23  0855 01/27/24  0801 11/12/24  0949   Glucose 128 H 133 H 94   Sodium 141 141 144   Potassium 3.9 4.2 4.6   BUN 16 14 16   Creatinine 0.8 0.8 0.7   Total Bilirubin 0.3 0.3 0.5   AST 10 15 17   ALT 12 12 13       Recent Labs   Lab 01/27/24  0801   Hemoglobin A1C 5.4       Recent Labs   Lab 01/27/24  0801   Cholesterol 223 H   Triglycerides 53   HDL 48   LDL Cholesterol 164.4 H   Non-HDL Cholesterol 175             Recent Labs   Lab 07/19/23  1117   HIV 1/2 Ag/Ab Non-reactive   Hepatitis C Ab Non-reactive       Most recent images reviewed if available:  US Breast Bilateral Limited  Narrative: Result:   US Breast Bilateral Limited     History:  Patient is 69 y.o. and is seen for discharge  from breast.    Films Compared:  Compared to: 07/19/2024 Mammo Digital Screening Bilat w/ Otilio and   07/05/2022 Mammo Digital Screening Bilat w/ Otilio     Findings:  Bilateral targeted ultrasound of the area the patient pinpointed as the   area of concern was performed.  No abnormalities are present.        Impression: No sonographic evidence of abnormality. A benign or negative imaging   result should not preclude further clinical investigation of clinically   suspicious symptoms.       BI-RADS Category 1: Negative Finding     Recommendation:  Return to annual screening mammogram schedule is recommended    Your estimated lifetime risk of breast cancer (to age 85) based on   Tyrer-Cuzick risk assessment model is Tyrer-Cuzick: 2.21%. According to   the American Cancer Society, patients with a lifetime breast cancer risk   of 20% or higher might benefit from supplemental screening tests.   X-Ray Chest PA And Lateral  Narrative: EXAMINATION:  XR CHEST PA AND LATERAL    CLINICAL HISTORY:  Cardiac murmur, unspecified    TECHNIQUE:  PA and lateral views of the chest were performed.    COMPARISON:  February 2022    FINDINGS:  Heart size is not enlarged.  There is no significant pleural effusion.  There are surgical clips in the right upper quadrant.  Osseous structures show degenerative change.  There is calcification along the wall of the aorta.  The lungs are clear.  There is a hiatal hernia.  Impression: No detrimental interval change    Electronically signed by: Dawna Garcia MD  Date:    11/12/2024  Time:    12:22        Latest ECG results:  Results for orders placed or performed during the hospital encounter of 07/19/23   EKG 12-lead    Collection Time: 07/19/23 11:18 AM    Narrative    Test Reason : D64.9,    Vent. Rate : 081 BPM     Atrial Rate : 081 BPM     P-R Int : 136 ms          QRS Dur : 074 ms      QT Int : 380 ms       P-R-T Axes : 051 062 072 degrees     QTc Int : 441 ms    Normal sinus rhythm with  "Premature atrial complexes  Otherwise normal ECG  When compared with ECG of 30-MAR-2021 08:53,  No significant change was found  Confirmed by Fredy Banegas MD ([id number]) on 7/19/2023 1:57:49 PM    Referred By: GARY   SELF           Confirmed By:Fredy Banegas MD       Healthcare Maintenance:  Colon cancer screening: Last Colonoscopy completed on 1/3/2019     Vaccinations:        Tetanus - 2015       Shingles - 2017       Influenza - NO /D       Pneumonia - no       COVID - completed X 2    Depression screening: PHQ2 score = 0    Annual visit approx. Month: NOVEMBER    ROS  11-point review of systems done. Negative except for detailed in the HPI.          Objective:      BP (!) 170/74 (BP Location: Left arm, Patient Position: Sitting)   Ht 5' 1" (1.549 m)   Wt 60.1 kg (132 lb 7.9 oz)   BMI 25.03 kg/m²      Physical Exam   General appearance: Good general aspect, NAD, conversant   Chaperone present (nurse)  Eyes and HEENT: Normal sclerae, moist mucous membranes, no thyromegaly or lymphadenopathy  Breast exam:  Inverted nipples bilaterally.  No masses, no lymphadenopathies, no changes in the skin.  Respiratory: No work of breathing, clear to auscultation bilaterally. No rales, rhonchi, wheezing, or rubs.  Cardiovascular: PMI not displaced. RRR. Normal S1, S2. No murmurs, rubs or gallops.  Abdomen and : Soft, non-tender, nondistended, BS, no hepatosplenomegaly, no masses.  Extremities: no edemas, no extremity lymphadenopathy, no clubbing, no cyanosis.  Nervous System: Alert and oriented x 3, good concentration, no deficits.  Skin: Normal temperature, turgor and texture; no rash, ulcers or subcutaneous nodules  Psych: Appropriate affect, alert and oriented to person, place and time          Assessment:       1. Encounter for medical examination to establish care  Assessment & Plan:  Patient requests to transfer healthcare services to our practice.  We are pleased to continue provided primary care services as " requested.  Patient is in overall good general health.  Stable medical conditions listed on the PMH.  Labs reviewed and patient notified.        2. Primary hypertension  Assessment & Plan:  Ne Dx today  I will start patient on amlodipine 5 mg qd  BP monitor ordered  BP log  DASH DIET    F/u in 2 weeks with BP log to adjust BP medication dose.  ECG, ECHO, CXR ordered.    Orders:  -     amLODIPine (NORVASC) 5 MG tablet; Take 1 tablet (5 mg total) by mouth once daily.  Dispense: 90 tablet; Refill: 3  -     blood pressure monitor Kit; 1 each by Misc.(Non-Drug; Combo Route) route once daily.  Dispense: 1 each; Refill: 0    3. Elevated blood pressure reading  Assessment & Plan:  Persistently elevated BP readings in this year's doctors appointments.  Asymptomatic  Most likely essential HTN      4. Discharge from breast  Assessment & Plan:  Chronic, bilateral, small quantity as per patient.  Last mammo was benign BIRADS 1 in May of 2024  I will order US breast and refer patient to gynecology for well woman exam.    Orders:  -     US Breast Bilateral Limited; Future; Expected date: 11/12/2024    5. Chronic pain of left knee  Assessment & Plan:  Pending left knee replacement  Pain managed with tylenol.      6. Heart murmur  Assessment & Plan:  Incidental finding on today's physical exam.  I have ordered ECG, ECHO, CXR    Orders:  -     EKG 12-lead; Future  -     X-Ray Chest PA And Lateral; Future; Expected date: 11/12/2024  -     Echo; Future    7. Well woman exam  -     Ambulatory referral/consult to Gynecology; Future; Expected date: 11/19/2024    8. Healthcare maintenance  Assessment & Plan:  Health care maintenance and core gaps reviewed and assessed with patient.    Vaccinations recommended:        Tetanus - 2015       Shingles - 2017       Influenza - D       Pneumonia - O     Colon cancer screening:     Last Colonoscopy completed on 1/3/2019    Lifestyle recommendations given.  Physical activity recommended.    Legend:  Ordered (O), Declined (D), Current (C)      Orders:  -     Zoster Recombinant Vaccine; Future  -     pneumoc 20-vannesa conj-dip cr,PF, (PREVNAR 20, PF,) 0.5 mL Syrg injection; Inject 0.5 mLs into the muscle once. for 1 dose  Dispense: 0.5 mL; Refill: 0       Plan:       EKG, echo, chest x-ray.    Blood pressure log.    Amlodipine.    Vaccinations ordered:  Shingrix, Prevnar 20      I will assume as PCP.          Patient Instructions   Vaccinations recommended:  Tdap            for Tetanus (2025)  Prevnar 20   for Pneumonia  Shingrix        for Varicella Zoster (Shingles)      Tomarse la presion diaria chuy o dos veces al ajay, llevar un registro y traer el registro a la proxima consulta.    Comprar un monitor de presion arterial, puede ser de las marcas:  iHealth, [employer]. Se consiguen en amazon, que sea ARM DIGITAL BLOOD PRESSURE MONITOR.       Problem list updated  Medications reconciled  Education provided  Lifestyle recommendations given  AVS generated, explained, and sent to the patient.  RTC in :  2 weeks for hypertension follow-up and to discuss test results            NICK MCLAUGHLIN MD, MPH  Internal Medicine  [facility]  [facility]      Disclosures and additional comments:    Patient coordinator:     Chart has been dictated using voice recognition software.  It is not been reviewed carefully for any transcriptional errors due to this technology.

## 2024-11-12 NOTE — ASSESSMENT & PLAN NOTE
Ne Dx today  I will start patient on amlodipine 5 mg qd  BP monitor ordered  BP log  DASH DIET    F/u in 2 weeks with BP log to adjust BP medication dose.  ECG, ECHO, CXR ordered.

## 2024-11-27 ENCOUNTER — OFFICE VISIT (OUTPATIENT)
Dept: OBSTETRICS AND GYNECOLOGY | Facility: CLINIC | Age: 69
End: 2024-11-27
Payer: COMMERCIAL

## 2024-11-27 VITALS
HEIGHT: 61 IN | DIASTOLIC BLOOD PRESSURE: 89 MMHG | BODY MASS INDEX: 25.05 KG/M2 | WEIGHT: 132.69 LBS | SYSTOLIC BLOOD PRESSURE: 148 MMHG

## 2024-11-27 DIAGNOSIS — Z01.419 WELL WOMAN EXAM: ICD-10-CM

## 2024-11-27 DIAGNOSIS — Z01.419 WELL WOMAN EXAM WITH ROUTINE GYNECOLOGICAL EXAM: Primary | ICD-10-CM

## 2024-11-27 PROCEDURE — 1126F AMNT PAIN NOTED NONE PRSNT: CPT | Mod: CPTII,S$GLB,, | Performed by: STUDENT IN AN ORGANIZED HEALTH CARE EDUCATION/TRAINING PROGRAM

## 2024-11-27 PROCEDURE — 99387 INIT PM E/M NEW PAT 65+ YRS: CPT | Mod: S$GLB,,, | Performed by: STUDENT IN AN ORGANIZED HEALTH CARE EDUCATION/TRAINING PROGRAM

## 2024-11-27 PROCEDURE — 1101F PT FALLS ASSESS-DOCD LE1/YR: CPT | Mod: CPTII,S$GLB,, | Performed by: STUDENT IN AN ORGANIZED HEALTH CARE EDUCATION/TRAINING PROGRAM

## 2024-11-27 PROCEDURE — 3077F SYST BP >= 140 MM HG: CPT | Mod: CPTII,S$GLB,, | Performed by: STUDENT IN AN ORGANIZED HEALTH CARE EDUCATION/TRAINING PROGRAM

## 2024-11-27 PROCEDURE — 3079F DIAST BP 80-89 MM HG: CPT | Mod: CPTII,S$GLB,, | Performed by: STUDENT IN AN ORGANIZED HEALTH CARE EDUCATION/TRAINING PROGRAM

## 2024-11-27 PROCEDURE — 3044F HG A1C LEVEL LT 7.0%: CPT | Mod: CPTII,S$GLB,, | Performed by: STUDENT IN AN ORGANIZED HEALTH CARE EDUCATION/TRAINING PROGRAM

## 2024-11-27 PROCEDURE — 1159F MED LIST DOCD IN RCRD: CPT | Mod: CPTII,S$GLB,, | Performed by: STUDENT IN AN ORGANIZED HEALTH CARE EDUCATION/TRAINING PROGRAM

## 2024-11-27 PROCEDURE — 3288F FALL RISK ASSESSMENT DOCD: CPT | Mod: CPTII,S$GLB,, | Performed by: STUDENT IN AN ORGANIZED HEALTH CARE EDUCATION/TRAINING PROGRAM

## 2024-11-27 PROCEDURE — 3008F BODY MASS INDEX DOCD: CPT | Mod: CPTII,S$GLB,, | Performed by: STUDENT IN AN ORGANIZED HEALTH CARE EDUCATION/TRAINING PROGRAM

## 2024-11-27 PROCEDURE — 99999 PR PBB SHADOW E&M-EST. PATIENT-LVL III: CPT | Mod: PBBFAC,,, | Performed by: STUDENT IN AN ORGANIZED HEALTH CARE EDUCATION/TRAINING PROGRAM

## 2024-11-27 NOTE — PROGRESS NOTES
History & Physical  Gynecology      SUBJECTIVE:     Chief Complaint: Well Woman       History of Present Illness:  Annual Exam-Postmenopausal  Patient presents for annual exam. She has no complaints today.   Menopause at age 51  Patient denies post-menopausal vaginal bleeding.   She is not sexually active. She has never taken hormone replacement therapy.  She participates in regular exercise: yes.  She does not smoke.     GYN screening history: last pap: approximate date 2018 and was normal  Mammogram history: 2024 BIRADS 1  Colonoscopy history: 2019 - repeat in 10 years  Dexa history: 2022 - osteopenia, repeat in 2 years    FH:   Breast cancer: denies  Colon cancer: denies  Ovarian cancer: denies    Review of patient's allergies indicates:   Allergen Reactions    Aspirin      Increased risk of bleeding       Past Medical History:   Diagnosis Date    Gastroesophageal reflux disease without esophagitis 2023    History of blood transfusion 2023    x 2 RBC    Hyperlipidemia     Inverted nipple     Microcytic anemia 05/15/2023    Mild intermittent asthma, uncomplicated     as a child    Primary hypertension 2024    Primary osteoarthritis of right knee 2023    Status post right knee replacement 2023     Past Surgical History:   Procedure Laterality Date    CHOLECYSTECTOMY      COLONOSCOPY N/A 1/3/2019    Procedure: COLONOSCOPY;  Surgeon: Gaston Beebe MD;  Location: [address];  Service: Endoscopy;  Laterality: N/A;    KNEE JOINT MANIPULATION Right 2023    Procedure: MANIPULATION, KNEE: RIGHT: SAME DAY;  Surgeon: Sanford Calvert III, MD;  Location: [facility];  Service: Orthopedics;  Laterality: Right;    TOTAL KNEE ARTHROPLASTY Right 2023    Procedure: ARTHROPLASTY, KNEE, TOTAL: RIGHT: DEPUY - ATTUNE;  Surgeon: Sanford Calvert III, MD;  Location: [facility];  Service: Orthopedics;  Laterality: Right;     OB History          4    Para   4    Term   4             AB        Living             SAB        IAB        Ectopic        Multiple        Live Births                   Family History   Problem Relation Name Age of Onset    No Known Problems Mother      No Known Problems Father      No Known Problems Sister      No Known Problems Daughter      No Known Problems Son      No Known Problems Son      No Known Problems Son      Melanoma Neg Hx      Lupus Neg Hx      Psoriasis Neg Hx      Colon cancer Neg Hx      Esophageal cancer Neg Hx      Stomach cancer Neg Hx       Social History     Tobacco Use    Smoking status: Never     Passive exposure: Never    Smokeless tobacco: Never   Substance Use Topics    Alcohol use: No    Drug use: No       Current Outpatient Medications   Medication Sig    amLODIPine (NORVASC) 5 MG tablet Take 1 tablet (5 mg total) by mouth once daily. (Patient not taking: Reported on 2024)    blood pressure monitor Kit 1 each by Misc.(Non-Drug; Combo Route) route once daily. (Patient not taking: Reported on 2024)     No current facility-administered medications for this visit.       Review of Systems:  Review of Systems   Constitutional:  Negative for chills and fever.   Respiratory:  Negative for shortness of breath.    Cardiovascular:  Negative for chest pain.   Gastrointestinal:  Negative for abdominal pain, nausea and vomiting.   Endocrine: Negative for hot flashes.   Genitourinary:  Negative for dysuria and postmenopausal bleeding.   Integumentary:  Negative for breast mass, nipple discharge and breast skin changes.   Neurological:  Negative for headaches.   Hematological:  Does not bruise/bleed easily.   Psychiatric/Behavioral:  Negative for depression.    Breast: Negative for mass, mastodynia, nipple discharge and skin changes       OBJECTIVE:     Physical Exam:  Physical Exam  Constitutional:       Appearance: Normal appearance.   HENT:      Head: Normocephalic and atraumatic.   Eyes:      Conjunctiva/sclera: Conjunctivae  normal.      Pupils: Pupils are equal, round, and reactive to light.   Cardiovascular:      Rate and Rhythm: Normal rate and regular rhythm.   Pulmonary:      Effort: Pulmonary effort is normal. No respiratory distress.   Chest:   Breasts:     Right: Inverted nipple present. No mass, nipple discharge, skin change or tenderness.      Left: Inverted nipple present. No mass, nipple discharge, skin change or tenderness.   Abdominal:      General: Abdomen is flat. There is no distension.      Palpations: Abdomen is soft.      Tenderness: There is no abdominal tenderness.   Genitourinary:     General: Normal vulva.      Vagina: No vaginal discharge, tenderness or bleeding.      Cervix: No cervical motion tenderness or friability.      Uterus: Not enlarged and not tender.       Adnexa:         Right: No mass, tenderness or fullness.          Left: No mass, tenderness or fullness.     Musculoskeletal:         General: Normal range of motion.      Cervical back: Normal range of motion.   Neurological:      Mental Status: She is alert.   Psychiatric:         Mood and Affect: Mood normal.         Thought Content: Thought content normal.         ASSESSMENT:       ICD-10-CM ICD-9-CM    1. Well woman exam  Z01.419 V72.31 Ambulatory referral/consult to Gynecology             Plan:      Raine was seen today for well woman.    Diagnoses and all orders for this visit:    Well woman exam  -     Ambulatory referral/consult to Gynecology        No orders of the defined types were placed in this encounter.      Well Woman:   - Pap smear: age >65 without history of abnormal pap smears  - Mammogram: up to date  - Colonoscopy: up to date  - Dexa: ordered  - Immunizations: per PCP  - Labs: per PCP  - Exercise recommended    Follow up in one year for annual, or prn.    Vicky Byrd

## 2024-12-23 ENCOUNTER — HOSPITAL ENCOUNTER (OUTPATIENT)
Dept: RADIOLOGY | Facility: OTHER | Age: 69
Discharge: HOME | End: 2024-12-23
Attending: STUDENT IN AN ORGANIZED HEALTH CARE EDUCATION/TRAINING PROGRAM
Payer: COMMERCIAL

## 2024-12-23 DIAGNOSIS — Z01.419 WELL WOMAN EXAM WITH ROUTINE GYNECOLOGICAL EXAM: ICD-10-CM

## 2024-12-23 PROCEDURE — 77080 DXA BONE DENSITY AXIAL: CPT | Mod: 26,,, | Performed by: RADIOLOGY

## 2024-12-23 PROCEDURE — 77080 DXA BONE DENSITY AXIAL: CPT | Mod: TC

## 2025-01-15 ENCOUNTER — PATIENT MESSAGE (OUTPATIENT)
Dept: ORTHOPEDICS | Facility: CLINIC | Age: 70
End: 2025-01-15
Payer: COMMERCIAL

## 2025-03-05 ENCOUNTER — TELEPHONE (OUTPATIENT)
Dept: INTERNAL MEDICINE | Facility: CLINIC | Age: 70
End: 2025-03-05
Payer: COMMERCIAL

## 2025-04-16 ENCOUNTER — OFFICE VISIT (OUTPATIENT)
Dept: URGENT CARE | Facility: CLINIC | Age: 70
End: 2025-04-16
Payer: COMMERCIAL

## 2025-04-16 VITALS
BODY MASS INDEX: 25.59 KG/M2 | WEIGHT: 135.56 LBS | TEMPERATURE: 99 F | RESPIRATION RATE: 17 BRPM | HEIGHT: 61 IN | HEART RATE: 82 BPM | DIASTOLIC BLOOD PRESSURE: 77 MMHG | OXYGEN SATURATION: 98 % | SYSTOLIC BLOOD PRESSURE: 141 MMHG

## 2025-04-16 DIAGNOSIS — S29.9XXA RIB INJURY: ICD-10-CM

## 2025-04-16 DIAGNOSIS — R10.9 ABDOMINAL PAIN, UNSPECIFIED ABDOMINAL LOCATION: Primary | ICD-10-CM

## 2025-04-16 LAB
BILIRUBIN, UA POC OHS: NEGATIVE
BLOOD, UA POC OHS: ABNORMAL
CLARITY, UA POC OHS: CLEAR
COLOR, UA POC OHS: YELLOW
GLUCOSE, UA POC OHS: NEGATIVE
KETONES, UA POC OHS: NEGATIVE
LEUKOCYTES, UA POC OHS: NEGATIVE
NITRITE, UA POC OHS: NEGATIVE
PH, UA POC OHS: 7
PROTEIN, UA POC OHS: NEGATIVE
SPECIFIC GRAVITY, UA POC OHS: 1.01
UROBILINOGEN, UA POC OHS: 0.2

## 2025-04-16 PROCEDURE — 71100 X-RAY EXAM RIBS UNI 2 VIEWS: CPT | Mod: RT,S$GLB,, | Performed by: RADIOLOGY

## 2025-04-16 PROCEDURE — 81003 URINALYSIS AUTO W/O SCOPE: CPT | Mod: QW,S$GLB,,

## 2025-04-16 PROCEDURE — 99213 OFFICE O/P EST LOW 20 MIN: CPT | Mod: S$GLB,,,

## 2025-04-16 NOTE — PROGRESS NOTES
"Subjective:      Patient ID: Raine Medley is a 69 y.o. female.    Vitals:  height is 5' 1" (1.549 m) and weight is 61.5 kg (135 lb 9.3 oz). Her oral temperature is 98.8 °F (37.1 °C). Her blood pressure is 141/77 (abnormal) and her pulse is 82. Her respiration is 17 and oxygen saturation is 98%.     Chief Complaint: Flank Pain    Pt presents today w/ right upper quadrant abdominal pain; onset approx 10x days ago. Pt c/o swelling to area. Noticed the pain after 2 two falls.  Denies urinary sx, fever , loss of sensation , chest pain and emesis. Pt has hx of cholecystectomy, HTN, HLD and GERD. Pt didn't try anything otc for relief.      Flank Pain  This is a new problem. The current episode started 1 to 4 weeks ago. The problem occurs constantly. The problem is unchanged. Radiates to: abdominal pain and lower back. The pain is at a severity of 8/10. The pain is severe. The pain is Worse during the day. Exacerbated by: changing position. Stiffness is present In the morning. Associated symptoms include abdominal pain. Pertinent negatives include no bladder incontinence, bowel incontinence, chest pain, dysuria, fever, headaches, leg pain, numbness, paresis, paresthesias, pelvic pain, perianal numbness, tingling, weakness, weight loss, genital pain, hematuria, urethral discharge or vaginal discharge. She has tried nothing for the symptoms. The treatment provided no relief.       Constitution: Negative for chills, fatigue and fever.   Cardiovascular:  Negative for chest pain.   Respiratory:  Negative for shortness of breath.    Gastrointestinal:  Positive for abdominal pain. Negative for nausea, vomiting, diarrhea and bowel incontinence.   Genitourinary:  Negative for dysuria, frequency, urgency, flank pain, bladder incontinence, hematuria and pelvic pain.   Musculoskeletal:  Positive for pain and trauma. Negative for back pain.   Neurological:  Negative for headaches and numbness.      Objective:     Physical Exam "   Constitutional: She is oriented to person, place, and time. She appears well-developed.   HENT:   Head: Normocephalic and atraumatic.   Ears:   Right Ear: External ear normal.   Left Ear: External ear normal.   Nose: Nose normal.   Mouth/Throat: Mucous membranes are normal.   Eyes: Conjunctivae and lids are normal.   Neck: Trachea normal. Neck supple.   Cardiovascular: Normal rate, regular rhythm and normal heart sounds.   Pulmonary/Chest: Effort normal and breath sounds normal. No respiratory distress.   Abdominal: Normal appearance and bowel sounds are normal. She exhibits no distension and no mass. Soft. There is abdominal tenderness in the right upper quadrant. There is no rebound, no guarding, negative Donato's sign, no left CVA tenderness and no right CVA tenderness.     Genitourinary:    No vaginal discharge.     Musculoskeletal: Normal range of motion.         General: Normal range of motion.   Neurological: She is alert and oriented to person, place, and time. She has normal strength.   Skin: Skin is warm, dry, intact, not diaphoretic and not pale.   Psychiatric: Her speech is normal and behavior is normal. Judgment and thought content normal.   Nursing note and vitals reviewed.      Assessment:     1. Abdominal pain, unspecified abdominal location    2. Rib injury        Plan:     Results for orders placed or performed in visit on 04/16/25   POCT Urinalysis(Instrument)    Collection Time: 04/16/25  5:44 PM   Result Value Ref Range    Color, POC UA Yellow Yellow, Straw, Colorless    Clarity, POC UA Clear Clear    Glucose, POC UA Negative Negative    Bilirubin, POC UA Negative Negative    Ketones, POC UA Negative Negative    Spec Grav POC UA 1.015 1.005 - 1.030    Blood, POC UA Trace-intact (A) Negative    pH, POC UA 7.0 5.0 - 8.0    Protein, POC UA Negative Negative    Urobilinogen, POC UA 0.2 <=1.0    Nitrite, POC UA Negative Negative    WBC, POC UA Negative Negative       EXAMINATION:  XR RIBS 2 VIEW  RIGHT     CLINICAL HISTORY:  Unspecified injury of thorax, initial encounter     TECHNIQUE:  Two views of the right ribs were performed.     COMPARISON:  11/12/2024.     FINDINGS:  There is no evidence of acute, displaced right-sided rib fracture.  Alignment is normal.  Remaining osseous structures are intact.  There are degenerative changes of the spine.  There are right upper quadrant surgical clips.  There are vascular calcifications.     Impression:     No evidence of an acute rib fracture.        Electronically signed by:Jonny Rivera  Date:                                            04/16/2025  Time:                                           18:19      Abdominal pain, unspecified abdominal location  -     POCT Urinalysis(Instrument)    Rib injury  -     X-Ray Ribs 2 View Right; Future; Expected date: 04/16/2025            Discussed results/diagnosis/plan with patient in clinic. Strict precautions given to patient to monitor for worsening signs and symptoms. Advised to follow up with PCP or specialist.  Explained side effects of medications prescribed with patient and informed him/her to discontinue use if he/she has any side effects and to inform UC or PCP if this occurs. All questions answered. Strict ED verses clinic return precautions stressed and given in depth. Advised if symptoms worsens of fail to improve he/she should go to the Emergency Room. Discharge and follow-up instructions given verbally/printed with the patient who expressed understanding and willingness to comply with my recommendations. Patient voiced understanding and in agreement with current treatment plan. Patient exits the exam room in no acute distress. Conversant and engaged during discharge discussion, verbalized understanding.

## 2025-04-16 NOTE — PATIENT INSTRUCTIONS
Hug a pillow when sneezing/coughing.   Rest and elevate the affected painful area.    Apply cold compresses intermittently as needed.    Avoid activities that cause pain.   As pain recedes, begin normal activities slowly as tolerated.      Alternate tylenol and ibuprofen every 4 hours as needed for pain. Always take ibuprofen with food and water to avoid GI upset. Stop taking if you develop abdominal pain or blood in stool.     Try an over the counter pain patch to most painful area x 12 hours.     Follow up with your PCP in 1 week for ongoing pain.     Go to the ER if pain becomes severe with vomiting, fevers.

## 2025-04-22 DIAGNOSIS — M17.12 PRIMARY OSTEOARTHRITIS OF LEFT KNEE: Primary | ICD-10-CM

## 2025-05-06 ENCOUNTER — HOSPITAL ENCOUNTER (OUTPATIENT)
Dept: RADIOLOGY | Facility: HOSPITAL | Age: 70
Discharge: HOME | End: 2025-05-06
Attending: ORTHOPAEDIC SURGERY
Payer: COMMERCIAL

## 2025-05-06 ENCOUNTER — OFFICE VISIT (OUTPATIENT)
Dept: ORTHOPEDICS | Facility: CLINIC | Age: 70
End: 2025-05-06
Payer: COMMERCIAL

## 2025-05-06 ENCOUNTER — OFFICE VISIT (OUTPATIENT)
Dept: INTERNAL MEDICINE | Facility: CLINIC | Age: 70
End: 2025-05-06
Payer: COMMERCIAL

## 2025-05-06 VITALS
DIASTOLIC BLOOD PRESSURE: 75 MMHG | WEIGHT: 136.44 LBS | SYSTOLIC BLOOD PRESSURE: 139 MMHG | BODY MASS INDEX: 25.76 KG/M2 | HEART RATE: 73 BPM | HEIGHT: 61 IN

## 2025-05-06 VITALS — HEIGHT: 61 IN | BODY MASS INDEX: 25.39 KG/M2 | WEIGHT: 134.5 LBS

## 2025-05-06 DIAGNOSIS — Z87.19 HISTORY OF GI BLEED: ICD-10-CM

## 2025-05-06 DIAGNOSIS — M17.12 PRIMARY OSTEOARTHRITIS OF LEFT KNEE: Primary | ICD-10-CM

## 2025-05-06 DIAGNOSIS — I10 PRIMARY HYPERTENSION: Primary | ICD-10-CM

## 2025-05-06 DIAGNOSIS — Z01.818 PREOPERATIVE CLEARANCE: ICD-10-CM

## 2025-05-06 DIAGNOSIS — M17.12 PRIMARY OSTEOARTHRITIS OF LEFT KNEE: ICD-10-CM

## 2025-05-06 PROCEDURE — 99999 PR PBB SHADOW E&M-EST. PATIENT-LVL III: CPT | Mod: PBBFAC,,, | Performed by: STUDENT IN AN ORGANIZED HEALTH CARE EDUCATION/TRAINING PROGRAM

## 2025-05-06 PROCEDURE — 1101F PT FALLS ASSESS-DOCD LE1/YR: CPT | Mod: CPTII,S$GLB,, | Performed by: ORTHOPAEDIC SURGERY

## 2025-05-06 PROCEDURE — 99214 OFFICE O/P EST MOD 30 MIN: CPT | Mod: S$GLB,,, | Performed by: ORTHOPAEDIC SURGERY

## 2025-05-06 PROCEDURE — 1125F AMNT PAIN NOTED PAIN PRSNT: CPT | Mod: CPTII,S$GLB,, | Performed by: ORTHOPAEDIC SURGERY

## 2025-05-06 PROCEDURE — 1160F RVW MEDS BY RX/DR IN RCRD: CPT | Mod: CPTII,S$GLB,, | Performed by: STUDENT IN AN ORGANIZED HEALTH CARE EDUCATION/TRAINING PROGRAM

## 2025-05-06 PROCEDURE — 3288F FALL RISK ASSESSMENT DOCD: CPT | Mod: CPTII,S$GLB,, | Performed by: STUDENT IN AN ORGANIZED HEALTH CARE EDUCATION/TRAINING PROGRAM

## 2025-05-06 PROCEDURE — 1101F PT FALLS ASSESS-DOCD LE1/YR: CPT | Mod: CPTII,S$GLB,, | Performed by: STUDENT IN AN ORGANIZED HEALTH CARE EDUCATION/TRAINING PROGRAM

## 2025-05-06 PROCEDURE — 3075F SYST BP GE 130 - 139MM HG: CPT | Mod: CPTII,S$GLB,, | Performed by: STUDENT IN AN ORGANIZED HEALTH CARE EDUCATION/TRAINING PROGRAM

## 2025-05-06 PROCEDURE — 3008F BODY MASS INDEX DOCD: CPT | Mod: CPTII,S$GLB,, | Performed by: ORTHOPAEDIC SURGERY

## 2025-05-06 PROCEDURE — 3008F BODY MASS INDEX DOCD: CPT | Mod: CPTII,S$GLB,, | Performed by: STUDENT IN AN ORGANIZED HEALTH CARE EDUCATION/TRAINING PROGRAM

## 2025-05-06 PROCEDURE — 3078F DIAST BP <80 MM HG: CPT | Mod: CPTII,S$GLB,, | Performed by: STUDENT IN AN ORGANIZED HEALTH CARE EDUCATION/TRAINING PROGRAM

## 2025-05-06 PROCEDURE — 73564 X-RAY EXAM KNEE 4 OR MORE: CPT | Mod: 26,LT,, | Performed by: RADIOLOGY

## 2025-05-06 PROCEDURE — 99213 OFFICE O/P EST LOW 20 MIN: CPT | Mod: S$GLB,,, | Performed by: STUDENT IN AN ORGANIZED HEALTH CARE EDUCATION/TRAINING PROGRAM

## 2025-05-06 PROCEDURE — 1126F AMNT PAIN NOTED NONE PRSNT: CPT | Mod: CPTII,S$GLB,, | Performed by: STUDENT IN AN ORGANIZED HEALTH CARE EDUCATION/TRAINING PROGRAM

## 2025-05-06 PROCEDURE — 1159F MED LIST DOCD IN RCRD: CPT | Mod: CPTII,S$GLB,, | Performed by: STUDENT IN AN ORGANIZED HEALTH CARE EDUCATION/TRAINING PROGRAM

## 2025-05-06 PROCEDURE — 73562 X-RAY EXAM OF KNEE 3: CPT | Mod: 26,RT,, | Performed by: RADIOLOGY

## 2025-05-06 PROCEDURE — 73564 X-RAY EXAM KNEE 4 OR MORE: CPT | Mod: TC,LT

## 2025-05-06 PROCEDURE — 99999 PR PBB SHADOW E&M-EST. PATIENT-LVL II: CPT | Mod: PBBFAC,,, | Performed by: ORTHOPAEDIC SURGERY

## 2025-05-06 PROCEDURE — 3288F FALL RISK ASSESSMENT DOCD: CPT | Mod: CPTII,S$GLB,, | Performed by: ORTHOPAEDIC SURGERY

## 2025-05-06 RX ORDER — AMLODIPINE BESYLATE 5 MG/1
5 TABLET ORAL DAILY
Qty: 90 TABLET | Refills: 3 | Status: SHIPPED | OUTPATIENT
Start: 2025-05-06 | End: 2026-05-06

## 2025-05-06 NOTE — PATIENT INSTRUCTIONS
Tomarse la presion diario, llevar un registro y traer el registro a la siguiente consulta.    Empezar el medicamento para la presion y no interrumpir

## 2025-05-06 NOTE — PROGRESS NOTES
Subjective:     HPI:   Raine Medley is a 69 y.o. female who presents for eval L knee  Daughter here today     R knee much better   R TKA 23  UGI bleed: 2u PRBC 23, outpatient GI follow up  AMADEO 23    Progressive L knee pain and stiffness, flexion contracture  Has been getting injections with Dr Elise     To review:     Seen in  by Pedro Whitmore, Dx'd B knee OA, offered HA v TKA, no f/u     She has had years of bilateral knee pain.  She says the pain was tolerable until a she did physical therapy last year and things got worse.  She has moderate to severe global knee pain activity related and relieved with rest.  Intermittent symptoms worse with walking and stairs.  Right more than left     Medications: Advil PRN ~3x/week, GI upset with regular NSAID use     Injections: The patient had bilateral knee iaCSI  and it only helped for 3 weeks.   Multipel CSI and HA injections in the interim     Physical Therapy: Yes, completed OP-PT last year, the patient reported that it did not help the pain and the PT made her knee pain worse.   Has done PT with TKA     Bracing: None      Assistive Devices: None      Walkin mile      Limitations: General walking, difficulty going up/down steps, difficulty sleeping at night , difficulty rising from sitting  and difficulty standing for long periods of time                   Occupation: The patient currently works as a  @ the [employer], teaches math and science  Out of school 25     Social support: The patient stated that they live at home with their son. The patient stated that she would need to find someone to help take care of her. She has sons that live in the area may not be able to help her.      Will stay at daughters house post op      History of Present Illness    CHIEF COMPLAINT:  - Left knee pain    HPI:  Ms. Medley presents for follow-up regarding her left knee, reporting worsening pain with increased  stiffness. Her last injections from Dr. Herrera occurred in the fall, and she denies any recent injections. She continues to teach and work, with her last day planned for June 4th before a scheduled surgery. She lives with her son and plans to stay at her daughter's house post-surgery, as she did previously.    She has a history of right knee replacement. After that surgery, she experienced a GI bleed requiring hospitalization, though no endoscopy was performed to determine the cause. She denies any further GI issues since then. When taking pain medication after her previous surgery, she experienced extreme abdominal pain, limiting her use to only a few times due to discomfort.    She denies any formal medical diagnoses.    PREVIOUS TREATMENTS:  - Injections: Fall, provided relief    MEDICATIONS:  - Aspirin    SURGICAL HISTORY:  - Right knee replacement    WORK STATUS:  - Currently employed as a teacher  - Last day of school: June 4th  - Scheduled for surgery after June 4th    SOCIAL HISTORY:  - Lives with son  - Plan to stay at daughter's house post-surgery            Objective:   Body mass index is 25.41 kg/m².  Exam:    Physical Exam    Musculoskeletal: Antalgic gait favoring left knee. No groin pain with straight leg raise. No pain with active passive range of motion of hip joint.  Neurological: Normal sensation and pulses distally.  MSK: Knee - Left: Left knee ROM: 80 to 110 degrees. 3 degrees varus alignment which corrects. Tender to palpation at medial lateral patellofemoral joint lines. Moderate effusion. Knee stable to anterior posterior varus and valgus stresses. Flexion contraction with no extension.  IMAGING:  - XR Knees: Day of visit, Right knee looks good, left knee shows bone-on-bone changes  - XR: November, Good labs results         R 7-90    L 8-110, 3 varus + janak    Imaging:    Results              KNEE L ARTHRITIS     Indication:  Left knee pain  Exam Ordered: Radiographs of the left knee include a  standing anteroposterior view, a standing posterioanterior view, a lateral view in full flexion, and a sunrise view  Details of Examination: Exam shows evidence of joint space narrowing, osteophyte formation, and subchondral sclerosis, all consistent with degenerative arthritis of the knee.  No other significant findings are noted.  Impression:  Degenerative Arthritis, Left Knee    Klg4 varus L knee severe bone on bone    R TKA no change      Assessment/Plan:       ICD-10-CM ICD-9-CM   1. Primary osteoarthritis of left knee  M17.12 715.16        R TKA 6/5/23  UGI bleed: 2u PRBC 7/19/23, outpatient GI follow up - E[facility]/colon[facility] was recommended by GI 7/26/23 but patient restarted working and never had it done. Has not had any further GI bleeding issues since then     AMADEO 8/21/23    Assessment/Plan:     Assessment & Plan    LEFT KNEE OSTEOARTHRITIS:   Left knee replacement using new robotic technology planned.   Procedure involves newer parts, including different version on shin bone that may cause less early pain.   Potential risks: infection, bleeding, nerve impingement, heart and lung issues, blood clots, and stiffness.    LEFT KNEE STIFFNESS:   Manipulation under anesthesia possible if post-op stiffness occurs.    BLOOD CLOT PREVENTION:   Eliquis to be used as anticoagulant instead of aspirin; anti-inflammatory medications avoided to reduce stomach irritation.         The above findings were discussed with patient length. We discussed the risks of conservative versus surgical management knee arthritis. Conservative management consisting of anti-inflammatory medications, glucosamine/chondroitin sulfate, weight loss, physical therapy, activity modification, as well as injections (lubricant versus corticosteroid) was discussed at length. At this point considering the patient's level of activity, pain, and radiographic findings I recommend TKA    This patient has significant symptoms in their knee that  are affecting their quality of life and daily activities.  They have tried non-operative treatment including analgesics, an exercise program, and activity modification, but the symptoms have persisted. I believe they make a good candidate for knee arthroplasty.     The risks and benefits of knee arthroplasty have been discussed with the patient which include, but are not limited to infections (including severe sequelae), potential component failure, fracture, DVT, pulmonary embolus, nerve palsy, poor range of motion, the possibility of bone grafting, persistent pain, wound healing complications, transfusions, medical complications and death.     We discussed surgical options including implant type and why I believe the implant selected is a good match for the patient's needs. The patient expressed understanding and agrees to proceed with the planned surgery.     Pre-operative planning will include the following:  A pre-surgical evaluation by will be arranged.  Pre-op orders will be placed.  We will make arrangements with the operating room for proper time and staffing.  We will make Social Service arrangements for the patient.    Implants:   Company: [employer]  System: [employer]    [employer] (not available at [facility])  all poly    DVT prophylaxis: ASA 81mg x1 @12hrs post op, Eliquis 2.5mg BID x30 days @24hrs post op    Dispo: outpatient PT    Admission status: Outpatient/23hr OBS    Location: [facility]   1 night  Eliquis and no nsaids - GI bleed with ASA   Dilaudid and lyrica    Hx of AMADEO increases chance of needing one for next one    Messaged PCP and GI NP re: colonoscopy/EGD recs from 2023    Update:        ----- Message from April Hairston MD sent at 5/6/2025  3:38 PM CDT -----  Regarding: Preop clearance  Dear Dr. Hobbs called and saw Ms. Medley today in our office after we received your message.I am not worried about a GI bleed at this time.However her BP is not controlled because she wasn't taking her BP  meds.I have resumed the meds, educated patient, ordered preop labs and I will see her again for her preop on 6/10I believe she will be OK to have her surgery, no problem, I just wanted to let you know.No need for PPI either.Thank youClaudia      I messaged [facility] so they can f/u BP at pre-op visit      No orders of the defined types were placed in this encounter.      This note was generated with the assistance of ambient listening technology. Verbal consent was obtained by the patient and accompanying visitor(s) for the recording of patient appointment to facilitate this note. I attest to having reviewed and edited the generated note for accuracy, though some syntax or spelling errors may persist. Please contact the author of this note for any clarification.            Past Medical History:   Diagnosis Date    Gastroesophageal reflux disease without esophagitis 07/19/2023    History of blood transfusion 2023    x 2 RBC    Hyperlipidemia     Inverted nipple     Microcytic anemia 05/15/2023    Mild intermittent asthma, uncomplicated     as a child    Primary hypertension 11/12/2024    Primary osteoarthritis of right knee 05/11/2023    Status post right knee replacement 08/21/2023       Past Surgical History:   Procedure Laterality Date    CHOLECYSTECTOMY      COLONOSCOPY N/A 1/3/2019    Procedure: COLONOSCOPY;  Surgeon: Gaston Beebe MD;  Location: [address];  Service: Endoscopy;  Laterality: N/A;    KNEE JOINT MANIPULATION Right 8/21/2023    Procedure: MANIPULATION, KNEE: RIGHT: SAME DAY;  Surgeon: Sanford Calvert III, MD;  Location: [facility];  Service: Orthopedics;  Laterality: Right;    TOTAL KNEE ARTHROPLASTY Right 6/5/2023    Procedure: ARTHROPLASTY, KNEE, TOTAL: RIGHT: DEPUY - ATTUNE;  Surgeon: Sanford Calvert III, MD;  Location: [facility];  Service: Orthopedics;  Laterality: Right;       Family History   Problem Relation Name Age of Onset    No Known Problems Mother      No Known Problems Father       No Known Problems Sister      No Known Problems Daughter      No Known Problems Son      No Known Problems Son      No Known Problems Son      Melanoma Neg Hx      Lupus Neg Hx      Psoriasis Neg Hx      Colon cancer Neg Hx      Esophageal cancer Neg Hx      Stomach cancer Neg Hx         Social History     Socioeconomic History    Marital status:    Occupational History     Employer: [employer]   Tobacco Use    Smoking status: Never     Passive exposure: Never    Smokeless tobacco: Never   Substance and Sexual Activity    Alcohol use: No    Drug use: No   Social History Narrative    She lives in [place] with her 15-year-old son in [place]. 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 She is from [place].      Social Drivers of Health     Financial Resource Strain: Low Risk  (7/12/2024)    Overall Financial Resource Strain (CARDIA)     Difficulty of Paying Living Expenses: Not hard at all   Food Insecurity: No Food Insecurity (7/12/2024)    Hunger Vital Sign     Worried About Running Out of Food in the Last Year: Never true     Ran Out of Food in the Last Year: Never true   Physical Activity: Insufficiently Active (7/12/2024)    Exercise Vital Sign     Days of Exercise per Week: 2 days     Minutes of Exercise per Session: 20 min   Stress: No Stress Concern Present (7/12/2024)    [employer] - Occupational Stress Questionnaire     Feeling of Stress : Only a little   Housing Stability: Unknown (7/12/2024)    Housing Stability Vital Sign     Unable to Pay for Housing in the Last Year: No

## 2025-05-06 NOTE — PROGRESS NOTES
Subjective:       Patient ID: Raine Medley is a 69 y.o. female.    Chief Complaint: Pre-op Exam (Leftknee surgery future)    HPI    Raine Medley is a 69 y.o. female , Maltese speaking, with a history of:  GERD  Anemia  HLD  Mild intermittent asthma  H/o R knee replacement  Chronic left knee pain.  Osteopenia      [Local Patient]  Originally from [facility]  Lives in: [facility] [id number]       Patient comes to the clinic for preoperative evaluation for left knee replacement surgery scheduled for June 16.    She was prescribed amlodipine for hypertension but reports never taking it. She has obtained a home blood pressure monitor as recommended.  She has a blood pressure monitor at home but she hasn't measured her BP.    She reports left knee pain and takes Tylenol every two weeks when pain becomes severe and intolerable. She recently experienced chest wall pain below the ribs after a fall caused by an unexpected hug from a child. The pain is most severe in the morning, particularly when putting on shoes. X-rays were normal, and injury was diagnosed as a contusion.    She has a history of GI bleeding after taking baby aspirin twice daily for 1 month and a half after her first knee replacement. She required transfusion of two units of blood. She subsequently continued vitamin B12 supplementation. She currently denies any GI symptoms including abdominal pain, digestive bleeding, black stools, or abdominal pain.    Normal colonoscopy in 2019 and normal EKG in 2022.    No other concerns    Latest PCP visits:      11/12/2024 Encounter to establish care - Primary HTN / new Dx    Changes in health or medications: No    Specialists visits and recommendations:        H/o ER or Urgent care visits:   NO    H/o Hospitalizations:  NO    H/o falls: None     Life events / lifestyle:   Nothing new      Most recent / available laboratories reviewed with the patient:    Recent Labs   Lab 07/26/23  0855 01/27/24  0801  "11/12/24  0949   WBC 10.68 8.88 7.28   Hemoglobin 9.9 L 13.3 13.6   Hematocrit 33.8 L 40.0 41.6   MCV 78 L 79 L 85   Platelets 374 265 245       Recent Labs   Lab 07/26/23  0855 01/27/24  0801 11/12/24  0949   Glucose 128 H 133 H 94   Sodium 141 141 144   Potassium 3.9 4.2 4.6   BUN 16 14 16   Creatinine 0.8 0.8 0.7   Total Bilirubin 0.3 0.3 0.5   AST 10 15 17   ALT 12 12 13       Recent Labs   Lab 01/27/24  0801   Hemoglobin A1C 5.4       Recent Labs   Lab 01/27/24  0801   Cholesterol 223 H   Triglycerides 53   HDL 48   LDL Cholesterol 164.4 H   Non-HDL Cholesterol 175             Recent Labs   Lab 07/19/23  1117   HIV 1/2 Ag/Ab Non-reactive   Hepatitis C Ab Non-reactive         Current medications:  Current Medications[1]      Healthcare Maintenance:  Colon cancer screening:   Last Colonoscopy completed on 1/3/2019     Vaccinations:        Tetanus: 2015       Pneumonia:       Zoster: 2017       Influenza: no       COVID vaccination: completed x 2    Depression screening: PHQ2 score = 0    Annual Wellness visit approx. Month: NOVEMBER ROS  11-point review of systems done. Negative except for detailed in the HPI.        Objective:      /75   Pulse 73   Ht 5' 1" (1.549 m)   Wt 61.9 kg (136 lb 7.4 oz)   BMI 25.78 kg/m²      Physical Exam   General appearance: Good general aspect, NAD, conversant   Eyes and HEENT: Normal sclerae, moist mucous membranes, no thyromegaly or lymphadenopathy  Respiratory: No work of breathing, clear to auscultation bilaterally. No rales, rhonchi, wheezing, or rubs.  Cardiovascular: PMI not displaced. RRR. Normal S1, S2. No murmurs, rubs or gallops.  Abdomen and : Soft, non-tender, nondistended, BS, no hepatosplenomegaly, no masses.  Extremities: no edemas, no extremity lymphadenopathy, no clubbing, no cyanosis.  Nervous System: Alert and oriented x 3, good concentration, no deficits.  Skin: Normal temperature, turgor and texture; no rash, ulcers or subcutaneous " nodules  Psych: Appropriate affect, alert and oriented to person, place and time          Assessment:       1. Primary hypertension  Assessment & Plan:  Ne Dx today  I will start patient on amlodipine 5 mg qd  BP monitor ordered  BP log  DASH DIET    F/u in 2 weeks with BP log to adjust BP medication dose.  ECG, ECHO, CXR ordered.    Orders:  -     amLODIPine (NORVASC) 5 MG tablet; Take 1 tablet (5 mg total) by mouth once daily.  Dispense: 90 tablet; Refill: 3    2. History of GI bleed  Assessment & Plan:  CBC ordered  No indication for EGD at this time      3. Preoperative clearance  -     CBC Auto Differential; Future; Expected date: 05/06/2025  -     Comprehensive Metabolic Panel; Future; Expected date: 05/06/2025  -     Protime-INR; Future; Expected date: 05/06/2025  -     Type & Screen; Future; Expected date: 05/06/2025       Summary of orders / plan:         IMPRESSION: Patient requires BP control prior to knee replacement surgery scheduled for June 16. Previously prescribed amlodipine for HTN, but never taken; started amlodipine to be taken nightly for BP control. Decided against endoscopy at this time due to lack of current GI symptoms. Will provide preoperative clearance after reviewing lab results and BP readings.             Patient Instructions   Tomarse la presion diario, llevar un registro y traer el registro a la siguiente consulta.    Empezar el medicamento para la presion y no interrumpir       Problem list updated  Medications reconciled  Education provided  Lifestyle recommendations given  AVS generated, explained, and sent to the patient.  RTC in : 1 month for preoperative clearance, labs ordered.            NICK MCLAUGHLIN MD, MPH  Internal Medicine  [facility]  [facility]               [1]   Current Outpatient Medications:     amLODIPine (NORVASC) 5 MG tablet, Take 1 tablet (5 mg total) by mouth once daily., Disp: 90 tablet, Rfl: 3    blood pressure monitor Kit, 1  each by Misc.(Non-Drug; Combo Route) route once daily. (Patient not taking: Reported on 5/6/2025), Disp: 1 each, Rfl: 0

## 2025-05-07 ENCOUNTER — TELEPHONE (OUTPATIENT)
Dept: ORTHOPEDICS | Facility: HOSPITAL | Age: 70
End: 2025-05-07
Payer: COMMERCIAL

## 2025-05-07 NOTE — TELEPHONE ENCOUNTER
----- Message from April Hairston MD sent at 5/6/2025  3:38 PM CDT -----  Regarding: Preop clearance  Dear Dr. Hobbs called and saw Ms. Medley today in our office after we received your message.I am not worried about a GI bleed at this time.However her BP is not controlled because she wasn't taking her BP meds.I have resumed the meds, educated patient, ordered preop labs and I will see her again for her preop on 6/10I believe she will be OK to have her surgery, no problem, I just wanted to let you know.No need for PPI either.Thank Martinez

## 2025-05-08 RX ORDER — ACETAMINOPHEN 500 MG
500 TABLET ORAL
COMMUNITY
End: 2025-06-16 | Stop reason: HOSPADM

## 2025-05-08 NOTE — ANESTHESIA PAT ROS NOTE
05/08/2025  Raine Medley is a 69 y.o., female.      Pre-op Assessment          Review of Systems           Anesthesia Assessment: Preoperative EQUATION    Planned Procedure: Procedure(s) (LRB):  ARTHROPLASTY, KNEE, TOTAL, USING Corelytics COMPUTER-ASSISTED NAVIGATION: LEFT: DEPUY - ATTUNE (Left)  Requested Anesthesia Type:Regional  Surgeon: Sanford Calvert III, MD  Service: Orthopedics  Known or anticipated Date of Surgery:6/16/2025    Surgeon notes: reviewed    Electronic QUestionnaire Assessment completed via nurse interview with patient.        Triage considerations:     The patient has no apparent active cardiac condition (No unstable coronary Syndrome such as severe unstable angina or recent [<1 month] myocardial infarction, decompensated CHF, severe valvular   disease or significant arrhythmia)    Previous anesthesia records:GETA, MAC, and CSE.      Patient reports slow to awake with gallbladder surgery and reports no family history of anesthesia complications.    08/21/23  MANIPULATION, KNEE: RIGHT: SAME DAY (Right: Knee)   Airway:  Mallampati: II   Mouth Opening: Normal  TM Distance: Normal  Tongue: Normal  Neck ROM: Normal ROM    06/05/23  ARTHROPLASTY, KNEE, TOTAL: RIGHT: DEPUY - ATTUNE (Right: Knee)   Airway:  Mallampati: II / I  Mouth Opening: Normal  TM Distance: Normal  Tongue: Normal  Neck ROM: Normal ROM  Spinal Block  Patient position: sitting  Prep: ChloraPrep  Patient monitoring: heart rate, continuous pulse ox and frequent blood pressure checks  Approach: midline  Location: L4-5  Injection technique: single shot  CSF Fluid: clear free-flowing CSF    Last PCP note: within 1 month , within [facility]   Subspecialty notes: Ortho  OBGYN     Dr. Byrd  Derm        Dr. Hernandez    Other important co-morbidities: HLD and HTN, childhood asthma, GI bleed post RTKA after taking asa BID x 1.5  mos (required 2u PRBC)      Tests already available:  Available tests,  within [facility] .   11/12/24   CBC  CMP     EKG     Echo   CXR  01/27/24   A1C  08/19/22   Xray Lumbar spine  03/05/14   Xray Cervical spine            Instructions given. (See in Nurse's note)    Optimization:  Anesthesia Preop Clinic Assessment  Indicated Will schedule POC.    Medical Opinion Indicated       Sub-specialist consult indicated:   [facility] NP.       Plan:    Testing:  CBC, CMP, and PT/INR  (previously ordered by PCP), EKG   Pre-anesthesia  visit       Visit focus: possible regional anesthesia and/or nerve block      Consultation:.PCC NP for medical and anesthesia optimization.     Patient  has previously scheduled Medical Appointment: 5/16   6/10    Navigation: Tests Scheduled.              Consults scheduled.             Results will be tracked by Preop Clinic.      05/06/25     Ortho/ Dr. Calvert  Messaged PCP and GI NP re: colonoscopy/EGD recs from 2023     Update:         ----- Message from April Hairston MD sent at 5/6/2025  3:38 PM CDT -----  Regarding: Preop clearance  Dear Dr. Hobbs called and saw Ms. Medley today in our office after we received your message.I am not worried about a GI bleed at this time.However her BP is not controlled because she wasn't taking her BP meds.I have resumed the meds, educated patient, ordered preop labs and I will see her again for her preop on 6/10I believe she will be OK to have her surgery, no problem, I just wanted to let you know.No need for PPI either.Thank youClaudia                     Patient is pending optimization better BP control- readings due 5/23 5/23/25: Left voicemail for home BP readings  5/26/25: Left voicemail for home BP readings        Constantin Flores NP  Perioperative Medicine  [facility]             Electronically signed by Constantin Flores NP at 5/26/2025  4:51 PM     From: Belle Klein RN   Sent: 6/9/2025   3:26 PM CDT    To: Constantin Flores NP   Subject: B/P f/u readings                                 Hi Constantin,     Called and spoke with patient   B/P readings were given:   6/5/25   am  123/71                pm  141/79     6/6/25   afternoon   141/71   6/7/25   afternoon   147/74   6/8/25   afternoon   146/86   Today    am             134/86     06/09/25 5/23/25: Left voicemail for home BP readings  5/26/25: Left voicemail for home BP readings  6/2/25: Staff nurse left another voicemail     6/9/25: Received six home BP readings: systolic range (123-147) and diastolic range (71-86); average reading is 139/78 and acceptable for surgery. Recommend continued follow-up with PCP.     Patient is optimized for surgery.         Constantin Flores NP  Perioperative Medicine  [facility]             Electronically signed by Constantin Flores NP at 6/9/2025  5:51 PM

## 2025-05-08 NOTE — PRE-PROCEDURE INSTRUCTIONS
Chart review; triage plan initiated.    Spoke to patient regarding upcoming scheduled surgery.  Name, , and allergies verified.  Medical, surgical, and anesthesia history reviewed.  Instructed to hold vitamins, supplements and NSAIDs for one week prior to surgery. Informed that the remaining medication instructions will be provided at the POC visit. Pt verbalized understanding.    Patient explained she has stopped all of her vits / supplements (B12, eye vit, fish oil) ~ 2 wks ago and does not plan on starting back until sometime after surgery.

## 2025-05-08 NOTE — PRE-PROCEDURE INSTRUCTIONS
Patient called me and we only had time to discuss lab work and EKG that is needed for upcoming ortho surgery.  She is ok with moving lab apt from 6/10/25 to 5/16/25.  Explained she will see these apt in her mychart today or tomorrow.  Patient able to call me back after 4:00pm today as she only had 7 min left for her lunch time now.

## 2025-05-13 NOTE — PRE-PROCEDURE INSTRUCTIONS
Patient called and explained she is scheduled to see her dentist for a crown on Wed. May 21.  Explained the crown will be made in the office and it will be a permanent crown. Patient confirmed it is not a temporary crown where she would need to go back and have removed and replaced with a permanent crown.  I explained that this would not interfere with surgery scheduled on 6/16.  If dentist finds any evidence of an infection I asked patient to contact Dr. Calvert's office after her visit.  Patient verbalized understanding and agreed.

## 2025-05-15 ENCOUNTER — TELEPHONE (OUTPATIENT)
Dept: INTERNAL MEDICINE | Facility: CLINIC | Age: 70
End: 2025-05-15
Payer: COMMERCIAL

## 2025-05-15 PROBLEM — M17.12 PRIMARY OSTEOARTHRITIS OF LEFT KNEE: Status: ACTIVE | Noted: 2025-05-15

## 2025-05-16 ENCOUNTER — OFFICE VISIT (OUTPATIENT)
Dept: ORTHOPEDICS | Facility: CLINIC | Age: 70
End: 2025-05-16
Payer: COMMERCIAL

## 2025-05-16 ENCOUNTER — HOSPITAL ENCOUNTER (OUTPATIENT)
Dept: RADIOLOGY | Facility: HOSPITAL | Age: 70
Discharge: HOME | End: 2025-05-16
Attending: NURSE PRACTITIONER
Payer: COMMERCIAL

## 2025-05-16 ENCOUNTER — HOSPITAL ENCOUNTER (OUTPATIENT)
Dept: CARDIOLOGY | Facility: CLINIC | Age: 70
Discharge: HOME | End: 2025-05-16
Payer: COMMERCIAL

## 2025-05-16 ENCOUNTER — OFFICE VISIT (OUTPATIENT)
Dept: INTERNAL MEDICINE | Facility: CLINIC | Age: 70
End: 2025-05-16
Payer: COMMERCIAL

## 2025-05-16 VITALS
BODY MASS INDEX: 25.75 KG/M2 | DIASTOLIC BLOOD PRESSURE: 74 MMHG | HEIGHT: 61 IN | SYSTOLIC BLOOD PRESSURE: 161 MMHG | WEIGHT: 136.38 LBS | TEMPERATURE: 98 F | HEART RATE: 76 BPM | OXYGEN SATURATION: 97 %

## 2025-05-16 VITALS — BODY MASS INDEX: 26.86 KG/M2 | HEIGHT: 60 IN | WEIGHT: 136.81 LBS

## 2025-05-16 DIAGNOSIS — Z01.818 PREOPERATIVE EXAMINATION: Primary | ICD-10-CM

## 2025-05-16 DIAGNOSIS — M17.12 PRIMARY OSTEOARTHRITIS OF LEFT KNEE: Primary | ICD-10-CM

## 2025-05-16 DIAGNOSIS — R06.83 SNORING: ICD-10-CM

## 2025-05-16 DIAGNOSIS — M17.12 PRIMARY OSTEOARTHRITIS OF LEFT KNEE: ICD-10-CM

## 2025-05-16 DIAGNOSIS — Z87.19 HISTORY OF GI BLEED: ICD-10-CM

## 2025-05-16 DIAGNOSIS — Z01.818 PRE-OP TESTING: ICD-10-CM

## 2025-05-16 DIAGNOSIS — I10 PRIMARY HYPERTENSION: ICD-10-CM

## 2025-05-16 DIAGNOSIS — K59.00 CONSTIPATION, UNSPECIFIED CONSTIPATION TYPE: ICD-10-CM

## 2025-05-16 LAB
OHS QRS DURATION: 80 MS
OHS QTC CALCULATION: 420 MS

## 2025-05-16 PROCEDURE — 93005 ELECTROCARDIOGRAM TRACING: CPT | Mod: S$GLB,,, | Performed by: ANESTHESIOLOGY

## 2025-05-16 PROCEDURE — 99999 PR PBB SHADOW E&M-EST. PATIENT-LVL III: CPT | Mod: PBBFAC,,, | Performed by: NURSE PRACTITIONER

## 2025-05-16 PROCEDURE — 73560 X-RAY EXAM OF KNEE 1 OR 2: CPT | Mod: TC,LT

## 2025-05-16 PROCEDURE — 93010 ELECTROCARDIOGRAM REPORT: CPT | Mod: S$GLB,,, | Performed by: INTERNAL MEDICINE

## 2025-05-16 PROCEDURE — 73560 X-RAY EXAM OF KNEE 1 OR 2: CPT | Mod: 26,LT,, | Performed by: RADIOLOGY

## 2025-05-16 RX ORDER — BISACODYL 10 MG/1
10 SUPPOSITORY RECTAL EVERY 12 HOURS PRN
OUTPATIENT
Start: 2025-05-16

## 2025-05-16 RX ORDER — SODIUM CHLORIDE 9 MG/ML
INJECTION, SOLUTION INTRAVENOUS CONTINUOUS
OUTPATIENT
Start: 2025-05-16 | End: 2025-05-17

## 2025-05-16 RX ORDER — SODIUM CHLORIDE 0.9 % (FLUSH) 0.9 %
10 SYRINGE (ML) INJECTION
OUTPATIENT
Start: 2025-05-16

## 2025-05-16 RX ORDER — METHOCARBAMOL 750 MG/1
750 TABLET, FILM COATED ORAL 3 TIMES DAILY
OUTPATIENT
Start: 2025-05-16

## 2025-05-16 RX ORDER — MUPIROCIN 20 MG/G
1 OINTMENT TOPICAL
OUTPATIENT
Start: 2025-05-16

## 2025-05-16 RX ORDER — ONDANSETRON HYDROCHLORIDE 2 MG/ML
4 INJECTION, SOLUTION INTRAVENOUS EVERY 8 HOURS PRN
OUTPATIENT
Start: 2025-05-16

## 2025-05-16 RX ORDER — OXYCODONE HYDROCHLORIDE 5 MG/1
10 TABLET ORAL
Refills: 0 | OUTPATIENT
Start: 2025-05-16

## 2025-05-16 RX ORDER — AMOXICILLIN 250 MG
1 CAPSULE ORAL 2 TIMES DAILY
OUTPATIENT
Start: 2025-05-16

## 2025-05-16 RX ORDER — FENTANYL CITRATE 50 UG/ML
25 INJECTION, SOLUTION INTRAMUSCULAR; INTRAVENOUS EVERY 5 MIN PRN
Refills: 0 | OUTPATIENT
Start: 2025-05-16

## 2025-05-16 RX ORDER — POLYETHYLENE GLYCOL 3350 17 G/17G
17 POWDER, FOR SOLUTION ORAL DAILY
OUTPATIENT
Start: 2025-05-16

## 2025-05-16 RX ORDER — MORPHINE SULFATE 2 MG/ML
2 INJECTION, SOLUTION INTRAMUSCULAR; INTRAVENOUS
Refills: 0 | OUTPATIENT
Start: 2025-05-16

## 2025-05-16 RX ORDER — FENTANYL CITRATE 50 UG/ML
100 INJECTION, SOLUTION INTRAMUSCULAR; INTRAVENOUS
Refills: 0 | OUTPATIENT
Start: 2025-05-16 | End: 2025-05-17

## 2025-05-16 RX ORDER — FAMOTIDINE 20 MG/1
20 TABLET, FILM COATED ORAL 2 TIMES DAILY
OUTPATIENT
Start: 2025-05-16

## 2025-05-16 RX ORDER — PREGABALIN 75 MG/1
75 CAPSULE ORAL NIGHTLY
OUTPATIENT
Start: 2025-05-16

## 2025-05-16 RX ORDER — ACETAMINOPHEN 500 MG
1000 TABLET ORAL EVERY 6 HOURS
OUTPATIENT
Start: 2025-05-16

## 2025-05-16 RX ORDER — ACETAMINOPHEN 500 MG
1000 TABLET ORAL
OUTPATIENT
Start: 2025-05-16

## 2025-05-16 RX ORDER — MUPIROCIN 20 MG/G
1 OINTMENT TOPICAL 2 TIMES DAILY
OUTPATIENT
Start: 2025-05-16 | End: 2025-05-21

## 2025-05-16 RX ORDER — TALC
6 POWDER (GRAM) TOPICAL NIGHTLY PRN
OUTPATIENT
Start: 2025-05-16

## 2025-05-16 RX ORDER — PROCHLORPERAZINE EDISYLATE 5 MG/ML
5 INJECTION INTRAMUSCULAR; INTRAVENOUS EVERY 6 HOURS PRN
OUTPATIENT
Start: 2025-05-16

## 2025-05-16 RX ORDER — LIDOCAINE HYDROCHLORIDE 10 MG/ML
1 INJECTION, SOLUTION EPIDURAL; INFILTRATION; INTRACAUDAL; PERINEURAL
OUTPATIENT
Start: 2025-05-16

## 2025-05-16 RX ORDER — OXYCODONE HYDROCHLORIDE 5 MG/1
5 TABLET ORAL
Refills: 0 | OUTPATIENT
Start: 2025-05-16

## 2025-05-16 RX ORDER — PREGABALIN 75 MG/1
75 CAPSULE ORAL
OUTPATIENT
Start: 2025-05-16

## 2025-05-16 RX ORDER — NALOXONE HCL 0.4 MG/ML
0.02 VIAL (ML) INJECTION
OUTPATIENT
Start: 2025-05-16

## 2025-05-16 RX ORDER — SODIUM CHLORIDE 9 MG/ML
INJECTION, SOLUTION INTRAVENOUS
OUTPATIENT
Start: 2025-05-16

## 2025-05-16 RX ORDER — MIDAZOLAM HYDROCHLORIDE 1 MG/ML
4 INJECTION, SOLUTION INTRAMUSCULAR; INTRAVENOUS
OUTPATIENT
Start: 2025-05-16 | End: 2025-05-17

## 2025-05-16 NOTE — OUTPATIENT SUBJECTIVE & OBJECTIVE
Outpatient Subjective & Objective      Chief Complaint: Preoperative evaulation, perioperative medical management, and complication reduction plan.     Functional Capacity: walks up 25 steps 5 days weekly at work multiple times a day without CP/SOB.       Anesthesia issues: slow to awaken with gallbladder surgery    Difficulty mouth opening: No    Steroid use in the last 12 months:  left knee    Dental Issues: None    Family anesthesia difficulty: None     Family Hx of Thrombosis: None        Past Medical History:   Diagnosis Date    Gastroesophageal reflux disease without esophagitis 07/19/2023    History of blood transfusion 2023    x 2 RBC    Hyperlipidemia     Inverted nipple     Microcytic anemia 05/15/2023    Mild intermittent asthma, uncomplicated     as a child    Primary hypertension 11/12/2024    Primary osteoarthritis of right knee 05/11/2023    Status post right knee replacement 08/21/2023         Past Medical History Pertinent Negatives:   Diagnosis Date Noted    Anxiety 05/12/2023    CHF (congestive heart failure) 05/12/2023    Coronary artery disease 05/12/2023    Deep vein thrombosis 05/12/2023    Depression 05/12/2023    Diabetes mellitus 08/23/2015    Diabetes mellitus, type 2 05/12/2023    Disorder of kidney and ureter 05/12/2023    Myocardial infarction 05/12/2023    Pulmonary embolism 05/12/2023    Seizures 05/12/2023    Stroke 05/12/2023    Thyroid disease 05/12/2023         Past Surgical History:   Procedure Laterality Date    CHOLECYSTECTOMY      COLONOSCOPY N/A 1/3/2019    Procedure: COLONOSCOPY;  Surgeon: Gaston Beebe MD;  Location: [address];  Service: Endoscopy;  Laterality: N/A;    KNEE JOINT MANIPULATION Right 8/21/2023    Procedure: MANIPULATION, KNEE: RIGHT: SAME DAY;  Surgeon: Sanford Calvert III, MD;  Location: [facility];  Service: Orthopedics;  Laterality: Right;    TOTAL KNEE ARTHROPLASTY Right 6/5/2023    Procedure: ARTHROPLASTY, KNEE, TOTAL: RIGHT: DEPUY -  "ATTUNE;  Surgeon: Sanford Calvert III, MD;  Location: [facility];  Service: Orthopedics;  Laterality: Right;       Review of Systems   Constitutional:  Negative for chills, fatigue, fever and unexpected weight change.   HENT:  Negative for congestion, hearing loss, rhinorrhea, sore throat, tinnitus and trouble swallowing.    Eyes:  Negative for visual disturbance.   Respiratory:  Negative for cough, chest tightness, shortness of breath and wheezing.         STOP BANG risk factors:  Snoring  HTN   Cardiovascular:  Positive for leg swelling (occasional; R>L). Negative for chest pain and palpitations.   Gastrointestinal:  Positive for constipation. Negative for diarrhea.        Denies Fatty liver, Hepatitis   Genitourinary:  Negative for decreased urine volume, difficulty urinating, dysuria, frequency, hematuria and urgency.   Musculoskeletal:  Positive for arthralgias (left knee) and back pain (only in the AM). Negative for neck pain and neck stiffness.   Neurological:  Positive for numbness (occasional - RLE). Negative for dizziness, syncope, weakness and headaches.   Hematological:  Does not bruise/bleed easily.   Psychiatric/Behavioral:  Negative for sleep disturbance and suicidal ideas.               VITALS  Visit Vitals  BP (!) 161/74 (BP Location: Right arm, Patient Position: Sitting)   Pulse 76   Temp 98.3 °F (36.8 °C) (Oral)   Ht 5' 1" (1.549 m)   Wt 61.8 kg (136 lb 5.7 oz)   SpO2 97%   BMI 25.76 kg/m²          Physical Exam  Vitals reviewed.   Constitutional:       General: She is not in acute distress.     Appearance: She is well-developed.   HENT:      Head: Normocephalic.      Nose: Nose normal.      Mouth/Throat:      Pharynx: No oropharyngeal exudate.   Eyes:      General:         Right eye: No discharge.         Left eye: No discharge.      Conjunctiva/sclera: Conjunctivae normal.      Pupils: Pupils are equal, round, and reactive to light.   Neck:      Thyroid: No thyromegaly.      Vascular: No carotid " bruit or JVD.      Trachea: No tracheal deviation.   Cardiovascular:      Rate and Rhythm: Normal rate and regular rhythm.      Pulses:           Carotid pulses are 2+ on the right side and 2+ on the left side.       Dorsalis pedis pulses are 2+ on the right side and 2+ on the left side.        Posterior tibial pulses are 2+ on the right side and 2+ on the left side.      Heart sounds: Murmur (heard best to left lower sternal border) heard.      Systolic murmur is present with a grade of 2/6.   Pulmonary:      Effort: Pulmonary effort is normal. No respiratory distress.      Breath sounds: Normal breath sounds. No stridor. No wheezing, rhonchi or rales.   Abdominal:      General: Bowel sounds are normal. There is no distension.      Palpations: Abdomen is soft.      Tenderness: There is no abdominal tenderness. There is no guarding.   Musculoskeletal:      Cervical back: Normal range of motion. No pain with movement.      Right lower leg: Edema (trace) present.      Left lower leg: Edema (trace) present.   Lymphadenopathy:      Cervical: No cervical adenopathy.   Skin:     General: Skin is warm and dry.      Capillary Refill: Capillary refill takes less than 2 seconds.      Findings: No erythema or rash.   Neurological:      Mental Status: She is alert and oriented to person, place, and time.   Psychiatric:         Behavior: Behavior normal. Behavior is cooperative.          Significant Labs:  Lab Results   Component Value Date    WBC 7.28 11/12/2024    HGB 13.6 11/12/2024    HCT 41.6 11/12/2024     11/12/2024    CHOL 223 (H) 01/27/2024    TRIG 53 01/27/2024    HDL 48 01/27/2024    ALT 13 11/12/2024    AST 17 11/12/2024     11/12/2024    K 4.6 11/12/2024     11/12/2024    CREATININE 0.7 11/12/2024    BUN 16 11/12/2024    CO2 27 11/12/2024    TSH 2.262 03/31/2021    INR 0.9 05/12/2023    HGBA1C 5.4 01/27/2024           EKG:   Results for orders placed or performed during the hospital encounter of  11/12/24   EKG 12-lead    Collection Time: 11/12/24  2:30 PM   Result Value Ref Range    QRS Duration 72 ms    [facility] QTC Calculation 426 ms    Narrative    Test Reason : R01.1,    Vent. Rate : 058 BPM     Atrial Rate : 058 BPM     P-R Int : 144 ms          QRS Dur : 072 ms      QT Int : 434 ms       P-R-T Axes : 059 065 071 degrees     QTc Int : 426 ms    Sinus bradycardia  Otherwise normal ECG  When compared with ECG of 19-JUL-2023 11:18,  Premature atrial complexes are no longer Present  Confirmed by Keiry Be MD ([id number]) on 11/12/2024 3:38:31 PM    Referred By: NICK MCLAUGHLIN           Confirmed By:Keiry Be MD         2D ECHO:  TTE:  Results for orders placed or performed during the hospital encounter of 11/12/24   Echo   Result Value Ref Range    LV Diastolic Volume 64.09 mL    Echo EF Estimated 74 %    LV Systolic Volume 16.52 mL    IVS 0.8 0.6 - 1.1 cm    LVIDd 3.9 3.5 - 6.0 cm    LVIDs 2.2 2.1 - 4.0 cm    LVOT diameter 1.5 cm    PW 0.7 0.6 - 1.1 cm    AV LVOT peak gradient 4 mmHg    LVOT mn grad 3 mmHg    LVOT peak fausto 1.0 m/s    LVOT peak VTI 23.6 cm    RV- kaplan basal diam 4.4 cm    RV S' 15.07 cm/s    LA size 2.76 cm    Left Atrium Major Axis 5.00 cm    Left Atrium Minor Axis 4.43 cm    LA Vol (MOD) 49.60 mL    RA Major Axis 5.01 cm    RA Area 19.4 cm2    AV valve area 1.4 cm2    AV area by cont VTI 1.4 cm2    AV peak gradient 9.0 mmHg    AV mean gradient 5.0 mmHg    Ao peak fausto 1.5 m/s    Ao VTI 29.2 cm    MV Peak A Fausto 0.77 m/s    E wave deceleration time 214.13 ms    MV Peak E Fausto 0.80 m/s    E/A ratio 1.04     LV LATERAL E/E' RATIO 8.00     LV SEPTAL E/E' RATIO 8.89     TDI LATERAL 0.10 m/s    TDI SEPTAL 0.09 m/s    TV peak gradient 32 mmHg    TR Max Fausto 2.84 m/s    Ascending aorta 2.85 cm    STJ 2.75 cm    Sinus 3.06 cm    LA WIDTH 4.31 cm    RA Width 4.22 cm    TAPSE 3.03 cm    BSA 1.61 m2    LVOT stroke volume 41.7 cm3    LV Systolic Volume Index 10.5 mL/m2    LV Diastolic Volume Index 40.56 mL/m2     LVOT area 1.8 cm2    FS 43.6 28 - 44 %    Left Ventricle Relative Wall Thickness 0.36 cm    LV mass 82.3 g    LV Mass Index 52.1 g/m2    E/E' ratio 8.42 m/s    REGAN 30.1 mL/m2    LA Vol 47.50 cm3    RV/LV Ratio 1.13 cm    REGAN (MOD) 31.4 mL/m2    AV Velocity Ratio 0.67     AV index (prosthetic) 0.81     MANJINDER by Velocity Ratio 1.2 cm²    Triscuspid Valve Regurgitation Peak Gradient 32 mmHg    Mean e' 0.10 m/s    ZLVIDS -1.93     ZLVIDD -1.49     TV resting pulmonary artery pressure 35 mmHg    RV TB RVSP 6 mmHg    Est. RA pres 3 mmHg    Narrative      Left Ventricle: The left ventricle is normal in size. Normal wall   thickness. Normal wall motion. There is normal systolic function with a   visually estimated ejection fraction of 65 - 70%. There is normal   diastolic function.    Right Ventricle: Normal right ventricular cavity size. Wall thickness   is normal. Systolic function is normal.    Tricuspid Valve: There is mild regurgitation.    Pulmonary Artery: The estimated pulmonary artery systolic pressure is   35 mmHg.    IVC/SVC: Normal venous pressure at 3 mmHg.             Imaging   Xray L-Spine 2022  FINDINGS:  Bones appear mildly demineralized.  Alignment appears satisfactory in the frontal projection.  Neutral lateral view demonstrates grade 1 anterolisthesis of L4 with respect L5.  This does not appear to change significantly with flexion and extension.  Vertebral body heights are satisfactorily maintained.  Mild hypertrophic spurring at the lower thoracic and lower lumbar spine.  Mild disc space narrowing at the L4-5 level.  No definite evidence of spondylolysis.  Degenerative changes at some of the lower facet joints bilaterally.  No fracture or osseous destruction.  Pedicles appear intact.  Surgical clips at the right upper quadrant of the abdomen.  Aortoiliac atherosclerosis without definite evidence of aneurysm.     Impression:     No acute abnormality.  DJD and alignment abnormalities as detailed  above.        Electronically signed by: Martin Holt MD  Date:                                            08/19/2022  Time:                                           16:56        Active Cardiac Conditions: None        Revised Cardiac Risk Index   High -Risk Surgery  Intraperitoneal; Intrathoracic; suprainguinal vascular Yes- + 1 No- 0   History of Ischemic Heart Disease   (Hx of MI/positive exercise test/current chest pain due to ischemia/use of nitrate therapy/EKG with pathological Q waves) Yes- + 1 No- 0   History of CHF  (Pulmonary edema/bilateral rales or S3 gallop/PND/CXR showing pulmonary vascular redistribution) Yes- + 1 No- 0   History of CVA   (Prior stroke or TIA) Yes- + 1 No- 0   Pre-operative treatment with insulin Yes- + 1 No- 0   Pre-operative creatinine > 2mg/dl Yes- + 1 No- 0   Total: 0      Risk Status:  Estimated risk of cardiac complications after non-cardiac surgery using the Revised Cardiac Risk Index for Preoperative risk is 3.9 %      ARISCAT (Canet) risk index: Low: 1.6% risk of post-op pulmonary complications; Intermediate: 13.3% risk of post-op pulmonary complications if surgery is > 3 hours.     American Society of Anesthesiologists Physical Status classification (ASA): 2             Outpatient Subjective & Objective

## 2025-05-16 NOTE — ASSESSMENT & PLAN NOTE
Episode in 2023 after right knee arthroplasty.  S/P 2 units PRBCs; thought to be attributed to ASA 81 mg twice daily due to knee surgery.  EGD was not done at that time  No recurrence  No need for for PPI per PCP telephone note 5/6/25

## 2025-05-16 NOTE — PROGRESS NOTES
[facility]  Progress Note    Patient Name: Raine Medley  MRN: [id number]  Date of Evaluation- 05/16/2025  PCP- April Hairston MD    Future cases for Raine Medley [[id number]]       Case ID Status Date Time Rod Procedure Provider Location    [id number] [facility] 6/16/2025 12:43  ARTHROPLASTY, KNEE, TOTAL, USING [employer] COMPUTER-ASSISTED NAVIGATION: LEFT: DEPUY - ATTSanford Gerardo III, MD [[id number]] [facility] OR            HPI:  This is a 70 y.o. female  who presents today for a preoperative evaluation in preparation for left knee arthroplasty.  Surgery is indicated for  osteoarthritis of left knee.   I have seen this patient before in May 2023 for preop eval before left knee surgery.  The history has been obtained by speaking with the patient and reviewing the electronic medical record and/or outside health information. Significant health conditions for the perioperative period are discussed below in assessment and plan.   Patient reports current health status to be Good.  Denies any new symptoms before surgery.       Subjective/ Objective:     Chief Complaint: Preoperative evaulation, perioperative medical management, and complication reduction plan.     Functional Capacity: walks up 25 steps 5 days weekly at work multiple times a day without CP/SOB.       Anesthesia issues: slow to awaken with gallbladder surgery    Difficulty mouth opening: No    Steroid use in the last 12 months:  left knee    Dental Issues: None    Family anesthesia difficulty: None     Family Hx of Thrombosis: None        Past Medical History:   Diagnosis Date    Gastroesophageal reflux disease without esophagitis 07/19/2023    History of blood transfusion 2023    x 2 RBC    Hyperlipidemia     Inverted nipple     Microcytic anemia 05/15/2023    Mild intermittent asthma, uncomplicated     as a child    Primary hypertension 11/12/2024    Primary osteoarthritis of right knee 05/11/2023    Status post right knee  replacement 08/21/2023         Past Medical History Pertinent Negatives:   Diagnosis Date Noted    Anxiety 05/12/2023    CHF (congestive heart failure) 05/12/2023    Coronary artery disease 05/12/2023    Deep vein thrombosis 05/12/2023    Depression 05/12/2023    Diabetes mellitus 08/23/2015    Diabetes mellitus, type 2 05/12/2023    Disorder of kidney and ureter 05/12/2023    Myocardial infarction 05/12/2023    Pulmonary embolism 05/12/2023    Seizures 05/12/2023    Stroke 05/12/2023    Thyroid disease 05/12/2023         Past Surgical History:   Procedure Laterality Date    CHOLECYSTECTOMY      COLONOSCOPY N/A 1/3/2019    Procedure: COLONOSCOPY;  Surgeon: Gaston Beebe MD;  Location: [address]);  Service: Endoscopy;  Laterality: N/A;    KNEE JOINT MANIPULATION Right 8/21/2023    Procedure: MANIPULATION, KNEE: RIGHT: SAME DAY;  Surgeon: Sanford Calvert III, MD;  Location: [facility];  Service: Orthopedics;  Laterality: Right;    TOTAL KNEE ARTHROPLASTY Right 6/5/2023    Procedure: ARTHROPLASTY, KNEE, TOTAL: RIGHT: DEPUY - ATTUNE;  Surgeon: Sanford Calvert III, MD;  Location: [facility];  Service: Orthopedics;  Laterality: Right;       Review of Systems   Constitutional:  Negative for chills, fatigue, fever and unexpected weight change.   HENT:  Negative for congestion, hearing loss, rhinorrhea, sore throat, tinnitus and trouble swallowing.    Eyes:  Negative for visual disturbance.   Respiratory:  Negative for cough, chest tightness, shortness of breath and wheezing.         STOP BANG risk factors:  Snoring  HTN   Cardiovascular:  Positive for leg swelling (occasional; R>L). Negative for chest pain and palpitations.   Gastrointestinal:  Positive for constipation. Negative for diarrhea.        Denies Fatty liver, Hepatitis   Genitourinary:  Negative for decreased urine volume, difficulty urinating, dysuria, frequency, hematuria and urgency.   Musculoskeletal:  Positive for arthralgias (left knee) and back  "pain (only in the AM). Negative for neck pain and neck stiffness.   Neurological:  Positive for numbness (occasional - RLE). Negative for dizziness, syncope, weakness and headaches.   Hematological:  Does not bruise/bleed easily.   Psychiatric/Behavioral:  Negative for sleep disturbance and suicidal ideas.               VITALS  Visit Vitals  BP (!) 161/74 (BP Location: Right arm, Patient Position: Sitting)   Pulse 76   Temp 98.3 °F (36.8 °C) (Oral)   Ht 5' 1" (1.549 m)   Wt 61.8 kg (136 lb 5.7 oz)   SpO2 97%   BMI 25.76 kg/m²          Physical Exam  Vitals reviewed.   Constitutional:       General: She is not in acute distress.     Appearance: She is well-developed.   HENT:      Head: Normocephalic.      Nose: Nose normal.      Mouth/Throat:      Pharynx: No oropharyngeal exudate.   Eyes:      General:         Right eye: No discharge.         Left eye: No discharge.      Conjunctiva/sclera: Conjunctivae normal.      Pupils: Pupils are equal, round, and reactive to light.   Neck:      Thyroid: No thyromegaly.      Vascular: No carotid bruit or JVD.      Trachea: No tracheal deviation.   Cardiovascular:      Rate and Rhythm: Normal rate and regular rhythm.      Pulses:           Carotid pulses are 2+ on the right side and 2+ on the left side.       Dorsalis pedis pulses are 2+ on the right side and 2+ on the left side.        Posterior tibial pulses are 2+ on the right side and 2+ on the left side.      Heart sounds: Murmur (heard best to left lower sternal border) heard.      Systolic murmur is present with a grade of 2/6.   Pulmonary:      Effort: Pulmonary effort is normal. No respiratory distress.      Breath sounds: Normal breath sounds. No stridor. No wheezing, rhonchi or rales.   Abdominal:      General: Bowel sounds are normal. There is no distension.      Palpations: Abdomen is soft.      Tenderness: There is no abdominal tenderness. There is no guarding.   Musculoskeletal:      Cervical back: Normal range of " motion. No pain with movement.      Right lower leg: Edema (trace) present.      Left lower leg: Edema (trace) present.   Lymphadenopathy:      Cervical: No cervical adenopathy.   Skin:     General: Skin is warm and dry.      Capillary Refill: Capillary refill takes less than 2 seconds.      Findings: No erythema or rash.   Neurological:      Mental Status: She is alert and oriented to person, place, and time.   Psychiatric:         Behavior: Behavior normal. Behavior is cooperative.          Significant Labs:  Lab Results   Component Value Date    WBC 7.28 11/12/2024    HGB 13.6 11/12/2024    HCT 41.6 11/12/2024     11/12/2024    CHOL 223 (H) 01/27/2024    TRIG 53 01/27/2024    HDL 48 01/27/2024    ALT 13 11/12/2024    AST 17 11/12/2024     11/12/2024    K 4.6 11/12/2024     11/12/2024    CREATININE 0.7 11/12/2024    BUN 16 11/12/2024    CO2 27 11/12/2024    TSH 2.262 03/31/2021    INR 0.9 05/12/2023    HGBA1C 5.4 01/27/2024           EKG:   Results for orders placed or performed during the hospital encounter of 11/12/24   EKG 12-lead    Collection Time: 11/12/24  2:30 PM   Result Value Ref Range    QRS Duration 72 ms    OHS QTC Calculation 426 ms    Narrative    Test Reason : R01.1,    Vent. Rate : 058 BPM     Atrial Rate : 058 BPM     P-R Int : 144 ms          QRS Dur : 072 ms      QT Int : 434 ms       P-R-T Axes : 059 065 071 degrees     QTc Int : 426 ms    Sinus bradycardia  Otherwise normal ECG  When compared with ECG of 19-JUL-2023 11:18,  Premature atrial complexes are no longer Present  Confirmed by Keiyr Be MD ([id number]) on 11/12/2024 3:38:31 PM    Referred By: NICK MCLAUGHLIN           Confirmed By:Keiry Be MD         2D ECHO:  TTE:  Results for orders placed or performed during the hospital encounter of 11/12/24   Echo   Result Value Ref Range    LV Diastolic Volume 64.09 mL    Echo EF Estimated 74 %    LV Systolic Volume 16.52 mL    IVS 0.8 0.6 - 1.1 cm    LVIDd 3.9 3.5 - 6.0 cm     LVIDs 2.2 2.1 - 4.0 cm    LVOT diameter 1.5 cm    PW 0.7 0.6 - 1.1 cm    AV LVOT peak gradient 4 mmHg    LVOT mn grad 3 mmHg    LVOT peak fausto 1.0 m/s    LVOT peak VTI 23.6 cm    RV- kaplan basal diam 4.4 cm    RV S' 15.07 cm/s    LA size 2.76 cm    Left Atrium Major Axis 5.00 cm    Left Atrium Minor Axis 4.43 cm    LA Vol (MOD) 49.60 mL    RA Major Axis 5.01 cm    RA Area 19.4 cm2    AV valve area 1.4 cm2    AV area by cont VTI 1.4 cm2    AV peak gradient 9.0 mmHg    AV mean gradient 5.0 mmHg    Ao peak fausto 1.5 m/s    Ao VTI 29.2 cm    MV Peak A Fausto 0.77 m/s    E wave deceleration time 214.13 ms    MV Peak E Fausto 0.80 m/s    E/A ratio 1.04     LV LATERAL E/E' RATIO 8.00     LV SEPTAL E/E' RATIO 8.89     TDI LATERAL 0.10 m/s    TDI SEPTAL 0.09 m/s    TV peak gradient 32 mmHg    TR Max Fausto 2.84 m/s    Ascending aorta 2.85 cm    STJ 2.75 cm    Sinus 3.06 cm    LA WIDTH 4.31 cm    RA Width 4.22 cm    TAPSE 3.03 cm    BSA 1.61 m2    LVOT stroke volume 41.7 cm3    LV Systolic Volume Index 10.5 mL/m2    LV Diastolic Volume Index 40.56 mL/m2    LVOT area 1.8 cm2    FS 43.6 28 - 44 %    Left Ventricle Relative Wall Thickness 0.36 cm    LV mass 82.3 g    LV Mass Index 52.1 g/m2    E/E' ratio 8.42 m/s    REGAN 30.1 mL/m2    LA Vol 47.50 cm3    RV/LV Ratio 1.13 cm    REGAN (MOD) 31.4 mL/m2    AV Velocity Ratio 0.67     AV index (prosthetic) 0.81     MANJINDER by Velocity Ratio 1.2 cm²    Triscuspid Valve Regurgitation Peak Gradient 32 mmHg    Mean e' 0.10 m/s    ZLVIDS -1.93     ZLVIDD -1.49     TV resting pulmonary artery pressure 35 mmHg    RV TB RVSP 6 mmHg    Est. RA pres 3 mmHg    Narrative      Left Ventricle: The left ventricle is normal in size. Normal wall   thickness. Normal wall motion. There is normal systolic function with a   visually estimated ejection fraction of 65 - 70%. There is normal   diastolic function.    Right Ventricle: Normal right ventricular cavity size. Wall thickness   is normal. Systolic function is  normal.    Tricuspid Valve: There is mild regurgitation.    Pulmonary Artery: The estimated pulmonary artery systolic pressure is   35 mmHg.    IVC/SVC: Normal venous pressure at 3 mmHg.             Imaging   Xray L-Spine 2022  FINDINGS:  Bones appear mildly demineralized.  Alignment appears satisfactory in the frontal projection.  Neutral lateral view demonstrates grade 1 anterolisthesis of L4 with respect L5.  This does not appear to change significantly with flexion and extension.  Vertebral body heights are satisfactorily maintained.  Mild hypertrophic spurring at the lower thoracic and lower lumbar spine.  Mild disc space narrowing at the L4-5 level.  No definite evidence of spondylolysis.  Degenerative changes at some of the lower facet joints bilaterally.  No fracture or osseous destruction.  Pedicles appear intact.  Surgical clips at the right upper quadrant of the abdomen.  Aortoiliac atherosclerosis without definite evidence of aneurysm.     Impression:     No acute abnormality.  DJD and alignment abnormalities as detailed above.        Electronically signed by: Martin Holt MD  Date:                                            08/19/2022  Time:                                           16:56        Active Cardiac Conditions: None        Revised Cardiac Risk Index   High -Risk Surgery  Intraperitoneal; Intrathoracic; suprainguinal vascular Yes- + 1 No- 0   History of Ischemic Heart Disease   (Hx of MI/positive exercise test/current chest pain due to ischemia/use of nitrate therapy/EKG with pathological Q waves) Yes- + 1 No- 0   History of CHF  (Pulmonary edema/bilateral rales or S3 gallop/PND/CXR showing pulmonary vascular redistribution) Yes- + 1 No- 0   History of CVA   (Prior stroke or TIA) Yes- + 1 No- 0   Pre-operative treatment with insulin Yes- + 1 No- 0   Pre-operative creatinine > 2mg/dl Yes- + 1 No- 0   Total: 0      Risk Status:  Estimated risk of cardiac complications after non-cardiac surgery  using the Revised Cardiac Risk Index for Preoperative risk is 3.9 %      ARISCAT (Canet) risk index: Low: 1.6% risk of post-op pulmonary complications; Intermediate: 13.3% risk of post-op pulmonary complications if surgery is > 3 hours.     American Society of Anesthesiologists Physical Status classification (ASA): 2                            Assessment/Plan:     Primary osteoarthritis of left knee  Scheduled with Dr. Calvert on 6/16/25 for left knee arthroplasty.    Primary hypertension  Current BP  not at goal today; 161/74.    Taking: amlodipine- takes at night   Will have patient take some home BP readings and have results available on 5/23/25    Lifestyle changes to reduce systolic BP:   exercise 30 minutes per day,  5 days per week or 150 minutes weekly; sodium reduction and avoidance of high salt foods such as processed meats, frozen meals and  fast foods.   Keeping a healthy weight/BMI can help with better BP control  I recommend monitoring BP during perioperative period as uncontrolled pain can elevate blood pressure.         History of GI bleed  Episode in 2023 after right knee arthroplasty.  S/P 2 units PRBCs; thought to be attributed to ASA 81 mg twice daily due to knee surgery.  EGD was not done at that time  No recurrence  No need for for PPI per PCP telephone note 5/6/25    Snoring  Denies REMELINDA. Possible sleep apnea: recommend caution with sedating medication in the perioperative period.   Sleep Study referral declined     Constipation  Treated with: Bananas  I suggest having a laxative regimen available as decreased mobility/ambulation and opioid use can increase risk of constipation.         Discussion/Management of Perioperative Care    Thromboembolic prophylaxis (VTE) Care: Risk factors for thrombosis include: age and surgical procedure.  I recommend prophylaxis of thromboembolism with the use of compression stockings/pneumatic devices, and/or pharmacologic agents. The benefits should outweigh the  risks for pharmacologic prophylaxis in the perioperative period. I also encourage early ambulation if not contraindicated during the post-operative period.    Risk factors for post-operative pulmonary complications include:age > 65 years and HTN. To reduce the risk of pulmonary complications, prophylactic recommendations include: incentive spirometry use/deep breathing, early ambulation, and pain control.    Risk factors for renal complications include: age and HTN. To reduce the risk of postoperative renal complications, I recommend the patient maintain adequate hydration.  Avoid/reduce NSAIDS and RASMUSSEN-2 inhibitors use as well as IV contrast for renal protection.    I recommend the use of appropriate prophylactic antibiotics to reduce the risk of surgical site infections.    Delirium risk factors include advanced age. I recommend to avoid/reduce use of benzodiazepine use (not for patients who take on a regular basis), anticholinergics, Benadryl,  and agents that may cause postoperative serotonin syndrome.  Controlled pain can decrease the risk for postop delirium and since opioids are used for postoperative pain control, I suggest using the lowest dose for the shortest amount of time necessary for pain management.     The patient is at an increased risk for urinary retention due to : possible regional anesthesia and advanced age. I recommend to avoid/decrease the use of benzodiazepines, anticholinergics, and Benadryl in the perioperative period. I also recommend using opioids for the shortest period of time if possible.          This visit was focused on Preoperative evaluation, Perioperative Medical management, complication reduction plans. I suggest that the patient follows up with primary care or relevant sub specialists for ongoing health care.    I appreciate the opportunity to be involved in this patients care. Please feel free to contact me if there were any questions about this consultation.        I spent  a total of 35 minutes on the day of the visit.  This includes face to face time and non-face to face time preparing to see the patient (e.g., review of tests), obtaining and/or reviewing separately obtained history, documenting clinical information in the electronic or other health record, independently interpreting results and communicating results to the patient/family/caregiver, or care coordinator.       Patient is pending optimization labs/EKG/better BP control- readings due 5/23      Constantin Flores NP  Perioperative Medicine  [facility]

## 2025-05-16 NOTE — DISCHARGE INSTRUCTIONS
.Your surgery has been scheduled for:___________6/16/25_______________________________    [facility]:  [facility] on the [place] side of the first floor of the           [facility] ([phone])  ____[facility], located on the [facility] side of the            Second floor of the [facility] ([phone])  ____3rd Floor [facility] located on the [facility] side of the [facility] [phone]  __X__[facility]: located at [address] [place] [place] [address]. [address].     Please Note   Tell your doctor if you take Aspirin, products containing Aspirin, herbal medications  or blood thinners, such as Coumadin, Ticlid, or Plavix.  (Consult your provider regarding holding or stopping before surgery).  Arrange for someone to drive you home following surgery.  You will not be allowed to leave the surgical facility alone or drive yourself home following sedation and anesthesia.    Before Surgery  Stop taking all herbal medications, vitamins, and supplements 7 days prior to surgery  No Motrin/Advil (Ibuprofen) 7 days before surgery  No Aleve (Naproxen) 7 days before surgery   No Goody's/BC Powder 7 days before surgery  Refrain from drinking alcoholic beverages for 24 hours before and after surgery  Stop or limit smoking at least 24 hours prior to surgery  You may take Tylenol for pain    Night before Surgery  Do not eat or drink after midnight  Take a shower or bath (shower is recommended).  Bathe with Hibiclens soap or an antibacterial soap from the neck down.  If not supplied by your surgeon, hibiclens soap will need to be purchased over the counter in pharmacy.  Rinse soap off thoroughly.  Shampoo your hair with your regular shampoo    The Day of Surgery  Take another bath or shower with hibiclens or any antibacterial soap, to reduce the chance of infection.  Take heart  and blood pressure medications with a small sip of water, as advised by the perioperative team.  Do not take fluid pills  You may brush your teeth and rinse your mouth, but do not swallow any additional water.   Do not apply perfumes, powder, body lotions or deodorant on the day of surgery.  Nail polish should be removed.  Do not wear makeup or moisturizer  Wear comfortable clothes, such as a button front shirt and loose fitting pants.  Leave all jewelry, including body piercings, and valuables at home.    Bring any devices you will need after surgery such as crutches or canes.  If you have sleep apnea, please bring your CPAP machine  In the event that your physical condition changes including the onset of a cold or respiratory illness, or if you have to delay or cancel your surgery, please notify your surgeon.

## 2025-05-16 NOTE — ASSESSMENT & PLAN NOTE
Current BP  not at goal today; 161/74.    Taking: amlodipine- takes at night   Will have patient take some home BP readings and have results available on 5/23/25    Lifestyle changes to reduce systolic BP:   exercise 30 minutes per day,  5 days per week or 150 minutes weekly; sodium reduction and avoidance of high salt foods such as processed meats, frozen meals and  fast foods.   Keeping a healthy weight/BMI can help with better BP control  I recommend monitoring BP during perioperative period as uncontrolled pain can elevate blood pressure.

## 2025-05-16 NOTE — H&P
CC:  Left knee pain    Raine Medley is a 70 y.o. female with history of Left knee pain. Pain is worse with activity and weight bearing.  Patient has experienced interference of activities of daily living due to decreased range of motion and an increase in joint pain and swelling.  Patient has failed non-operative treatment including NSAIDs, corticosteroid injections, viscosupplement injections, and activity modification.  Raine Medley currently ambulates independently.     Relevant medical conditions of significance in perioperative period:  HTN- on medication managed by pcp      Past Medical History:   Diagnosis Date    Gastroesophageal reflux disease without esophagitis 07/19/2023    History of blood transfusion 2023    x 2 RBC    Hyperlipidemia     Inverted nipple     Microcytic anemia 05/15/2023    Mild intermittent asthma, uncomplicated     as a child    Primary hypertension 11/12/2024    Primary osteoarthritis of right knee 05/11/2023    Status post right knee replacement 08/21/2023       Past Surgical History:   Procedure Laterality Date    CHOLECYSTECTOMY      COLONOSCOPY N/A 1/3/2019    Procedure: COLONOSCOPY;  Surgeon: Gaston Beebe MD;  Location: [address];  Service: Endoscopy;  Laterality: N/A;    KNEE JOINT MANIPULATION Right 8/21/2023    Procedure: MANIPULATION, KNEE: RIGHT: SAME DAY;  Surgeon: Sanford Calvert III, MD;  Location: [facility];  Service: Orthopedics;  Laterality: Right;    TOTAL KNEE ARTHROPLASTY Right 6/5/2023    Procedure: ARTHROPLASTY, KNEE, TOTAL: RIGHT: DEPUY - ATTUNE;  Surgeon: Sanford Calvert III, MD;  Location: [facility];  Service: Orthopedics;  Laterality: Right;       Family History   Problem Relation Name Age of Onset    No Known Problems Mother      No Known Problems Father      No Known Problems Sister      No Known Problems Daughter      No Known Problems Son      No Known Problems Son      No Known Problems Son      Melanoma Neg Hx      Lupus  HISTORY OF PRESENT ILLNESS  Verito Johnson is a 13 y.o. female. HPI  Magdiel Owens presents with the history of developing some nasal congestion with some facial fullness and secondary ear congestion over a week ago. She has a productive cough. She states her symptoms started one week ago. She has never had elevated BP but is slightly anxious today because she has been coughing a lot. She had a tactile temperature. She has been given mucinex for the chest congestion. Review of Systems   Constitutional: Positive for fever. HENT: Positive for congestion and ear pain. Negative for ear discharge and sore throat. Respiratory: Positive for cough. Gastrointestinal: Negative for abdominal pain, diarrhea and vomiting. Musculoskeletal: Negative for myalgias. Skin: Negative for rash. Neurological: Positive for headaches. Physical Exam  Visit Vitals    /90    Pulse 90    Temp 98 °F (36.7 °C) (Oral)    Resp 16    Ht 5' 1\" (1.549 m)    Wt 128 lb 4.8 oz (58.2 kg)    LMP  (LMP Unknown)    BMI 24.24 kg/m2     Eyes: Normal +PEERL  HEENT: Normal TM on the LT RT TM +purulent effusion with meniscus Nose cloudy discharge +facial pressure Mouth no lesions noted Throat no lesions noted    Neck: Normal  Chest/Breast: Normal  Lungs: Clear to auscultation, unlabored breathing  Heart: Normal PMI, regular rate & rhythm, normal S1,S2, no murmurs, rubs, or gallops  Abdomen: Normal scaphoid appearance, soft, non-tender, without organ enlargement or masses. Musculoskeletal: Normal symmetric bulk and strength  Lymphatic: No abnormally enlarged lymph nodes. Skin/Hair/Nails: No rashes or abnormal dyspigmentation  Neurologic: Alert sweet teen in no distress normal strength and tone, normal gait    ASSESSMENT and PLAN    ICD-10-CM ICD-9-CM    1. Nasal congestion R09.81 478.19 AMB POC TANIA INFLUENZA A/B TEST   2. Acute frontal sinusitis, recurrence not specified J01.10 461.1    3. Cough R05 786.2    4.  Otitis "Neg Hx      Psoriasis Neg Hx      Colon cancer Neg Hx      Esophageal cancer Neg Hx      Stomach cancer Neg Hx         Review of patient's allergies indicates:   Allergen Reactions    Aspirin      Increased risk of bleeding       Current Medications[1]    Review of Systems:  Constitutional: no fever or chills  Eyes: no visual changes  ENT: no nasal congestion or sore throat  Respiratory: no cough or shortness of breath  Cardiovascular: no chest pain or palpitations  Gastrointestinal: no nausea or vomiting, tolerating diet  Genitourinary: no hematuria or dysuria  Integument/Breast: no rash or pruritis  Hematologic/Lymphatic: no easy bruising or lymphadenopathy  Musculoskeletal: positive for knee pain  Neurological: no seizures or tremors  Behavioral/Psych: no auditory or visual hallucinations  Endocrine: no heat or cold intolerance    PE:  Ht 5' 0.24" (1.53 m)   Wt 62.1 kg (136 lb 12.7 oz)   BMI 26.51 kg/m²   General: Pleasant, cooperative, NAD   Gait: antalgic  HEENT: NCAT, sclera nonicteric   Lungs: Respirations clear bilaterally; equal and unlabored.   CV: S1S2; 2+ bilateral upper and lower extremity pulses.   Skin: Intact throughout with no rashes, erythema, or lesions  Extremities: No LE edema,  no erythema or warmth of the skin in either lower extremity.    Left knee exam:  Knee Range of Motion:0-110, pain with passive range of motion  Effusion:none  Condition of skin:intact  Location of tenderness:Medial joint line   Strength:normal  Stability: stable to testing    Hip Examination: painless PROM of hip     Radiographs: Radiographs reveal advanced degenerative changes including subchondral cyst formation, subchondral sclerosis, osteophyte formation, joint space narrowing.     Knee Alignment:  Mild varus    Diagnosis: Primary osteoarthritis Left knee    Plan: Left total knee arthroplasty    Due to the serious nature of total joint infection and the high prevalence of community acquired MRSA, vancomycin will be " media, non-suppurative, acute, right H65.191 381.00      Orders Placed This Encounter    AMB POC TANIA INFLUENZA A/B TEST    amoxicillin (AMOXIL) 400 mg/5 mL suspension     Patient Instructions          Sinusitis in Teens: Care Instructions  Your Care Instructions    Sinusitis is an infection of the lining of the sinus cavities in your head. Sinusitis often follows a cold. It causes pain and pressure in your head and face. In most cases, sinusitis gets better on its own in 1 to 2 weeks. But some mild symptoms may last for several weeks. Sometimes antibiotics are needed. Follow-up care is a key part of your treatment and safety. Be sure to make and go to all appointments, and call your doctor if you are having problems. It's also a good idea to know your test results and keep a list of the medicines you take. How can you care for yourself at home? · Take an over-the-counter pain medicine, such as acetaminophen (Tylenol), ibuprofen (Advil, Motrin), or naproxen (Aleve). Be safe with medicines. Read and follow all instructions on the label. No one younger than 20 should take aspirin. It has been linked to Reye syndrome, a serious illness. · If the doctor prescribed antibiotics, take them as directed. Do not stop taking them just because you feel better. You need to take the full course of antibiotics. · Be careful when taking over-the-counter cold or flu medicines and Tylenol at the same time. Many of these medicines have acetaminophen, which is Tylenol. Read the labels to make sure that you are not taking more than the recommended dose. Too much acetaminophen (Tylenol) can be harmful. · Use a nasal spray medicine that relieves a stuffy nose. Do not use the medicine longer than the label says. · Breathe warm, moist air from a steamy shower, a hot bath, or a sink filled with hot water. Avoid cold, dry air. Using a humidifier in your home may help. Follow the directions for cleaning the machine.   · Use saline (saltwater) nasal washes to help keep your nasal passages open and wash out mucus and bacteria. You can buy saline nose drops at a grocery store or drugstore. Or you can make your own at home by adding 1 teaspoon of salt and 1 teaspoon of baking soda to 2 cups of distilled water. If you make your own, fill a bulb syringe with the solution, insert the tip into your nostril, and squeeze gently. Shayla Pilot your nose. · Put a hot, wet towel or a warm gel pack on your face 3 or 4 times a day for 5 to 10 minutes each time. When should you call for help? Call your doctor now or seek immediate medical care if:  ? · You have new or worse symptoms of infection, such as:  ¨ Increased pain, swelling, warmth, or redness. ¨ Red streaks leading from the area. ¨ Pus draining from the area. ¨ A fever. ? Watch closely for changes in your health, and be sure to contact your doctor if:  ? · You are not getting better as expected. Where can you learn more? Go to http://gay-michael.info/. Enter Y586 in the search box to learn more about \"Sinusitis in Teens: Care Instructions. \"  Current as of: May 12, 2017  Content Version: 11.4  © 9916-3070 [employer]. Care instructions adapted under license by [employer] (which disclaims liability or warranty for this information). If you have questions about a medical condition or this instruction, always ask your healthcare professional. [place] [address] any warranty or liability for your use of this information. Ear Infection (Otitis Media) in Teens: Care Instructions  Your Care Instructions    An ear infection may start with a cold and affect the middle ear (otitis media). It can hurt a lot. Most ear infections clear up on their own in a couple of days and do not need antibiotics. Also, antibiotics do not work against viruses, which may be the cause of your infection.  Regular doses of pain relievers are the best way to reduce used perioperatively.                 [1]   Current Outpatient Medications:     acetaminophen (TYLENOL) 500 MG tablet, Take 500 mg by mouth as needed for Pain., Disp: , Rfl:     amLODIPine (NORVASC) 5 MG tablet, Take 1 tablet (5 mg total) by mouth once daily., Disp: 90 tablet, Rfl: 3    blood pressure monitor Kit, 1 each by Misc.(Non-Drug; Combo Route) route once daily., Disp: 1 each, Rfl: 0     your fever and help you feel better. Follow-up care is a key part of your treatment and safety. Be sure to make and go to all appointments, and call your doctor if you are having problems. It's also a good idea to know your test results and keep a list of the medicines you take. How can you care for yourself at home? · Take pain medicines exactly as directed. ¨ If the doctor gave you a prescription medicine for pain, take it as prescribed. ¨ If you are not taking a prescription pain medicine, take an over-the-counter medicine, such as acetaminophen (Tylenol), ibuprofen (Advil, Motrin), or naproxen (Aleve). Read and follow all instructions on the label. No one younger than 20 should take aspirin. It has been linked to Reye syndrome, a serious illness. ¨ Do not take two or more pain medicines at the same time unless the doctor told you to. Many pain medicines have acetaminophen, which is Tylenol. Too much acetaminophen (Tylenol) can be harmful. · Plan to take a full dose of pain reliever before bedtime. Getting enough sleep will help you get better. · Try a warm, moist washcloth on the ear to see if it helps relieve pain. · If your doctor prescribed antibiotics, take them as directed. Do not stop taking them just because you feel better. You need to take the full course of antibiotics. When should you call for help? Call your doctor now or seek immediate medical care if:  ? · You have new or worse symptoms of infection, such as:  ¨ Increased pain, swelling, warmth, or redness. ¨ Red streaks leading from the area. ¨ Pus draining from the area. ¨ A fever. ? Watch closely for changes in your health, and be sure to contact your doctor if:  ? · You have new or worse discharge coming from your ear. ? · You do not get better as expected. Where can you learn more? Go to http://gay-michael.info/.   Enter X732 in the search box to learn more about \"Ear Infection (Otitis Media) in Teens: Care Instructions. \"  Current as of: May 12, 2017  Content Version: 11.4  © 9274-0731 [employer]. Care instructions adapted under license by [employer] (which disclaims liability or warranty for this information). If you have questions about a medical condition or this instruction, always ask your healthcare professional. [address] any warranty or liability for your use of this information. Follow-up Disposition:  Return in about 2 weeks (around 1/31/2018) for Follow up sinusitis and otitis media suppurative.

## 2025-05-16 NOTE — ASSESSMENT & PLAN NOTE
Treated with: Solitario  I suggest having a laxative regimen available as decreased mobility/ambulation and opioid use can increase risk of constipation.

## 2025-05-16 NOTE — HPI
This is a 70 y.o. female  who presents today for a preoperative evaluation in preparation for left knee arthroplasty.  Surgery is indicated for  osteoarthritis of left knee.   I have seen this patient before in May 2023 for preop eval before left knee surgery.  The history has been obtained by speaking with the patient and reviewing the electronic medical record and/or outside health information. Significant health conditions for the perioperative period are discussed below in assessment and plan.   Patient reports current health status to be Good.  Denies any new symptoms before surgery.

## 2025-05-16 NOTE — PROGRESS NOTES
Raine Medley is a 70 y.o. year old here today for pre surgery optimization visit  in preparation for a Left total knee arthroplasty to be performed by Dr. Calvert on 6/16/2025.  she was last seen and treated in the clinic on 5/6/2025. she will be medically optimized by the pre op center. There has been no significant change in medical status since last visit. No fever, chills, malaise, or unexplained weight change.      Allergies, Medications, past medical and surgical history reviewed.    Focused examination performed.    Patient did not request to see surgeon in clinic today. All questions answered. Patient encouraged to call with questions. Contact information given.     Pre, connor, and post operative procedures and expectations discussed. Goals of successful surgery reviewed and include manageable pain levels, surgical site free of infection, medication management, and ambulation with PT/OT assistance. Healthy weight management discussed with patient and caregiver who were receptive to eduction of healthy diet and activity. No other necessary lifestyle changes identified. Educated patient about signs and symptoms of infection, medication management, anticoagulation therapy, risk of tobacco and alcohol use, and self-care to promote healing. Surgical guide given for future reference. Hibiclens given to patient with instructions. All questions were answered.     Raine Medley verbalized an understanding to the education and goals. Patient has displayed readiness to engage in care and is ready to proceed with surgery.  Patient reports her daughter is able and ready to provide assistance at home after discharge.    Surgical and blood consents signed.    DME will be arranged. The mobility limitation cannot be sufficiently resolved by the use of a cane. Patient's functional mobility deficit can be sufficiently resolved with the use of a (Rolling Walker or Walker). Patient's mobility limitation  "significantly impairs their ability to participate in one of more activities of daily living. The use of a (Rolling Walker or Walker) will significantly improve the patient's ability to participate in MRADLS and the patient will use it on regular basis in the home."     Raine Medley will contact us if there are any questions, concerns, or changes in medical status prior to surgery.       Patient has discussed discharge planning with surgeon. Patient will be discharged to home following surgery.   patient will be scheduled with [facility]. PT will be done at the [place] location.    30 minutes of time was spent on patient education, review of records, templating, H&P, , appointment scheduling and optimizing patient for surgery.      "

## 2025-05-19 ENCOUNTER — LAB VISIT (OUTPATIENT)
Dept: LAB | Facility: HOSPITAL | Age: 70
End: 2025-05-19
Attending: STUDENT IN AN ORGANIZED HEALTH CARE EDUCATION/TRAINING PROGRAM
Payer: COMMERCIAL

## 2025-05-19 DIAGNOSIS — Z01.818 PREOPERATIVE CLEARANCE: ICD-10-CM

## 2025-05-19 LAB
ABSOLUTE EOSINOPHIL (OHS): 0.23 K/UL
ABSOLUTE MONOCYTE (OHS): 0.8 K/UL (ref 0.3–1)
ABSOLUTE NEUTROPHIL COUNT (OHS): 5.43 K/UL (ref 1.8–7.7)
ALBUMIN SERPL BCP-MCNC: 3.9 G/DL (ref 3.5–5.2)
ALP SERPL-CCNC: 101 UNIT/L (ref 40–150)
ALT SERPL W/O P-5'-P-CCNC: 14 UNIT/L (ref 10–44)
ANION GAP (OHS): 8 MMOL/L (ref 8–16)
AST SERPL-CCNC: 17 UNIT/L (ref 11–45)
BASOPHILS # BLD AUTO: 0.06 K/UL
BASOPHILS NFR BLD AUTO: 0.7 %
BILIRUB SERPL-MCNC: 0.2 MG/DL (ref 0.1–1)
BUN SERPL-MCNC: 16 MG/DL (ref 8–23)
CALCIUM SERPL-MCNC: 9.4 MG/DL (ref 8.7–10.5)
CHLORIDE SERPL-SCNC: 105 MMOL/L (ref 95–110)
CO2 SERPL-SCNC: 26 MMOL/L (ref 23–29)
CREAT SERPL-MCNC: 0.7 MG/DL (ref 0.5–1.4)
ERYTHROCYTE [DISTWIDTH] IN BLOOD BY AUTOMATED COUNT: 12.5 % (ref 11.5–14.5)
GFR SERPLBLD CREATININE-BSD FMLA CKD-EPI: >60 ML/MIN/1.73/M2
GLUCOSE SERPL-MCNC: 108 MG/DL (ref 70–110)
HCT VFR BLD AUTO: 41.1 % (ref 37–48.5)
HGB BLD-MCNC: 13.1 GM/DL (ref 12–16)
IMM GRANULOCYTES # BLD AUTO: 0.06 K/UL (ref 0–0.04)
IMM GRANULOCYTES NFR BLD AUTO: 0.7 % (ref 0–0.5)
INDIRECT COOMBS: NORMAL
INR PPP: 1 (ref 0.8–1.2)
LYMPHOCYTES # BLD AUTO: 1.95 K/UL (ref 1–4.8)
MCH RBC QN AUTO: 27.8 PG (ref 27–31)
MCHC RBC AUTO-ENTMCNC: 31.9 G/DL (ref 32–36)
MCV RBC AUTO: 87 FL (ref 82–98)
NUCLEATED RBC (/100WBC) (OHS): 0 /100 WBC
PLATELET # BLD AUTO: 244 K/UL (ref 150–450)
PMV BLD AUTO: 12.4 FL (ref 9.2–12.9)
POTASSIUM SERPL-SCNC: 4 MMOL/L (ref 3.5–5.1)
PROT SERPL-MCNC: 7.4 GM/DL (ref 6–8.4)
PROTHROMBIN TIME: 10.8 SECONDS (ref 9–12.5)
RBC # BLD AUTO: 4.71 M/UL (ref 4–5.4)
RELATIVE EOSINOPHIL (OHS): 2.7 %
RELATIVE LYMPHOCYTE (OHS): 22.9 % (ref 18–48)
RELATIVE MONOCYTE (OHS): 9.4 % (ref 4–15)
RELATIVE NEUTROPHIL (OHS): 63.6 % (ref 38–73)
RH BLD: NORMAL
SODIUM SERPL-SCNC: 139 MMOL/L (ref 136–145)
SPECIMEN OUTDATE: NORMAL
WBC # BLD AUTO: 8.53 K/UL (ref 3.9–12.7)

## 2025-05-19 PROCEDURE — 80053 COMPREHEN METABOLIC PANEL: CPT

## 2025-05-19 PROCEDURE — 36415 COLL VENOUS BLD VENIPUNCTURE: CPT

## 2025-05-19 PROCEDURE — 85025 COMPLETE CBC W/AUTO DIFF WBC: CPT

## 2025-05-19 PROCEDURE — 85610 PROTHROMBIN TIME: CPT

## 2025-05-19 PROCEDURE — 86850 RBC ANTIBODY SCREEN: CPT | Performed by: STUDENT IN AN ORGANIZED HEALTH CARE EDUCATION/TRAINING PROGRAM

## 2025-05-20 ENCOUNTER — PATIENT MESSAGE (OUTPATIENT)
Dept: ORTHOPEDICS | Facility: CLINIC | Age: 70
End: 2025-05-20
Payer: COMMERCIAL

## 2025-05-21 ENCOUNTER — TELEPHONE (OUTPATIENT)
Dept: ORTHOPEDICS | Facility: HOSPITAL | Age: 70
End: 2025-05-21
Payer: COMMERCIAL

## 2025-05-21 NOTE — TELEPHONE ENCOUNTER
----- Message from April Hairston MD sent at 5/6/2025 11:47 AM CDT -----  Regarding: RE: knee replacement 6/6/25, ?GI eval  We will reach out to her and do a follow up. It seems she had a recent urgent care visit for abdominal pain.We will keep you updated.Thank  Martinez Hairston  ----- Message -----  From: Annetta Francis NP  Sent: 5/6/2025  10:55 AM CDT  To: Sanford Calvert III, MD; Rashaad Walker; #  Subject: RE: knee replacement 6/6/25, ?GI eval            I think its reasonable to just complete CBC prior to surgery and trend. If requiring NSAID's for pain mgmt post op would start empiric PPI--- 40mg daily until completion of NSAID's. Please reach out if she has any s/s of bleeding after sx. Thanks!!  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 She had a R TKA in 2023 and had a GI bleed post op. EGD/colonoscopy at [facility] was recommended by GI 7/26/23 but patient restarted working and never had it done. But she has not had any further GI bleeding issues since then. Would appreciate guidance on how best to proceed. Thank Homa Calvert

## 2025-05-29 ENCOUNTER — CLINICAL SUPPORT (OUTPATIENT)
Dept: REHABILITATION | Facility: HOSPITAL | Age: 70
End: 2025-05-29
Attending: ORTHOPAEDIC SURGERY
Payer: COMMERCIAL

## 2025-05-29 DIAGNOSIS — M17.12 PRIMARY OSTEOARTHRITIS OF LEFT KNEE: ICD-10-CM

## 2025-05-29 DIAGNOSIS — G89.29 CHRONIC PAIN OF LEFT KNEE: Primary | ICD-10-CM

## 2025-05-29 DIAGNOSIS — M25.562 CHRONIC PAIN OF LEFT KNEE: Primary | ICD-10-CM

## 2025-05-29 PROCEDURE — 97112 NEUROMUSCULAR REEDUCATION: CPT

## 2025-05-29 PROCEDURE — 97162 PT EVAL MOD COMPLEX 30 MIN: CPT

## 2025-05-29 PROCEDURE — 97530 THERAPEUTIC ACTIVITIES: CPT

## 2025-05-29 NOTE — PROGRESS NOTES
Outpatient Rehab    Physical Therapy Evaluation    Patient Name: Raine Medley  MRN: [id number]  YOB: 1955  Encounter Date: 5/29/2025    Therapy Diagnosis:   Encounter Diagnoses   Name Primary?    Primary osteoarthritis of left knee     Chronic pain of left knee Yes     Physician: Sanford Calvert III, *    Physician Orders: Eval and Treat  Medical Diagnosis: Primary osteoarthritis of left knee    Visit # / Visits Authorized:  1 / 1  Insurance Authorization Period: 5/6/2025 to 12/31/2025  Date of Evaluation: 5/29/2025  Plan of Care Certification: 5/29/2025 to 5/30/2025     Time In: 1700   Time Out: 1750  Total Time (in minutes): 50   Total Billable Time (in minutes): 50    Intake Outcome Measure for FOTO Survey    Therapist reviewed FOTO scores for Raine Medley on 5/29/2025.   FOTO report - see Media section or FOTO account episode details.     Intake Score:  %    Precautions:       Subjective   History of Present Illness  Raine is a 70 y.o. female                  History of Present Condition/Illness: Pt presents with chronic L knee pain due to long history of OA. She has had R knee replaced 2 years ago. She is planning to undergo L TKA on 6/16/2025. She is here today for prehab. Pt reports that her R TKA was complicated and she ended up needing a manipulation.     Pain     Patient reports a current pain level of 4/10. Pain at best is reported as 4/10. Pain at worst is reported as 10/10.   Pain-Relieving Factors: Medications - over-the-counter  Pt reports difficulty descending stairs, prolonged walking, prolonged standing, and all weightbearing tasks.       Living Arrangements  Pt will be staying with her daughter for a few weeks following surgery. No steps to enter her daughter's home. Pt has 7 steps to enter her own home which is a 2 story home.       Employment      Pt navigates 25 steps multiple time per day at her job. She is employed as a .       Past  Medical History/Physical Systems Review:   Raine Medley  has a past medical history of Gastroesophageal reflux disease without esophagitis, History of blood transfusion, Hyperlipidemia, Inverted nipple, Microcytic anemia, Mild intermittent asthma, uncomplicated, Primary hypertension, Primary osteoarthritis of right knee, and Status post right knee replacement.    Raine Medley  has a past surgical history that includes Cholecystectomy; Colonoscopy (N/A, 1/3/2019); Total knee arthroplasty (Right, 6/5/2023); and Knee joint manipulation (Right, 8/21/2023).    Raine has a current medication list which includes the following prescription(s): acetaminophen, amlodipine, and blood pressure monitor.    Review of patient's allergies indicates:   Allergen Reactions    Aspirin      Increased risk of bleeding        Objective   Knee Observations  Right Knee Observations  Present: Straight Leg Raise Extensor Lag             Knee Range of Motion   Right Knee   Active (deg) Passive (deg) Pain   Flexion 85 85     Extension 0           Left Knee   Active (deg) Passive (deg) Pain   Flexion 105 105     Extension -5 0                        Knee Strength   Right Strength Right Pain Left Strength Left  Pain   Flexion (S2) 4   4     Prone Flexion           Extension (L3) 4   4       Knee Extensor Lag  Lag Present: Right              Timed Up & Go (TUG)  Time: 12 seconds     An older adult who takes >=12 seconds to complete the TUG is at risk for falling.       Sit to Stand Testing      The patient completed 13 repetitions of a sit to stand transfer in 30 seconds. using BUE support              Treatment:  Balance/Neuromuscular Re-Education  NMR 1: Heel slides, 1 x 10, hold 5 seconds  NMR 2: Quad sets, 1x 10, hold 10 seconds  NMR 3: Heel prop x 3 minutes  Therapeutic Activity  TA 1: Education on anatomy, DVT/infection signs, rehab expectations, TKA procedure, edema management, exercise rationale, ROM goals, scar tissue  formation    Time Entry(in minutes):  PT Evaluation (Moderate) Time Entry: 30  Neuromuscular Re-Education Time Entry: 10  Therapeutic Activity Time Entry: 10    Assessment & Plan   Assessment  Raine presents with a condition of Moderate complexity.   Presentation of Symptoms: Evolving            Patient Goal for Therapy (PT): Prepare of L TKA  Prognosis: Good  Assessment Details: Patient demonstrates decreased lower extremity strength, impaired right quadriceps motor control and strength, difficulty ambulating long distances, and reduced functional mobility. Patient would benefit from skilled outpatient physical therapy to address motor control and strength deficits so that she may return to full independence with all household and personal ADL's, and improve overall functional mobility, and meet all social and recreational needs.    Plan  From a physical therapy perspective, the patient would benefit from: Skilled Rehab Services    Planned therapy interventions include: Therapeutic exercise, Therapeutic activities, Neuromuscular re-education, and Manual therapy.            Visit Frequency: 1 times In Total          This plan was discussed with Patient.   Discussion participants: Agreed Upon Plan of Care  Plan details: 1 PT visit then discharge to [facility] until pt undergoes L TKA procedure.           The patient's spiritual, cultural, and educational needs were considered, and the patient is agreeable to the plan of care and goals.           Goals:   Resolved       Functional outcome       Patient will demonstrate independence in home program for support of progression (Met)       Start:  05/30/25    Expected End:  05/30/25    Resolved:  05/30/25             Eze Hernandes PT, DPT

## 2025-06-09 NOTE — PRE-PROCEDURE INSTRUCTIONS
Called and spoke with patient   B/P readings were given:  6/5/25   am  123/71               pm  141/79    6/6/25   afternoon   141/71  6/7/25   afternoon   147/74  6/8/25   afternoon   146/86  Today    am             134/86

## 2025-06-13 ENCOUNTER — PATIENT MESSAGE (OUTPATIENT)
Dept: ORTHOPEDICS | Facility: CLINIC | Age: 70
End: 2025-06-13
Payer: COMMERCIAL

## 2025-06-13 ENCOUNTER — TELEPHONE (OUTPATIENT)
Dept: ORTHOPEDICS | Facility: CLINIC | Age: 70
End: 2025-06-13
Payer: COMMERCIAL

## 2025-06-13 DIAGNOSIS — Z96.652 S/P TOTAL KNEE REPLACEMENT, LEFT: Primary | ICD-10-CM

## 2025-06-13 RX ORDER — TRANEXAMIC ACID 650 MG/1
1950 TABLET ORAL DAILY
Qty: 6 TABLET | Refills: 0 | Status: SHIPPED | OUTPATIENT
Start: 2025-06-13

## 2025-06-13 RX ORDER — DEXTROMETHORPHAN HYDROBROMIDE, GUAIFENESIN 5; 100 MG/5ML; MG/5ML
650 LIQUID ORAL EVERY 8 HOURS
Qty: 120 TABLET | Refills: 0 | Status: SHIPPED | OUTPATIENT
Start: 2025-06-13

## 2025-06-13 RX ORDER — POLYETHYLENE GLYCOL 3350 17 G/17G
17 POWDER, FOR SOLUTION ORAL DAILY
Qty: 119 G | Refills: 0 | Status: SHIPPED | OUTPATIENT
Start: 2025-06-13

## 2025-06-13 RX ORDER — HYDROMORPHONE HYDROCHLORIDE 2 MG/1
TABLET ORAL
Qty: 50 TABLET | Refills: 0 | Status: SHIPPED | OUTPATIENT
Start: 2025-06-13

## 2025-06-13 RX ORDER — MULTIVITAMIN
1 TABLET ORAL DAILY
Qty: 14 TABLET | Refills: 0 | Status: SHIPPED | OUTPATIENT
Start: 2025-06-13

## 2025-06-13 RX ORDER — PREGABALIN 75 MG/1
75 CAPSULE ORAL 2 TIMES DAILY
Qty: 60 CAPSULE | Refills: 1 | Status: SHIPPED | OUTPATIENT
Start: 2025-06-13 | End: 2025-12-12

## 2025-06-13 RX ORDER — CEFADROXIL 500 MG/1
500 CAPSULE ORAL EVERY 12 HOURS
Qty: 14 CAPSULE | Refills: 0 | Status: SHIPPED | OUTPATIENT
Start: 2025-06-13

## 2025-06-13 RX ORDER — METHOCARBAMOL 750 MG/1
750 TABLET, FILM COATED ORAL 4 TIMES DAILY PRN
Qty: 40 TABLET | Refills: 0 | Status: SHIPPED | OUTPATIENT
Start: 2025-06-13 | End: 2025-06-23

## 2025-06-13 RX ORDER — ARGIN/GLUT/CAHMB/COLLAG/MV-MIN 7G-7G-1.5G
1 POWDER IN PACKET (EA) ORAL DAILY
Qty: 14 PACKET | Refills: 0 | Status: SHIPPED | OUTPATIENT
Start: 2025-06-13

## 2025-06-13 RX ORDER — LACTOSE-REDUCED FOOD
LIQUID (ML) ORAL
Qty: 3318 ML | Refills: 0 | Status: SHIPPED | OUTPATIENT
Start: 2025-06-13

## 2025-06-13 RX ORDER — ASCORBIC ACID 500 MG
1000 TABLET ORAL 2 TIMES DAILY
Qty: 56 TABLET | Refills: 0 | Status: SHIPPED | OUTPATIENT
Start: 2025-06-13 | End: 2025-06-27

## 2025-06-13 RX ORDER — PANTOPRAZOLE SODIUM 40 MG/1
40 TABLET, DELAYED RELEASE ORAL DAILY
Qty: 30 TABLET | Refills: 0 | Status: SHIPPED | OUTPATIENT
Start: 2025-06-13 | End: 2025-07-13

## 2025-06-13 NOTE — TELEPHONE ENCOUNTER
I called the patient today regarding surgery on 6/16/2025 with Dr. Sanford Calvert. I informed the patient that her surgery will be at  [facility] ([facility] [place] [address]). I informed the patient they must arrive at 10:30am and their surgery will start at 12:30pm.     I reminded the patient that they cannot eat or drink after midnight, the night before surgery.      I reminded the patient to be careful of their skin over the next few days to make sure they do not get any cuts, scratches or scrapes.    The patient verbalized understanding and has no further questions.

## 2025-06-16 ENCOUNTER — ANESTHESIA EVENT (OUTPATIENT)
Dept: SURGERY | Facility: HOSPITAL | Age: 70
End: 2025-06-16
Payer: COMMERCIAL

## 2025-06-16 ENCOUNTER — ANESTHESIA (OUTPATIENT)
Dept: SURGERY | Facility: HOSPITAL | Age: 70
End: 2025-06-16
Payer: COMMERCIAL

## 2025-06-16 ENCOUNTER — HOSPITAL ENCOUNTER (OUTPATIENT)
Facility: HOSPITAL | Age: 70
Discharge: HOME | End: 2025-06-17
Attending: ORTHOPAEDIC SURGERY | Admitting: ORTHOPAEDIC SURGERY
Payer: COMMERCIAL

## 2025-06-16 DIAGNOSIS — Z01.818 PRE-OP TESTING: ICD-10-CM

## 2025-06-16 DIAGNOSIS — M17.12 PRIMARY OSTEOARTHRITIS OF LEFT KNEE: Primary | ICD-10-CM

## 2025-06-16 PROCEDURE — 97116 GAIT TRAINING THERAPY: CPT

## 2025-06-16 PROCEDURE — 25000003 PHARM REV CODE 250: Performed by: STUDENT IN AN ORGANIZED HEALTH CARE EDUCATION/TRAINING PROGRAM

## 2025-06-16 PROCEDURE — 36000712 HC OR TIME LEV V 1ST 15 MIN: Performed by: ORTHOPAEDIC SURGERY

## 2025-06-16 PROCEDURE — 27447 TOTAL KNEE ARTHROPLASTY: CPT | Mod: LT,,, | Performed by: ORTHOPAEDIC SURGERY

## 2025-06-16 PROCEDURE — 63600175 PHARM REV CODE 636 W HCPCS: Performed by: ORTHOPAEDIC SURGERY

## 2025-06-16 PROCEDURE — 27447 TOTAL KNEE ARTHROPLASTY: CPT | Mod: AS,LT,,

## 2025-06-16 PROCEDURE — 25000003 PHARM REV CODE 250

## 2025-06-16 PROCEDURE — 99900035 HC TECH TIME PER 15 MIN (STAT)

## 2025-06-16 PROCEDURE — 27201423 OPTIME MED/SURG SUP & DEVICES STERILE SUPPLY: Performed by: ORTHOPAEDIC SURGERY

## 2025-06-16 PROCEDURE — 37000008 HC ANESTHESIA 1ST 15 MINUTES: Performed by: ORTHOPAEDIC SURGERY

## 2025-06-16 PROCEDURE — 71000039 HC RECOVERY, EACH ADD'L HOUR: Performed by: ORTHOPAEDIC SURGERY

## 2025-06-16 PROCEDURE — 25000003 PHARM REV CODE 250: Performed by: NURSE PRACTITIONER

## 2025-06-16 PROCEDURE — 94799 UNLISTED PULMONARY SVC/PX: CPT

## 2025-06-16 PROCEDURE — 71000033 HC RECOVERY, INTIAL HOUR: Performed by: ORTHOPAEDIC SURGERY

## 2025-06-16 PROCEDURE — 63600175 PHARM REV CODE 636 W HCPCS: Performed by: NURSE PRACTITIONER

## 2025-06-16 PROCEDURE — 25000003 PHARM REV CODE 250: Performed by: ORTHOPAEDIC SURGERY

## 2025-06-16 PROCEDURE — 63600175 PHARM REV CODE 636 W HCPCS: Performed by: ANESTHESIOLOGY

## 2025-06-16 PROCEDURE — 97161 PT EVAL LOW COMPLEX 20 MIN: CPT

## 2025-06-16 PROCEDURE — C1776 JOINT DEVICE (IMPLANTABLE): HCPCS | Performed by: ORTHOPAEDIC SURGERY

## 2025-06-16 PROCEDURE — 36000713 HC OR TIME LEV V EA ADD 15 MIN: Performed by: ORTHOPAEDIC SURGERY

## 2025-06-16 PROCEDURE — 25000003 PHARM REV CODE 250: Performed by: NURSE ANESTHETIST, CERTIFIED REGISTERED

## 2025-06-16 PROCEDURE — 25000003 PHARM REV CODE 250: Performed by: ANESTHESIOLOGY

## 2025-06-16 PROCEDURE — 63600175 PHARM REV CODE 636 W HCPCS: Performed by: STUDENT IN AN ORGANIZED HEALTH CARE EDUCATION/TRAINING PROGRAM

## 2025-06-16 PROCEDURE — 25000003 PHARM REV CODE 250: Performed by: PHYSICIAN ASSISTANT

## 2025-06-16 PROCEDURE — 37000009 HC ANESTHESIA EA ADD 15 MINS: Performed by: ORTHOPAEDIC SURGERY

## 2025-06-16 PROCEDURE — C1713 ANCHOR/SCREW BN/BN,TIS/BN: HCPCS | Performed by: ORTHOPAEDIC SURGERY

## 2025-06-16 PROCEDURE — 27100025 HC TUBING, SET FLUID WARMER: Performed by: STUDENT IN AN ORGANIZED HEALTH CARE EDUCATION/TRAINING PROGRAM

## 2025-06-16 PROCEDURE — 94761 N-INVAS EAR/PLS OXIMETRY MLT: CPT

## 2025-06-16 PROCEDURE — 20985 CPTR-ASST DIR MS PX: CPT | Mod: ,,, | Performed by: ORTHOPAEDIC SURGERY

## 2025-06-16 PROCEDURE — 63600175 PHARM REV CODE 636 W HCPCS: Performed by: NURSE ANESTHETIST, CERTIFIED REGISTERED

## 2025-06-16 DEVICE — ATTUNE KNEE SYSTEM ALL POLY TIBIAL IMPLANT POSTERIOR STABILIZED SIZE 3, 6MM CEMENTED
Type: IMPLANTABLE DEVICE | Site: KNEE | Status: FUNCTIONAL
Brand: ATTUNE

## 2025-06-16 DEVICE — CEMENT BONE SURG SMPLX P RADPQ: Type: IMPLANTABLE DEVICE | Site: KNEE | Status: FUNCTIONAL

## 2025-06-16 DEVICE — ATTUNE KNEE SYSTEM FEMORAL POSTERIOR STABILIZED SIZE 3 LEFT CEMENTED
Type: IMPLANTABLE DEVICE | Site: KNEE | Status: FUNCTIONAL
Brand: ATTUNE

## 2025-06-16 DEVICE — ATTUNE PATELLA MEDIALIZED DOME 32MM CEMENTED AOX
Type: IMPLANTABLE DEVICE | Site: KNEE | Status: FUNCTIONAL
Brand: ATTUNE

## 2025-06-16 RX ORDER — CEFAZOLIN 2 G/1
2 INJECTION, POWDER, FOR SOLUTION INTRAMUSCULAR; INTRAVENOUS
Status: DISCONTINUED | OUTPATIENT
Start: 2025-06-16 | End: 2025-06-16 | Stop reason: HOSPADM

## 2025-06-16 RX ORDER — CALCIUM CARBONATE 200(500)MG
500 TABLET,CHEWABLE ORAL 2 TIMES DAILY PRN
Status: DISCONTINUED | OUTPATIENT
Start: 2025-06-16 | End: 2025-06-17 | Stop reason: HOSPADM

## 2025-06-16 RX ORDER — SODIUM CHLORIDE 0.9 % (FLUSH) 0.9 %
10 SYRINGE (ML) INJECTION
Status: DISCONTINUED | OUTPATIENT
Start: 2025-06-16 | End: 2025-06-16 | Stop reason: HOSPADM

## 2025-06-16 RX ORDER — DEXAMETHASONE SODIUM PHOSPHATE 4 MG/ML
INJECTION, SOLUTION INTRA-ARTICULAR; INTRALESIONAL; INTRAMUSCULAR; INTRAVENOUS; SOFT TISSUE
Status: DISCONTINUED | OUTPATIENT
Start: 2025-06-16 | End: 2025-06-16

## 2025-06-16 RX ORDER — HALOPERIDOL LACTATE 5 MG/ML
0.5 INJECTION, SOLUTION INTRAMUSCULAR EVERY 10 MIN PRN
Status: DISCONTINUED | OUTPATIENT
Start: 2025-06-16 | End: 2025-06-16 | Stop reason: HOSPADM

## 2025-06-16 RX ORDER — MORPHINE SULFATE 2 MG/ML
2 INJECTION, SOLUTION INTRAMUSCULAR; INTRAVENOUS
Status: DISCONTINUED | OUTPATIENT
Start: 2025-06-16 | End: 2025-06-17 | Stop reason: HOSPADM

## 2025-06-16 RX ORDER — FENTANYL CITRATE 50 UG/ML
100 INJECTION, SOLUTION INTRAMUSCULAR; INTRAVENOUS
Status: DISCONTINUED | OUTPATIENT
Start: 2025-06-16 | End: 2025-06-16 | Stop reason: HOSPADM

## 2025-06-16 RX ORDER — SODIUM CHLORIDE 9 MG/ML
INJECTION, SOLUTION INTRAVENOUS CONTINUOUS
Status: DISCONTINUED | OUTPATIENT
Start: 2025-06-16 | End: 2025-06-16

## 2025-06-16 RX ORDER — EPHEDRINE SULFATE 50 MG/ML
INJECTION, SOLUTION INTRAVENOUS
Status: DISCONTINUED | OUTPATIENT
Start: 2025-06-16 | End: 2025-06-16

## 2025-06-16 RX ORDER — FENTANYL CITRATE 50 UG/ML
25 INJECTION, SOLUTION INTRAMUSCULAR; INTRAVENOUS EVERY 5 MIN PRN
Status: DISCONTINUED | OUTPATIENT
Start: 2025-06-16 | End: 2025-06-16 | Stop reason: HOSPADM

## 2025-06-16 RX ORDER — ONDANSETRON HYDROCHLORIDE 2 MG/ML
4 INJECTION, SOLUTION INTRAVENOUS DAILY PRN
Status: DISCONTINUED | OUTPATIENT
Start: 2025-06-16 | End: 2025-06-16 | Stop reason: HOSPADM

## 2025-06-16 RX ORDER — CEFAZOLIN SODIUM 1 G/3ML
INJECTION, POWDER, FOR SOLUTION INTRAMUSCULAR; INTRAVENOUS
Status: DISCONTINUED | OUTPATIENT
Start: 2025-06-16 | End: 2025-06-16

## 2025-06-16 RX ORDER — ROPIVACAINE HYDROCHLORIDE 5 MG/ML
INJECTION, SOLUTION EPIDURAL; INFILTRATION; PERINEURAL
Status: DISCONTINUED | OUTPATIENT
Start: 2025-06-16 | End: 2025-06-16

## 2025-06-16 RX ORDER — MUPIROCIN 20 MG/G
1 OINTMENT TOPICAL
Status: COMPLETED | OUTPATIENT
Start: 2025-06-16 | End: 2025-06-16

## 2025-06-16 RX ORDER — VANCOMYCIN HYDROCHLORIDE 1 G/20ML
INJECTION, POWDER, LYOPHILIZED, FOR SOLUTION INTRAVENOUS
Status: DISCONTINUED | OUTPATIENT
Start: 2025-06-16 | End: 2025-06-16 | Stop reason: HOSPADM

## 2025-06-16 RX ORDER — POVIDONE-IODINE 5 %
SOLUTION, NON-ORAL OPHTHALMIC (EYE)
Status: DISCONTINUED | OUTPATIENT
Start: 2025-06-16 | End: 2025-06-16 | Stop reason: HOSPADM

## 2025-06-16 RX ORDER — KETAMINE HCL IN 0.9 % NACL 50 MG/5 ML
SYRINGE (ML) INTRAVENOUS
Status: DISCONTINUED | OUTPATIENT
Start: 2025-06-16 | End: 2025-06-16

## 2025-06-16 RX ORDER — MIDAZOLAM HYDROCHLORIDE 1 MG/ML
INJECTION, SOLUTION INTRAMUSCULAR; INTRAVENOUS
Status: DISCONTINUED | OUTPATIENT
Start: 2025-06-16 | End: 2025-06-16

## 2025-06-16 RX ORDER — OXYCODONE HYDROCHLORIDE 5 MG/1
5 TABLET ORAL
Status: DISCONTINUED | OUTPATIENT
Start: 2025-06-16 | End: 2025-06-16 | Stop reason: HOSPADM

## 2025-06-16 RX ORDER — AMLODIPINE BESYLATE 5 MG/1
5 TABLET ORAL DAILY
Status: DISCONTINUED | OUTPATIENT
Start: 2025-06-17 | End: 2025-06-17 | Stop reason: HOSPADM

## 2025-06-16 RX ORDER — BETHANECHOL CHLORIDE 10 MG/1
10 TABLET ORAL
Status: COMPLETED | OUTPATIENT
Start: 2025-06-16 | End: 2025-06-16

## 2025-06-16 RX ORDER — MORPHINE SULFATE 1 MG/ML
INJECTION, SOLUTION EPIDURAL; INTRATHECAL; INTRAVENOUS
Status: DISCONTINUED | OUTPATIENT
Start: 2025-06-16 | End: 2025-06-16 | Stop reason: HOSPADM

## 2025-06-16 RX ORDER — PANTOPRAZOLE SODIUM 40 MG/1
40 TABLET, DELAYED RELEASE ORAL DAILY
Status: DISCONTINUED | OUTPATIENT
Start: 2025-06-16 | End: 2025-06-17 | Stop reason: HOSPADM

## 2025-06-16 RX ORDER — METHOCARBAMOL 750 MG/1
750 TABLET, FILM COATED ORAL 3 TIMES DAILY
Status: DISCONTINUED | OUTPATIENT
Start: 2025-06-16 | End: 2025-06-17 | Stop reason: HOSPADM

## 2025-06-16 RX ORDER — ACETAMINOPHEN 500 MG
1000 TABLET ORAL EVERY 6 HOURS
Status: DISCONTINUED | OUTPATIENT
Start: 2025-06-16 | End: 2025-06-17 | Stop reason: HOSPADM

## 2025-06-16 RX ORDER — BISACODYL 10 MG/1
10 SUPPOSITORY RECTAL EVERY 12 HOURS PRN
Status: DISCONTINUED | OUTPATIENT
Start: 2025-06-16 | End: 2025-06-17 | Stop reason: HOSPADM

## 2025-06-16 RX ORDER — SODIUM CHLORIDE 9 MG/ML
INJECTION, SOLUTION INTRAVENOUS
Status: COMPLETED | OUTPATIENT
Start: 2025-06-16 | End: 2025-06-16

## 2025-06-16 RX ORDER — TALC
6 POWDER (GRAM) TOPICAL NIGHTLY PRN
Status: DISCONTINUED | OUTPATIENT
Start: 2025-06-16 | End: 2025-06-16 | Stop reason: HOSPADM

## 2025-06-16 RX ORDER — LIDOCAINE HYDROCHLORIDE 10 MG/ML
1 INJECTION, SOLUTION EPIDURAL; INFILTRATION; INTRACAUDAL; PERINEURAL
Status: DISCONTINUED | OUTPATIENT
Start: 2025-06-16 | End: 2025-06-16 | Stop reason: HOSPADM

## 2025-06-16 RX ORDER — ONDANSETRON HYDROCHLORIDE 2 MG/ML
INJECTION, SOLUTION INTRAVENOUS
Status: DISCONTINUED | OUTPATIENT
Start: 2025-06-16 | End: 2025-06-16

## 2025-06-16 RX ORDER — LIDOCAINE HYDROCHLORIDE 20 MG/ML
INJECTION INTRAVENOUS
Status: DISCONTINUED | OUTPATIENT
Start: 2025-06-16 | End: 2025-06-16

## 2025-06-16 RX ORDER — FAMOTIDINE 20 MG/1
20 TABLET, FILM COATED ORAL 2 TIMES DAILY
Status: DISCONTINUED | OUTPATIENT
Start: 2025-06-16 | End: 2025-06-16

## 2025-06-16 RX ORDER — PREGABALIN 75 MG/1
75 CAPSULE ORAL NIGHTLY
Status: DISCONTINUED | OUTPATIENT
Start: 2025-06-16 | End: 2025-06-17 | Stop reason: HOSPADM

## 2025-06-16 RX ORDER — AMOXICILLIN 250 MG
1 CAPSULE ORAL 2 TIMES DAILY
Status: DISCONTINUED | OUTPATIENT
Start: 2025-06-16 | End: 2025-06-17 | Stop reason: HOSPADM

## 2025-06-16 RX ORDER — HYDROMORPHONE HYDROCHLORIDE 2 MG/1
2 TABLET ORAL
Status: DISCONTINUED | OUTPATIENT
Start: 2025-06-16 | End: 2025-06-17 | Stop reason: HOSPADM

## 2025-06-16 RX ORDER — NALOXONE HCL 0.4 MG/ML
0.02 VIAL (ML) INJECTION
Status: DISCONTINUED | OUTPATIENT
Start: 2025-06-16 | End: 2025-06-17 | Stop reason: HOSPADM

## 2025-06-16 RX ORDER — PROPOFOL 10 MG/ML
VIAL (ML) INTRAVENOUS CONTINUOUS PRN
Status: DISCONTINUED | OUTPATIENT
Start: 2025-06-16 | End: 2025-06-16

## 2025-06-16 RX ORDER — ACETAMINOPHEN 500 MG
1000 TABLET ORAL
Status: COMPLETED | OUTPATIENT
Start: 2025-06-16 | End: 2025-06-16

## 2025-06-16 RX ORDER — GLUCAGON 1 MG
1 KIT INJECTION
Status: DISCONTINUED | OUTPATIENT
Start: 2025-06-16 | End: 2025-06-16 | Stop reason: HOSPADM

## 2025-06-16 RX ORDER — ONDANSETRON HYDROCHLORIDE 2 MG/ML
4 INJECTION, SOLUTION INTRAVENOUS EVERY 8 HOURS PRN
Status: DISCONTINUED | OUTPATIENT
Start: 2025-06-16 | End: 2025-06-17 | Stop reason: HOSPADM

## 2025-06-16 RX ORDER — TRANEXAMIC ACID 1 G/10ML
INJECTION, SOLUTION INTRAVENOUS
Status: DISCONTINUED | OUTPATIENT
Start: 2025-06-16 | End: 2025-06-16 | Stop reason: HOSPADM

## 2025-06-16 RX ORDER — ELECTROLYTES/DEXTROSE
400 SOLUTION, ORAL ORAL
Status: DISCONTINUED | OUTPATIENT
Start: 2025-06-16 | End: 2025-06-17 | Stop reason: HOSPADM

## 2025-06-16 RX ORDER — PROCHLORPERAZINE EDISYLATE 5 MG/ML
5 INJECTION INTRAMUSCULAR; INTRAVENOUS EVERY 6 HOURS PRN
Status: DISCONTINUED | OUTPATIENT
Start: 2025-06-16 | End: 2025-06-17 | Stop reason: HOSPADM

## 2025-06-16 RX ORDER — PROPOFOL 10 MG/ML
VIAL (ML) INTRAVENOUS
Status: DISCONTINUED | OUTPATIENT
Start: 2025-06-16 | End: 2025-06-16

## 2025-06-16 RX ORDER — MIDAZOLAM HYDROCHLORIDE 1 MG/ML
4 INJECTION, SOLUTION INTRAMUSCULAR; INTRAVENOUS
Status: DISCONTINUED | OUTPATIENT
Start: 2025-06-16 | End: 2025-06-16 | Stop reason: HOSPADM

## 2025-06-16 RX ORDER — FENTANYL CITRATE 50 UG/ML
INJECTION, SOLUTION INTRAMUSCULAR; INTRAVENOUS
Status: DISCONTINUED | OUTPATIENT
Start: 2025-06-16 | End: 2025-06-16

## 2025-06-16 RX ORDER — HYDROMORPHONE HYDROCHLORIDE 2 MG/1
4 TABLET ORAL
Status: DISCONTINUED | OUTPATIENT
Start: 2025-06-16 | End: 2025-06-17 | Stop reason: HOSPADM

## 2025-06-16 RX ORDER — CEFAZOLIN 2 G/1
2 INJECTION, POWDER, FOR SOLUTION INTRAMUSCULAR; INTRAVENOUS
Status: COMPLETED | OUTPATIENT
Start: 2025-06-16 | End: 2025-06-17

## 2025-06-16 RX ORDER — MUPIROCIN 20 MG/G
1 OINTMENT TOPICAL 2 TIMES DAILY
Status: DISCONTINUED | OUTPATIENT
Start: 2025-06-16 | End: 2025-06-17 | Stop reason: HOSPADM

## 2025-06-16 RX ORDER — METHYLPREDNISOLONE ACETATE 40 MG/ML
INJECTION, SUSPENSION INTRA-ARTICULAR; INTRALESIONAL; INTRAMUSCULAR; SOFT TISSUE
Status: DISCONTINUED | OUTPATIENT
Start: 2025-06-16 | End: 2025-06-16 | Stop reason: HOSPADM

## 2025-06-16 RX ORDER — ASCORBIC ACID 500 MG
1000 TABLET ORAL 2 TIMES DAILY
Status: DISCONTINUED | OUTPATIENT
Start: 2025-06-17 | End: 2025-06-17 | Stop reason: HOSPADM

## 2025-06-16 RX ORDER — ROPIVACAINE HYDROCHLORIDE 5 MG/ML
INJECTION, SOLUTION EPIDURAL; INFILTRATION; PERINEURAL
Status: DISCONTINUED | OUTPATIENT
Start: 2025-06-16 | End: 2025-06-16 | Stop reason: HOSPADM

## 2025-06-16 RX ORDER — POLYETHYLENE GLYCOL 3350 17 G/17G
17 POWDER, FOR SOLUTION ORAL DAILY
Status: DISCONTINUED | OUTPATIENT
Start: 2025-06-17 | End: 2025-06-17 | Stop reason: HOSPADM

## 2025-06-16 RX ORDER — PANTOPRAZOLE SODIUM 40 MG/1
40 TABLET, DELAYED RELEASE ORAL DAILY
Status: DISCONTINUED | OUTPATIENT
Start: 2025-06-17 | End: 2025-06-16

## 2025-06-16 RX ORDER — TRANEXAMIC ACID 1 G/10ML
INJECTION, SOLUTION INTRAVENOUS
Status: DISCONTINUED | OUTPATIENT
Start: 2025-06-16 | End: 2025-06-16

## 2025-06-16 RX ORDER — PREGABALIN 75 MG/1
75 CAPSULE ORAL
Status: COMPLETED | OUTPATIENT
Start: 2025-06-16 | End: 2025-06-16

## 2025-06-16 RX ORDER — SIMETHICONE 80 MG
1 TABLET,CHEWABLE ORAL 3 TIMES DAILY PRN
Status: DISCONTINUED | OUTPATIENT
Start: 2025-06-16 | End: 2025-06-17 | Stop reason: HOSPADM

## 2025-06-16 RX ORDER — FAMOTIDINE 10 MG/ML
INJECTION, SOLUTION INTRAVENOUS
Status: DISCONTINUED | OUTPATIENT
Start: 2025-06-16 | End: 2025-06-16

## 2025-06-16 RX ADMIN — BETHANECHOL CHLORIDE 10 MG: 10 TABLET ORAL at 06:06

## 2025-06-16 RX ADMIN — ROPIVACAINE HYDROCHLORIDE 15 MG: 5 INJECTION EPIDURAL; INFILTRATION; PERINEURAL at 12:06

## 2025-06-16 RX ADMIN — BETHANECHOL CHLORIDE 10 MG: 10 TABLET ORAL at 03:06

## 2025-06-16 RX ADMIN — FENTANYL CITRATE 25 MCG: 50 INJECTION, SOLUTION INTRAMUSCULAR; INTRAVENOUS at 12:06

## 2025-06-16 RX ADMIN — Medication 400 ML: at 10:06

## 2025-06-16 RX ADMIN — EPHEDRINE SULFATE 5 MG: 50 INJECTION INTRAVENOUS at 01:06

## 2025-06-16 RX ADMIN — SODIUM CHLORIDE: 0.9 INJECTION, SOLUTION INTRAVENOUS at 12:06

## 2025-06-16 RX ADMIN — CEFAZOLIN 2 G: 2 INJECTION, POWDER, FOR SOLUTION INTRAMUSCULAR; INTRAVENOUS at 09:06

## 2025-06-16 RX ADMIN — METHOCARBAMOL 750 MG: 750 TABLET ORAL at 11:06

## 2025-06-16 RX ADMIN — Medication 400 ML: at 06:06

## 2025-06-16 RX ADMIN — TRANEXAMIC ACID 2000 MG: 100 INJECTION INTRAVENOUS at 12:06

## 2025-06-16 RX ADMIN — MUPIROCIN 1 G: 20 OINTMENT TOPICAL at 12:06

## 2025-06-16 RX ADMIN — PREGABALIN 75 MG: 75 CAPSULE ORAL at 12:06

## 2025-06-16 RX ADMIN — ACETAMINOPHEN 1000 MG: 500 TABLET ORAL at 05:06

## 2025-06-16 RX ADMIN — MIDAZOLAM HYDROCHLORIDE 2 MG: 2 INJECTION, SOLUTION INTRAMUSCULAR; INTRAVENOUS at 12:06

## 2025-06-16 RX ADMIN — HYDROMORPHONE HYDROCHLORIDE 2 MG: 2 TABLET ORAL at 06:06

## 2025-06-16 RX ADMIN — PROPOFOL 20 MG: 10 INJECTION, EMULSION INTRAVENOUS at 12:06

## 2025-06-16 RX ADMIN — CALCIUM CARBONATE (ANTACID) CHEW TAB 500 MG 500 MG: 500 CHEW TAB at 03:06

## 2025-06-16 RX ADMIN — OXYCODONE HYDROCHLORIDE 5 MG: 5 TABLET ORAL at 03:06

## 2025-06-16 RX ADMIN — LIDOCAINE HYDROCHLORIDE 60 MG: 20 INJECTION INTRAVENOUS at 12:06

## 2025-06-16 RX ADMIN — MUPIROCIN 1 G: 20 OINTMENT TOPICAL at 11:06

## 2025-06-16 RX ADMIN — Medication 20 MG: at 12:06

## 2025-06-16 RX ADMIN — SODIUM CHLORIDE 1000 MG: 9 INJECTION, SOLUTION INTRAVENOUS at 12:06

## 2025-06-16 RX ADMIN — ACETAMINOPHEN 1000 MG: 500 TABLET ORAL at 12:06

## 2025-06-16 RX ADMIN — ACETAMINOPHEN 1000 MG: 500 TABLET ORAL at 11:06

## 2025-06-16 RX ADMIN — ONDANSETRON 4 MG: 2 INJECTION INTRAMUSCULAR; INTRAVENOUS at 03:06

## 2025-06-16 RX ADMIN — EPHEDRINE SULFATE 5 MG: 50 INJECTION INTRAVENOUS at 12:06

## 2025-06-16 RX ADMIN — BETHANECHOL CHLORIDE 10 MG: 10 TABLET ORAL at 05:06

## 2025-06-16 RX ADMIN — TRANEXAMIC ACID 1000 MG: 100 INJECTION INTRAVENOUS at 02:06

## 2025-06-16 RX ADMIN — PREGABALIN 75 MG: 75 CAPSULE ORAL at 11:06

## 2025-06-16 RX ADMIN — CEFAZOLIN 2 G: 330 INJECTION, POWDER, FOR SOLUTION INTRAMUSCULAR; INTRAVENOUS at 12:06

## 2025-06-16 RX ADMIN — PANTOPRAZOLE SODIUM 40 MG: 40 TABLET, DELAYED RELEASE ORAL at 09:06

## 2025-06-16 RX ADMIN — SIMETHICONE 80 MG: 80 TABLET, CHEWABLE ORAL at 05:06

## 2025-06-16 RX ADMIN — ONDANSETRON 4 MG: 2 INJECTION INTRAMUSCULAR; INTRAVENOUS at 12:06

## 2025-06-16 RX ADMIN — PROPOFOL 125 MCG/KG/MIN: 10 INJECTION, EMULSION INTRAVENOUS at 12:06

## 2025-06-16 RX ADMIN — FAMOTIDINE 20 MG: 10 INJECTION, SOLUTION INTRAVENOUS at 12:06

## 2025-06-16 RX ADMIN — DEXAMETHASONE SODIUM PHOSPHATE 8 MG: 4 INJECTION, SOLUTION INTRAMUSCULAR; INTRAVENOUS at 12:06

## 2025-06-16 RX ADMIN — METHOCARBAMOL 750 MG: 750 TABLET ORAL at 03:06

## 2025-06-16 RX ADMIN — SODIUM CHLORIDE, SODIUM GLUCONATE, SODIUM ACETATE, POTASSIUM CHLORIDE, MAGNESIUM CHLORIDE, SODIUM PHOSPHATE, DIBASIC, AND POTASSIUM PHOSPHATE: .53; .5; .37; .037; .03; .012; .00082 INJECTION, SOLUTION INTRAVENOUS at 01:06

## 2025-06-16 NOTE — PLAN OF CARE
Problem: Adult Inpatient Plan of Care  Goal: Plan of Care Review  Outcome: Progressing  Flowsheets (Taken 6/16/2025 1652)  Plan of Care Reviewed With:   patient   family  Goal: Patient-Specific Goal (Individualized)  Outcome: Progressing  Flowsheets (Taken 6/16/2025 1652)  Individualized Care Needs: pain management  Goal: Absence of Hospital-Acquired Illness or Injury  Outcome: Progressing  Goal: Optimal Comfort and Wellbeing  Outcome: Progressing  Goal: Readiness for Transition of Care  Outcome: Progressing     Problem: Pain Acute  Goal: Optimal Pain Control and Function  Outcome: Progressing  Intervention: Develop Pain Management Plan  Flowsheets (Taken 6/16/2025 1652)  Pain Management Interventions:   around-the-clock dosing utilized   breathing exercises utilized   care clustered   pain management plan reviewed with patient/caregiver   pillow support provided   relaxation techniques promoted   quiet environment facilitated   position adjusted   warm blanket provided  Intervention: Prevent or Manage Pain  Flowsheets (Taken 6/16/2025 1652)  Medication Review/Management:   medications reviewed   high-risk medications identified     Problem: Wound  Goal: Optimal Coping  Outcome: Progressing  Goal: Optimal Functional Ability  Outcome: Progressing  Goal: Absence of Infection Signs and Symptoms  Outcome: Progressing  Goal: Improved Oral Intake  Outcome: Progressing  Goal: Optimal Pain Control and Function  Outcome: Progressing  Goal: Skin Health and Integrity  Outcome: Progressing  Goal: Optimal Wound Healing  Outcome: Progressing     Problem: Infection  Goal: Absence of Infection Signs and Symptoms  Outcome: Progressing     Problem: Comorbidity Management  Goal: Blood Pressure in Desired Range  Outcome: Progressing  Intervention: Maintain Blood Pressure Management  Flowsheets (Taken 6/16/2025 1652)  Medication Review/Management:   medications reviewed   high-risk medications identified     Problem: Knee  Arthroplasty  Goal: Optimal Coping  Outcome: Progressing  Goal: Absence of Bleeding  Outcome: Progressing  Intervention: Monitor and Manage Bleeding  Flowsheets (Taken 6/16/2025 1652)  Bleeding Management: dressing monitored  Goal: Effective Bowel Elimination  Outcome: Progressing  Goal: Fluid and Electrolyte Balance  Outcome: Progressing  Goal: Optimal Functional Ability  Outcome: Progressing  Goal: Absence of Infection Signs and Symptoms  Outcome: Progressing  Goal: Intact Neurovascular Status  Outcome: Progressing  Goal: Anesthesia/Sedation Recovery  Outcome: Progressing  Goal: Optimal Pain Control and Function  Outcome: Progressing  Goal: Nausea and Vomiting Relief  Outcome: Progressing  Intervention: Prevent or Manage Nausea and Vomiting  Flowsheets (Taken 6/16/2025 1652)  Nausea/Vomiting Interventions:   nausea triggers minimized   stimuli minimized  Goal: Effective Urinary Elimination  Outcome: Progressing  Goal: Effective Oxygenation and Ventilation  Outcome: Progressing     Problem: Fall Injury Risk  Goal: Absence of Fall and Fall-Related Injury  Outcome: Progressing  Intervention: Identify and Manage Contributors  Flowsheets (Taken 6/16/2025 1652)  Medication Review/Management:   medications reviewed   high-risk medications identified  Intervention: Promote Injury-Free Environment  Flowsheets (Taken 6/16/2025 1652)  Safety Promotion/Fall Prevention:   assistive device/personal item within reach   Fall Risk reviewed with patient/family   family expresses understanding of fall risk and prevention   lighting adjusted   instructed to call staff for mobility   medications reviewed   high risk medications identified   nonskid shoes/socks when out of bed   patient expresses understanding of fall risk and prevention   side rails raised x 2   Supervised toileting - stay within arms reach       Plan of care reviewed with patient, verbalized understanding. Medications reviewed and given as ordered. Rounding for  safety and patient care per policy. Safety precautions maintained. Call light within reach, bed wheels locked, bed in lowest position, side rails up x2, patient instructed to call for assistance, DME at bedside.

## 2025-06-16 NOTE — ANESTHESIA POSTPROCEDURE EVALUATION
Anesthesia Post Evaluation    Patient: Raine Medley    Procedure(s) Performed: Procedure(s) (LRB):  ARTHROPLASTY, KNEE, TOTAL, USING [employer] COMPUTER-ASSISTED NAVIGATION: LEFT: DEPUY - ATTUNE (Left)    Final Anesthesia Type: spinal      Patient location during evaluation: PACU  Patient participation: Yes- Able to Participate  Level of consciousness: awake and alert and oriented  Post-procedure vital signs: reviewed and stable  Pain management: adequate  Airway patency: patent    PONV status at discharge: nausea (controlled) and vomiting (controlled) (One episode of emesis with patient endorsing gas like abdominal pains, resolved with TUMs and simethicone tablet)  Anesthetic complications: no      Cardiovascular status: blood pressure returned to baseline  Respiratory status: unassisted, spontaneous ventilation and room air  Hydration status: euvolemic  Follow-up not needed.              Vitals Value Taken Time   /68 06/16/25 16:21   Temp 36.1 °C (96.9 °F) 06/16/25 16:21   Pulse 66 06/16/25 16:21   Resp 16 06/16/25 16:21   SpO2 96 % 06/16/25 16:21         Event Time   Out of Recovery 16:14:00         Pain/Araseli Score: Pain Rating Prior to Med Admin: 8 (6/16/2025  5:07 PM)  Pain Rating Post Med Admin: 0 (6/16/2025  4:21 PM)  Araseli Score: 10 (6/16/2025  4:00 PM)

## 2025-06-16 NOTE — ANESTHESIA PROCEDURE NOTES
Spinal    Diagnosis: osteoarthritis  Patient location during procedure: OR  Start time: 6/16/2025 12:36 PM  Timeout: 6/16/2025 12:35 PM  End time: 6/16/2025 12:39 PM    Staffing  Authorizing Provider: Tyrone Braswell MD  Performing Provider: Tyrone Braswell MD    Staffing  Performed by: Tyrone Braswell MD  Authorized by: Tyrone Braswell MD    Spinal Block  Patient position: sitting  Prep: ChloraPrep  Patient monitoring: heart rate, cardiac monitor, continuous pulse ox and frequent blood pressure checks  Approach: midline  Location: L3-4  Injection technique: single shot  CSF Fluid: clear free-flowing CSF  Needle  Needle type: pencil-tip   Needle gauge: 25 G  Needle length: 3.5 in  Additional Documentation: negative aspiration for heme and no paresthesia on injection  Needle localization: anatomical landmarks  Assessment  Ease of block: easy  Patient's tolerance of the procedure: comfortable throughout block and no complaints

## 2025-06-16 NOTE — ANESTHESIA PREPROCEDURE EVALUATION
06/16/2025  Raine Medley is a 70 y.o., female.    Procedure: ARTHROPLASTY, KNEE, TOTAL, USING [employer] COMPUTER-ASSISTED NAVIGATION: LEFT: DEPUY - ATTUNE (Left: Knee)   Anesthesia type: Regional   Diagnosis: Primary osteoarthritis of left knee [M17.12]     Patient Active Problem List   Diagnosis    Elevated blood pressure reading    Encounter for medical examination to establish care    Discharge from breast    Chronic pain of left knee    Healthcare maintenance    Heart murmur    Primary hypertension    History of GI bleed    Primary osteoarthritis of left knee    Snoring    Constipation     Past Surgical History:   Procedure Laterality Date    CHOLECYSTECTOMY      COLONOSCOPY N/A 1/3/2019    Procedure: COLONOSCOPY;  Surgeon: Gaston Beebe MD;  Location: [facility] ENDO [address]);  Service: Endoscopy;  Laterality: N/A;    KNEE JOINT MANIPULATION Right 8/21/2023    Procedure: MANIPULATION, KNEE: RIGHT: SAME DAY;  Surgeon: Sanford Calvert III, MD;  Location: [facility];  Service: Orthopedics;  Laterality: Right;    TOTAL KNEE ARTHROPLASTY Right 6/5/2023    Procedure: ARTHROPLASTY, KNEE, TOTAL: RIGHT: DEPUY - ATTUNE;  Surgeon: Sanford Calvert III, MD;  Location: [facility];  Service: Orthopedics;  Laterality: Right;     Medication List with Changes/Refills   Current Medications    ACETAMINOPHEN (TYLENOL) 650 MG TBSR    Take 1 tablet (650 mg total) by mouth every 8 (eight) hours.    AMLODIPINE (NORVASC) 5 MG TABLET    Take 1 tablet (5 mg total) by mouth once daily.    APIXABAN (ELIQUIS) 2.5 MG TAB    Take 1 tablet (2.5 mg total) by mouth 2 (two) times daily.    JICLN-MOTB-FUHHK-COLLAG-MV-MIN (FLACO, WITH COLLAGEN,) 7-7-1.5 GRAM PWPK    Take 1 PACKET by mouth once daily.    ASCORBIC ACID, VITAMIN C, (VITAMIN C) 500 MG TABLET    Take 2 tablets (1,000 mg total) by mouth 2 (two) times daily. for 14  days    BLOOD PRESSURE MONITOR KIT    1 each by Misc.(Non-Drug; Combo Route) route once daily.    CEFADROXIL (DURICEF) 500 MG CAP    Take 1 capsule (500 mg total) by mouth every 12 (twelve) hours.    FOOD SUPPLEMT, LACTOSE-REDUCED (ENSURE) LIQD    Drink one bottle daily for 14 days.    HYDROMORPHONE (DILAUDID) 2 MG TABLET    Take 1-2 tablets every 4-6 hours as needed for pain.    METHOCARBAMOL (ROBAXIN) 750 MG TAB    Take 1 tablet (750 mg total) by mouth 4 (four) times daily as needed (muscle spasms).    MULTIVITAMIN (THERAGRAN) PER TABLET    Take 1 tablet by mouth once daily.    PANTOPRAZOLE (PROTONIX) 40 MG TABLET    Take 1 tablet (40 mg total) by mouth once daily.    POLYETHYLENE GLYCOL (GLYCOLAX) 17 GRAM/DOSE POWDER    Take 17 g by mouth once daily.    PREGABALIN (LYRICA) 75 MG CAPSULE    Take 1 capsule (75 mg total) by mouth 2 (two) times daily.    TRANEXAMIC ACID (LYSTEDA) 650 MG TABLET    Take 3 tablets (1,950 mg total) by mouth once daily.   Discontinued Medications    ACETAMINOPHEN (TYLENOL) 500 MG TABLET    Take 500 mg by mouth as needed for Pain.         Pre-op Assessment    I have reviewed the Patient Summary Reports.     I have reviewed the Nursing Notes. I have reviewed the NPO Status.   I have reviewed the Medications.     Review of Systems  Anesthesia Hx:             Denies Family Hx of Anesthesia complications.    Denies Personal Hx of Anesthesia complications.                    Social:  Non-Smoker, No Alcohol Use           Physical Exam    Airway:  Mallampati: II / II  Mouth Opening: Normal  TM Distance: Normal  Tongue: Normal  Neck ROM: Normal ROM    Dental:  Intact      Anesthesia Assessment: Preoperative EQUATION    Planned Procedure: Procedure(s) (LRB):  ARTHROPLASTY, KNEE, TOTAL, USING [employer] COMPUTER-ASSISTED NAVIGATION: [facility]  Requested Anesthesia Type:Regional  Surgeon: Sanford Calvert III, MD  Service: Orthopedics  Known or anticipated Date of  Surgery:6/16/2025    Surgeon notes: reviewed    Electronic QUestionnaire Assessment completed via nurse interview with patient.        Triage considerations:     The patient has no apparent active cardiac condition (No unstable coronary Syndrome such as severe unstable angina or recent [<1 month] myocardial infarction, decompensated CHF, severe valvular   disease or significant arrhythmia)    Previous anesthesia records:GETA, MAC, and CSE.      Patient reports slow to awake with gallbladder surgery and reports no family history of anesthesia complications.    08/21/23  MANIPULATION, KNEE: RIGHT: SAME DAY (Right: Knee)   Airway:  Mallampati: II   Mouth Opening: Normal  TM Distance: Normal  Tongue: Normal  Neck ROM: Normal ROM    06/05/23  ARTHROPLASTY, KNEE, TOTAL: RIGHT: DEPUY - ATTUNE (Right: Knee)   Airway:  Mallampati: II / I  Mouth Opening: Normal  TM Distance: Normal  Tongue: Normal  Neck ROM: Normal ROM  Spinal Block  Patient position: sitting  Prep: ChloraPrep  Patient monitoring: heart rate, continuous pulse ox and frequent blood pressure checks  Approach: midline  Location: L4-5  Injection technique: single shot  CSF Fluid: clear free-flowing CSF    Last PCP note: within 1 month , within [facility]   Subspecialty notes: Ortho  OBGYN     Dr. Byrd  Derm        Dr. Hernandez    Other important co-morbidities: HLD and HTN, childhood asthma, GI bleed post RTKA after taking asa BID x 1.5 mos (required 2u PRBC)      Tests already available:  Available tests,  within [facility] .   11/12/24   CBC  CMP     EKG     Echo   CXR  01/27/24   A1C  08/19/22   Xray Lumbar spine  03/05/14   Xray Cervical spine            Instructions given. (See in Nurse's note)    Optimization:  Anesthesia Preop Clinic Assessment  Indicated Will schedule POC.    Medical Opinion Indicated       Sub-specialist consult indicated:   [facility] NP.       Plan:    Testing:  CBC, CMP, and PT/INR  (previously ordered by PCP), EKG    Pre-anesthesia  visit       Visit focus: possible regional anesthesia and/or nerve block      Consultation:.PCC NP for medical and anesthesia optimization.     Patient  has previously scheduled Medical Appointment: 5/16   6/10    Navigation: Tests Scheduled.              Consults scheduled.             Results will be tracked by Preop Clinic.      05/06/25     Ortho/ Dr. Calvert  Messaged PCP and GI NP re: colonoscopy/EGD recs from 2023     Update:         ----- Message from April Hairston MD sent at 5/6/2025  3:38 PM CDT -----  Regarding: Preop clearance  Dear Dr. Hobbs called and saw Ms. Medley today in our office after we received your message.I am not worried about a GI bleed at this time.However her BP is not controlled because she wasn't taking her BP meds.I have resumed the meds, educated patient, ordered preop labs and I will see her again for her preop on 6/10I believe she will be OK to have her surgery, no problem, I just wanted to let you know.No need for PPI either.Thank youClaudia                     Patient is pending optimization better BP control- readings due 5/23 5/23/25: Left voicemail for home BP readings  5/26/25: Left voicemail for home BP readings        Constantin Flores NP  Perioperative Medicine  [facility]             Electronically signed by Constantin Flores NP at 5/26/2025  4:51 PM     From: Belle Klein RN   Sent: 6/9/2025   3:26 PM CDT   To: Constantin Flores NP   Subject: B/P f/u readings                                 Samuel Killian     Called and spoke with patient   B/P readings were given:   6/5/25   am  123/71                pm  141/79     6/6/25   afternoon   141/71   6/7/25   afternoon   147/74   6/8/25   afternoon   146/86   Today    am             134/86     06/09/25 5/23/25: Left voicemail for home BP readings  5/26/25: Left voicemail for home BP readings  6/2/25: Staff nurse left another voicemail     6/9/25: Received six home BP readings:  systolic range (123-147) and diastolic range (71-86); average reading is 139/78 and acceptable for surgery. Recommend continued follow-up with PCP.     Patient is optimized for surgery.         Constantin Flores NP  Perioperative Medicine  [facility]             Electronically signed by Constantin Flores NP at 6/9/2025  5:51 PM       Anesthesia Plan  Type of Anesthesia, risks & benefits discussed:    Anesthesia Type: Gen Natural Airway, Spinal  Intra-op Monitoring Plan: Standard ASA Monitors  Post Op Pain Control Plan: multimodal analgesia and IV/PO Opioids PRN  Induction:  IV  Airway Plan: Direct  Informed Consent: Informed consent signed with the Patient and all parties understand the risks and agree with anesthesia plan.  All questions answered.   ASA Score: 2    Ready For Surgery From Anesthesia Perspective.   .

## 2025-06-16 NOTE — PT/OT/SLP PROGRESS
Occupational Therapy      Patient Name:  Raine Medley   MRN:  [id number]    Patient not seen today secondary to pt c increased pain. Will follow-up tomorrow.    6/16/2025

## 2025-06-16 NOTE — TRANSFER OF CARE
"Anesthesia Transfer of Care Note    Patient: Raine Medley    Procedure(s) Performed: Procedure(s) (LRB):  ARTHROPLASTY, KNEE, TOTAL, USING [employer] COMPUTER-ASSISTED NAVIGATION: LEFT: DEPUY - ARABELLA (Left)    Patient location: PACU    Anesthesia Type: MAC and spinal    Transport from OR: Transported from OR on 6-10 L/min O2 by face mask with adequate spontaneous ventilation    Post pain: adequate analgesia    Post assessment: no apparent anesthetic complications    Post vital signs: stable    Level of consciousness: alert, awake and oriented    Nausea/Vomiting: no nausea/vomiting    Complications: none    Transfer of care protocol was followed      Last vitals: Visit Vitals  BP (!) 141/70 (BP Location: Right arm, Patient Position: Lying)   Pulse 62   Temp 37.1 °C (98.8 °F) (Oral)   Resp 16   Ht 5' 1" (1.549 m)   Wt 62.6 kg (138 lb)   SpO2 96%   Breastfeeding No   BMI 26.07 kg/m²     "

## 2025-06-16 NOTE — NURSING
Patient arrived to unit AAOx3, In hospital bed from PACU. VSS and IV saline locked. Dressing to left knee, CDI. See assessment. Patient oriented to room. Bed in lowest position, side rails up x2, call light within reach, patient instructed to call for assistance, verbalized understanding. Will continue to monitor.     Nurses Note -- 4 Eyes      6/16/2025   1950      Skin assessed during: Admit      [x] No Altered Skin Integrity Present    []Prevention Measures Documented      [] Yes- Altered Skin Integrity Present or Discovered   [] LDA Added if Not in Epic (Describe Wound)   [] New Altered Skin Integrity was Present on Admit and Documented in LDA   [] Wound Image Taken    Wound Care Consulted? No    Attending Nurse:  STEPHANIE Young     Second RN/Staff Member:   STEPHANIE Vergara

## 2025-06-16 NOTE — PLAN OF CARE
Preop complete. States walker in the car. Daughter at BS. Call light in reach,side rails up, bed in lowest position

## 2025-06-16 NOTE — OP NOTE
Enrike Left TKA  OPERATIVE NOTE    Date of Operation:   6/16/2025    Providers Performing:   Surgeon(s):  Sanford Calvert III, MD  Assistant: Davion Quijano PA-C, I certify that the services for which payment is claimed were medically necessary, and that no qualified resident was available to perform the services.      Operative Procedure:   Left Total Knee Arthroplasty, using [employer] GLIIF robotic assisted solution, CPT 37922  Computer-assisted surgical navigational procedure for musculoskeletal procedures, image-less, CPT 06689  Surgical techniques requiring use of robotic surgical system, CPT     Preoperative Diagnosis:   Left Knee Osteoarthritis, ICD-10 M17.12    Postoperative Diagnosis:   SAME    Components Used:   Depuy  Femur: Attune PS Size 3  Tibia: Attune all poly AOX PS Size 3 x  6mm  Patella: Attune Medialized Dome 32 mm    2 packs cement: Simplex P    Implant Name Type Inv. Item Serial No.  Lot No. LRB No. Used Action   CEMENT BONE SURG SMPLX P RADPQ - [id number]  CEMENT BONE SURG SMPLX P RADPQ  [email or web address]. [id number] Left 2 Implanted   COMP ATTUNE POS RHODA L SZ3 - [id number]  COMP ATTUNE POS RHODA L SZ3  [employer] [id number] Left 1 Implanted   PATELLA ATTUNE MED 32MM - [id number]  PATELLA ATTUNE MED 32MM  [employer] [id number] Left 1 Implanted   INSERT ATTUNE POST 6MM SZ3 - [id number]  INSERT ATTUNE POST 6MM SZ3  [employer] [id number] Left 1 Implanted       Anesthesia:   Spinal    MALLIKA cocktail: Calvert Block, no toradol    Estimated Blood Loss:   350 cc    Specimens:   None    Complications:   None    Indications:   The patient has failed non-operative measures including medications, injections and activity modifications for their knee.  After an explanation of risks and benefits of knee arthroplasty surgery, the patient wishes to proceed with surgery.    Operative Notes:   The patient was greeted in the pre-op holding area.  The patients knee was marked with a surgical marker by the surgeon.   Anesthesia per above technique was administered by the anesthesia team.  The patient was placed in the supine position on the OR table with all bony prominences padded.  A tourniquet was not used.  IV antibiotics were administered.  The leg was prepped and draped in the usual sterile fashion.  A timeout was performed and the correct patient, site and procedure were identified.    A midline incision was made with a standard medial parapatellar arthrotomy.  The standard medial capsular release was performed along with the lateral gutter.  The patella was mobilized from the lateral parapatellar ligament and bony osteophytes were removed.  The intercondylar notch was cleared of the ACL and PCL.    The femoral and tibial guide pins where placed and the arrays were positioned and tightened to the pins. The hip was then brought through a range of motion and the hip center was identified, medial and lateral malleolus were identified and the tibia and femur were registered.     Using a tibia first with tensioner technique the joint was first tensioned manually in extension and flexion and through the full range of motion to define our gaps. Provisional cuts/implants were positioned virtually for appropriate size gaps and implant placement.    The VELYS robot was then brought into position and checkpoints were confirmed. Care was taken during the burring process to protect the soft tissue. We cut the tibia. The tensioner was then placed in the joint and the knee was again extension and flexion and through the full range of motion to define our gaps. The cuts/implants were positioned virtually for appropriate size gaps and implant placement and the femoral cuts were then made.     The PS box was cut. Meniscal remnants were removed and the knee was brought into flexion and posterior osteophytes were removed.      At this time the trial implants were placed and knee assessed for stability, and flexion arc. The patella was  prepared using free-hand technique and the three-holed lug drill guide was sized and appropriately assessed. Patella tracking was found to be acceptable. The tibial component rotation was marked. The trials and guide pins were removed. The tibial component was positioned and the keel was drilled and punched.    The wound was copiously irrigated with pulsitile lavage and the bony surfaces dried.  Cement was mixed on the back table and applied to all components.  Cementation of the tibial, femoral, and patellar components was performed sequentially with removal of all excess cement. While the cement dried and a 0.35% betadine solution was left to soak in the surgical field.     After the cement was dry hemostasis was obtained with bovie electrocautery.  The knee was again copiously irrigated with pulsatile lavage.     1 gram of vancomycin powder was placed in the knee. The arthrotomy was closed with interrupted #1 vicryl suture and #2 quill, the subcuticular layer was closed with 2-0 vicryl suture and a running 4-0 monocryl was used to closed the skin surface.  Pin sites were closed with 4-0 monocryl and skin glue. Surgicel sealant was placed over the top of the incision and sterile dressing placed over the wound. Appropriately sized TEDs hose were placed for edema control.     Sponge and needle counts were correct.    The first-assistant was critical to all steps of the operation, including retraction and leg stabilization during exposure and bone preparation, as well as the deep and superficial wound closure.  Dr. Calvert performed and/or supervised the key portions of this surgical procedure, including evaluation of the bone cuts, reshaping of the bony elements, and insertion of the provisional and final components.    Postoperative Plan:  DVT Prophylaxis: The patient will be anticoagulated with Eliquis 2.5mg BID x30 days starting 0900 POD#1  They will receive 24 hours of post-operative antibiotics.    Activity will  be weight bearing as tolerated.    No aspirin  No celebrex  Dilaudid  Lyrica  Po txa 1950mg QD x3 days    Pour/bethanechol  Nutrition support    Due patient and or surgical factors the patient will receive an Rx for cefadroxil 500mg BID x7 days after discharge per [place] data:   Shivam MM, Dunia VALENTINE, Ayaan M, Rufino RM. [facility] Clinical Research Award: Extended Oral Antibiotics Prevent Periprosthetic Joint Infection in High-Risk Cases: 3855 Patients With 1-Year Follow-Up. J Arthroplasty. 2021 Jul;36(7S):S18-S25. doi: [email or web address]/[email or web address]. Epub 2021 Jan 23. PMID: [id number].      Signed by: Sanford Calvert III, MD

## 2025-06-16 NOTE — PLAN OF CARE
Care plan reviewed w/ pt and family at bedside. AVSS on RA. L knee dressing CDI. Spinal resolved, pt able to move BLE. Xrays done. Pain controlled w/ PRN medications. Report given to Hannah in [facility] Suites. Pt transferred to room 316.

## 2025-06-16 NOTE — H&P
CC:  Left knee pain     Raine Medley is a 70 y.o. female with history of Left knee pain. Pain is worse with activity and weight bearing.  Patient has experienced interference of activities of daily living due to decreased range of motion and an increase in joint pain and swelling.  Patient has failed non-operative treatment including NSAIDs, corticosteroid injections, viscosupplement injections, and activity modification.  Raine Medley currently ambulates independently.      Relevant medical conditions of significance in perioperative period:  HTN- on medication managed by pcp             Past Medical History:   Diagnosis Date    Gastroesophageal reflux disease without esophagitis 07/19/2023    History of blood transfusion 2023     x 2 RBC    Hyperlipidemia      Inverted nipple      Microcytic anemia 05/15/2023    Mild intermittent asthma, uncomplicated       as a child    Primary hypertension 11/12/2024    Primary osteoarthritis of right knee 05/11/2023    Status post right knee replacement 08/21/2023               Past Surgical History:   Procedure Laterality Date    CHOLECYSTECTOMY        COLONOSCOPY N/A 1/3/2019     Procedure: COLONOSCOPY;  Surgeon: Gaston Beebe MD;  Location: [address];  Service: Endoscopy;  Laterality: N/A;    KNEE JOINT MANIPULATION Right 8/21/2023     Procedure: MANIPULATION, KNEE: RIGHT: SAME DAY;  Surgeon: Sanford Calvert III, MD;  Location: [facility];  Service: Orthopedics;  Laterality: Right;    TOTAL KNEE ARTHROPLASTY Right 6/5/2023     Procedure: ARTHROPLASTY, KNEE, TOTAL: RIGHT: DEPUY - ATTUNE;  Surgeon: Sanford Calvert III, MD;  Location: [facility];  Service: Orthopedics;  Laterality: Right;                Family History   Problem Relation Name Age of Onset    No Known Problems Mother        No Known Problems Father        No Known Problems Sister        No Known Problems Daughter        No Known Problems Son        No Known Problems Son        No  "Known Problems Son        Melanoma Neg Hx        Lupus Neg Hx        Psoriasis Neg Hx        Colon cancer Neg Hx        Esophageal cancer Neg Hx        Stomach cancer Neg Hx                   Review of patient's allergies indicates:   Allergen Reactions    Aspirin         Increased risk of bleeding         [Current Medications]    [Current Medications]     Current Outpatient Medications:     acetaminophen (TYLENOL) 500 MG tablet, Take 500 mg by mouth as needed for Pain., Disp: , Rfl:     amLODIPine (NORVASC) 5 MG tablet, Take 1 tablet (5 mg total) by mouth once daily., Disp: 90 tablet, Rfl: 3    blood pressure monitor Kit, 1 each by Misc.(Non-Drug; Combo Route) route once daily., Disp: 1 each, Rfl: 0     Review of Systems:  Constitutional: no fever or chills  Eyes: no visual changes  ENT: no nasal congestion or sore throat  Respiratory: no cough or shortness of breath  Cardiovascular: no chest pain or palpitations  Gastrointestinal: no nausea or vomiting, tolerating diet  Genitourinary: no hematuria or dysuria  Integument/Breast: no rash or pruritis  Hematologic/Lymphatic: no easy bruising or lymphadenopathy  Musculoskeletal: positive for knee pain  Neurological: no seizures or tremors  Behavioral/Psych: no auditory or visual hallucinations  Endocrine: no heat or cold intolerance     PE:  Ht 5' 0.24" (1.53 m)   Wt 62.1 kg (136 lb 12.7 oz)   BMI 26.51 kg/m²   General: Pleasant, cooperative, NAD   Gait: antalgic  HEENT: NCAT, sclera nonicteric   Lungs: Respirations clear bilaterally; equal and unlabored.   CV: S1S2; 2+ bilateral upper and lower extremity pulses.   Skin: Intact throughout with no rashes, erythema, or lesions  Extremities: No LE edema,  no erythema or warmth of the skin in either lower extremity.     Left knee exam:  Knee Range of Motion:0-110, pain with passive range of motion  Effusion:none  Condition of skin:intact  Location of tenderness:Medial joint line   Strength:normal  Stability: stable to " testing     Hip Examination: painless PROM of hip      Radiographs: Radiographs reveal advanced degenerative changes including subchondral cyst formation, subchondral sclerosis, osteophyte formation, joint space narrowing.      Knee Alignment:  Mild varus     Diagnosis: Primary osteoarthritis Left knee     Plan: Left total knee arthroplasty     Due to the serious nature of total joint infection and the high prevalence of community acquired MRSA, vancomycin will be used perioperatively.

## 2025-06-16 NOTE — PT/OT/SLP EVAL
ORTHOPEDIC SURGERY  Vlad Pugh PA-C    PRIMARY CARE PHYSICIAN:  Orlando Manuel MD    CHIEF COMPLAINT:    Chief Complaint   Patient presents with   • Knee Pain     Rt, pn improved, stopped PT about a month ago so now does HEP, pn at 0, pn depends on activity, some swelling still depending on activity, no pn meds,    • Office Visit        HISTORY OF PRESENT ILLNESS:  Ms. Oliveira is a 48 year old female who presents today 6 months post-op status post Arthroscopic medial meniscus repair and open medial collateral ligament repair right knee.  She is doing well.  Therapy discharged a few weeks ago.  Continues with Home Exercise Program.  Increasing activity including biking.  Minimal if any pain.  Does get some fatigue with activity.      REVIEW OF SYSTEMS:  Constitutional:  Patient denies fever, chills, tiredness or malaise.  Respiratory:  Denies cough, shortness of breath. Denies history of problems with intubation or anesthesia.  Cardiovascular:  Denies chest pain, edema.   Musculoskeletal:  Negative except HPI.    Current Outpatient Medications   Medication Sig   • buPROPion (WELLBUTRIN SR) 150 MG 12 hr tablet Take 2 tablets by mouth daily.   • amLODIPine (NORVASC) 5 MG tablet TAKE 1 TABLET BY MOUTH EVERY DAY   • hydroxychloroquine (PLAQUENIL) 200 MG tablet Take 1 tablet by mouth daily.   • albuterol 108 (90 Base) MCG/ACT inhaler Inhale 2 puffs into the lungs every 4 hours as needed for Shortness of Breath or Wheezing.   • sertraline (ZOLOFT) 100 MG tablet Take 1.5 tablets by mouth daily.   • traZODone (DESYREL) 50 MG tablet Take 1 tablet by mouth nightly.   • metoPROLOL succinate (TOPROL-XL) 100 MG 24 hr tablet Take 1 tablet by mouth daily.   • MAGNESIUM OXIDE PO Take 250 mg by mouth daily.     No current facility-administered medications for this visit.       ALLERGIES:  No Known Allergies      PHYSICAL EXAMINATION:     Visit Vitals  Temp 97.3 °F (36.3 °C) (Temporal)   Ht 5' 8\" (1.727 m) Comment: stated   Wt  Physical Therapy Evaluation and Treatment    Patient Name: Raine Medley   MRN: [id number]  Recent Surgery: Procedure(s) (LRB):  ARTHROPLASTY, KNEE, TOTAL, USING [employer] COMPUTER-ASSISTED NAVIGATION: LEFT: DEPUY - ATTUNE (Left) * Day of Surgery *    Recommendations:     Discharge Recommendations: Low Intensity Therapy   Discharge Equipment Recommendations: none   Barriers to discharge: None    Assessment:     Raine Medley is a 70 y.o. female admitted with a medical diagnosis of Primary osteoarthritis of left knee. She presents with the following impairments/functional limitations: weakness, impaired functional mobility, gait instability, impaired balance, pain, decreased ROM, decreased lower extremity function, orthopedic precautions. Patient tolerated PT session fair due to being sleepy and left knee pain. Patient ambulated 20ft with RW and contact guard assistance. Limited in distance due to left knee pain.     Rehab Prognosis: Good; patient would benefit from acute PT services to address these deficits and reach maximum level of function.    Plan:     During this hospitalization, patient to be seen daily to address the above listed problems via gait training, therapeutic activities, therapeutic exercises, neuromuscular re-education    Plan of Care Expires: 06/20/25    Subjective     Chief Complaint: Nausea. Pain increasing after ambulation.   Patient Comments/Goals: To walk.  Pain/Comfort:  Pain Rating 1: 3/10 (but increased after ambulation and patient did not rate with number)  Location - Side 1: Left  Location 1: knee  Pain Addressed 1: Pre-medicate for activity, Reposition, Distraction, Nurse notified, Cessation of Activity    Social History:  Living Environment: Patient is going to her daughter's house which is a [facility] with 1 step to enter.   Prior Level of Function: Prior to admission, patient was independent for functional mobility.  Equipment at Home from previous surgery: walker, rolling,  77.1 kg (170 lb) Comment: stated   LMP 09/20/2022 (Exact Date)   BMI 25.85 kg/m²     PE:  Surgical incision(s) is(are) well healed and without erythema, drainage or signs of infection.  Minimal swelling.  Intact to medial and lateral stress.  ROM 0-130 .  Negative Anterior Drawer.   Negative Pranav's.  Strength against resistance hamstring, quadriceps 5/5.  Calf on operative leg is soft and non-tender to palpation.  Negative Giovani's sign.  Neurovascular intact.    RADIOGRAPHS:   none    ASSESSMENT AND PLAN:  Diagnosis:  1. S/P arthroscopy of knee    2. Tear of medial collateral ligament of right knee, subsequent encounter      Assessment:    Continues to improve.  Encouraged to be consistent with Home Exercise Program and increasing activity level as tolerated.  Will follow up on an as needed basis.      All of the patient's questions were answered    Supervising Physician:  Dr. Jose Cooper DO      CC: Orlando Manuel MD     7/25/2023  11:20 AM             bedside commode, shower chair, cane, straight  Assistance Upon Discharge: family    Objective:     Communicated with RN prior to session. Patient found HOB elevated with cryotherapy, FCD upon PT entry to room. Daughter and daughter in law present in room.    General Precautions: Standard, fall   Orthopedic Precautions: LLE weight bearing as tolerated   Braces: N/A    Respiratory Status: Room air    Exams:  RLE ROM: WFL  RLE Strength: WFL  LLE ROM: WFL except limited at knee due to pain  LLE Strength: appears WFL but did not formally assess due to pain and recent surgery  Cognitive: Patient is oriented to Person, Place, Time, Situation    Functional Mobility:  Gait belt applied - Yes  Bed Mobility  Scooting: contact guard assistance  Supine to Sit: contact guard assistance   Transfers  Sit to Stand: contact guard assistance with rolling walker x1 from bed   Gait  Patient ambulated 20ft with rolling walker and contact guard assistance. Patient required cues for position in walker, sequencing, and weight bearing status to increase independence and safety. Limited due to left knee pain.     Therapeutic Activities and Exercises:  Patient and family educated in:  -PT role and POC  -safety with transfers including hand placement  -gait sequencing and RW management  -out of bed activity to maximize recovery including ambulating with nursing staff assistance and RW while in the hospital     AM-PAC 6 CLICK MOBILITY  Total Score:17    Patient left up in chair with all lines intact, call button in reach, RN notified, and family present.    GOALS:   Multidisciplinary Problems       Physical Therapy Goals          Problem: Physical Therapy    Goal Priority Disciplines Outcome Interventions   Physical Therapy Goal     PT, PT/OT Progressing    Description: Goals to be met by: 25     Patient will increase functional independence with mobility by performin. Supine to sit with supervision  2. Sit to stand transfer with  Supervision  3. Gait x200 feet with Supervision using Rolling Walker  4. Ascend/Descend 1 curb step with Stand by assistance using Rolling Walker  5. Lower extremity exercise program x10 reps per handout, with supervision                        History:     Past Medical History:   Diagnosis Date    Gastroesophageal reflux disease without esophagitis 07/19/2023    History of blood transfusion 2023    x 2 RBC    Hyperlipidemia     Inverted nipple     Microcytic anemia 05/15/2023    Mild intermittent asthma, uncomplicated     as a child    Primary hypertension 11/12/2024    Primary osteoarthritis of right knee 05/11/2023    Status post right knee replacement 08/21/2023       Past Surgical History:   Procedure Laterality Date    ARTHROPLASTY, KNEE, TOTAL, USING COMPUTER-ASSISTED NAVIGATION Left 6/16/2025    Procedure: ARTHROPLASTY, KNEE, TOTAL, USING [employer] COMPUTER-ASSISTED NAVIGATION: LEFT: DEPUY - ATTUNE;  Surgeon: Sanford Calvert III, MD;  Location: [facility];  Service: Orthopedics;  Laterality: Left;    CHOLECYSTECTOMY      COLONOSCOPY N/A 1/3/2019    Procedure: COLONOSCOPY;  Surgeon: Gaston Beebe MD;  Location: [address]);  Service: Endoscopy;  Laterality: N/A;    KNEE JOINT MANIPULATION Right 8/21/2023    Procedure: MANIPULATION, KNEE: RIGHT: SAME DAY;  Surgeon: Sanford Calvert III, MD;  Location: [facility];  Service: Orthopedics;  Laterality: Right;    TOTAL KNEE ARTHROPLASTY Right 6/5/2023    Procedure: ARTHROPLASTY, KNEE, TOTAL: RIGHT: DEPUY - ATTUNE;  Surgeon: Sanford Calvert III, MD;  Location: [facility];  Service: Orthopedics;  Laterality: Right;       Time Tracking:     PT Received On: 06/16/25  PT Start Time: 1636  PT Stop Time: 1651  PT Total Time (min): 15 min     Billable Minutes: Evaluation 7 and Gait Training 8    6/16/2025

## 2025-06-16 NOTE — PLAN OF CARE
Patient tolerated PT session fair due to being sleepy and left knee pain. Patient ambulated 20ft with RW and contact guard assistance. Limited in distance due to left knee pain.     Problem: Physical Therapy  Goal: Physical Therapy Goal  Description: Goals to be met by: 25     Patient will increase functional independence with mobility by performin. Supine to sit with supervision  2. Sit to stand transfer with Supervision  3. Gait x200 feet with Supervision using Rolling Walker  4. Ascend/Descend 1 curb step with Stand by assistance using Rolling Walker  5. Lower extremity exercise program x10 reps per handout, with supervision     Outcome: Progressing

## 2025-06-16 NOTE — OPERATIVE NOTE ADDENDUM
Certification of Assistant at Surgery       Surgery Date: 6/16/2025     Participating Surgeons:  Surgeons and Role:     * Sanford Calvert III, MD - Primary    Procedures:  Procedure(s) (LRB):  ARTHROPLASTY, KNEE, TOTAL, USING [employer] COMPUTER-ASSISTED NAVIGATION: LEFT: YNESUY - ARABELLA (Left)    Assistant Surgeon's Certification of Necessity:  I understand that section 1842 (b) (6) (d) of the Social Security Act generally prohibits Medicare Part B reasonable charge payment for the services of assistants at surgery in teaching hospitals when qualified residents are available to furnish such services. I certify that the services for which payment is claimed were medically necessary, and that no qualified resident was available to perform the services. I further understand that these services are subject to post-payment review by the Medicare carrier.      Davion Quijano PA-C    06/16/2025  2:47 PM

## 2025-06-17 VITALS
OXYGEN SATURATION: 97 % | SYSTOLIC BLOOD PRESSURE: 156 MMHG | BODY MASS INDEX: 26.06 KG/M2 | TEMPERATURE: 99 F | DIASTOLIC BLOOD PRESSURE: 78 MMHG | WEIGHT: 138 LBS | HEIGHT: 61 IN | RESPIRATION RATE: 18 BRPM | HEART RATE: 73 BPM

## 2025-06-17 PROCEDURE — 97116 GAIT TRAINING THERAPY: CPT | Mod: CQ

## 2025-06-17 PROCEDURE — 94761 N-INVAS EAR/PLS OXIMETRY MLT: CPT

## 2025-06-17 PROCEDURE — 97110 THERAPEUTIC EXERCISES: CPT | Mod: CQ

## 2025-06-17 PROCEDURE — 99499 UNLISTED E&M SERVICE: CPT | Mod: FS,,, | Performed by: STUDENT IN AN ORGANIZED HEALTH CARE EDUCATION/TRAINING PROGRAM

## 2025-06-17 PROCEDURE — 63600175 PHARM REV CODE 636 W HCPCS: Performed by: NURSE PRACTITIONER

## 2025-06-17 PROCEDURE — 25000003 PHARM REV CODE 250: Performed by: NURSE PRACTITIONER

## 2025-06-17 PROCEDURE — 25000003 PHARM REV CODE 250: Performed by: PHYSICIAN ASSISTANT

## 2025-06-17 PROCEDURE — 25000003 PHARM REV CODE 250: Performed by: STUDENT IN AN ORGANIZED HEALTH CARE EDUCATION/TRAINING PROGRAM

## 2025-06-17 PROCEDURE — 99900035 HC TECH TIME PER 15 MIN (STAT)

## 2025-06-17 PROCEDURE — 97165 OT EVAL LOW COMPLEX 30 MIN: CPT

## 2025-06-17 PROCEDURE — 25000003 PHARM REV CODE 250

## 2025-06-17 PROCEDURE — 97535 SELF CARE MNGMENT TRAINING: CPT

## 2025-06-17 RX ADMIN — POLYETHYLENE GLYCOL 3350 17 G: 17 POWDER, FOR SOLUTION ORAL at 08:06

## 2025-06-17 RX ADMIN — SENNOSIDES AND DOCUSATE SODIUM 1 TABLET: 50; 8.6 TABLET ORAL at 12:06

## 2025-06-17 RX ADMIN — ACETAMINOPHEN 1000 MG: 500 TABLET ORAL at 06:06

## 2025-06-17 RX ADMIN — Medication 400 ML: at 02:06

## 2025-06-17 RX ADMIN — MUPIROCIN 1 G: 20 OINTMENT TOPICAL at 08:06

## 2025-06-17 RX ADMIN — HYDROMORPHONE HYDROCHLORIDE 2 MG: 2 TABLET ORAL at 12:06

## 2025-06-17 RX ADMIN — OXYCODONE HYDROCHLORIDE AND ACETAMINOPHEN 1000 MG: 500 TABLET ORAL at 08:06

## 2025-06-17 RX ADMIN — THERA TABS 1 TABLET: TAB at 08:06

## 2025-06-17 RX ADMIN — ACETAMINOPHEN 1000 MG: 500 TABLET ORAL at 12:06

## 2025-06-17 RX ADMIN — METHOCARBAMOL 750 MG: 750 TABLET ORAL at 08:06

## 2025-06-17 RX ADMIN — APIXABAN 2.5 MG: 2.5 TABLET, FILM COATED ORAL at 08:06

## 2025-06-17 RX ADMIN — CEFAZOLIN 2 G: 2 INJECTION, POWDER, FOR SOLUTION INTRAMUSCULAR; INTRAVENOUS at 05:06

## 2025-06-17 RX ADMIN — SENNOSIDES AND DOCUSATE SODIUM 1 TABLET: 50; 8.6 TABLET ORAL at 08:06

## 2025-06-17 RX ADMIN — PANTOPRAZOLE SODIUM 40 MG: 40 TABLET, DELAYED RELEASE ORAL at 08:06

## 2025-06-17 RX ADMIN — Medication 400 ML: at 06:06

## 2025-06-17 NOTE — PT/OT/SLP PROGRESS
Physical Therapy Treatment    Patient Name:  Raine Medley   MRN:  [id number]    Recommendations:     Discharge Recommendations: Low Intensity Therapy  Discharge Equipment Recommendations: none  Barriers to discharge: None    Assessment:     Raine Medley is a 70 y.o. female admitted with a medical diagnosis of Primary osteoarthritis of left knee.  She presents with the following impairments/functional limitations: weakness, impaired endurance, impaired self care skills, impaired functional mobility, gait instability, impaired balance, edema, impaired skin, decreased ROM, decreased lower extremity function, pain . Patient showed min difficulty transferring from sit<>stand due to pain on the L surgical knee and decreased R knee flexion from previous R TKA. Pain increased after treatment and RN was notified.    Rehab Prognosis: Good; patient would benefit from acute skilled PT services to address these deficits and reach maximum level of function.    Recent Surgery: Procedure(s) (LRB):  ARTHROPLASTY, KNEE, TOTAL, USING [employer] COMPUTER-ASSISTED NAVIGATION: LEFT: DEPUY - ATTUNE (Left) 1 Day Post-Op    Plan:     During this hospitalization, patient to be seen daily to address the identified rehab impairments via gait training, therapeutic activities, therapeutic exercises, neuromuscular re-education and progress toward the following goals:    Plan of Care Expires:  06/20/25    Subjective     Chief Complaint: pain still significant after moving and walking  Patient/Family Comments/goals: to have no more pain and to have more ROM on both knees.  Pain/Comfort:  Pain Rating 1: 2/10  Location - Side 1: Left  Location - Orientation 1: generalized  Location 1: knee  Pain Addressed 1: Reposition, Distraction, Cessation of Activity, Nurse notified  Pain Rating Post-Intervention 1: 6/10      Objective:     Communicated with NSG prior to session.  Patient found up in chair with cryotherapy upon PT entry to room.  "    General Precautions: Standard, fall  Orthopedic Precautions: LLE weight bearing as tolerated  Braces: N/A  Respiratory Status: Room air     Functional Mobility:  Bed Mobility:     Scooting: stand by assistance  Supine to Sit: stand by assistance  Sit to Supine: stand by assistance  Transfers:     Sit to Stand:  contact guard assistance with rolling walker  Bed to Chair: contact guard assistance with  rolling walker  using  Stand Pivot  Gait: 110 ft and 21 ft with RW and CGA, with L LE WBAT. Pt. Ascended/descended a 6" curb using RW with CGA.       AM-PAC 6 CLICK MOBILITY  Turning over in bed (including adjusting bedclothes, sheets and blankets)?: 4  Sitting down on and standing up from a chair with arms (e.g., wheelchair, bedside commode, etc.): 3  Moving from lying on back to sitting on the side of the bed?: 4  Moving to and from a bed to a chair (including a wheelchair)?: 3  Need to walk in hospital room?: 3  Climbing 3-5 steps with a railing?: 3  Basic Mobility Total Score: 20       Treatment & Education:  Doffed/Donned Cryotherapy on the L knee. Educated on safety and the importance of ROM on the L knee to facilitate transfers. Provided with HEP in supine: SAQ, HS, HIP ABD, SLR, AP, SLR, INGRID SETS, GLUT SETS AND LAQ X 10 REPS B LE.    Patient left up in chair with all lines intact, call button in reach, and family present.    GOALS:   Multidisciplinary Problems       Physical Therapy Goals          Problem: Physical Therapy    Goal Priority Disciplines Outcome Interventions   Physical Therapy Goal     PT, PT/OT Progressing    Description: Goals to be met by: 25     Patient will increase functional independence with mobility by performin. Supine to sit with supervision  2. Sit to stand transfer with Supervision  3. Gait x200 feet with Supervision using Rolling Walker  4. Ascend/Descend 1 curb step with Stand by assistance using Rolling Walker  5. Lower extremity exercise program x10 reps per " handout, with supervision                          DME Justifications:  No DME recommended requiring DME justifications    Time Tracking:     PT Received On: 06/17/25  PT Start Time: 1130     PT Stop Time: 1212  PT Total Time (min): 42 min     Billable Minutes: Gait Training 15 and Therapeutic Exercise 27    Treatment Type: Treatment  PT/PTA: PTA     Number of PTA visits since last PT visit: 1 06/17/2025

## 2025-06-17 NOTE — PROGRESS NOTES
Acute Pain Service and Perioperative Surgical Home Progress Note    Interval history  Raine Medley is a 70 y.o., female,     Surgery:  Procedure(s) (LRB):  ARTHROPLASTY, KNEE, TOTAL, USING [employer] COMPUTER-ASSISTED NAVIGATION: LEFT: DEPUY - ATTUNE (Left)    Post Op Day #: 1    Rested well overnight.  Eating and drinking some, but she was nauseated all evening.  Resolved overnight. Ambulating to restroom with assistance - good urine output.  Normal strength in lower extremities.  Pain well managed on multimodal regimen.    Problem List:    Active Hospital Problems    Diagnosis  POA    *Primary osteoarthritis of left knee [M17.12]  Yes      Resolved Hospital Problems   No resolved problems to display.       Subjective:       General appearance of alert, oriented, no complaints   Pain with rest: 5    Numbers   Pain with movement: 6    Numbers   Side Effects    1. Pruritis No    2. Nausea Yes    3. Motor Blockade No, 0=Ability to raise lower extremities off bed    4. Sedation Yes, 1=awake and alert    Schedule Medications:    acetaminophen  1,000 mg Oral Q6H    amLODIPine  5 mg Oral Daily    apixaban  2.5 mg Oral BID    ascorbic acid (vitamin C)  1,000 mg Oral BID    ceFAZolin (Ancef) IV (PEDS and ADULTS)  2 g Intravenous Q8H    electrolytes-dextrose  400 mL Oral Q4H    methocarbamoL  750 mg Oral TID    multivitamin  1 tablet Oral Daily    mupirocin  1 g Nasal BID    pantoprazole  40 mg Oral Daily    polyethylene glycol  17 g Oral Daily    pregabalin  75 mg Oral QHS    senna-docusate  1 tablet Oral BID        Continuous Infusions:       PRN Medications:    Current Facility-Administered Medications:     bisacodyL, 10 mg, Rectal, Q12H PRN    calcium carbonate, 500 mg, Oral, BID PRN    HYDROmorphone, 2 mg, Oral, Q3H PRN    HYDROmorphone, 4 mg, Oral, Q3H PRN    morphine, 2 mg, Intravenous, Q3H PRN    naloxone, 0.02 mg, Intravenous, PRN    ondansetron, 4 mg, Intravenous, Q8H PRN    prochlorperazine, 5 mg, Intravenous,  "Q6H PRN    simethicone, 1 tablet, Oral, TID PRN       Antibiotics:  Antibiotics (From admission, onward)      Start     Stop Route Frequency Ordered    06/16/25 2100  mupirocin 2 % ointment 1 g         06/21/25 2059 Nasl 2 times daily 06/16/25 1731    06/16/25 1442  ceFAZolin 2 g  (MEDICATIONS - ANTIBIOTICS NO PENICILLIN ALLERGY TOTAL KNEE PATHWAY POST-OP)         06/17/25 0644 IV Every 8 hours (non-standard times) 06/16/25 1442               Objective:         Vital Signs (Most Recent):  Temp: 97.4 °F (36.3 °C) (06/17/25 0346)  Pulse: 64 (06/17/25 0346)  Resp: 16 (06/17/25 0346)  BP: 128/63 (06/17/25 0346)  SpO2: 98 % (06/17/25 0346) Vital Signs Range (Last 24H):  Temp:  [96.9 °F (36.1 °C)-98.8 °F (37.1 °C)]   Pulse:  [53-81]   Resp:  [13-20]   BP: (100-161)/(52-81)   SpO2:  [95 %-100 %]          I & O (Last 24H):  Intake/Output Summary (Last 24 hours) at 6/17/2025 0541  Last data filed at 6/17/2025 0003  Gross per 24 hour   Intake 2680 ml   Output 1300 ml   Net 1380 ml       Physical Exam:    GA: Alert, comfortable, no acute distress.   Pulmonary: Clear to auscultation . Normal respiratory ratei.  Cardiac: regular rate and rhythm.          Laboratory: reviewed in chart  CBC: No results for input(s): "WBC", "RBC", "HGB", "HCT", "PLT", "MCV", "MCH", "MCHC" in the last 72 hours.    BMP: No results for input(s): "NA", "K", "CO2", "CL", "BUN", "CREATININE", "GLU", "MG", "PHOS", "CALCIUM" in the last 72 hours.    No results for input(s): "PT", "INR", "PROTIME", "APTT" in the last 72 hours.          Assessment:         Pain control adequate    Plan:  1) Pain: Multimodal pain regimen ordered which includes acetaminophen, celecoxib, pregabalin, and prn oxycodone.  Well controlled on current regimen.  Will continue to monitor.    2) HTN, HLD: continue home regimen   3) FEN/GI: Tolerating regular diet.     4) Dispo: Pt working well with PT/OT. Case management and SW following along for setting up home health and physical " therapy. Plan to discharge home this am.              Evaluator Zehra Aguilar PA-C

## 2025-06-17 NOTE — PLAN OF CARE
Problem: Adult Inpatient Plan of Care  Goal: Absence of Hospital-Acquired Illness or Injury  Outcome: Met  Intervention: Identify and Manage Fall Risk  Flowsheets (Taken 6/17/2025 1021)  Safety Promotion/Fall Prevention:   assistive device/personal item within reach   Fall Risk reviewed with patient/family   family expresses understanding of fall risk and prevention   high risk medications identified   instructed to call staff for mobility   lighting adjusted   medications reviewed   nonskid shoes/socks when out of bed   patient expresses understanding of fall risk and prevention   side rails raised x 2   Supervised toileting - stay within arms reach  Goal: Optimal Comfort and Wellbeing  Outcome: Met  Intervention: Monitor Pain and Promote Comfort  Flowsheets (Taken 6/17/2025 1021)  Pain Management Interventions:   around-the-clock dosing utilized   breathing exercises utilized   care clustered   pain management plan reviewed with patient/caregiver   pillow support provided   quiet environment facilitated   relaxation techniques promoted   warm blanket provided   position adjusted  Goal: Readiness for Transition of Care  Outcome: Met     Problem: Pain Acute  Goal: Optimal Pain Control and Function  Outcome: Met  Intervention: Develop Pain Management Plan  Flowsheets (Taken 6/17/2025 1021)  Pain Management Interventions:   around-the-clock dosing utilized   breathing exercises utilized   care clustered   pain management plan reviewed with patient/caregiver   pillow support provided   quiet environment facilitated   relaxation techniques promoted   warm blanket provided   position adjusted     Problem: Wound  Goal: Optimal Coping  Outcome: Met  Goal: Optimal Functional Ability  Outcome: Met  Goal: Absence of Infection Signs and Symptoms  Outcome: Met  Goal: Improved Oral Intake  Outcome: Met  Goal: Optimal Pain Control and Function  Outcome: Met  Goal: Skin Health and Integrity  Outcome: Met  Goal: Optimal Wound  Healing  Outcome: Met     Problem: Infection  Goal: Absence of Infection Signs and Symptoms  Outcome: Met     Problem: Comorbidity Management  Goal: Blood Pressure in Desired Range  Outcome: Met  Intervention: Maintain Blood Pressure Management  Flowsheets (Taken 6/17/2025 1021)  Medication Review/Management:   medications reviewed   high-risk medications identified     Problem: Knee Arthroplasty  Goal: Optimal Coping  Outcome: Met  Goal: Effective Bowel Elimination  Outcome: Met  Goal: Fluid and Electrolyte Balance  Outcome: Met  Intervention: Monitor and Manage Fluid and Electrolyte Balance  Flowsheets (Taken 6/17/2025 1021)  Fluid/Electrolyte Management: oral rehydration therapy initiated  Goal: Optimal Functional Ability  Outcome: Met  Goal: Absence of Infection Signs and Symptoms  Outcome: Met  Goal: Intact Neurovascular Status  Outcome: Met  Goal: Anesthesia/Sedation Recovery  Outcome: Met  Goal: Optimal Pain Control and Function  Outcome: Met  Goal: Nausea and Vomiting Relief  Outcome: Met  Intervention: Prevent or Manage Nausea and Vomiting  Flowsheets (Taken 6/17/2025 1021)  Nausea/Vomiting Interventions:   stimuli minimized   nausea triggers minimized  Goal: Effective Urinary Elimination  Outcome: Met  Goal: Effective Oxygenation and Ventilation  Outcome: Met     Problem: Fall Injury Risk  Goal: Absence of Fall and Fall-Related Injury  Outcome: Met  Intervention: Identify and Manage Contributors  Flowsheets (Taken 6/17/2025 1021)  Medication Review/Management:   medications reviewed   high-risk medications identified  Intervention: Promote Injury-Free Environment  Flowsheets (Taken 6/17/2025 1021)  Safety Promotion/Fall Prevention:   assistive device/personal item within reach   Fall Risk reviewed with patient/family   family expresses understanding of fall risk and prevention   high risk medications identified   instructed to call staff for mobility   lighting adjusted   medications reviewed   nonskid  shoes/socks when out of bed   patient expresses understanding of fall risk and prevention   side rails raised x 2   Supervised toileting - stay within arms reach       Plan of care reviewed with patient, verbalized understanding. Medications reviewed and given as ordered. Rounding for safety and patient care per policy. Safety precautions maintained. Call light within reach, bed wheels locked, bed in lowest position, side rails up x2, patient instructed to call for assistance, DME at bedside.

## 2025-06-17 NOTE — HOSPITAL COURSE
On 6-16-25, the patient arrived to the [facility] for proper pre-operative management.  Upon completion of pre-operative preparation, the patient was taken back to the operative theatre. L TKA was performed without complication and the patient was transported to the post anesthesia care unit in stable condition.  After appropriate recovery from the anaesthetic agents used during the surgery, the patient was then transported to the hospital inpatient floor.  The interim of the hospital stay from arrival on the floor up to discharge has been uncomplicated. The patient has tolerated regular diet.  The patient's pain has been controlled using a multimodal approach. Currently, the patient's pain is well controlled on an oral regimen.  The patient has been voiding without difficulty.  The patient began participation in physical therapy after surgery and has progressed throughout the entire hospital stay.  Currently, the patient's progress is sufficient to allow the them to be discharged to home safely.  The patient agrees with this assessment and desires a discharge today.

## 2025-06-17 NOTE — PROGRESS NOTES
[facility])  Orthopedics  Progress Note    Patient Name: Raine Medley  MRN: [id number]  Admission Date: 6/16/2025  Hospital Length of Stay: 0 days  Attending Provider: Sanford Calvert III, MD  Primary Care Provider: April Hairston MD  Follow-up For: Procedure(s) (LRB):  ARTHROPLASTY, KNEE, TOTAL, USING [employer] COMPUTER-ASSISTED NAVIGATION: LEFT: DEPUY - ATTUNE (Left)    Post-Operative Day: 1 Day Post-Op  Subjective:     Principal Problem:Primary osteoarthritis of left knee    Principal Orthopedic Problem: same, s.p L TKA 6-16    Interval History: Pt seen and examined at bedside. NAEON, VSS, AF. Pain controlled. Denies fevers, chills, chest pain, SOB. Has been able to void.  Nausea overnight, pt refused PRN medications per RN, improved this AM.   Ambulated 20 feet with PT, did not work with OT due to pain.         Review of patient's allergies indicates:   Allergen Reactions    Aspirin      Increased risk of bleeding       Current Facility-Administered Medications   Medication    acetaminophen tablet 1,000 mg    amLODIPine tablet 5 mg    apixaban tablet 2.5 mg    ascorbic acid (vitamin C) tablet 1,000 mg    bisacodyL suppository 10 mg    calcium carbonate 200 mg calcium (500 mg) chewable tablet 500 mg    electrolytes-dextrose (Pedialyte) oral solution 400 mL    HYDROmorphone tablet 2 mg    HYDROmorphone tablet 4 mg    methocarbamoL tablet 750 mg    morphine injection 2 mg    multivitamin tablet    mupirocin 2 % ointment 1 g    naloxone 0.4 mg/mL injection 0.02 mg    ondansetron injection 4 mg    pantoprazole EC tablet 40 mg    polyethylene glycol packet 17 g    pregabalin capsule 75 mg    prochlorperazine injection Soln 5 mg    senna-docusate 8.6-50 mg per tablet 1 tablet    simethicone chewable tablet 80 mg     Objective:     Vital Signs (Most Recent):  Temp: 97.4 °F (36.3 °C) (06/17/25 0346)  Pulse: 65 (06/17/25 0713)  Resp: 16 (06/17/25 0713)  BP: 128/63 (06/17/25 0346)  SpO2: 95 %  "(06/17/25 0713) Vital Signs (24h Range):  Temp:  [96.9 °F (36.1 °C)-98.8 °F (37.1 °C)] 97.4 °F (36.3 °C)  Pulse:  [53-81] 65  Resp:  [13-20] 16  SpO2:  [95 %-100 %] 95 %  BP: (100-161)/(52-81) 128/63     Weight: 62.6 kg (138 lb)  Height: 5' 1" (154.9 cm)  Body mass index is 26.07 kg/m².      Intake/Output Summary (Last 24 hours) at 6/17/2025 0743  Last data filed at 6/17/2025 0600  Gross per 24 hour   Intake 2880 ml   Output 1600 ml   Net 1280 ml        Ortho/SPM Exam     AAOx4  NAD  Reg rate  No increased WOB    LLE:  Dressing c/d/i  SILT T/SP/DP/Hicks/Sa  Motor intact T/SP/DP  WWP extremities  FCDs in place and functioning    Significant Labs: All pertinent labs within the past 24 hours have been reviewed.    Significant Imaging: I have reviewed all pertinent imaging results/findings.  Assessment/Plan:     * Primary osteoarthritis of left knee  Raine Medley is a 70 y.o. female is s/p L TKA on 6-16-25.    Surgical dressing C/D/I  Pain control: multimodal (No aspirin, No celebrex, Dilaudid, Lyrica),Anesthesia Surgical Home following  PT/OT: WBAT LLE  DVT PPx: Eliquis 2.5mg BID x30 days starting 0900 POD#1, FCDs at all times when not ambulating  Abx: postop Ancef  Drain: none  Maldonado: none  Po txa 1950mg QD x3 days  Pour/bethanechol  Nutrition support    Dispo: plan to dc to home today once cleared by PT/OT              Joss Rivera MD  Orthopedics  [facility] ([facility])    "

## 2025-06-17 NOTE — SUBJECTIVE & OBJECTIVE
"Principal Problem:Primary osteoarthritis of left knee    Principal Orthopedic Problem: same, s.p L TKA 6-16    Interval History: Pt seen and examined at bedside. DAVON AGUAYO, AF. Pain controlled. Denies fevers, chills, chest pain, SOB. Has been able to void.  Nausea overnight, pt refused PRN medications per RN, improved this AM.   Ambulated 20 feet with PT, did not work with OT due to pain.         Review of patient's allergies indicates:   Allergen Reactions    Aspirin      Increased risk of bleeding       Current Facility-Administered Medications   Medication    acetaminophen tablet 1,000 mg    amLODIPine tablet 5 mg    apixaban tablet 2.5 mg    ascorbic acid (vitamin C) tablet 1,000 mg    bisacodyL suppository 10 mg    calcium carbonate 200 mg calcium (500 mg) chewable tablet 500 mg    electrolytes-dextrose (Pedialyte) oral solution 400 mL    HYDROmorphone tablet 2 mg    HYDROmorphone tablet 4 mg    methocarbamoL tablet 750 mg    morphine injection 2 mg    multivitamin tablet    mupirocin 2 % ointment 1 g    naloxone 0.4 mg/mL injection 0.02 mg    ondansetron injection 4 mg    pantoprazole EC tablet 40 mg    polyethylene glycol packet 17 g    pregabalin capsule 75 mg    prochlorperazine injection Soln 5 mg    senna-docusate 8.6-50 mg per tablet 1 tablet    simethicone chewable tablet 80 mg     Objective:     Vital Signs (Most Recent):  Temp: 97.4 °F (36.3 °C) (06/17/25 0346)  Pulse: 65 (06/17/25 0713)  Resp: 16 (06/17/25 0713)  BP: 128/63 (06/17/25 0346)  SpO2: 95 % (06/17/25 0713) Vital Signs (24h Range):  Temp:  [96.9 °F (36.1 °C)-98.8 °F (37.1 °C)] 97.4 °F (36.3 °C)  Pulse:  [53-81] 65  Resp:  [13-20] 16  SpO2:  [95 %-100 %] 95 %  BP: (100-161)/(52-81) 128/63     Weight: 62.6 kg (138 lb)  Height: 5' 1" (154.9 cm)  Body mass index is 26.07 kg/m².      Intake/Output Summary (Last 24 hours) at 6/17/2025 0743  Last data filed at 6/17/2025 0600  Gross per 24 hour   Intake 2880 ml   Output 1600 ml   Net 1280 ml      "   Ortho/SPM Exam     AAOx4  NAD  Reg rate  No increased WOB    LLE:  Dressing c/d/i  SILT T/SP/DP/Hicks/Sa  Motor intact T/SP/DP  WWP extremities  FCDs in place and functioning    Significant Labs: All pertinent labs within the past 24 hours have been reviewed.    Significant Imaging: I have reviewed all pertinent imaging results/findings.

## 2025-06-17 NOTE — PLAN OF CARE
Problem: Occupational Therapy  Goal: Occupational Therapy Goal  Description: Goals to be met by: 6/17/25     Patient will increase functional independence with ADLs by performing:    UE Dressing with Modified [place].  LE Dressing with Modified [place].  Grooming while standing at sink with Modified [place].  Bathing from  shower chair/bench with Modified [place].  Supine to sit with Modified [place].  Toilet transfer to bedside commode with Modified [place].    Outcome: Met

## 2025-06-17 NOTE — PLAN OF CARE
Problem: Adult Inpatient Plan of Care  Goal: Plan of Care Review  Outcome: Met  Flowsheets (Taken 6/17/2025 0743)  Plan of Care Reviewed With:   patient   caregiver  Goal: Patient-Specific Goal (Individualized)  Outcome: Met  Flowsheets (Taken 6/17/2025 0743)  Individualized Care Needs: [place] management  Anxieties, Fears or Concerns: none  Patient/Family-Specific Goals (Include Timeframe): Keep patient and son updated on Plan of Care     Problem: Pain Acute  Goal: Optimal Pain Control and Function  Outcome: Progressing  Intervention: Develop Pain Management Plan  Flowsheets (Taken 6/17/2025 0743)  Pain Management Interventions:   breathing exercises utilized   care clustered   cold applied   diversional activity provided   medication offered but refused   pain management plan reviewed with patient/caregiver   quiet environment facilitated   relaxation techniques promoted  Intervention: Prevent or Manage Pain  Flowsheets (Taken 6/17/2025 0743)  Sleep/Rest Enhancement:   awakenings minimized   consistent schedule promoted   noise level reduced   regular sleep/rest pattern promoted   relaxation techniques promoted   room darkened   therapeutic touch utilized  Sensory Stimulation Regulation:   auditory stimulation minimized   quiet environment promoted   visual stimulation minimized   tactile stimulation minimized   care clustered   lighting decreased  Bowel Elimination Promotion: adequate fluid intake promoted  Medication Review/Management:   medications reviewed   high-risk medications identified     Problem: Knee Arthroplasty  Goal: Absence of Bleeding  Outcome: Met  Intervention: Monitor and Manage Bleeding  Flowsheets (Taken 6/17/2025 0743)  Bleeding Management: dressing monitored     Problem: Fall Injury Risk  Goal: Absence of Fall and Fall-Related Injury  Outcome: Met  Intervention: Identify and Manage Contributors  Flowsheets (Taken 6/17/2025 0743)  Self-Care Promotion:   BADL personal objects within reach    BADL personal routines maintained   adaptive equipment use encouraged  Medication Review/Management:   medications reviewed   high-risk medications identified

## 2025-06-17 NOTE — HPI
Raine Medley is a 70 y.o. female with history of Left knee pain. Pain is worse with activity and weight bearing.  Patient has experienced interference of activities of daily living due to decreased range of motion and an increase in joint pain and swelling.  Patient has failed non-operative treatment including NSAIDs, corticosteroid injections, viscosupplement injections, and activity modification.  Raine Medley currently ambulates independently.      Relevant medical conditions of significance in perioperative period:  HTN- on medication managed by pcp

## 2025-06-17 NOTE — PT/OT/SLP EVAL
"Occupational Therapy   Evaluation and Discharge    Name: Raine Medley  MRN: [id number]  Admitting Diagnosis: Primary osteoarthritis of left knee  Recent Surgery: Procedure(s) (LRB):  ARTHROPLASTY, KNEE, TOTAL, USING [employer] COMPUTER-ASSISTED NAVIGATION: LEFT: DEPUY - ATTUNE (Left) 1 Day Post-Op    Recommendations:     Discharge Recommendations: Low Intensity Therapy  Discharge Equipment Recommendations:  none  Barriers to discharge:  None    Assessment:     Raine Medley is a 70 y.o. female with a medical diagnosis of Primary osteoarthritis of left knee.  She presents with L TKA. Performance deficits affecting function: weakness, impaired endurance, impaired self care skills, impaired functional mobility, decreased lower extremity function, decreased ROM.  Pt performed well in therapy. She ambulated to bathroom and practiced a toilet transfer before walking to recliner. She performed UB/LB dressing. Pt was educated on cryotherapy, shower instructions, and car/shower transfers.    Rehab Prognosis: Good; patient would benefit from acute skilled OT services to address these deficits and reach maximum level of function.       Plan:     Patient to be seen   to address the above listed problems via self-care/home management, therapeutic activities  Plan of Care Expires: 06/17/25  Plan of Care Reviewed with: patient, daughter, son    Subjective     Chief Complaint: "I feel good."  Patient/Family Comments/goals: Pt's daughter hopes for her to return home and resume usual routine.    Occupational Profile:  Living Environment: Lives in [facility] w 7 steps with rails to access front door. Pt will be staying w daughter who lives in a 2 SH, but has moved everything she needs to the first floor. She will be using a shower w SC.  Previous level of function: Independent  Roles and Routines: Retired teacher  Equipment Used at Home: cane, straight, shower chair, walker, rolling, raised toilet  Assistance upon Discharge: " Daughter    Pain/Comfort:  Pain Rating 1: 0/10    Patients cultural, spiritual, Christianity conflicts given the current situation: no    Objective:     Communicated with: RN prior to session.  Patient found supine with peripheral IV, FCD upon OT entry to room.    General Precautions: Standard, fall  Orthopedic Precautions: LLE weight bearing as tolerated  Braces:    Respiratory Status: Room air    Occupational Performance:    Bed Mobility:    Patient completed Rolling/Turning to Left with  contact guard assistance  Patient completed Scooting/Bridging with contact guard assistance  Patient completed Supine to Sit with contact guard assistance    Functional Mobility/Transfers:  Patient completed Sit <> Stand Transfer with modified independence  with  rolling walker   Patient completed Toilet Transfer Stand Pivot technique with modified independence with  rolling walker  Functional Mobility: Pt ambulated from bedside to bathroom to practice a toilet transfer. After, she returned to her room and performed stand to sit transfer into chair.    Activities of Daily Living:  Upper Body Dressing: modified independence to don underclothes and top  Lower Body Dressing: modified independence to don socks, shoes, underclothes, and pants    Physical Exam:  Upper Extremity Range of Motion:     -       Right Upper Extremity: WFL  -       Left Upper Extremity: WFL  Upper Extremity Strength:    -       Right Upper Extremity: WFL  -       Left Upper Extremity: WFL    AMPAC 6 Click ADL:  AMPAC Total Score: 22    Treatment & Education:  Pt was educated on cryotherapy, bathing instructions, and car/shower transfer.    Patient left up in chair with call button in reach and daughter present    GOALS:   Multidisciplinary Problems       Occupational Therapy Goals       Not on file              Multidisciplinary Problems (Resolved)          Problem: Occupational Therapy    Goal Priority Disciplines Outcome Interventions   Occupational Therapy  Goal   (Resolved)     OT, PT/OT Met    Description: Goals to be met by: 6/17/25     Patient will increase functional independence with ADLs by performing:    UE Dressing with Modified [place].  LE Dressing with Modified [place].  Grooming while standing at sink with Modified [place].  Bathing from  shower chair/bench with Modified [place].  Supine to sit with Modified [place].  Toilet transfer to bedside commode with Modified [place].                             History:     Past Medical History:   Diagnosis Date    Gastroesophageal reflux disease without esophagitis 07/19/2023    History of blood transfusion 2023    x 2 RBC    Hyperlipidemia     Inverted nipple     Microcytic anemia 05/15/2023    Mild intermittent asthma, uncomplicated     as a child    Primary hypertension 11/12/2024    Primary osteoarthritis of right knee 05/11/2023    Status post right knee replacement 08/21/2023         Past Surgical History:   Procedure Laterality Date    ARTHROPLASTY, KNEE, TOTAL, USING COMPUTER-ASSISTED NAVIGATION Left 6/16/2025    Procedure: ARTHROPLASTY, KNEE, TOTAL, USING [employer] COMPUTER-ASSISTED NAVIGATION: LEFT: DEPUY - ATTUNE;  Surgeon: Sanford Calvert III, MD;  Location: [facility];  Service: Orthopedics;  Laterality: Left;    CHOLECYSTECTOMY      COLONOSCOPY N/A 1/3/2019    Procedure: COLONOSCOPY;  Surgeon: Gaston Beebe MD;  Location: [facility] ENDO [address]);  Service: Endoscopy;  Laterality: N/A;    KNEE JOINT MANIPULATION Right 8/21/2023    Procedure: MANIPULATION, KNEE: RIGHT: SAME DAY;  Surgeon: Sanford Calvert III, MD;  Location: [facility] ER;  Service: Orthopedics;  Laterality: Right;    TOTAL KNEE ARTHROPLASTY Right 6/5/2023    Procedure: ARTHROPLASTY, KNEE, TOTAL: RIGHT: DEPUY - ATTUNE;  Surgeon: Sanford Calvert III, MD;  Location: [facility];  Service: Orthopedics;  Laterality: Right;       Time Tracking:     OT Date of Treatment: 06/17/25  OT Start Time: 0917  OT Stop Time: 0942  OT  Total Time (min): 25 min    Billable Minutes:Evaluation 8  Therapeutic Activity 17    6/17/2025

## 2025-06-17 NOTE — NURSING
Has met unit/department guidelines for discharge from each phase of the post procedure continuum. Patient discharged.  Instructions, placed in dc folder and Prescriptions given.  IV removed, tolerated well, w/ catheter intact, no redness or swelling to area. Dressing to left knee remains CDI. Patient verbalized understanding instructions.  AAOx3, VSS, NADN, no complaints of pain noted at this time.  Wheelchair to private vehicle in care of daughter and son.  All personal belongings sent with pt.  Blue Bracelet given applied to pts wrist and instructions given to call # on bracelet w/any surgery related issues.

## 2025-06-17 NOTE — ASSESSMENT & PLAN NOTE
Raine Medley is a 70 y.o. female is s/p L TKA on 6-16-25.    Surgical dressing C/D/I  Pain control: multimodal (No aspirin, No celebrex, Dilaudid, Lyrica),Anesthesia Surgical Home following  PT/OT: WBAT LLE  DVT PPx: Eliquis 2.5mg BID x30 days starting 0900 POD#1, FCDs at all times when not ambulating  Abx: postop Ancef  Drain: none  Maldonado: none  Po txa 1950mg QD x3 days  Pour/bethanechol  Nutrition support    Dispo: plan to dc to home today once cleared by PT/OT

## 2025-06-17 NOTE — DISCHARGE SUMMARY
[facility])  Orthopedics  Discharge Summary      Patient Name: Raine Medley  MRN: [id number]  Admission Date: 6/16/2025  Hospital Length of Stay: 0 days  Discharge Date and Time: 6/17/2025 12:20 PM  Attending Physician: Irina julian. providers found   Discharging Provider: Joss Rivera MD  Primary Care Provider: April Hairston MD    HPI:   Raine Medley is a 70 y.o. female with history of Left knee pain. Pain is worse with activity and weight bearing.  Patient has experienced interference of activities of daily living due to decreased range of motion and an increase in joint pain and swelling.  Patient has failed non-operative treatment including NSAIDs, corticosteroid injections, viscosupplement injections, and activity modification.  Raine Medley currently ambulates independently.      Relevant medical conditions of significance in perioperative period:  HTN- on medication managed by pcp       Procedure(s) (LRB):  ARTHROPLASTY, KNEE, TOTAL, USING Tube2Tone COMPUTER-ASSISTED NAVIGATION: LEFT: DEPUY - ATTCOLLEEN (Left)      Hospital Course:  On 6-16-25, the patient arrived to the [facility] for proper pre-operative management.  Upon completion of pre-operative preparation, the patient was taken back to the operative theatre. L TKA was performed without complication and the patient was transported to the post anesthesia care unit in stable condition.  After appropriate recovery from the anaesthetic agents used during the surgery, the patient was then transported to the hospital inpatient floor.  The interim of the hospital stay from arrival on the floor up to discharge has been uncomplicated. The patient has tolerated regular diet.  The patient's pain has been controlled using a multimodal approach. Currently, the patient's pain is well controlled on an oral regimen.  The patient has been voiding without difficulty.  The patient began participation in physical  therapy after surgery and has progressed throughout the entire hospital stay.  Currently, the patient's progress is sufficient to allow the them to be discharged to home safely.  The patient agrees with this assessment and desires a discharge today.      Goals of Care Treatment Preferences:  Code Status: Full Code      Consults (From admission, onward)          Status Ordering Provider     Inpatient consult to Social Work  Once        Provider:  (Not yet assigned)    Acknowledged MARIA FERNANDA AVALOS     Inpatient consult to Pain Management  Once        Provider:  (Not yet assigned)    Acknowledged MARIA FERNANDA AVALOS     Inpatient consult to Respiratory Care  Once        Provider:  (Not yet assigned)    Acknowledged MARIA FERNANDA AVALOS            Significant Diagnostic Studies: Labs: All labs within the past 24 hours have been reviewed    Pending Diagnostic Studies:       None          Final Active Diagnoses:    Diagnosis Date Noted POA    PRINCIPAL PROBLEM:  Primary osteoarthritis of left knee [M17.12] 05/15/2025 Yes      Problems Resolved During this Admission:      Discharged Condition: good    Disposition: Home or Self Care    Follow Up:   Follow-up Information       Sanford Calvert III, MD Follow up in 2 week(s).    Specialty: Orthopedic Surgery  Why: For wound re-check  Contact information:  [address] [place] [id number]  [phone]                           Patient Instructions:      Activity as tolerated     Sponge bath only until clinic visit     Keep surgical extremity elevated     Lifting restrictions   Order Comments: No strenuous exercise or lifting of > 10 lbs     Weight bearing as tolerated     No driving, operating heavy equipment or signing legal documents while taking pain medication     Leave dressing on - Keep it clean, dry, and intact until clinic visit   Order Comments: Do not remove surgical dressing for 2 weeks post-op. This will be done only by MD at initial post-op visit. If  dressing is completely saturated, replace with identical dressing - silver-impregnated hydrocolloid dressing.     Do not get dressings wet. Do not shower.     If dressing continues to be saturated or there are signs of infection, please call [facility] [phone] for further instructions and to make appt to be seen.     Call MD for:  temperature >100.4     Call MD for:  persistent nausea and vomiting     Call MD for:  severe uncontrolled pain     Call MD for:  difficulty breathing, headache or visual disturbances     Call MD for:  redness, tenderness, or signs of infection (pain, swelling, redness, odor or green/yellow discharge around incision site)     Call MD for:  hives     Call MD for:  persistent dizziness or light-headedness     Call MD for:  extreme fatigue     EKG 12-lead   Standing Status: Future Number of Occurrences: 1 Standing Exp. Date: 05/08/26     Medications:  Reconciled Home Medications:      Medication List        CHANGE how you take these medications      acetaminophen 650 MG Tbsr  Commonly known as: TYLENOL  Take 1 tablet (650 mg total) by mouth every 8 (eight) hours.  What changed: Another medication with the same name was removed. Continue taking this medication, and follow the directions you see here.            CONTINUE taking these medications      amLODIPine 5 MG tablet  Commonly known as: NORVASC  [place] chuy tableta (5 mg en total) por vía oral diariamente.  (Take 1 tablet (5 mg total) by mouth once daily.)     blood pressure monitor Kit  1 each by Misc.(Non-Drug; Combo Route) route once daily.     cefadroxil 500 MG Cap  Commonly known as: DURICEF  [place] 1 cápsula (500 mg en total) por vía oral cada 12 (doce) horas.  (Take 1 capsule (500 mg total) by mouth every 12 (twelve) hours.)     ELIQUIS 2.5 mg Tab  Generic drug: apixaban  [place] chuy tableta (2.5 mg en total) por vía oral 2 veces al día.  (Take 1 tablet (2.5 mg total) by mouth 2 (two) times daily.)     ENSURE  Generic drug: food  supplemt, lactose-reduced  Drink one bottle daily for 14 days.     HYDROmorphone 2 MG tablet  Commonly known as: DILAUDID  Take 1-2 tablets every 4-6 hours as needed for pain.     FLACO (WITH COLLAGEN) 7-7-1.5 gram Pwpk  Generic drug: ywqhp-toyk-UgKMJ-collag-mv-min  Take 1 PACKET by mouth once daily.     methocarbamoL 750 MG Tab  Commonly known as: ROBAXIN  Take 1 tablet (750 mg total) by mouth 4 (four) times daily as needed (muscle spasms).     ONE DAILY MULTIVITAMIN tablet  Generic drug: multivitamin  Eugenio Saenz chuy tableta por vía oral diariamente.  (Take 1 tablet by mouth once daily.)     pantoprazole 40 MG tablet  Commonly known as: PROTONIX  [place]ugen[place] chuy tableta (40 mg en total) por vía oral diariamente.  (Take 1 tablet (40 mg total) by mouth once daily.)     polyethylene glycol 17 gram/dose powder  Commonly known as: GLYCOLAX  Eugenio Saenz 17 g por vía oral chuy vez al día.  (Take 17 g by mouth once daily.)     pregabalin 75 MG capsule  Commonly known as: LYRICA  Eugenio Saenz chuy cápsula (75 mg en total) por vía oral 2 veces al día.  (Take 1 capsule (75 mg total) by mouth 2 (two) times daily.)     tranexamic acid 650 mg tablet  Commonly known as: LYSTEDA  Take 3 tablets (1,950 mg total) by mouth once daily.     VITAMIN C 500 MG tablet  Generic drug: ascorbic acid (vitamin C)  Take 2 tablets (1,000 mg total) by mouth 2 (two) times daily. for 14 days              Joss Rivera MD  Orthopedics  [facility]

## 2025-06-18 ENCOUNTER — CLINICAL SUPPORT (OUTPATIENT)
Dept: ORTHOPEDICS | Facility: CLINIC | Age: 70
End: 2025-06-18
Payer: COMMERCIAL

## 2025-06-18 ENCOUNTER — CLINICAL SUPPORT (OUTPATIENT)
Dept: REHABILITATION | Facility: HOSPITAL | Age: 70
End: 2025-06-18
Attending: ORTHOPAEDIC SURGERY
Payer: COMMERCIAL

## 2025-06-18 DIAGNOSIS — R26.2 DIFFICULTY WALKING: ICD-10-CM

## 2025-06-18 DIAGNOSIS — G89.29 CHRONIC PAIN OF LEFT KNEE: Primary | ICD-10-CM

## 2025-06-18 DIAGNOSIS — M25.562 CHRONIC PAIN OF LEFT KNEE: Primary | ICD-10-CM

## 2025-06-18 DIAGNOSIS — M17.12 PRIMARY OSTEOARTHRITIS OF LEFT KNEE: ICD-10-CM

## 2025-06-18 DIAGNOSIS — Z96.652 S/P TOTAL KNEE REPLACEMENT, LEFT: Primary | ICD-10-CM

## 2025-06-18 PROCEDURE — 97112 NEUROMUSCULAR REEDUCATION: CPT

## 2025-06-18 PROCEDURE — 97162 PT EVAL MOD COMPLEX 30 MIN: CPT

## 2025-06-18 PROCEDURE — 97530 THERAPEUTIC ACTIVITIES: CPT

## 2025-06-18 NOTE — PROGRESS NOTES
Outpatient Rehab    Physical Therapy Evaluation    Patient Name: Raine Medley  MRN: [id number]  YOB: 1955  Encounter Date: 6/18/2025    Therapy Diagnosis:   Encounter Diagnoses   Name Primary?    Primary osteoarthritis of left knee     Chronic pain of left knee Yes    Difficulty walking      Physician: Sanford Calvert III, *    Physician Orders: Eval and Treat  Medical Diagnosis: Primary osteoarthritis of left knee  Surgical Diagnosis: L TKA   Surgical Date: 6/16/2025  Days Since Last Surgery: 4    Visit # / Visits Authorized:  1 / 1  Insurance Authorization Period: 5/6/2025 to 5/6/2026  Date of Evaluation: 6/18/2025  Plan of Care Certification: 6/18/2025 to 8/1/2025     Time In: 1002   Time Out: 1100  Total Time (in minutes): 58   Total Billable Time (in minutes): 58    Intake Outcome Measure for FOTO Survey    Therapist reviewed FOTO scores for Raine Medley on 6/18/2025.   FOTO report - see Media section or FOTO account episode details.     Intake Score:  %    Precautions:       Subjective   History of Present Illness  Raine is a 70 y.o. female        Patient reports a surgery of L TKA. Surgery occurred on 06/16/25.         History of Present Condition/Illness: Pt presents 2 days s/p L TKA. Pt has long history of L knee pain and had R TKA procedure in 2023.     Pain     Patient reports a current pain level of 5/10. Pain at best is reported as 5/10. Pain at worst is reported as 10/10.   Location: L knee  Pain-Relieving Factors: Medications - prescription, Medications - over-the-counter, Ice, Elevation         Living Arrangements  Pt will be staying with her daughter for a few weeks following surgery. No steps to enter her daughter's home. Pt has 7 steps to enter her own home which is a 2 story home.       Employment      Pt navigates 25 steps multiple time per day at her job. She is employed as a  but she is off for the summer.       Past Medical  History/Physical Systems Review:   Raine Medley  has a past medical history of Gastroesophageal reflux disease without esophagitis, History of blood transfusion, Hyperlipidemia, Inverted nipple, Microcytic anemia, Mild intermittent asthma, uncomplicated, Primary hypertension, Primary osteoarthritis of right knee, and Status post right knee replacement.    Raine Medley  has a past surgical history that includes Cholecystectomy; Colonoscopy (N/A, 1/3/2019); Total knee arthroplasty (Right, 6/5/2023); Knee joint manipulation (Right, 8/21/2023); and arthroplasty, knee, total, using computer-assisted navigation (Left, 6/16/2025).    Raine has a current medication list which includes the following prescription(s): acetaminophen, amlodipine, apixaban, fly (with collagen), ascorbic acid (vitamin c), blood pressure monitor, cefadroxil, ensure, hydromorphone, methocarbamol, multivitamin, pantoprazole, polyethylene glycol, pregabalin, and tranexamic acid.    Review of patient's allergies indicates:   Allergen Reactions    Aspirin      Increased risk of bleeding        Objective   Knee Observations  Left Knee Observations  Present: Straight Leg Raise Extensor Lag             Knee Range of Motion   Right Knee   Active (deg) Passive (deg) Pain   Flexion 85 85     Extension 0           Left Knee   Active (deg) Passive (deg) Pain   Flexion 40 76 Yes   Extension -5   Yes                      Knee Strength   Right Strength Right Pain Left Strength Left  Pain   Flexion (S2) 4   3     Prone Flexion           Extension (L3) 4   3-       Knee Extensor Lag  Lag Present: Left              Timed Up & Go (TUG)  Time: 55 seconds     An older adult who takes >=12 seconds to complete the TUG is at risk for falling.    Using BUE support and RW           Treatment:  Balance/Neuromuscular Re-Education  NMR 2: SAQs with green strap and foam roll, 1 x 10, hold 5 seconds  NMR 3: Heel prop to improve tissue extensibility and  fascilitate quad activation x 2 minutes  NMR 4: Heel slides to improve tissue extensibility and ROM: 10 second hold, 5 reps  NMR 5: Quad sets, 1 x 10, hold 10 seconds  Therapeutic Activity  TA 1: Education on anatomy, DVT/infection signs, rehab expectations, TKA procedure, edema management, exercise rationale, ROM goals, scar tissue formation    Time Entry(in minutes):  PT Evaluation (Moderate) Time Entry: 28  Neuromuscular Re-Education Time Entry: 15  Therapeutic Activity Time Entry: 15    Assessment & Plan   Assessment  Raine presents with a condition of Moderate complexity.   Presentation of Symptoms: Evolving            Patient Goal for Therapy (PT): Improve functional ability and alleviate pain  Prognosis: Good  Assessment Details: Pt presents to physical therapy 2 days s/p L TKA. Patient demonstrates decreased lower extremity strength, impaired left quadriceps motor control and strength, difficulty ambulating long distances, and reduced functional mobility. Patient would benefit from skilled outpatient physical therapy to address motor control and strength deficits so that she may return to full independence with all household and personal ADL's, and improve overall functional mobility, and meet all social and recreational needs.    Plan  From a physical therapy perspective, the patient would benefit from: Skilled Rehab Services    Planned therapy interventions include: Therapeutic exercise, Therapeutic activities, Neuromuscular re-education, Manual therapy, ADLs/IADLs, and Gait training.    Planned modalities to include: Biofeedback, Electrical stimulation - passive/unattended, and Cryotherapy (cold pack).        Visit Frequency: 3 times Per Week for 6 Weeks.       This plan was discussed with Patient.   Discussion participants: Agreed Upon Plan of Care  Plan details: 3x's/week for 4 weeks then 2x's/week for 2 weeks.           The patient's spiritual, cultural, and educational needs were considered, and the  patient is agreeable to the plan of care and goals.           Goals:   Active       Ambulation/movement       Patient will walk community distances with SPC in order to demonstrate improved functional ability.        Start:  06/20/25    Expected End:  08/01/25            Patient will navigate 1 flight of stairs without difficulty in order to demonstrate improved functional ability.        Start:  06/20/25    Expected End:  08/01/25               Changing body position       Patient will demonstrate moving sit to stand 12-14 reps in 30 seconds in order to demonstrate improved transfer ability.        Start:  06/20/25    Expected End:  08/01/25               Functional outcome       Patient will demonstrate independence in home program for support of progression       Start:  06/20/25    Expected End:  08/01/25               Home activities       Patient will perform household indoor maintenance without difficulty in order to demonstrate improved functional ability.        Start:  06/20/25    Expected End:  08/01/25               Range of Motion       Patient will achieve left knee ROM of  0-120 degrees        Start:  06/20/25    Expected End:  08/01/25               Strength       Patient will achieve left knee flexion strength of >/= 4+/5 in order to demonstrate improved knee stability.        Start:  06/20/25    Expected End:  08/01/25            Patient will achieve left knee extension strength of >/= 4+/5 in order to demonstrate improved knee stability.        Start:  06/20/25    Expected End:  08/01/25                Eze Hernandes PT, DPT

## 2025-06-18 NOTE — PROGRESS NOTES
I called the patient today regarding her  surgery with Dr. Calvert. The patient had a left TKA on 6/16/25.     [facility] with translation service #[id number] used for this visit    Pain Scale: 7 / 10    Any issues with Fever: No.    Any issues with medications (specifically DVT prophylaxis): No.    Pain medication: no;  Celebrex : no  Protonix : no  Resume home meds : Yes    Any issues with:   Bowel movements: Yes:   Passing shirlene: No.  Urination: Yes:     Completing at home exercises: Yes    Any concerns regarding their dressing/bandage:  No.    Patient confirmed first OP-PT appointment:  yes on  6/18/25     Any other concerns: No.    Blue Bracelet : yes    The patient was informed that if they have any urgent issues with their bandage, medications or any other health concerns regarding their surgery to call the 24/7 Orthopedic Post-op Hot Line at [phone]. The patient was reminded that if they have any chest pain or shortness of breath to call 911 or go to the ER.    The patient verbalized understanding and does not have any other questions

## 2025-06-20 ENCOUNTER — CLINICAL SUPPORT (OUTPATIENT)
Dept: REHABILITATION | Facility: HOSPITAL | Age: 70
End: 2025-06-20
Payer: COMMERCIAL

## 2025-06-20 DIAGNOSIS — G89.29 CHRONIC PAIN OF LEFT KNEE: Primary | Chronic | ICD-10-CM

## 2025-06-20 DIAGNOSIS — M25.562 CHRONIC PAIN OF LEFT KNEE: Primary | Chronic | ICD-10-CM

## 2025-06-20 DIAGNOSIS — R26.2 DIFFICULTY WALKING: ICD-10-CM

## 2025-06-20 PROCEDURE — 97112 NEUROMUSCULAR REEDUCATION: CPT

## 2025-06-20 PROCEDURE — 97530 THERAPEUTIC ACTIVITIES: CPT

## 2025-06-20 PROCEDURE — 97014 ELECTRIC STIMULATION THERAPY: CPT

## 2025-06-20 NOTE — PROGRESS NOTES
Outpatient Rehab    Physical Therapy Visit    Patient Name: Raine Medley  MRN: [id number]  YOB: 1955  Encounter Date: 6/20/2025    Therapy Diagnosis:   Encounter Diagnoses   Name Primary?    Chronic pain of left knee Yes    Difficulty walking      Physician: Sanford Calvert III, *    Physician Orders: Eval and Treat  Medical Diagnosis: Unilateral primary osteoarthritis, left knee  Surgical Diagnosis: L TKA   Surgical Date: 6/16/2025  Days Since Last Surgery: 4    Visit # / Visits Authorized:  1 / 20  Insurance Authorization Period: 5/30/2025 to 12/31/2025  Date of Evaluation: 6/18/2025  Plan of Care Certification: 6/18/2025 to 8/1/2025      PT/PTA:     Number of PTA visits since last PT visit:   Time In: 1000   Time Out: 1111  Total Time (in minutes): 71   Total Billable Time (in minutes): 61    FOTO:  Intake Score:  %  Survey Score 2:  %  Survey Score 3:  %    Precautions:       Subjective   Pt says she did not perform HEP yesterday due to increased knee pain. She says she does not have any knee pain right now..  Pain reported as 0/10.      Objective            Treatment:  Balance/Neuromuscular Re-Education  NMR 2: [place] NMES 10 sec on/30 sec off including: quad sets with towel roll x 10 minutes, SAQs with medium bolster x 10 minutes (NP), SAQs with 1/2 foam x 10 minutes (NP), LAQs at EOM x 10 minutes  NMR 3: Heel prop to improve tissue extensibility and fascilitate quad activation x 3 minutes  NMR 4: seated Heel slides and supine heel slides with wooden board to improve tissue extensibility and ROM: 10 second hold, 20 reps each  Therapeutic Activity  TA 1: Education on anatomy, DVT/infection signs, rehab expectations, TKA procedure, edema management, exercise rationale, ROM goals, scar tissue formation  Modalities  Cryotherapy (Minutes\Location): 10 minutes of ice to L knee with leg elevated on bolster    Time Entry(in minutes):  E-Stim (Unattended) Time Entry: 20  Hot/Cold Pack Time  Entry: 10  Neuromuscular Re-Education Time Entry: 53  Therapeutic Activity Time Entry: 8    Assessment & Plan   Assessment: Pt tolerated session well. NMES was initiated and pt had good response. Knee ROM was measured -2 - 83 degrees at end of visit. Importance of HEP compliance reiterated and pt verbalized understanding.        The patient will continue to benefit from skilled outpatient physical therapy in order to address the deficits listed in the problem list on the initial evaluation, provide patient and family education, and maximize the patients level of independence in the home and community environments.     The patient's spiritual, cultural, and educational needs were considered, and the patient is agreeable to the plan of care and goals.           Plan: Continue with established POC.    Goals:   Active       Ambulation/movement       Patient will walk community distances with SPC in order to demonstrate improved functional ability.  (Progressing)       Start:  06/20/25    Expected End:  08/01/25            Patient will navigate 1 flight of stairs without difficulty in order to demonstrate improved functional ability.  (Progressing)       Start:  06/20/25    Expected End:  08/01/25               Changing body position       Patient will demonstrate moving sit to stand 12-14 reps in 30 seconds in order to demonstrate improved transfer ability.  (Progressing)       Start:  06/20/25    Expected End:  08/01/25               Functional outcome       Patient will demonstrate independence in home program for support of progression (Progressing)       Start:  06/20/25    Expected End:  08/01/25               Home activities       Patient will perform household indoor maintenance without difficulty in order to demonstrate improved functional ability.  (Progressing)       Start:  06/20/25    Expected End:  08/01/25               Range of Motion       Patient will achieve left knee ROM of  0-120 degrees   (Progressing)       Start:  06/20/25    Expected End:  08/01/25               Strength       Patient will achieve left knee flexion strength of >/= 4+/5 in order to demonstrate improved knee stability.  (Progressing)       Start:  06/20/25    Expected End:  08/01/25            Patient will achieve left knee extension strength of >/= 4+/5 in order to demonstrate improved knee stability.  (Progressing)       Start:  06/20/25    Expected End:  08/01/25                Eze Hernandes PT

## 2025-06-23 ENCOUNTER — CLINICAL SUPPORT (OUTPATIENT)
Dept: REHABILITATION | Facility: HOSPITAL | Age: 70
End: 2025-06-23
Payer: COMMERCIAL

## 2025-06-23 DIAGNOSIS — G89.29 CHRONIC PAIN OF LEFT KNEE: Primary | Chronic | ICD-10-CM

## 2025-06-23 DIAGNOSIS — M25.562 CHRONIC PAIN OF LEFT KNEE: Primary | Chronic | ICD-10-CM

## 2025-06-23 DIAGNOSIS — R26.2 DIFFICULTY WALKING: ICD-10-CM

## 2025-06-23 PROCEDURE — 97112 NEUROMUSCULAR REEDUCATION: CPT | Mod: CQ

## 2025-06-23 PROCEDURE — 97530 THERAPEUTIC ACTIVITIES: CPT | Mod: CQ

## 2025-06-23 PROCEDURE — 97014 ELECTRIC STIMULATION THERAPY: CPT | Mod: CQ

## 2025-06-23 NOTE — PROGRESS NOTES
"  Outpatient Rehab    Physical Therapy Visit    Patient Name: Raine Medley  MRN: [id number]  YOB: 1955  Encounter Date: 6/23/2025    Therapy Diagnosis:   Encounter Diagnoses   Name Primary?    Chronic pain of left knee Yes    Difficulty walking      Physician: Sanford Calvert III, *    Physician Orders: Eval and Treat  Medical Diagnosis: Unilateral primary osteoarthritis, left knee  Surgical Diagnosis: L TKA   Surgical Date: 6/16/2025  Days Since Last Surgery: 7    Visit # / Visits Authorized:  2 / 20  Insurance Authorization Period: 5/30/2025 to 12/31/2025  Date of Evaluation: 6/18/2025  Plan of Care Certification: 6/18/2025 to 8/1/2025      PT/PTA: PTA   Number of PTA visits since last PT visit:1  Time In: 1000   Time Out: 1105  Total Time (in minutes): 65   Total Billable Time (in minutes): 55    FOTO:  Intake Score: 36%  Survey Score 2:  %  Survey Score 3:  %    Precautions: none    Subjective   Patient reports she noticed new bruising along her thigh today. She has increased pain at night. Reports a "pulling" discomfort in her quad muscle.  Pain reported as 6/10. Left knee    Objective   Range of Motion:   Knee Left Active   Flexion Post: 90 degrees AAROM     Treatment:  Balance/Neuromuscular Re-Education  NMR 2: Ghanaian NMES 10 sec on/20 sec off including: quad sets with towel roll x 8 minutes, SAQs with medium bolster x 10 minutes with strap, SAQs with 1/2 foam x 00 minutes, LAQs at EOM x 10 minutes with knee flexion stretch at rest  NMR 4: supine heel slides with wooden board to improve tissue extensibility and ROM: 10 second hold, 10 reps each  Therapeutic Activity  TA 1: Education on anatomy, DVT/infection signs, rehab expectations, TKA procedure, edema management, exercise rationale, ROM goals, scar tissue formation  Modalities  Cryotherapy (Minutes\Location): 10 minutes of ice to L knee with leg elevated post session    Time Entry(in minutes):  E-Stim (Unattended) Time Entry: " 28  Hot/Cold Pack Time Entry: 10  Neuromuscular Re-Education Time Entry: 47  Therapeutic Activity Time Entry: 8    Assessment & Plan   Assessment: Raine is 1 weeks, 0 days s/p Left TKA. Presents ambulating with RW. Visible bruising local to inner Left thigh that presents following a TKA. Educated on use of SWETHA Hose for swelling management along with elevation. Continued NMES with good tolerance, requires use of strap with SAQs for lifting assist. Notes discomfort throughout quadriceps at end of visit. Measured 90 degrees AAROM post session.   Evaluation/Treatment Tolerance: Patient tolerated treatment well    The patient will continue to benefit from skilled outpatient physical therapy in order to address the deficits listed in the problem list on the initial evaluation, provide patient and family education, and maximize the patients level of independence in the home and community environments.     The patient's spiritual, cultural, and educational needs were considered, and the patient is agreeable to the plan of care and goals.           Plan: Will continue per POC towards treatment goals. PT/PTA met face to face to discuss patient's treatment plan and progress towards established goals. Patient will be seen by physical therapist every sixth visit and minimally once per month.    Goals:   Active       Ambulation/movement       Patient will walk community distances with SPC in order to demonstrate improved functional ability.  (Progressing)       Start:  06/20/25    Expected End:  08/01/25            Patient will navigate 1 flight of stairs without difficulty in order to demonstrate improved functional ability.  (Progressing)       Start:  06/20/25    Expected End:  08/01/25               Changing body position       Patient will demonstrate moving sit to stand 12-14 reps in 30 seconds in order to demonstrate improved transfer ability.  (Progressing)       Start:  06/20/25    Expected End:  08/01/25                Functional outcome       Patient will demonstrate independence in home program for support of progression (Progressing)       Start:  06/20/25    Expected End:  08/01/25               Home activities       Patient will perform household indoor maintenance without difficulty in order to demonstrate improved functional ability.  (Progressing)       Start:  06/20/25    Expected End:  08/01/25               Range of Motion       Patient will achieve left knee ROM of  0-120 degrees  (Progressing)       Start:  06/20/25    Expected End:  08/01/25               Strength       Patient will achieve left knee flexion strength of >/= 4+/5 in order to demonstrate improved knee stability.  (Progressing)       Start:  06/20/25    Expected End:  08/01/25            Patient will achieve left knee extension strength of >/= 4+/5 in order to demonstrate improved knee stability.  (Progressing)       Start:  06/20/25    Expected End:  08/01/25                Karis Clemens, PTA

## 2025-06-25 ENCOUNTER — CLINICAL SUPPORT (OUTPATIENT)
Dept: REHABILITATION | Facility: HOSPITAL | Age: 70
End: 2025-06-25
Payer: COMMERCIAL

## 2025-06-25 DIAGNOSIS — R26.2 DIFFICULTY WALKING: ICD-10-CM

## 2025-06-25 DIAGNOSIS — M25.562 CHRONIC PAIN OF LEFT KNEE: Primary | Chronic | ICD-10-CM

## 2025-06-25 DIAGNOSIS — G89.29 CHRONIC PAIN OF LEFT KNEE: Primary | Chronic | ICD-10-CM

## 2025-06-25 PROCEDURE — 97014 ELECTRIC STIMULATION THERAPY: CPT

## 2025-06-25 PROCEDURE — 97530 THERAPEUTIC ACTIVITIES: CPT

## 2025-06-25 PROCEDURE — 97112 NEUROMUSCULAR REEDUCATION: CPT

## 2025-06-25 NOTE — PROGRESS NOTES
Outpatient Rehab    Physical Therapy Visit    Patient Name: Raine Medley  MRN: [id number]  YOB: 1955  Encounter Date: 6/25/2025    Therapy Diagnosis:   Encounter Diagnoses   Name Primary?    Chronic pain of left knee Yes    Difficulty walking      Physician: Sanford Calvert III, *    Physician Orders: Eval and Treat  Medical Diagnosis: Unilateral primary osteoarthritis, left knee  Surgical Diagnosis: L TKA   Surgical Date: 6/16/2025  Days Since Last Surgery: 9    Visit # / Visits Authorized:  3 / 20  Insurance Authorization Period: 5/30/2025 to 12/31/2025  Date of Evaluation: 6/18/2025  Plan of Care Certification: 6/18/2025 to 8/1/2025      PT/PTA:     Number of PTA visits since last PT visit:   Time In: 1000   Time Out: 1111  Total Time (in minutes): 71   Total Billable Time (in minutes): 61    FOTO:  Intake Score:  %  Survey Score 2:  %  Survey Score 3:  %    Precautions:       Subjective   Pt reports she is feeling fine. She reports ongoing knee pain but says it is manageable..  Pain reported as 5/10.      Objective            Treatment:  Balance/Neuromuscular Re-Education  NMR 2: Danish NMES 10 sec on/30 sec off including: heel prop + pink ball under knee for quad sets x 10 minutes, SAQs with medium bolster x 10 minutes with strap, SAQs with 1/2 foam x 00 minutes, LAQs at EOM x 10 minutes with strap  NMR 3: Heel prop to improve tissue extensibility and fascilitate quad activation x 4 minutes  NMR 4: supine heel slides with wooden board to improve tissue extensibility and ROM: 10 second hold, 20 reps each  Therapeutic Activity  TA 1: Education on anatomy, DVT/infection signs, rehab expectations, TKA procedure, edema management, exercise rationale, ROM goals, scar tissue formation  Modalities  Cryotherapy (Minutes\Location): 10 minutes of ice to L knee with leg elevated post session    Time Entry(in minutes):  Hot/Cold Pack Time Entry: 10  Neuromuscular Re-Education Time Entry:  53  Therapeutic Activity Time Entry: 8    Assessment & Plan   Assessment: Raine is 1 weeks, 2 days s/p Left TKA. Presents ambulating with RW. Visible bruising local to inner thigh. Pt provided education on importance of HEP compliance after mentioning she has not been performed heel slides at home. Continued NMES with good tolerance, requires use of strap with SAQs for lifting assist. Notes discomfort throughout quadriceps at end of visit. Measured knee flexion at 88 degrees AAROM post session.        The patient will continue to benefit from skilled outpatient physical therapy in order to address the deficits listed in the problem list on the initial evaluation, provide patient and family education, and maximize the patients level of independence in the home and community environments.     The patient's spiritual, cultural, and educational needs were considered, and the patient is agreeable to the plan of care and goals.           Plan: Continue with established POC.    Goals:   Active       Ambulation/movement       Patient will walk community distances with SPC in order to demonstrate improved functional ability.  (Progressing)       Start:  06/20/25    Expected End:  08/01/25            Patient will navigate 1 flight of stairs without difficulty in order to demonstrate improved functional ability.  (Progressing)       Start:  06/20/25    Expected End:  08/01/25               Changing body position       Patient will demonstrate moving sit to stand 12-14 reps in 30 seconds in order to demonstrate improved transfer ability.  (Progressing)       Start:  06/20/25    Expected End:  08/01/25               Functional outcome       Patient will demonstrate independence in home program for support of progression (Progressing)       Start:  06/20/25    Expected End:  08/01/25               Home activities       Patient will perform household indoor maintenance without difficulty in order to demonstrate improved functional  ability.  (Progressing)       Start:  06/20/25    Expected End:  08/01/25               Range of Motion       Patient will achieve left knee ROM of  0-120 degrees  (Progressing)       Start:  06/20/25    Expected End:  08/01/25               Strength       Patient will achieve left knee flexion strength of >/= 4+/5 in order to demonstrate improved knee stability.  (Progressing)       Start:  06/20/25    Expected End:  08/01/25            Patient will achieve left knee extension strength of >/= 4+/5 in order to demonstrate improved knee stability.  (Progressing)       Start:  06/20/25    Expected End:  08/01/25                Eze Hernandes PT, DPT

## 2025-06-27 ENCOUNTER — CLINICAL SUPPORT (OUTPATIENT)
Dept: REHABILITATION | Facility: HOSPITAL | Age: 70
End: 2025-06-27
Payer: COMMERCIAL

## 2025-06-27 DIAGNOSIS — G89.29 CHRONIC PAIN OF LEFT KNEE: Primary | Chronic | ICD-10-CM

## 2025-06-27 DIAGNOSIS — R26.2 DIFFICULTY WALKING: ICD-10-CM

## 2025-06-27 DIAGNOSIS — M25.562 CHRONIC PAIN OF LEFT KNEE: Primary | Chronic | ICD-10-CM

## 2025-06-27 PROCEDURE — 97014 ELECTRIC STIMULATION THERAPY: CPT

## 2025-06-27 PROCEDURE — 97112 NEUROMUSCULAR REEDUCATION: CPT

## 2025-06-27 PROCEDURE — 97530 THERAPEUTIC ACTIVITIES: CPT

## 2025-06-27 NOTE — PROGRESS NOTES
Outpatient Rehab    Physical Therapy Visit    Patient Name: Raine Medley  MRN: [id number]  YOB: 1955  Encounter Date: 6/27/2025    Therapy Diagnosis:   Encounter Diagnoses   Name Primary?    Chronic pain of left knee Yes    Difficulty walking      Physician: Sanford Calvert III, *    Physician Orders: Eval and Treat  Medical Diagnosis: Unilateral primary osteoarthritis, left knee  Surgical Diagnosis: L TKA   Surgical Date: 6/16/2025  Days Since Last Surgery: 11    Visit # / Visits Authorized:  4 / 20  Insurance Authorization Period: 5/30/2025 to 12/31/2025  Date of Evaluation: 6/18/2025  Plan of Care Certification: 6/18/2025 to 8/1/2025      PT/PTA:     Number of PTA visits since last PT visit:   Time In: 0900   Time Out: 1011  Total Time (in minutes): 71   Total Billable Time (in minutes): 61    FOTO:  Intake Score:  %  Survey Score 2:  %  Survey Score 3:  %    Precautions:       Subjective   Pt states that she has been having increased lateral knee pain which started yesterday. She says it made it difficult for her to sleep. She says current knee pain is 8/10 while standing but 0/10 when sitting..  Pain reported as 8/10.      Objective            Treatment:  Balance/Neuromuscular Re-Education  NMR 2: Puerto Rican NMES 10 sec on/30 sec off including: heel prop + pink ball under knee for quad sets x 00 minutes, SAQs with medium bolster x 10 minutes, SAQs with 1/2 foam x 10 minutes, LAQs at EOM x 10 minutes with strap  NMR 3: Heel prop to improve tissue extensibility and fascilitate quad activation x 4 minutes  NMR 4: supine heel slides with wooden board to improve tissue extensibility and ROM: 10 second hold, 20 reps each  Therapeutic Activity  TA 1: Education on anatomy, DVT/infection signs, rehab expectations, TKA procedure, edema management, exercise rationale, ROM goals, scar tissue formation  Modalities  Cryotherapy (Minutes\Location): 10 minutes of ice to L knee with leg elevated post  session    Time Entry(in minutes):  E-Stim (Unattended) Time Entry: 30  Hot/Cold Pack Time Entry: 10  Neuromuscular Re-Education Time Entry: 53  Therapeutic Activity Time Entry: 8    Assessment & Plan   Assessment: Raine is 1 weeks, 4 days s/p Left TKA. Presents ambulating with RW. She reports 8/10 lateral knee pain prior to starting PT. Pain was 0/10 once she sat and reports only feeling it in weightbearing position over the last 1-2 days. She is demonstrating improved quadriceps function so strap was removed except for LAQs. Measured knee ROM 0 - 90 degrees at end of today's visit.        The patient will continue to benefit from skilled outpatient physical therapy in order to address the deficits listed in the problem list on the initial evaluation, provide patient and family education, and maximize the patients level of independence in the home and community environments.     The patient's spiritual, cultural, and educational needs were considered, and the patient is agreeable to the plan of care and goals.           Plan: Continue with established POC.    Goals:   Active       Ambulation/movement       Patient will walk community distances with SPC in order to demonstrate improved functional ability.  (Progressing)       Start:  06/20/25    Expected End:  08/01/25            Patient will navigate 1 flight of stairs without difficulty in order to demonstrate improved functional ability.  (Progressing)       Start:  06/20/25    Expected End:  08/01/25               Changing body position       Patient will demonstrate moving sit to stand 12-14 reps in 30 seconds in order to demonstrate improved transfer ability.  (Progressing)       Start:  06/20/25    Expected End:  08/01/25               Functional outcome       Patient will demonstrate independence in home program for support of progression (Progressing)       Start:  06/20/25    Expected End:  08/01/25               Home activities       Patient will perform  household indoor maintenance without difficulty in order to demonstrate improved functional ability.  (Progressing)       Start:  06/20/25    Expected End:  08/01/25               Range of Motion       Patient will achieve left knee ROM of  0-120 degrees  (Progressing)       Start:  06/20/25    Expected End:  08/01/25               Strength       Patient will achieve left knee flexion strength of >/= 4+/5 in order to demonstrate improved knee stability.  (Progressing)       Start:  06/20/25    Expected End:  08/01/25            Patient will achieve left knee extension strength of >/= 4+/5 in order to demonstrate improved knee stability.  (Progressing)       Start:  06/20/25    Expected End:  08/01/25                Eze Hernandes PT, DPT

## 2025-06-30 ENCOUNTER — OFFICE VISIT (OUTPATIENT)
Dept: ORTHOPEDICS | Facility: CLINIC | Age: 70
End: 2025-06-30
Payer: COMMERCIAL

## 2025-06-30 ENCOUNTER — CLINICAL SUPPORT (OUTPATIENT)
Dept: REHABILITATION | Facility: HOSPITAL | Age: 70
End: 2025-06-30
Payer: COMMERCIAL

## 2025-06-30 VITALS — WEIGHT: 143.75 LBS | BODY MASS INDEX: 27.16 KG/M2

## 2025-06-30 DIAGNOSIS — M25.562 CHRONIC PAIN OF LEFT KNEE: Primary | Chronic | ICD-10-CM

## 2025-06-30 DIAGNOSIS — G89.29 CHRONIC PAIN OF LEFT KNEE: Primary | Chronic | ICD-10-CM

## 2025-06-30 DIAGNOSIS — R26.2 DIFFICULTY WALKING: ICD-10-CM

## 2025-06-30 DIAGNOSIS — Z96.652 S/P TOTAL KNEE REPLACEMENT, LEFT: Primary | ICD-10-CM

## 2025-06-30 PROCEDURE — 97530 THERAPEUTIC ACTIVITIES: CPT

## 2025-06-30 PROCEDURE — 99999 PR PBB SHADOW E&M-EST. PATIENT-LVL III: CPT | Mod: PBBFAC,,, | Performed by: NURSE PRACTITIONER

## 2025-06-30 PROCEDURE — 1125F AMNT PAIN NOTED PAIN PRSNT: CPT | Mod: CPTII,S$GLB,, | Performed by: NURSE PRACTITIONER

## 2025-06-30 PROCEDURE — 1101F PT FALLS ASSESS-DOCD LE1/YR: CPT | Mod: CPTII,S$GLB,, | Performed by: NURSE PRACTITIONER

## 2025-06-30 PROCEDURE — 97014 ELECTRIC STIMULATION THERAPY: CPT

## 2025-06-30 PROCEDURE — 3288F FALL RISK ASSESSMENT DOCD: CPT | Mod: CPTII,S$GLB,, | Performed by: NURSE PRACTITIONER

## 2025-06-30 PROCEDURE — 1159F MED LIST DOCD IN RCRD: CPT | Mod: CPTII,S$GLB,, | Performed by: NURSE PRACTITIONER

## 2025-06-30 PROCEDURE — 99024 POSTOP FOLLOW-UP VISIT: CPT | Mod: S$GLB,,, | Performed by: NURSE PRACTITIONER

## 2025-06-30 PROCEDURE — 97112 NEUROMUSCULAR REEDUCATION: CPT

## 2025-06-30 PROCEDURE — 1160F RVW MEDS BY RX/DR IN RCRD: CPT | Mod: CPTII,S$GLB,, | Performed by: NURSE PRACTITIONER

## 2025-06-30 NOTE — PROGRESS NOTES
Outpatient Rehab    Physical Therapy Visit    Patient Name: Raine Medley  MRN: [id number]  YOB: 1955  Encounter Date: 6/30/2025    Therapy Diagnosis:   Encounter Diagnoses   Name Primary?    Chronic pain of left knee Yes    Difficulty walking      Physician: Sanford Calvert III, *    Physician Orders: Eval and Treat  Medical Diagnosis: Unilateral primary osteoarthritis, left knee  Surgical Diagnosis: L TKA   Surgical Date: 6/16/2025  Days Since Last Surgery: 14    Visit # / Visits Authorized:  5 / 20  Insurance Authorization Period: 5/30/2025 to 12/31/2025  Date of Evaluation: 6/18/2025  Plan of Care Certification: 6/18/2025 to 8/1/2025      PT/PTA:     Number of PTA visits since last PT visit:   Time In: 1230   Time Out: 1333  Total Time (in minutes): 63   Total Billable Time (in minutes): 63    FOTO:  Intake Score:  %  Survey Score 2:  %  Survey Score 3:  %    Precautions:       Subjective   Pt reports ongoing lateral knee and lateral lower leg pain which she describes as a burning sensation..  Pain reported as 6/10.      Objective            Treatment:  Balance/Neuromuscular Re-Education  NMR 2: Malagasy NMES 10 sec on/20 sec off including:  SAQs with 1/2 foam x 10 minutes  NMR 4: supine heel slides with wooden board to improve tissue extensibility and ROM: 10 second hold, 20 reps each  NMR 6: Standing hip abduction, 3 x 10  NMR 7: Standing marching, 3 x 10  NMR 8: Standing knee flexion, 3 x 10  NMR 9: Standing TKE with ball, hold 10 seconds, 3 minutes of reps  NMR 10: Gait training with SPC, 50 ft, cueing for L knee flexion during swing phase  Therapeutic Activity  TA 1: Education on anatomy, DVT/infection signs, rehab expectations, TKA procedure, edema management, exercise rationale, ROM goals, scar tissue formation  TA 2: Nustep for 10 minutes for endogenous release of endorphins    Time Entry(in minutes):  Neuromuscular Re-Education Time Entry: 53  Therapeutic Activity Time Entry:  10    Assessment & Plan   Assessment: Pt is 2 weeks s/p L TKA. Gait training with SPC performed this visit with good response and demonstration. She reports ongoing L lateral knee pain which she describes as burning sensation. L knee ROM measured at 0 - 90 degrees at end of visit.        The patient will continue to benefit from skilled outpatient physical therapy in order to address the deficits listed in the problem list on the initial evaluation, provide patient and family education, and maximize the patients level of independence in the home and community environments.     The patient's spiritual, cultural, and educational needs were considered, and the patient is agreeable to the plan of care and goals.           Plan: Continue with established POC.    Goals:   Active       Ambulation/movement       Patient will walk community distances with SPC in order to demonstrate improved functional ability.  (Progressing)       Start:  06/20/25    Expected End:  08/01/25            Patient will navigate 1 flight of stairs without difficulty in order to demonstrate improved functional ability.  (Progressing)       Start:  06/20/25    Expected End:  08/01/25               Changing body position       Patient will demonstrate moving sit to stand 12-14 reps in 30 seconds in order to demonstrate improved transfer ability.  (Progressing)       Start:  06/20/25    Expected End:  08/01/25               Functional outcome       Patient will demonstrate independence in home program for support of progression (Progressing)       Start:  06/20/25    Expected End:  08/01/25               Home activities       Patient will perform household indoor maintenance without difficulty in order to demonstrate improved functional ability.  (Progressing)       Start:  06/20/25    Expected End:  08/01/25               Range of Motion       Patient will achieve left knee ROM of  0-120 degrees  (Progressing)       Start:  06/20/25    Expected  End:  08/01/25               Strength       Patient will achieve left knee flexion strength of >/= 4+/5 in order to demonstrate improved knee stability.  (Progressing)       Start:  06/20/25    Expected End:  08/01/25            Patient will achieve left knee extension strength of >/= 4+/5 in order to demonstrate improved knee stability.  (Progressing)       Start:  06/20/25    Expected End:  08/01/25                Eze Hernandes PT, DPT

## 2025-07-02 ENCOUNTER — CLINICAL SUPPORT (OUTPATIENT)
Dept: REHABILITATION | Facility: HOSPITAL | Age: 70
End: 2025-07-02
Payer: COMMERCIAL

## 2025-07-02 DIAGNOSIS — G89.29 CHRONIC PAIN OF LEFT KNEE: Primary | Chronic | ICD-10-CM

## 2025-07-02 DIAGNOSIS — M25.562 CHRONIC PAIN OF LEFT KNEE: Primary | Chronic | ICD-10-CM

## 2025-07-02 DIAGNOSIS — R26.2 DIFFICULTY WALKING: ICD-10-CM

## 2025-07-02 PROCEDURE — 97112 NEUROMUSCULAR REEDUCATION: CPT | Mod: CQ

## 2025-07-02 PROCEDURE — 97014 ELECTRIC STIMULATION THERAPY: CPT | Mod: CQ

## 2025-07-02 PROCEDURE — 97530 THERAPEUTIC ACTIVITIES: CPT | Mod: CQ

## 2025-07-02 NOTE — PROGRESS NOTES
Outpatient Rehab    Physical Therapy Visit    Patient Name: Raine Medley  MRN: [id number]  YOB: 1955  Encounter Date: 7/2/2025    Therapy Diagnosis:   Encounter Diagnoses   Name Primary?    Chronic pain of left knee Yes    Difficulty walking      Physician: Sanford Calvert III, *    Physician Orders: Eval and Treat  Medical Diagnosis: Unilateral primary osteoarthritis, left knee  Surgical Diagnosis: L TKA   Surgical Date: 6/16/2025  Days Since Last Surgery: 16    Visit # / Visits Authorized:  6 / 20  Insurance Authorization Period: 5/30/2025 to 12/31/2025  Date of Evaluation: 6/18/2025  Plan of Care Certification: 6/18/2025 to 8/1/2025      PT/PTA: PTA   Number of PTA visits since last PT visit:1  Time In: 1000   Time Out: 1108  Total Time (in minutes): 68   Total Billable Time (in minutes): 58    FOTO:  Intake Score: 36%  Survey Score 2:  %  Survey Score 3:  %    Precautions: none    Subjective   Patient reports increased swelling in her leg and ankle pain that is new. She notes elevating at home with two pillows, but it does not help. She is up and down on her feet throughout the day.  Pain reported as 6/10. Left knee    Objective  Objective Measures updated at progress report unless specified.     Treatment:  Balance/Neuromuscular Re-Education  NMR 1: elevation on wedge x 2 for swelling reduction: 8 minutes with ankle pumps performed  NMR 2: Peruvian NMES 10 sec on/20 sec off including:  quad sets with towel x 8 minutes, SAQs with 1/2 foam x 10 minutes  NMR 6: Standing hip abduction, 3 x 10 reps  NMR 9: standing TKE with ball, hold 10 seconds, 3 minutes of reps  Therapeutic Activity  TA 1: Education on anatomy, DVT/infection signs, rehab expectations, TKA procedure, edema management, exercise rationale, ROM goals, scar tissue formation  TA 2: Nustep for 10 minutes for endogenous release of endorphins    Time Entry(in minutes):  E-Stim (Unattended) Time Entry: 18  Hot/Cold Pack Time Entry:  10  Neuromuscular Re-Education Time Entry: 46  Therapeutic Activity Time Entry: 12    Assessment & Plan   Assessment: Raine is 2 weeks, 2 days s/p Left TKA. Presents ambulating with rolling walker. Increased swelling observed in Left LE. Educated patient on activity modification and importance of elevation for swelling reduction. Difficulty with quad activation requiring verbal and tactile cueing for isolation.    Evaluation/Treatment Tolerance: Patient tolerated treatment well    The patient will continue to benefit from skilled outpatient physical therapy in order to address the deficits listed in the problem list on the initial evaluation, provide patient and family education, and maximize the patients level of independence in the home and community environments.     The patient's spiritual, cultural, and educational needs were considered, and the patient is agreeable to the plan of care and goals.           Plan: Will continue per POC towards treatment goals. PT/PTA met face to face to discuss patient's treatment plan and progress towards established goals. Patient will be seen by physical therapist every sixth visit and minimally once per month.    Goals:   Active       Ambulation/movement       Patient will walk community distances with SPC in order to demonstrate improved functional ability.  (Progressing)       Start:  06/20/25    Expected End:  08/01/25            Patient will navigate 1 flight of stairs without difficulty in order to demonstrate improved functional ability.  (Progressing)       Start:  06/20/25    Expected End:  08/01/25               Changing body position       Patient will demonstrate moving sit to stand 12-14 reps in 30 seconds in order to demonstrate improved transfer ability.  (Progressing)       Start:  06/20/25    Expected End:  08/01/25               Functional outcome       Patient will demonstrate independence in home program for support of progression (Progressing)       Start:   06/20/25    Expected End:  08/01/25               Home activities       Patient will perform household indoor maintenance without difficulty in order to demonstrate improved functional ability.  (Progressing)       Start:  06/20/25    Expected End:  08/01/25               Range of Motion       Patient will achieve left knee ROM of  0-120 degrees  (Progressing)       Start:  06/20/25    Expected End:  08/01/25               Strength       Patient will achieve left knee flexion strength of >/= 4+/5 in order to demonstrate improved knee stability.  (Progressing)       Start:  06/20/25    Expected End:  08/01/25            Patient will achieve left knee extension strength of >/= 4+/5 in order to demonstrate improved knee stability.  (Progressing)       Start:  06/20/25    Expected End:  08/01/25                Karis Clemens, PTA

## 2025-07-03 ENCOUNTER — DOCUMENTATION ONLY (OUTPATIENT)
Dept: REHABILITATION | Facility: HOSPITAL | Age: 70
End: 2025-07-03
Payer: COMMERCIAL

## 2025-07-03 NOTE — PROGRESS NOTES
Physical Therapy: No show/Cancellation of Visit  Date: 07/03/2025    Patient was a cancel to today's PT appointment. Reason for cancellation: transportation via [facility]. Patient's next scheduled appointment is Monday, 7/7/2025 at 11AM.    Cancel: 1   No show: 0     Therapist: Karis Clemens PTA

## 2025-07-07 ENCOUNTER — CLINICAL SUPPORT (OUTPATIENT)
Dept: REHABILITATION | Facility: HOSPITAL | Age: 70
End: 2025-07-07
Payer: COMMERCIAL

## 2025-07-07 DIAGNOSIS — R26.2 DIFFICULTY WALKING: ICD-10-CM

## 2025-07-07 DIAGNOSIS — M25.562 CHRONIC PAIN OF LEFT KNEE: Primary | Chronic | ICD-10-CM

## 2025-07-07 DIAGNOSIS — G89.29 CHRONIC PAIN OF LEFT KNEE: Primary | Chronic | ICD-10-CM

## 2025-07-07 PROCEDURE — 97112 NEUROMUSCULAR REEDUCATION: CPT | Mod: CQ

## 2025-07-07 PROCEDURE — 97530 THERAPEUTIC ACTIVITIES: CPT | Mod: CQ

## 2025-07-07 NOTE — PROGRESS NOTES
Outpatient Rehab    Physical Therapy Visit    Patient Name: Raine Medley  MRN: [id number]  YOB: 1955  Encounter Date: 7/7/2025    Therapy Diagnosis:   Encounter Diagnoses   Name Primary?    Chronic pain of left knee Yes    Difficulty walking      Physician: Sanford Calvert III, *    Physician Orders: Eval and Treat  Medical Diagnosis: Unilateral primary osteoarthritis, left knee  Surgical Diagnosis: L TKA   Surgical Date: 6/16/2025  Days Since Last Surgery: 21    Visit # / Visits Authorized:  7 / 20  Insurance Authorization Period: 5/30/2025 to 12/31/2025  Date of Evaluation: 6/18/2025  Plan of Care Certification: 6/18/2025 to 8/1/2025      PT/PTA: PTA   Number of PTA visits since last PT visit:2  Time In: 1053   Time Out: 1200  Total Time (in minutes): 67   Total Billable Time (in minutes): 59    FOTO:  Intake Score: 36%  Survey Score 2:  %  Survey Score 3:  %    Precautions: none    Subjective   Patient reports her knee starting itching this morning and she noticed redness and increased warmth that started last night. Her pain is better and she has been elevating higher at home.  Pain reported as 4/10. Left knee    Objective   Range of Motion:   Knee Left Active   Flexion Pre: 82 degrees  Post: 89 degrees AAROM     Treatment:  Balance/Neuromuscular Re-Education  NMR 2: quad sets with towel: 10 second hold, 20 reps -- SAQs with medium bolster: 5 second hold, 3 x 10 reps -- LAQs at EOM: 5 second hold, 3 x 10 reps  NMR 4: supine heel slides with wooden board to improve tissue extensibility and ROM: 10 second hold, 5 minutes  NMR 6: standing hip abduction, 3 x 10 reps each  NMR 7: standing marching, 3 x 10 reps alternating  NMR 8: standing knee flexion, 3 x 10 reps each  Therapeutic Activity  TA 1: Education on anatomy, DVT/infection signs, rehab expectations, TKA procedure, edema management, exercise rationale, ROM goals, scar tissue formation  TA 2: Nustep for 5 minutes for endogenous  release of endorphins  Modalities  Cryotherapy (Minutes\Location): 8 minutes of ice to L knee with leg elevated post session    Time Entry(in minutes):  Hot/Cold Pack Time Entry: 8  Neuromuscular Re-Education Time Entry: 51  Therapeutic Activity Time Entry: 8    Assessment & Plan   Assessment: Raine is 3 weeks, 0 days s/p Left TKA. Presents ambulating with rolling walker. There is visible redness and warmth present along Left knee that is new compared to previous session. Uploaded image into media and contacted MD's office. Poor tolerance to long sitting position reporting an 8-9 out of 10. She is able to complete open chain and closed chain interventions denying increase in pain levels. Significant difficulty with knee flexion measuring 82 degrees AROM, 89 degrees AAROM with pain reported. Emphasized importance of elevation, activity modification, and HEP compliance to tolerance.  Evaluation/Treatment Tolerance: Patient tolerated treatment well    The patient will continue to benefit from skilled outpatient physical therapy in order to address the deficits listed in the problem list on the initial evaluation, provide patient and family education, and maximize the patients level of independence in the home and community environments.     The patient's spiritual, cultural, and educational needs were considered, and the patient is agreeable to the plan of care and goals.           Plan: Will continue per POC towards treatment goals. PT/PTA met face to face to discuss patient's treatment plan and progress towards established goals. Patient will be seen by physical therapist every sixth visit and minimally once per month.    Goals:   Active       Ambulation/movement       Patient will walk community distances with SPC in order to demonstrate improved functional ability.  (Progressing)       Start:  06/20/25    Expected End:  08/01/25            Patient will navigate 1 flight of stairs without difficulty in order to  demonstrate improved functional ability.  (Progressing)       Start:  06/20/25    Expected End:  08/01/25               Changing body position       Patient will demonstrate moving sit to stand 12-14 reps in 30 seconds in order to demonstrate improved transfer ability.  (Progressing)       Start:  06/20/25    Expected End:  08/01/25               Functional outcome       Patient will demonstrate independence in home program for support of progression (Progressing)       Start:  06/20/25    Expected End:  08/01/25               Home activities       Patient will perform household indoor maintenance without difficulty in order to demonstrate improved functional ability.  (Progressing)       Start:  06/20/25    Expected End:  08/01/25               Range of Motion       Patient will achieve left knee ROM of  0-120 degrees  (Progressing)       Start:  06/20/25    Expected End:  08/01/25               Strength       Patient will achieve left knee flexion strength of >/= 4+/5 in order to demonstrate improved knee stability.  (Progressing)       Start:  06/20/25    Expected End:  08/01/25            Patient will achieve left knee extension strength of >/= 4+/5 in order to demonstrate improved knee stability.  (Progressing)       Start:  06/20/25    Expected End:  08/01/25                Karis Clemens, PTA

## 2025-07-09 ENCOUNTER — CLINICAL SUPPORT (OUTPATIENT)
Dept: REHABILITATION | Facility: HOSPITAL | Age: 70
End: 2025-07-09
Payer: COMMERCIAL

## 2025-07-09 DIAGNOSIS — M25.562 CHRONIC PAIN OF LEFT KNEE: Primary | Chronic | ICD-10-CM

## 2025-07-09 DIAGNOSIS — R26.2 DIFFICULTY WALKING: ICD-10-CM

## 2025-07-09 DIAGNOSIS — G89.29 CHRONIC PAIN OF LEFT KNEE: Primary | Chronic | ICD-10-CM

## 2025-07-09 PROCEDURE — 97112 NEUROMUSCULAR REEDUCATION: CPT

## 2025-07-09 PROCEDURE — 97530 THERAPEUTIC ACTIVITIES: CPT

## 2025-07-09 NOTE — PROGRESS NOTES
Outpatient Rehab    Physical Therapy Visit    Patient Name: Raine Medley  MRN: [id number]  YOB: 1955  Encounter Date: 7/9/2025    Therapy Diagnosis:   Encounter Diagnoses   Name Primary?    Chronic pain of left knee Yes    Difficulty walking      Physician: Sanford Calvert III, *    Physician Orders: Eval and Treat  Medical Diagnosis: Unilateral primary osteoarthritis, left knee  Surgical Diagnosis: L TKA   Surgical Date: 6/16/2025  Days Since Last Surgery: 24    Visit # / Visits Authorized:  8 / 20  Insurance Authorization Period: 5/30/2025 to 12/31/2025  Date of Evaluation: 6/18/2025  Plan of Care Certification: 6/18/2025 to 8/1/2025      PT/PTA:     Number of PTA visits since last PT visit:   Time In: 1000   Time Out: 1100  Total Time (in minutes): 60   Total Billable Time (in minutes): 60    FOTO:  Intake Score:  %  Survey Score 2:  %  Survey Score 3:  %    Precautions:       Subjective   Pt states that she has been having occasional burning sensation on anterior knee. She says itching sensation has improved but still happens intermittently. She reports that her pain has improved but rates it at 5/10..  Pain reported as 5/10.      Objective            Treatment:  Balance/Neuromuscular Re-Education  NMR 2: quad sets with towel: 10 second hold, 20 reps -- SAQs with medium bolster: 5 second hold, 3 x 10 reps -- LAQs at EOM: 5 second hold, 3 x 10 reps  NMR 4: supine heel slides with wooden board to improve tissue extensibility and ROM: 10 second hold, 5 minutes  NMR 6: standing hip abduction, 3 x 10 reps each  NMR 7: standing marching, 3 x 10 reps alternating  NMR 8: standing knee flexion, 3 x 10 reps each  NMR 10: Gait training with SPC, 50 ft, cueing for L knee flexion during swing phase  Therapeutic Activity  TA 1: Education on anatomy, DVT/infection signs, rehab expectations, TKA procedure, edema management, exercise rationale, ROM goals, scar tissue formation  TA 2: Nustep for 10  minutes for endogenous release of endorphins    Time Entry(in minutes):  Neuromuscular Re-Education Time Entry: 45  Therapeutic Activity Time Entry: 15    Assessment & Plan   Assessment: Raine is 3 weeks, 2 days s/p Left TKA. Pt presents with improved LLE edema but has ongoing anterior knee redness. SPC gait training performed today with good return in demonstration. She continues to report occasional itchiness at knee but denies it currently. Pt advised to call [facility] hotline regarding ongoing symptoms.        The patient will continue to benefit from skilled outpatient physical therapy in order to address the deficits listed in the problem list on the initial evaluation, provide patient and family education, and maximize the patients level of independence in the home and community environments.     The patient's spiritual, cultural, and educational needs were considered, and the patient is agreeable to the plan of care and goals.           Plan: Continue wtih established POC.    Goals:   Active       Ambulation/movement       Patient will walk community distances with SPC in order to demonstrate improved functional ability.  (Progressing)       Start:  06/20/25    Expected End:  08/01/25            Patient will navigate 1 flight of stairs without difficulty in order to demonstrate improved functional ability.  (Progressing)       Start:  06/20/25    Expected End:  08/01/25               Changing body position       Patient will demonstrate moving sit to stand 12-14 reps in 30 seconds in order to demonstrate improved transfer ability.  (Progressing)       Start:  06/20/25    Expected End:  08/01/25               Functional outcome       Patient will demonstrate independence in home program for support of progression (Progressing)       Start:  06/20/25    Expected End:  08/01/25               Home activities       Patient will perform household indoor maintenance without difficulty in order to demonstrate  improved functional ability.  (Progressing)       Start:  06/20/25    Expected End:  08/01/25               Range of Motion       Patient will achieve left knee ROM of  0-120 degrees  (Progressing)       Start:  06/20/25    Expected End:  08/01/25               Strength       Patient will achieve left knee flexion strength of >/= 4+/5 in order to demonstrate improved knee stability.  (Progressing)       Start:  06/20/25    Expected End:  08/01/25            Patient will achieve left knee extension strength of >/= 4+/5 in order to demonstrate improved knee stability.  (Progressing)       Start:  06/20/25    Expected End:  08/01/25                Eze Hernandes PT, DPT

## 2025-07-10 ENCOUNTER — TELEPHONE (OUTPATIENT)
Dept: ORTHOPEDICS | Facility: CLINIC | Age: 70
End: 2025-07-10
Payer: COMMERCIAL

## 2025-07-10 DIAGNOSIS — Z96.652 S/P TOTAL KNEE REPLACEMENT, LEFT: Primary | ICD-10-CM

## 2025-07-10 NOTE — TELEPHONE ENCOUNTER
Called patient's daughter regarding picture sent to [facility].  Patient's daughter noted that the patient's surgical knee has shown redness with mild associated itching. Patient denies any fever, chills, nausea, vomiting, or expression of fluid from the incision.    Patient instructed to rest, ice, and elevate the leg with occasional Benadryl as needed for itching.  Patient instructed on signs to look out for and to call the [facility] if needed.

## 2025-07-11 ENCOUNTER — CLINICAL SUPPORT (OUTPATIENT)
Dept: REHABILITATION | Facility: HOSPITAL | Age: 70
End: 2025-07-11
Payer: COMMERCIAL

## 2025-07-11 DIAGNOSIS — G89.29 CHRONIC PAIN OF LEFT KNEE: Primary | Chronic | ICD-10-CM

## 2025-07-11 DIAGNOSIS — M25.562 CHRONIC PAIN OF LEFT KNEE: Primary | Chronic | ICD-10-CM

## 2025-07-11 DIAGNOSIS — R26.2 DIFFICULTY WALKING: ICD-10-CM

## 2025-07-11 PROCEDURE — 97112 NEUROMUSCULAR REEDUCATION: CPT

## 2025-07-11 PROCEDURE — 97530 THERAPEUTIC ACTIVITIES: CPT

## 2025-07-11 NOTE — PROGRESS NOTES
Outpatient Rehab    Physical Therapy Visit    Patient Name: Raine Medley  MRN: [id number]  YOB: 1955  Encounter Date: 7/11/2025    Therapy Diagnosis:   Encounter Diagnoses   Name Primary?    Chronic pain of left knee Yes    Difficulty walking      Physician: Sanford Calvert III, *    Physician Orders: Eval and Treat  Medical Diagnosis: Unilateral primary osteoarthritis, left knee  Surgical Diagnosis: L TKA   Surgical Date: 6/16/2025  Days Since Last Surgery: 28    Visit # / Visits Authorized:  10 / 20  Insurance Authorization Period: 5/30/2025 to 12/31/2025  Date of Evaluation: 6/18/2025  Plan of Care Certification: 6/18/2025 to 8/1/2025      PT/PTA:     Number of PTA visits since last PT visit:   Time In: 1000   Time Out: 1100  Total Time (in minutes): 60   Total Billable Time (in minutes): 60    FOTO:  Intake Score: 36%  Survey Score 2: Not applicable for this Episode%  Survey Score 3: Not applicable for this Episode%    Precautions:         Subjective   Pt states that she spoke with [facility] hotline and instructed to take benadryl as needed for itchiness on anterior knee. She says that her knee is feeling better today. She presents with RW. She states she performed 70 reps of heel slides yesterday..  Pain reported as 5/10.      Objective       Knee Range of Motion   Left Knee   Active (deg) Passive (deg) Pain   Flexion   88     Extension -3                       Treatment:  Balance/Neuromuscular Re-Education  NMR 2: quad sets with towel: 10 second hold, 20 reps -- SAQs with medium bolster: 5 second hold, 3 x 10 reps -- LAQs at EOM: 5 second hold, 3 x 10 reps  NMR 3: Heel prop to improve tissue extensibility and fascilitate quad activation x 4 minutes  NMR 4: supine heel slides with wooden board to improve tissue extensibility and ROM: 10 second hold, 5 minutes  NMR 6: standing hip abduction, 3 x 10 reps each  NMR 7: standing marching, 3 x 10 reps alternating  NMR 8: standing knee  flexion, 3 x 10 reps each  NMR 10: Gait training with SPC, 50 ft, cueing for L knee flexion during swing phase  Therapeutic Activity  TA 1: Education on anatomy, DVT/infection signs, rehab expectations, TKA procedure, edema management, exercise rationale, ROM goals, scar tissue formation  TA 2: Nustep for 10 minutes for endogenous release of endorphins    Time Entry(in minutes):  Neuromuscular Re-Education Time Entry: 45  Therapeutic Activity Time Entry: 15    Assessment & Plan   Assessment: Raine is 3 weeks, 4 days s/p Left TKA. Pt presents with improved LLE edema and reports improved knee pain levels. SPC gait training performed today with good return in demonstration. Knee ROM is still limited to -3 to 88 degrees currently. Pt provided heavy education on scar tissue formation, importance of HEP compliance, and importance of knee extension and flexion in functional tasks.        The patient will continue to benefit from skilled outpatient physical therapy in order to address the deficits listed in the problem list on the initial evaluation, provide patient and family education, and maximize the patients level of independence in the home and community environments.     The patient's spiritual, cultural, and educational needs were considered, and the patient is agreeable to the plan of care and goals.           Plan: Continue wtih established POC.    Goals:   Active       Ambulation/movement       Patient will walk community distances with SPC in order to demonstrate improved functional ability.  (Progressing)       Start:  06/20/25    Expected End:  08/01/25            Patient will navigate 1 flight of stairs without difficulty in order to demonstrate improved functional ability.  (Progressing)       Start:  06/20/25    Expected End:  08/01/25               Changing body position       Patient will demonstrate moving sit to stand 12-14 reps in 30 seconds in order to demonstrate improved transfer ability.   (Progressing)       Start:  06/20/25    Expected End:  08/01/25               Functional outcome       Patient will demonstrate independence in home program for support of progression (Progressing)       Start:  06/20/25    Expected End:  08/01/25               Home activities       Patient will perform household indoor maintenance without difficulty in order to demonstrate improved functional ability.  (Progressing)       Start:  06/20/25    Expected End:  08/01/25               Range of Motion       Patient will achieve left knee ROM of  0-120 degrees  (Progressing)       Start:  06/20/25    Expected End:  08/01/25               Strength       Patient will achieve left knee flexion strength of >/= 4+/5 in order to demonstrate improved knee stability.  (Progressing)       Start:  06/20/25    Expected End:  08/01/25            Patient will achieve left knee extension strength of >/= 4+/5 in order to demonstrate improved knee stability.  (Progressing)       Start:  06/20/25    Expected End:  08/01/25                Eze Hernandes, PT

## 2025-07-14 ENCOUNTER — CLINICAL SUPPORT (OUTPATIENT)
Dept: REHABILITATION | Facility: HOSPITAL | Age: 70
End: 2025-07-14
Payer: COMMERCIAL

## 2025-07-14 DIAGNOSIS — G89.29 CHRONIC PAIN OF LEFT KNEE: Primary | Chronic | ICD-10-CM

## 2025-07-14 DIAGNOSIS — R26.2 DIFFICULTY WALKING: ICD-10-CM

## 2025-07-14 DIAGNOSIS — M25.562 CHRONIC PAIN OF LEFT KNEE: Primary | Chronic | ICD-10-CM

## 2025-07-14 PROCEDURE — 97112 NEUROMUSCULAR REEDUCATION: CPT

## 2025-07-14 PROCEDURE — 97530 THERAPEUTIC ACTIVITIES: CPT

## 2025-07-14 NOTE — PROGRESS NOTES
Outpatient Rehab    Physical Therapy Visit    Patient Name: Raine Medley  MRN: [id number]  YOB: 1955  Encounter Date: 7/14/2025    Therapy Diagnosis:   Encounter Diagnoses   Name Primary?    Chronic pain of left knee Yes    Difficulty walking      Physician: Sanford Calvert III, *    Physician Orders: Eval and Treat  Medical Diagnosis: Unilateral primary osteoarthritis, left knee  Surgical Diagnosis: L TKA   Surgical Date: 6/16/2025  Days Since Last Surgery: 29    Visit # / Visits Authorized:  10 / 20  Insurance Authorization Period: 5/30/2025 to 12/31/2025  Date of Evaluation: 6/18/2025  Plan of Care Certification: 6/18/2025 to 8/1/2025      PT/PTA:     Number of PTA visits since last PT visit:   Time In: 1000   Time Out: 1100  Total Time (in minutes): 60   Total Billable Time (in minutes): 60    FOTO:  Intake Score: 36%  Survey Score 2: Not applicable for this Episode%  Survey Score 3: Not applicable for this Episode%    Precautions:         Subjective   Pt states she is feeling better today. She presents today with SPC. She says knee pain is improving and currently 4/10..  Pain reported as 4/10.      Objective            Treatment:  Balance/Neuromuscular Re-Education  NMR 4: supine heel slides with wooden board to improve tissue extensibility and ROM: 10 second hold, 5 minutes  NMR 6: standing hip abduction, 3 x 10 reps each  NMR 7: standing marching, 3 x 10 reps alternating  NMR 8: standing knee flexion, 3 x 10 reps each  NMR 9: standing TKE with ball, hold 10 seconds, 3 minutes of reps  Therapeutic Activity  TA 1: Education on anatomy, DVT/infection signs, rehab expectations, TKA procedure, edema management, exercise rationale, ROM goals, scar tissue formation  TA 2: Nustep for 10 minutes for endogenous release of endorphins  TA 3: 4 inch step ups, 3 x 10  TA 4: DL shuttle press, 50 lbs, 3 x 10 -- SL shuttle press, 25 lbs, 3 x 10    Time Entry(in minutes):  Neuromuscular Re-Education  Time Entry: 30  Therapeutic Activity Time Entry: 30    Assessment & Plan   Assessment: Pt is 4 weeks L TKA. L knee is limited to -3 to 89 degrees. Pt encouraged to increase performance of heel slides at home in order to prevent knee contracture. Pt would benefit from knee flexion dynasplint to address ROM limitations.        The patient will continue to benefit from skilled outpatient physical therapy in order to address the deficits listed in the problem list on the initial evaluation, provide patient and family education, and maximize the patients level of independence in the home and community environments.     The patient's spiritual, cultural, and educational needs were considered, and the patient is agreeable to the plan of care and goals.           Plan: Continue wtih established POC.    Goals:   Active       Ambulation/movement       Patient will walk community distances with SPC in order to demonstrate improved functional ability.  (Progressing)       Start:  06/20/25    Expected End:  08/01/25            Patient will navigate 1 flight of stairs without difficulty in order to demonstrate improved functional ability.  (Progressing)       Start:  06/20/25    Expected End:  08/01/25               Changing body position       Patient will demonstrate moving sit to stand 12-14 reps in 30 seconds in order to demonstrate improved transfer ability.  (Progressing)       Start:  06/20/25    Expected End:  08/01/25               Functional outcome       Patient will demonstrate independence in home program for support of progression (Progressing)       Start:  06/20/25    Expected End:  08/01/25               Home activities       Patient will perform household indoor maintenance without difficulty in order to demonstrate improved functional ability.  (Progressing)       Start:  06/20/25    Expected End:  08/01/25               Range of Motion       Patient will achieve left knee ROM of  0-120 degrees  (Progressing)        Start:  06/20/25    Expected End:  08/01/25               Strength       Patient will achieve left knee flexion strength of >/= 4+/5 in order to demonstrate improved knee stability.  (Progressing)       Start:  06/20/25    Expected End:  08/01/25            Patient will achieve left knee extension strength of >/= 4+/5 in order to demonstrate improved knee stability.  (Progressing)       Start:  06/20/25    Expected End:  08/01/25                Eze Hernandes PT

## 2025-07-15 ENCOUNTER — PATIENT MESSAGE (OUTPATIENT)
Dept: ORTHOPEDICS | Facility: CLINIC | Age: 70
End: 2025-07-15
Payer: COMMERCIAL

## 2025-07-16 ENCOUNTER — CLINICAL SUPPORT (OUTPATIENT)
Dept: REHABILITATION | Facility: HOSPITAL | Age: 70
End: 2025-07-16
Payer: COMMERCIAL

## 2025-07-16 DIAGNOSIS — G89.29 CHRONIC PAIN OF LEFT KNEE: Primary | Chronic | ICD-10-CM

## 2025-07-16 DIAGNOSIS — M25.562 CHRONIC PAIN OF LEFT KNEE: Primary | Chronic | ICD-10-CM

## 2025-07-16 DIAGNOSIS — R26.2 DIFFICULTY WALKING: ICD-10-CM

## 2025-07-16 PROCEDURE — 97112 NEUROMUSCULAR REEDUCATION: CPT

## 2025-07-16 PROCEDURE — 97110 THERAPEUTIC EXERCISES: CPT

## 2025-07-16 PROCEDURE — 97530 THERAPEUTIC ACTIVITIES: CPT

## 2025-07-16 NOTE — PROGRESS NOTES
Outpatient Rehab    Physical Therapy Progress Note    Patient Name: Raine Medley  MRN: [id number]  YOB: 1955  Encounter Date: 7/16/2025    Therapy Diagnosis:   Encounter Diagnoses   Name Primary?    Chronic pain of left knee Yes    Difficulty walking      Physician: Sanford Calvert III, *    Physician Orders: Eval and Treat  Medical Diagnosis: Unilateral primary osteoarthritis, left knee  Surgical Diagnosis: L TKA   Surgical Date: 6/16/2025  Days Since Last Surgery: 30    Visit # / Visits Authorized:  11 / 20  Insurance Authorization Period: 5/30/2025 to 12/31/2025  Date of Evaluation: 6/18/2025  Plan of Care Certification: 6/18/2025 to 8/1/2025      PT/PTA:     Number of PTA visits since last PT visit:   Time In: 1000   Time Out: 1100  Total Time (in minutes): 60   Total Billable Time (in minutes): 60    FOTO:  Intake Score: 36%  Survey Score 2: Not applicable for this Episode%  Survey Score 3: Not applicable for this Episode%    Precautions:       Subjective   Pt states that L knee pain is 8/10. She says she still has difficulty sleeping at night and difficulty with all weightbearing activities. She says that she feels she is getting better though. Pt reports she has not been performing heel props..  Pain reported as 5/10.      Objective       Knee Range of Motion   Left Knee   Active (deg) Passive (deg) Pain   Flexion   93     Extension -3                       Timed Up & Go (TUG)  Time: 16 seconds     An older adult who takes >=12 seconds to complete the TUG is at risk for falling.       Sit to Stand Testing      The patient completed 11 repetitions of a sit to stand transfer in 30 seconds. no BUE support              Treatment:  Therapeutic Exercise  TE 1: ROM/TUG/30STS  Balance/Neuromuscular Re-Education  NMR 4: supine heel slides with wooden board to improve tissue extensibility and ROM: 10 second hold, 5 minutes  NMR 10: Gait training with SPC, 200 ft, cueing for L knee extension  during stance phase  Therapeutic Activity  TA 1: Education on anatomy, DVT/infection signs, rehab expectations, TKA procedure, edema management, exercise rationale, ROM goals, scar tissue formation  TA 2: Nustep for 10 minutes for endogenous release of endorphins  TA 3: 4 inch step ups, 3 x 10  TA 4: DL shuttle press, 50 lbs, 3 x 10 -- SL shuttle press, 25 lbs, 3 x 10    Time Entry(in minutes):  Neuromuscular Re-Education Time Entry: 15  Therapeutic Activity Time Entry: 30  Therapeutic Exercise Time Entry: 15    Assessment & Plan   Assessment: Pt is 4 weeks, 2 days L TKA. L knee is limited to -3 to 93 degrees. She has attended 11 PT visits since initial evaluation. Her strength and functional ability are progressing well towards established goals. ROM continues to be limited -3 to 93 degrees and has not been demonstrating significant progress over the last 2 weeks. Pt encouraged to continue HEP several times per day and she verbalized understanding. Pt provided thorough education on scar tissue formation timeline. Pt would benefit from knee flexion dynasplint to address ROM limitations.        The patient will continue to benefit from skilled outpatient physical therapy in order to address the deficits listed in the problem list on the initial evaluation, provide patient and family education, and maximize the patients level of independence in the home and community environments.     The patient's spiritual, cultural, and educational needs were considered, and the patient is agreeable to the plan of care and goals.           Plan: Continue wtih established POC.    Goals:   Active       Ambulation/movement       Patient will walk community distances with SPC in order to demonstrate improved functional ability.  (Progressing)       Start:  06/20/25    Expected End:  08/01/25            Patient will navigate 1 flight of stairs without difficulty in order to demonstrate improved functional ability.  (Progressing)        Start:  06/20/25    Expected End:  08/01/25               Changing body position       Patient will demonstrate moving sit to stand 12-14 reps in 30 seconds in order to demonstrate improved transfer ability.  (Progressing)       Start:  06/20/25    Expected End:  08/01/25               Functional outcome       Patient will demonstrate independence in home program for support of progression (Progressing)       Start:  06/20/25    Expected End:  08/01/25               Home activities       Patient will perform household indoor maintenance without difficulty in order to demonstrate improved functional ability.  (Progressing)       Start:  06/20/25    Expected End:  08/01/25               Range of Motion       Patient will achieve left knee ROM of  0-120 degrees  (Progressing)       Start:  06/20/25    Expected End:  08/01/25               Strength       Patient will achieve left knee flexion strength of >/= 4+/5 in order to demonstrate improved knee stability.  (Progressing)       Start:  06/20/25    Expected End:  08/01/25            Patient will achieve left knee extension strength of >/= 4+/5 in order to demonstrate improved knee stability.  (Progressing)       Start:  06/20/25    Expected End:  08/01/25                Eze Hernandes PT

## 2025-07-18 ENCOUNTER — CLINICAL SUPPORT (OUTPATIENT)
Dept: REHABILITATION | Facility: HOSPITAL | Age: 70
End: 2025-07-18
Payer: COMMERCIAL

## 2025-07-18 DIAGNOSIS — R26.2 DIFFICULTY WALKING: ICD-10-CM

## 2025-07-18 DIAGNOSIS — G89.29 CHRONIC PAIN OF LEFT KNEE: Primary | Chronic | ICD-10-CM

## 2025-07-18 DIAGNOSIS — M25.562 CHRONIC PAIN OF LEFT KNEE: Primary | Chronic | ICD-10-CM

## 2025-07-18 PROCEDURE — 97112 NEUROMUSCULAR REEDUCATION: CPT

## 2025-07-18 PROCEDURE — 97530 THERAPEUTIC ACTIVITIES: CPT

## 2025-07-18 NOTE — PROGRESS NOTES
Outpatient Rehab    Physical Therapy Visit    Patient Name: Raine Medley  MRN: [id number]  YOB: 1955  Encounter Date: 7/18/2025    Therapy Diagnosis:   Encounter Diagnoses   Name Primary?    Chronic pain of left knee Yes    Difficulty walking      Physician: Sanford Calvert III, *    Physician Orders: Eval and Treat  Medical Diagnosis: Unilateral primary osteoarthritis, left knee  Surgical Diagnosis: L TKA   Surgical Date: 6/16/2025  Days Since Last Surgery: 35    Visit # / Visits Authorized:  12 / 20  Insurance Authorization Period: 5/30/2025 to 12/31/2025  Date of Evaluation: 6/18/2025  Plan of Care Certification: 6/18/2025 to 8/1/2025      PT/PTA:     Number of PTA visits since last PT visit:   Time In: 1100   Time Out: 1200  Total Time (in minutes): 60   Total Billable Time (in minutes): 60    FOTO:  Intake Score (%): 36  Survey Score 2 (%): Not applicable for this Episode  Survey Score 3 (%): Not applicable for this Episode    Precautions:         Subjective   Pt states that she had calf muscle spasms last night and this morning. She denies any changes in activity levels. Pt reports moderate L knee pain..  Pain reported as 5/10.      Objective       Knee Range of Motion   Left Knee   Active (deg) Passive (deg) Pain   Flexion   94     Extension                         Treatment:  Balance/Neuromuscular Re-Education  NMR 3: Heel prop to improve tissue extensibility and fascilitate quad activation x 4 minutes  NMR 4: supine heel slides with wooden board to improve tissue extensibility and ROM: 10 second hold, 5 minutes  NMR 6: standing hip abduction, 3 x 10 reps each  NMR 7: standing marching, 3 x 10 reps alternating  NMR 8: standing knee flexion, 3 x 10 reps each  Therapeutic Activity  TA 1: Education on anatomy, DVT/infection signs, rehab expectations, TKA procedure, edema management, exercise rationale, ROM goals, scar tissue formation, instruction in knee flexion dynasplint  donning/doffing  TA 2: Nustep for 10 minutes for endogenous release of endorphins  TA 3: 4 inch step ups, 3 x 10  TA 4: DL shuttle press, 50 lbs, 3 x 10 -- SL shuttle press, 25 lbs, 3 x 10    Time Entry(in minutes):  Neuromuscular Re-Education Time Entry: 30  Therapeutic Activity Time Entry: 30    Assessment & Plan   Assessment: Pt is 4 weeks, 4 days L TKA. L knee is limited to -3 to 94 degrees. Pt was provided knee flexion dynasplint from representative prior to today's visit. She had appropriate muscle response to all interventions today.        The patient will continue to benefit from skilled outpatient physical therapy in order to address the deficits listed in the problem list on the initial evaluation, provide patient and family education, and maximize the patients level of independence in the home and community environments.     The patient's spiritual, cultural, and educational needs were considered, and the patient is agreeable to the plan of care and goals.           Plan: Continue wtih established POC.    Goals:   Active       Ambulation/movement       Patient will walk community distances with SPC in order to demonstrate improved functional ability.  (Progressing)       Start:  06/20/25    Expected End:  08/01/25            Patient will navigate 1 flight of stairs without difficulty in order to demonstrate improved functional ability.  (Progressing)       Start:  06/20/25    Expected End:  08/01/25               Changing body position       Patient will demonstrate moving sit to stand 12-14 reps in 30 seconds in order to demonstrate improved transfer ability.  (Progressing)       Start:  06/20/25    Expected End:  08/01/25               Functional outcome       Patient will demonstrate independence in home program for support of progression (Progressing)       Start:  06/20/25    Expected End:  08/01/25               Home activities       Patient will perform household indoor maintenance without  difficulty in order to demonstrate improved functional ability.  (Progressing)       Start:  06/20/25    Expected End:  08/01/25               Range of Motion       Patient will achieve left knee ROM of  0-120 degrees  (Progressing)       Start:  06/20/25    Expected End:  08/01/25               Strength       Patient will achieve left knee flexion strength of >/= 4+/5 in order to demonstrate improved knee stability.  (Progressing)       Start:  06/20/25    Expected End:  08/01/25            Patient will achieve left knee extension strength of >/= 4+/5 in order to demonstrate improved knee stability.  (Progressing)       Start:  06/20/25    Expected End:  08/01/25                Eze Hernandes PT, DPT

## 2025-07-22 ENCOUNTER — CLINICAL SUPPORT (OUTPATIENT)
Dept: REHABILITATION | Facility: HOSPITAL | Age: 70
End: 2025-07-22
Payer: COMMERCIAL

## 2025-07-22 DIAGNOSIS — G89.29 CHRONIC PAIN OF LEFT KNEE: Primary | Chronic | ICD-10-CM

## 2025-07-22 DIAGNOSIS — R26.2 DIFFICULTY WALKING: ICD-10-CM

## 2025-07-22 DIAGNOSIS — M25.562 CHRONIC PAIN OF LEFT KNEE: Primary | Chronic | ICD-10-CM

## 2025-07-22 PROCEDURE — 97530 THERAPEUTIC ACTIVITIES: CPT | Mod: CQ

## 2025-07-22 PROCEDURE — 97112 NEUROMUSCULAR REEDUCATION: CPT | Mod: CQ

## 2025-07-22 NOTE — PROGRESS NOTES
Outpatient Rehab    Physical Therapy Visit    Patient Name: Raine Medley  MRN: [id number]  YOB: 1955  Encounter Date: 7/22/2025    Therapy Diagnosis:   Encounter Diagnoses   Name Primary?    Chronic pain of left knee Yes    Difficulty walking      Physician: Sanford Calvert III, *    Physician Orders: Eval and Treat  Medical Diagnosis: Unilateral primary osteoarthritis, left knee  Surgical Diagnosis: L TKA   Surgical Date: 6/16/2025  Days Since Last Surgery: 36    Visit # / Visits Authorized:  13 / 20  Insurance Authorization Period: 5/30/2025 to 12/31/2025  Date of Evaluation: 6/18/2025  Plan of Care Certification: 6/18/2025 to 8/1/2025      PT/PTA: PTA   Number of PTA visits since last PT visit:1  Time In: 1000   Time Out: 1107  Total Time (in minutes): 67   Total Billable Time (in minutes): 67    FOTO:  Intake Score (%): 36  Survey Score 2 (%): Not applicable for this Episode  Survey Score 3 (%): Not applicable for this Episode    Precautions:  Additional Precautions and Protocol Details: Standard      Subjective   Patient reports increased discomfort with use of DynaSplint. States she is doing 10 minutes at a time, 3 times a day. Her pain continues to be worse at night. She reports being able to go up her stairs, but has difficulty coming down.  Pain reported as 5/10. Left knee    Objective   Range of Motion:   Knee Left Active   Flexion Pre: 93 degrees AAROM  Post: 93 degrees AAROM     Treatment:  Balance/Neuromuscular Re-Education  NMR 4: supine heel slides with wooden board to improve tissue extensibility and ROM: 10 second hold, 5 minutes x 2 trials  NMR 6: standing hip abduction, 3 x 10 reps each  NMR 7: standing marching, 3 x 10 reps alternating  NMR 8: standing knee flexion, 3 x 10 reps each  NMR 10: Gait training with SPC, 200 ft, cueing for L knee extension during stance phase  Therapeutic Activity  TA 1: Education on anatomy, DVT/infection signs, rehab expectations, TKA  "procedure, edema management, exercise rationale, ROM goals, scar tissue formation, instruction in knee flexion dynasplint donning/doffing  TA 2: Nustep for 5 minutes, Level 2 -- Recumbent Bike for 5 minutes, Level 1 full revolutions (seat 8)  TA 4: DL shuttle press, 50 lbs, 3 x 10 reps -- SL shuttle press, 25 lbs, 3 x 10 reps  TA 5: stair navigation, 4" to 6" step: 10 laps    Time Entry(in minutes):  Neuromuscular Re-Education Time Entry: 44  Therapeutic Activity Time Entry: 23    Assessment & Plan   Assessment: Raine is 5 weeks, 1 days s/p Left TKA. Presents ambulating with single point cane. Presents measuring 93 degrees AAROM Flexion. ROM did not improve throughout session with noted discomfort in knee joint. Encouraged performing DynaSplint longer than 10 minutes at home to improve tissue extensibility. Addition of stair navigation this session to improve functional ADL's.   Evaluation/Treatment Tolerance: Patient tolerated treatment well    The patient will continue to benefit from skilled outpatient physical therapy in order to address the deficits listed in the problem list on the initial evaluation, provide patient and family education, and maximize the patients level of independence in the home and community environments.     The patient's spiritual, cultural, and educational needs were considered, and the patient is agreeable to the plan of care and goals.           Plan: Will continue per POC towards treatment goals. PT/PTA met face to face to discuss patient's treatment plan and progress towards established goals. Patient will be seen by physical therapist every sixth visit and minimally once per month.    Goals:   Active       Ambulation/movement       Patient will walk community distances with SPC in order to demonstrate improved functional ability.  (Progressing)       Start:  06/20/25    Expected End:  08/01/25            Patient will navigate 1 flight of stairs without difficulty in order to " demonstrate improved functional ability.  (Progressing)       Start:  06/20/25    Expected End:  08/01/25               Changing body position       Patient will demonstrate moving sit to stand 12-14 reps in 30 seconds in order to demonstrate improved transfer ability.  (Progressing)       Start:  06/20/25    Expected End:  08/01/25               Functional outcome       Patient will demonstrate independence in home program for support of progression (Progressing)       Start:  06/20/25    Expected End:  08/01/25               Home activities       Patient will perform household indoor maintenance without difficulty in order to demonstrate improved functional ability.  (Progressing)       Start:  06/20/25    Expected End:  08/01/25               Range of Motion       Patient will achieve left knee ROM of  0-120 degrees  (Progressing)       Start:  06/20/25    Expected End:  08/01/25               Strength       Patient will achieve left knee flexion strength of >/= 4+/5 in order to demonstrate improved knee stability.  (Progressing)       Start:  06/20/25    Expected End:  08/01/25            Patient will achieve left knee extension strength of >/= 4+/5 in order to demonstrate improved knee stability.  (Progressing)       Start:  06/20/25    Expected End:  08/01/25                Karis Clemens, PTA

## 2025-07-24 ENCOUNTER — CLINICAL SUPPORT (OUTPATIENT)
Dept: REHABILITATION | Facility: HOSPITAL | Age: 70
End: 2025-07-24
Payer: COMMERCIAL

## 2025-07-24 DIAGNOSIS — R26.2 DIFFICULTY WALKING: ICD-10-CM

## 2025-07-24 DIAGNOSIS — M25.562 CHRONIC PAIN OF LEFT KNEE: Primary | Chronic | ICD-10-CM

## 2025-07-24 DIAGNOSIS — G89.29 CHRONIC PAIN OF LEFT KNEE: Primary | Chronic | ICD-10-CM

## 2025-07-24 PROCEDURE — 97530 THERAPEUTIC ACTIVITIES: CPT

## 2025-07-24 PROCEDURE — 97112 NEUROMUSCULAR REEDUCATION: CPT

## 2025-07-24 NOTE — PROGRESS NOTES
Outpatient Rehab    Physical Therapy Visit    Patient Name: Raine Medley  MRN: [id number]  YOB: 1955  Encounter Date: 7/24/2025    Therapy Diagnosis:   Encounter Diagnoses   Name Primary?    Chronic pain of left knee Yes    Difficulty walking      Physician: Sanford Calvert III, *    Physician Orders: Eval and Treat  Medical Diagnosis: Unilateral primary osteoarthritis, left knee  Surgical Diagnosis: L TKA   Surgical Date: 6/16/2025  Days Since Last Surgery: 38    Visit # / Visits Authorized:  14 / 20  Insurance Authorization Period: 5/30/2025 to 12/31/2025  Date of Evaluation: 6/18/2025  Plan of Care Certification: 6/18/2025 to 8/1/2025      PT/PTA:     Number of PTA visits since last PT visit:   Time In: 1000   Time Out: 1100  Total Time (in minutes): 60   Total Billable Time (in minutes): 60    FOTO:  Intake Score (%): 36  Survey Score 2 (%): Not applicable for this Episode  Survey Score 3 (%): Not applicable for this Episode    Precautions:  Additional Precautions and Protocol Details: Standard      Subjective   Pt reports onset of burning sensation throughout LLE and increased sensitivity to touch last night. She reports 2 rounds of dynasplint usage with 20 minute increment each. She reports difficulty going down stairs..  Pain reported as 6/10.      Objective            Treatment:  Balance/Neuromuscular Re-Education  NMR 4: supine heel slides with wooden board to improve tissue extensibility and ROM: 10 second hold, 5 minutes x 2 trials  NMR 7: 4 inch edyta navigation (8 hurdles) - 5 laps  NMR 8: standing knee flexion, 3 x 10 reps each  Therapeutic Activity  TA 1: Education on anatomy, DVT/infection signs, rehab expectations, TKA procedure, edema management, exercise rationale, ROM goals, scar tissue formation, instruction in knee flexion dynasplint donning/doffing  TA 2: Nustep for 10 minutes, Level 2 -- Recumbent Bike for 5 minutes, Level 1 full revolutions (seat 8)  TA 3: 4 inch  step ups, 3 x 10 -- 4 inch eccentric step down, 3 x 10  TA 4: DL shuttle press, 50 lbs, 3 x 10 reps -- SL shuttle press, 25 lbs, 3 x 10 reps    Time Entry(in minutes):  Neuromuscular Re-Education Time Entry: 40  Therapeutic Activity Time Entry: 20    Assessment & Plan   Assessment: Raine is 5 weeks, 3 days s/p Left TKA. Presents ambulating with single point cane. Presents measuring 93 degrees AAROM Flexion. ROM did not improve throughout session with noted discomfort and pain in knee joint. Encouraged performing DynaSplint with more frequency at home to improve tissue extensibility.        The patient will continue to benefit from skilled outpatient physical therapy in order to address the deficits listed in the problem list on the initial evaluation, provide patient and family education, and maximize the patients level of independence in the home and community environments.     The patient's spiritual, cultural, and educational needs were considered, and the patient is agreeable to the plan of care and goals.           Plan: Continue with established POC.    Goals:   Active       Ambulation/movement       Patient will walk community distances with SPC in order to demonstrate improved functional ability.  (Progressing)       Start:  06/20/25    Expected End:  08/01/25            Patient will navigate 1 flight of stairs without difficulty in order to demonstrate improved functional ability.  (Progressing)       Start:  06/20/25    Expected End:  08/01/25               Changing body position       Patient will demonstrate moving sit to stand 12-14 reps in 30 seconds in order to demonstrate improved transfer ability.  (Progressing)       Start:  06/20/25    Expected End:  08/01/25               Functional outcome       Patient will demonstrate independence in home program for support of progression (Progressing)       Start:  06/20/25    Expected End:  08/01/25               Home activities       Patient will perform  household indoor maintenance without difficulty in order to demonstrate improved functional ability.  (Progressing)       Start:  06/20/25    Expected End:  08/01/25               Range of Motion       Patient will achieve left knee ROM of  0-120 degrees  (Progressing)       Start:  06/20/25    Expected End:  08/01/25               Strength       Patient will achieve left knee flexion strength of >/= 4+/5 in order to demonstrate improved knee stability.  (Progressing)       Start:  06/20/25    Expected End:  08/01/25            Patient will achieve left knee extension strength of >/= 4+/5 in order to demonstrate improved knee stability.  (Progressing)       Start:  06/20/25    Expected End:  08/01/25                Eze Hernandes PT, DPT

## 2025-07-29 ENCOUNTER — CLINICAL SUPPORT (OUTPATIENT)
Dept: REHABILITATION | Facility: HOSPITAL | Age: 70
End: 2025-07-29
Payer: COMMERCIAL

## 2025-07-29 ENCOUNTER — OFFICE VISIT (OUTPATIENT)
Dept: ORTHOPEDICS | Facility: CLINIC | Age: 70
End: 2025-07-29
Payer: COMMERCIAL

## 2025-07-29 ENCOUNTER — HOSPITAL ENCOUNTER (OUTPATIENT)
Dept: RADIOLOGY | Facility: HOSPITAL | Age: 70
Discharge: HOME | End: 2025-07-29
Attending: NURSE PRACTITIONER
Payer: COMMERCIAL

## 2025-07-29 VITALS — BODY MASS INDEX: 25.39 KG/M2 | WEIGHT: 134.38 LBS

## 2025-07-29 DIAGNOSIS — Z96.652 S/P TOTAL KNEE REPLACEMENT, LEFT: ICD-10-CM

## 2025-07-29 DIAGNOSIS — M25.562 CHRONIC PAIN OF LEFT KNEE: Primary | Chronic | ICD-10-CM

## 2025-07-29 DIAGNOSIS — G89.29 CHRONIC PAIN OF LEFT KNEE: Primary | Chronic | ICD-10-CM

## 2025-07-29 DIAGNOSIS — R26.2 DIFFICULTY WALKING: ICD-10-CM

## 2025-07-29 DIAGNOSIS — Z96.652 S/P TOTAL KNEE REPLACEMENT, LEFT: Primary | ICD-10-CM

## 2025-07-29 PROCEDURE — 3288F FALL RISK ASSESSMENT DOCD: CPT | Mod: CPTII,S$GLB,, | Performed by: NURSE PRACTITIONER

## 2025-07-29 PROCEDURE — 97530 THERAPEUTIC ACTIVITIES: CPT

## 2025-07-29 PROCEDURE — 99024 POSTOP FOLLOW-UP VISIT: CPT | Mod: S$GLB,,, | Performed by: NURSE PRACTITIONER

## 2025-07-29 PROCEDURE — 97112 NEUROMUSCULAR REEDUCATION: CPT

## 2025-07-29 PROCEDURE — 73562 X-RAY EXAM OF KNEE 3: CPT | Mod: 26,LT,, | Performed by: RADIOLOGY

## 2025-07-29 PROCEDURE — 1101F PT FALLS ASSESS-DOCD LE1/YR: CPT | Mod: CPTII,S$GLB,, | Performed by: NURSE PRACTITIONER

## 2025-07-29 PROCEDURE — 1125F AMNT PAIN NOTED PAIN PRSNT: CPT | Mod: CPTII,S$GLB,, | Performed by: NURSE PRACTITIONER

## 2025-07-29 PROCEDURE — 97110 THERAPEUTIC EXERCISES: CPT

## 2025-07-29 PROCEDURE — 99999 PR PBB SHADOW E&M-EST. PATIENT-LVL III: CPT | Mod: PBBFAC,,, | Performed by: NURSE PRACTITIONER

## 2025-07-29 PROCEDURE — 1159F MED LIST DOCD IN RCRD: CPT | Mod: CPTII,S$GLB,, | Performed by: NURSE PRACTITIONER

## 2025-07-29 PROCEDURE — 73562 X-RAY EXAM OF KNEE 3: CPT | Mod: TC,LT

## 2025-07-29 PROCEDURE — 1160F RVW MEDS BY RX/DR IN RCRD: CPT | Mod: CPTII,S$GLB,, | Performed by: NURSE PRACTITIONER

## 2025-07-29 NOTE — PROGRESS NOTES
Outpatient Rehab    Physical Therapy Discharge    Patient Name: Raine Medely  MRN: [id number]  YOB: 1955  Encounter Date: 7/29/2025    Therapy Diagnosis:   Encounter Diagnoses   Name Primary?    Chronic pain of left knee Yes    Difficulty walking      Physician: Sanford Calvert III, *    Physician Orders: Eval and Treat  Medical Diagnosis: Unilateral primary osteoarthritis, left knee  Surgical Diagnosis: L TKA   Surgical Date: 6/16/2025  Days Since Last Surgery: 44    Visit # / Visits Authorized:  15 / 20  Insurance Authorization Period: 5/30/2025 to 12/31/2025  Date of Evaluation: 6/18/2025  Plan of Care Certification: 6/18/2025 to 8/1/2025      PT/PTA:     Number of PTA visits since last PT visit:   Time In: 1500   Time Out: 1545  Total Time (in minutes): 45   Total Billable Time (in minutes): 45    FOTO:  Intake Score (%): 36  Survey Score 2 (%): Not applicable for this Episode  Survey Score 3 (%): Not applicable for this Episode    Precautions:  Additional Precautions and Protocol Details: Standard    Subjective   Pt reports ongoing difficulty sleeping at night due to medial knee pain when she tries to lay on her side. She reports some low back pain over the last few days. She says she did not use dynasplint over the weekend due to low back and knee pain..  Pain reported as 5/10.      Objective       Knee Range of Motion   Left Knee   Active (deg) Passive (deg) Pain   Flexion   95     Extension -3                          Knee Strength   Right Strength Right Pain Left Strength Left  Pain   Flexion (S2) 4   4     Prone Flexion           Extension (L3) 4   4                   Timed Up & Go (TUG)  Time: 11 seconds     An older adult who takes >=12 seconds to complete the TUG is at risk for falling.       Sit to Stand Testing      The patient completed 13 repetitions of a sit to stand transfer in 30 seconds. no BUE support              Treatment:  Therapeutic Exercise  TE 1:  "ROM/TUG/30STS  Balance/Neuromuscular Re-Education  NMR 6: standing hip abduction, 3 x 10 reps each  NMR 8: standing knee flexion, 3 x 10 reps each  NMR 9: standing TKE with ball, hold 10 seconds, 3 minutes of reps  Therapeutic Activity  TA 1: Education on anatomy, DVT/infection signs, rehab expectations, TKA procedure, edema management, exercise rationale, ROM goals, scar tissue formation, instruction in knee flexion dynasplint donning/doffing  TA 2: Nustep for 10 minutes, Level 2 -- Recumbent Bike for 5 minutes, Level 1 full revolutions (seat 8)  TA 3: 4 inch step ups, 3 x 10 -- 4 inch eccentric step down, 3 x 10  TA 4: DL shuttle press, 50 lbs, 3 x 10 reps -- SL shuttle press, 25 lbs, 3 x 10 reps  TA 5: stair navigation, 4" to 6" step: 10 laps    Time Entry(in minutes):  Neuromuscular Re-Education Time Entry: 15  Therapeutic Activity Time Entry: 20  Therapeutic Exercise Time Entry: 10    Assessment & Plan   Assessment: Raine is 6 weeks s/p Left TKA. Presents ambulating with single point cane. Pt has attended 15 PT visits since initial evaluation. Pt's ROM has reached a plateau in progress at -3 to 95 degrees and has not improved over the last couple of weeks. She reports not using dynasplint over the weekend due to increased low back and knee pain but resumed use of dynasplint yesterday. She is demonstrating improved transfer ability and gait ability based on reassessment today. She is discharged to [facility] to continue strengthening independently.        The patient's spiritual, cultural, and educational needs were considered, and the patient is agreeable to the plan of care and goals.           Plan: Pt is discharged to [facility].    Goals:   Active       Changing body position       Patient will demonstrate moving sit to stand 12-14 reps in 30 seconds in order to demonstrate improved transfer ability.  (Unable to Meet)       Start:  06/20/25    Expected End:  08/01/25               Home activities       Patient will " perform household indoor maintenance without difficulty in order to demonstrate improved functional ability.  (Met)       Start:  06/20/25    Expected End:  08/01/25    Resolved:  07/30/25            Range of Motion       Patient will achieve left knee ROM of  0-120 degrees  (Unable to Meet)       Start:  06/20/25    Expected End:  08/01/25               Strength       Patient will achieve left knee flexion strength of >/= 4+/5 in order to demonstrate improved knee stability.  (Unable to Meet)       Start:  06/20/25    Expected End:  08/01/25            Patient will achieve left knee extension strength of >/= 4+/5 in order to demonstrate improved knee stability.  (Unable to Meet)       Start:  06/20/25    Expected End:  08/01/25              Resolved       Ambulation/movement       Patient will walk community distances with SPC in order to demonstrate improved functional ability.  (Met)       Start:  06/20/25    Expected End:  08/01/25    Resolved:  07/30/25         Patient will navigate 1 flight of stairs without difficulty in order to demonstrate improved functional ability.  (Met)       Start:  06/20/25    Expected End:  08/01/25    Resolved:  07/30/25            Functional outcome       Patient will demonstrate independence in home program for support of progression (Met)       Start:  06/20/25    Expected End:  08/01/25    Resolved:  07/30/25             Eze Hernandes PT, DPT

## 2025-07-29 NOTE — PROGRESS NOTES
Raine Medley presents for 6 week post-operative visit following a left total knee arthroplasty performed by Dr. Calvert on 6/16/2025.      Exam:   Weight 61 kg (134 lb 5.9 oz).   Ambulating well with assistive device. She is using a cane outside of the house  Incision is healed without drainage or erythema.   ROM:5-95    Post-operative radiographs reviewed today revealing a well fixed and aligned prosthesis.    A/P:  6 weeks s/p left total knee arthroplasty    - The patient was advised to keep the incision clean and dry for the next 24 hours after which she may wash the area with antibacterial soap in the shower. Will not submerge until the incision is completely healed.   - Outpatient PT ongoing: at the [place] location   - Follow up in 6 weeks with Dr. Calvert. Pt will call clinic with problems/concerns.

## 2025-08-04 ENCOUNTER — PATIENT MESSAGE (OUTPATIENT)
Dept: ADMINISTRATIVE | Facility: HOSPITAL | Age: 70
End: 2025-08-04
Payer: COMMERCIAL

## 2025-08-16 ENCOUNTER — PATIENT MESSAGE (OUTPATIENT)
Dept: ORTHOPEDICS | Facility: CLINIC | Age: 70
End: 2025-08-16
Payer: COMMERCIAL

## 2025-08-18 DIAGNOSIS — Z96.652 STATUS POST LEFT KNEE REPLACEMENT: Primary | ICD-10-CM

## 2025-08-18 RX ORDER — PREGABALIN 75 MG/1
75 CAPSULE ORAL 2 TIMES DAILY
Qty: 60 CAPSULE | Refills: 1 | Status: SHIPPED | OUTPATIENT
Start: 2025-08-18 | End: 2026-02-16

## 2025-08-19 DIAGNOSIS — Z96.652 S/P TOTAL KNEE REPLACEMENT, LEFT: ICD-10-CM

## 2025-08-20 RX ORDER — MULTIVITAMIN
1 TABLET ORAL DAILY
Qty: 14 TABLET | Refills: 0 | OUTPATIENT
Start: 2025-08-20

## 2025-08-23 DIAGNOSIS — Z96.652 S/P TOTAL KNEE REPLACEMENT, LEFT: ICD-10-CM

## 2025-08-25 RX ORDER — MULTIVITAMIN
1 TABLET ORAL DAILY
Qty: 14 TABLET | Refills: 0 | Status: SHIPPED | OUTPATIENT
Start: 2025-08-25

## (undated) DEVICE — SYR 50CC LL

## (undated) DEVICE — MARKER SKIN RULER STERILE

## (undated) DEVICE — DRAPE SURG W/TWL 17 5/8X23

## (undated) DEVICE — DRAPE TOP 53X102IN

## (undated) DEVICE — TOWEL OR DISP STRL BLUE 4/PK

## (undated) DEVICE — COVER MAYO STND XL 30X57IN

## (undated) DEVICE — SOL NACL IRR 1000ML BTL

## (undated) DEVICE — SYS KNEE EPAK PIN ATTUNE
Type: IMPLANTABLE DEVICE | Site: KNEE | Status: NON-FUNCTIONAL
Removed: 2025-06-16

## (undated) DEVICE — UNDERGLOVES BIOGEL PI SIZE 8

## (undated) DEVICE — CATH SUCTION 10FR

## (undated) DEVICE — TAPE SILK 3IN

## (undated) DEVICE — SPONGE GAUZE 16PLY 4X4

## (undated) DEVICE — SUT QUILL PDO VIOL CP 45CM 2

## (undated) DEVICE — SOL NACL IRR 3000ML

## (undated) DEVICE — PIN VELYS ARRAY DRILL 4X125MM
Type: IMPLANTABLE DEVICE | Site: KNEE | Status: NON-FUNCTIONAL
Removed: 2025-06-16

## (undated) DEVICE — SUT 1 36IN COATED VICRYL UN

## (undated) DEVICE — DRESSING TRANS 4X4 TEGADERM

## (undated) DEVICE — PIN VELYS ARRAY DRILL 4X175MM
Type: IMPLANTABLE DEVICE | Site: KNEE | Status: NON-FUNCTIONAL
Removed: 2025-06-16

## (undated) DEVICE — SUT VICRYL 3-0 OB 36 CT-1

## (undated) DEVICE — KIT TOTAL KNEE TKOFG OMC

## (undated) DEVICE — GLOVE BIOGEL PI MICRO SZ 7

## (undated) DEVICE — HOOD T7 W/ PEEL AWAY LENS

## (undated) DEVICE — SYS IRRISEPT 450ML0.05% CHG

## (undated) DEVICE — ALCOHOL 70% ISOP RUBBING 4OZ

## (undated) DEVICE — DRAPE T EXTRM SURG 121X128X90

## (undated) DEVICE — GOWN SMARTGOWN 3XL XLONG

## (undated) DEVICE — SOL BETADINE 5%

## (undated) DEVICE — SPONGE COTTON TRAY 4X4IN

## (undated) DEVICE — SUT QUILL 2T11 36IN

## (undated) DEVICE — MARKER SKIN STND TIP BLUE BARR

## (undated) DEVICE — SYS REVOLUTION CEMENT MIXING

## (undated) DEVICE — SUT 2/0 36IN COATED VICRYL

## (undated) DEVICE — NDL 18GA X1 1/2 REG BEVEL

## (undated) DEVICE — GAUZE SPONGE 4X4 12PLY

## (undated) DEVICE — CONTAINER SPECIMEN OR STER 4OZ

## (undated) DEVICE — DRAPE INCISE IOBAN 2 23X33IN

## (undated) DEVICE — BLADE DUAL CUT SAG 35X64X.89MM

## (undated) DEVICE — GLOVE SENSICARE PI SURG 7.5

## (undated) DEVICE — GLOVE BIOGEL SKINSENSE PI 8.0

## (undated) DEVICE — DRESSING AQUACEL AG RBBN 2X45

## (undated) DEVICE — BLADE VELYS OSCILLATING SAW

## (undated) DEVICE — TAPE CURAD SILK ADH 3INX10YD

## (undated) DEVICE — SUT MCRYL PLUS 4-0 PS2 27IN

## (undated) DEVICE — DRAPE THREE-QTR REINF 53X77IN

## (undated) DEVICE — TUBE SUCTION FRAZIER VENT 10FR

## (undated) DEVICE — BLADE RECIP DS OFST 70X1X12.5

## (undated) DEVICE — KIT IRR SUCTION HND PIECE

## (undated) DEVICE — ADHESIVE DERMABOND ADVANCED

## (undated) DEVICE — SYS KNEE EPAK PIN ATTUNE
Type: IMPLANTABLE DEVICE | Site: KNEE | Status: NON-FUNCTIONAL
Removed: 2023-06-05

## (undated) DEVICE — BRUSH SCRUB HIBICLENS 4%

## (undated) DEVICE — TOWEL OR XRAY WHITE 17X26IN

## (undated) DEVICE — GOWN ECLIPSE REINF LV4 XLNG XL

## (undated) DEVICE — CLOTH READYBATH STANDARD WT

## (undated) DEVICE — DRESSING TELFA N ADH 3X8

## (undated) DEVICE — BLADE SAGITTAL 18 X 1.27 X 90M

## (undated) DEVICE — DRAPE VELYS SATELLITE STATION

## (undated) DEVICE — DRAPE STERI U-SHAPED 47X51IN

## (undated) DEVICE — UNDERGLOVES BIOGEL PI SIZE 8.5

## (undated) DEVICE — UNDERGLOVES BIOGEL PI SIZE 7.5

## (undated) DEVICE — PENCIL SMK EVAC CONNECTOR 10FT

## (undated) DEVICE — PAD KNEE POLAR XL

## (undated) DEVICE — ELECTRODE REM PLYHSV RETURN 9

## (undated) DEVICE — PULSAVAC ZIMMER

## (undated) DEVICE — MANIFOLD 4 PORT

## (undated) DEVICE — SUT MONOCRYL 4-0 PS-1 UND

## (undated) DEVICE — DRAPE VELYS DEVICE STERILE

## (undated) DEVICE — ALCOHOL 70% ANTISEPTIC ISO 4OZ

## (undated) DEVICE — WRAP KNEE ACCU THERM GEL PACK

## (undated) DEVICE — NDL HYPO STD REG BVL 18GX1.5IN

## (undated) DEVICE — SET VELYS PURESIGHT ARRAY KNEE